# Patient Record
Sex: MALE | Race: WHITE | NOT HISPANIC OR LATINO | Employment: PART TIME | URBAN - METROPOLITAN AREA
[De-identification: names, ages, dates, MRNs, and addresses within clinical notes are randomized per-mention and may not be internally consistent; named-entity substitution may affect disease eponyms.]

---

## 2017-01-16 ENCOUNTER — TRANSCRIBE ORDERS (OUTPATIENT)
Dept: ADMINISTRATIVE | Facility: HOSPITAL | Age: 72
End: 2017-01-16

## 2017-01-16 DIAGNOSIS — Z87.891 PERSONAL HISTORY OF TOBACCO USE, PRESENTING HAZARDS TO HEALTH: Primary | ICD-10-CM

## 2017-01-20 ENCOUNTER — HOSPITAL ENCOUNTER (OUTPATIENT)
Dept: ULTRASOUND IMAGING | Facility: HOSPITAL | Age: 72
Discharge: HOME | End: 2017-01-20
Attending: FAMILY MEDICINE | Admitting: FAMILY MEDICINE

## 2017-01-20 DIAGNOSIS — Z87.891 PERSONAL HISTORY OF TOBACCO USE, PRESENTING HAZARDS TO HEALTH: ICD-10-CM

## 2017-01-20 PROCEDURE — 76706 US ABDL AORTA SCREEN AAA: CPT

## 2017-02-17 ENCOUNTER — OFFICE VISIT (OUTPATIENT)
Dept: CARDIOLOGY | Facility: CLINIC | Age: 72
End: 2017-02-17

## 2017-02-17 VITALS
WEIGHT: 212 LBS | DIASTOLIC BLOOD PRESSURE: 95 MMHG | HEIGHT: 71 IN | BODY MASS INDEX: 29.68 KG/M2 | SYSTOLIC BLOOD PRESSURE: 151 MMHG | HEART RATE: 63 BPM

## 2017-02-17 DIAGNOSIS — I25.10 CORONARY ARTERY DISEASE INVOLVING NATIVE CORONARY ARTERY OF NATIVE HEART WITHOUT ANGINA PECTORIS: Primary | ICD-10-CM

## 2017-02-17 DIAGNOSIS — I10 ESSENTIAL HYPERTENSION: ICD-10-CM

## 2017-02-17 DIAGNOSIS — E78.2 MIXED HYPERLIPIDEMIA: ICD-10-CM

## 2017-02-17 PROCEDURE — 99213 OFFICE O/P EST LOW 20 MIN: CPT | Performed by: INTERNAL MEDICINE

## 2017-02-17 NOTE — PROGRESS NOTES
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     PROBLEM LIST:  1.  CAD:  a. 11/15/1993: PTCA and repeat PTCA 1 week following of proximal  LAD, per Dr. Lovelace with normal circumflex and RCA noted at that time.   b. April 2008: Overlapping 3.0 x 24 mm. And 3.0 x 16.0 mm Taxus to LAD and 2.5 x 8.0 PTCA to proximal first OM, inability to pass stent.  EF greater than 60.  c. Stress echocardiogram,  03/08/2012, within normal limits: EF 60%, 10.1 SHARIFA.    d. 5/16 : Stress echocardiogram, which was within normal limits. EF greater than 60%. Normal hemodynamic response with exercise.   2. Dyslipidemia:  a.  November 2009:  Total cholesterol 163, triglycerides 169, HDL 55, LDL 81.  b. 02/30/2012:  Total cholesterol 242, HDL 51, triglycerides 192, , off statin therapy.  3. Nicotine addiction cessation 15 years prior.  4. HTN, presumed essential.  5. Hypothyroidism on replacement therapy.   6. Exogenous obesity.  7. Arthritis ---- data deficient, on steroid therapy greater than 5 years.   8. GERD.   9. Easy bruising.   10. Right jaw swelling, ongoing evaluation for potential mandibular infection per Dr. Smith.   11. Hip and foot pain, followed per Subhash Sloan and Chao.    12. Prostatism- 2017 3 rounds abx     ALLERGIES/DRUG INTOLERANCES:  1. ZOCOR, arthralgias.  2. LIPITOR,  arthralgias.  3. CRESTOR, arthralgias.    CURRENT MEDICATIONS:  1.  Tamsulosin 0.4 mg.  2. Celebrex 200 mg.  3. Crestor 10 mg.  4. Metoprolol tartrate 25 mg.  5. Levothyroxine 125 mg.  6. Prednisone 5 mg.  7. Nitrostat p.r.n.   8. Aspirin 81 mg.  9. Vitamin D3 at 5000 international units daily.  10. Fish oil 1000 mg.  11.  Osteo Bi-Flex b.i.d.     SUBJECTIVE: Patient presents in followup. He has had no new  substernal chest fullness, pressure, and shortness of breath with activity. He states  he is not sleeping  well and is still having some issues with his prostate.     OBJECTIVE:  VITAL SIGNS: Blood pressure 159/88, heart rate 58, weight 228 pounds, up 11 pounds from last visit.  GENERAL: Overweight white male appears stated age.   NECK: No JVD.   CARDIOVASCULAR: S1 and S2. Soft holosystolic murmur in the left upper sternal border.  CHEST: Clear.   ABDOMEN: Soft. Positive bowel sounds.    EXTREMITIES: No pitting edema.     IMPRESSION AND PLAN:  1. Chest pain, anginal equivalent, with prior revascularization. No stress test since 2012. Risk stratify  with stress echocardiogram at his nearest convenience. Low threshold for invasive ischemic evaluation with any wall motion abnormality.   2. Hypertension, uncontrolled, followup on the day of stress test.   3. Dyslipidemia, on statin therapy with recent  laboratory with Dr. Fletcher. We will obtain this at nearest convenience.   4. Exogenous obesity. Counseled regarding need for weight reduction. I will see him back otherwise in 6-9 months.   5. Disposition following stress testing.       Tal Wagner MD*  KINGSLEY/ashkan    cc: Del Fletcher MD    [facility]

## 2017-02-22 ENCOUNTER — TELEPHONE (OUTPATIENT)
Dept: CARDIOLOGY | Facility: CLINIC | Age: 72
End: 2017-02-22

## 2017-02-22 NOTE — TELEPHONE ENCOUNTER
Patient called and stated that he had discussed a medication OTC he could take as he has been on multiple antibiotics. Asked patient if the dicussed probiotic. Patient stated that he believes that was what it was and wanted to know where to get. Informed patient probiotic are OTC and he can get at any pharmacy. Patient verbalized understanding.

## 2017-04-05 ENCOUNTER — OFFICE VISIT (OUTPATIENT)
Dept: ORTHOPEDIC SURGERY | Facility: CLINIC | Age: 72
End: 2017-04-05

## 2017-04-05 VITALS
HEIGHT: 71 IN | DIASTOLIC BLOOD PRESSURE: 100 MMHG | WEIGHT: 224 LBS | SYSTOLIC BLOOD PRESSURE: 150 MMHG | BODY MASS INDEX: 31.36 KG/M2 | HEART RATE: 63 BPM

## 2017-04-05 DIAGNOSIS — M70.61 TROCHANTERIC BURSITIS OF BOTH HIPS: Primary | ICD-10-CM

## 2017-04-05 DIAGNOSIS — M70.62 TROCHANTERIC BURSITIS OF BOTH HIPS: Primary | ICD-10-CM

## 2017-04-05 PROCEDURE — 20610 DRAIN/INJ JOINT/BURSA W/O US: CPT | Performed by: PHYSICIAN ASSISTANT

## 2017-04-05 PROCEDURE — 99213 OFFICE O/P EST LOW 20 MIN: CPT | Performed by: PHYSICIAN ASSISTANT

## 2017-04-05 RX ORDER — NITROFURANTOIN MACROCRYSTALS 50 MG/1
CAPSULE ORAL
Refills: 0 | COMMUNITY
Start: 2017-01-11 | End: 2018-01-19

## 2017-04-05 RX ORDER — METOPROLOL SUCCINATE 50 MG/1
TABLET, EXTENDED RELEASE ORAL
Refills: 1 | Status: ON HOLD | COMMUNITY
Start: 2017-02-06 | End: 2018-01-29

## 2017-04-05 RX ORDER — METHYLPREDNISOLONE ACETATE 40 MG/ML
40 INJECTION, SUSPENSION INTRA-ARTICULAR; INTRALESIONAL; INTRAMUSCULAR; SOFT TISSUE ONCE
Status: COMPLETED | OUTPATIENT
Start: 2017-04-05 | End: 2017-04-05

## 2017-04-05 RX ORDER — LIDOCAINE HYDROCHLORIDE 10 MG/ML
4 INJECTION, SOLUTION INFILTRATION; PERINEURAL ONCE
Status: COMPLETED | OUTPATIENT
Start: 2017-04-05 | End: 2017-04-05

## 2017-04-05 RX ORDER — BUPIVACAINE HYDROCHLORIDE 5 MG/ML
4 INJECTION, SOLUTION PERINEURAL ONCE
Status: COMPLETED | OUTPATIENT
Start: 2017-04-05 | End: 2017-04-05

## 2017-04-05 RX ORDER — PROMETHAZINE HYDROCHLORIDE AND CODEINE PHOSPHATE 6.25; 1 MG/5ML; MG/5ML
SYRUP ORAL
Refills: 0 | COMMUNITY
Start: 2017-03-30 | End: 2017-08-15

## 2017-04-05 RX ORDER — DOXYCYCLINE HYCLATE 100 MG/1
CAPSULE ORAL
Refills: 0 | COMMUNITY
Start: 2017-03-30 | End: 2017-08-15

## 2017-04-05 RX ORDER — AMOXICILLIN AND CLAVULANATE POTASSIUM 562.5; 437.5; 62.5 MG/1; MG/1; MG/1
TABLET, FILM COATED, EXTENDED RELEASE ORAL
Refills: 0 | COMMUNITY
Start: 2017-02-09 | End: 2017-08-15

## 2017-04-05 RX ORDER — BUPIVACAINE HYDROCHLORIDE 5 MG/ML
4 INJECTION, SOLUTION EPIDURAL; INTRACAUDAL ONCE
Status: DISCONTINUED | OUTPATIENT
Start: 2017-04-05 | End: 2017-04-05

## 2017-04-05 RX ORDER — MELOXICAM 15 MG/1
TABLET ORAL
COMMUNITY
Start: 2015-12-15 | End: 2017-04-05 | Stop reason: SDUPTHER

## 2017-04-05 RX ADMIN — BUPIVACAINE HYDROCHLORIDE 4 ML: 5 INJECTION, SOLUTION PERINEURAL at 16:48

## 2017-04-05 RX ADMIN — METHYLPREDNISOLONE ACETATE 40 MG: 40 INJECTION, SUSPENSION INTRA-ARTICULAR; INTRALESIONAL; INTRAMUSCULAR; SOFT TISSUE at 16:12

## 2017-04-05 RX ADMIN — LIDOCAINE HYDROCHLORIDE 4 ML: 10 INJECTION, SOLUTION INFILTRATION; PERINEURAL at 16:11

## 2017-04-05 RX ADMIN — LIDOCAINE HYDROCHLORIDE 4 ML: 10 INJECTION, SOLUTION INFILTRATION; PERINEURAL at 16:10

## 2017-04-05 RX ADMIN — BUPIVACAINE HYDROCHLORIDE 4 ML: 5 INJECTION, SOLUTION PERINEURAL at 16:47

## 2017-04-05 NOTE — PROGRESS NOTES
Subjective   Rodrigo Baum is a 72 y.o. male with returned bilateral trochanteric bursitis.    History of Present Illness   Mr. Baum returns today with returned bilateral lateral hip pain.  He reports the pain has returned for about 3 weeks.  He has pain with lying on his sides and some pain with walking.  The pain localizes to the greater trochanter bilaterally with no radiating pain, no groin pain, no buttock pain.  He has been treated in the past with multiple steroid injections into the trochanteric bursa every 6 months or more.  He does at home exercises to maintain his bilateral hip and knee strength.  He is here today wanting repeat injections.  No new symptoms.    Allergies   Allergen Reactions   • Lipitor [Atorvastatin] Anxiety and Other (See Comments)     Arthralgias     • Lortab [Hydrocodone-Acetaminophen] Anxiety     Current Outpatient Prescriptions on File Prior to Visit   Medication Sig Dispense Refill   • aspirin 81 MG EC tablet Take 81 mg by mouth Daily.     • cholecalciferol (VITAMIN D3) 1000 UNITS tablet Take 2,000 Units by mouth Daily.     • glucosamine-chondroitin 500-400 MG capsule capsule Take  by mouth 3 (Three) Times a Day With Meals.     • levothyroxine (SYNTHROID, LEVOTHROID) 125 MCG tablet Take 125 mcg by mouth Daily.     • meloxicam (MOBIC) 15 MG tablet Take 15 mg by mouth Daily.     • nitroglycerin (NITROSTAT) 0.4 MG SL tablet Place 0.4 mg under the tongue Every 5 (Five) Minutes As Needed for chest pain. Take no more than 3 doses in 15 minutes.     • pravastatin (PRAVACHOL) 10 MG tablet Take 10 mg by mouth Daily.     • predniSONE (DELTASONE) 5 MG tablet Take 5 mg by mouth Daily.     • tamsulosin (FLOMAX) 0.4 MG capsule 24 hr capsule Take 1 capsule by mouth Every Night.     • famotidine (PEPCID) 20 MG tablet Take 1 tablet by mouth 2 (Two) Times a Day As Needed for heartburn. 60 tablet 0   • metoprolol succinate XL (TOPROL-XL) 25 MG 24 hr tablet Take 25 mg by mouth Daily.       No  current facility-administered medications on file prior to visit.      Social History     Social History   • Marital status:      Spouse name: N/A   • Number of children: N/A   • Years of education: N/A     Occupational History   • Not on file.     Social History Main Topics   • Smoking status: Former Smoker     Types: Cigarettes   • Smokeless tobacco: Never Used   • Alcohol use 0.6 oz/week     1 Cans of beer per week      Comment: occasionally   • Drug use: No   • Sexual activity: Defer     Other Topics Concern   • Not on file     Social History Narrative     Past Surgical History:   Procedure Laterality Date   • CORONARY ANGIOPLASTY WITH STENT PLACEMENT      X2   • CYSTOSCOPY TRANSURETHRAL RESECTION OF PROSTATE N/A 11/1/2016    Procedure: CYSTOSCOPY TRANSURETHRAL RESECTION OF PROSTATE GREENLIGHT;  Surgeon: Alverto Richards MD;  Location: [facility];  Service:    • TONSILLECTOMY       Family History   Problem Relation Age of Onset   • Diabetes Father    • Heart disease Father    • Cancer Father    • Hypertension Father    • Stroke Mother    • Hypertension Mother    • Osteoarthritis Mother    • Stroke Other    • Hypertension Other      Past Medical History:   Diagnosis Date   • Arthritis     Arthritis -- data deficient, on steroid therapy greater than 5 years.    • CAD (coronary artery disease)    • Coronary angioplasty status    • Disease of thyroid gland    • Dyslipidemia    • Easy bruising    • Enlarged prostate    • Exogenous obesity    • Foot pain     Hip and foot pain, followed per Subhash Sloan and Chao.     • GERD (gastroesophageal reflux disease)    • Hip pain     Hip and foot pain, followed per Subhash Sloan and Chao.     • HLD (hyperlipidemia)    • Hypertension     HTN, presumed essential.   • Hypothyroidism     Hypothyroidism on replacement therapy.    • Jaw swelling     Right jaw swelling, ongoing evaluation for potential mandibular infection per Dr. Smith.    • Kidney problem    • Nicotine  "addiction     Nicotine addiction cessation 15 years prior.   • Osteoporosis    • Thin blood          Review of Systems   Constitutional: Negative.  Negative for chills and fatigue.   HENT: Negative.    Eyes: Negative.    Respiratory: Negative.    Cardiovascular: Negative.    Gastrointestinal: Negative.    Endocrine: Negative.    Genitourinary: Negative.    Musculoskeletal: Positive for arthralgias. Negative for gait problem and joint swelling.   Skin: Negative.    Allergic/Immunologic: Negative.    Neurological: Negative.  Negative for weakness.   Hematological: Negative.    Psychiatric/Behavioral: Negative.        Objective   /100  Pulse 63  Ht 71\" (180.3 cm)  Wt 224 lb (102 kg)  BMI 31.24 kg/m2      Physical Exam   Constitutional: He is oriented to person, place, and time. He appears well-developed and well-nourished. No distress.   Neurological: He is alert and oriented to person, place, and time.   Skin: Skin is warm, dry and intact. No rash noted. No erythema.   Psychiatric: He has a normal mood and affect.     Body habitus: obese  Gait: normal    Right Hip Exam     Tenderness   The patient is experiencing tenderness in the greater trochanter.    Range of Motion   The patient has normal right hip ROM.  Flexion: 90   Internal Rotation: 30   External Rotation: 40     Muscle Strength   Abduction: 5/5   Adduction: 5/5   Flexion: 5/5     Other   Sensation: normal    Comments:  Negative Stinchfield's      Left Hip Exam     Tenderness   The patient is experiencing tenderness in the greater trochanter.    Range of Motion   The patient has normal left hip ROM.  Flexion: 90   Internal Rotation: 30   External Rotation: 40     Muscle Strength   Abduction: 5/5   Adduction: 5/5   Flexion: 5/5     Other   Sensation: normal    Comments:  Negative Stinchfield's        Back Exam     Tenderness   The patient is experiencing no tenderness.     Tests   Straight leg raise right: negative  Straight leg raise left: " negative    Other   Gait: normal             Assessment/Plan    Bilateral trochanteric bursitis.  I reviewed clinical findings, past and current treatment with the patient.  On exam, he is tender over bilateral greater trochanter.  He has normal range of motion of bilateral hips with no reproducible groin pain and normal strength.  He has beaded been treated in the past for several years by both myself and Dr. Mani Sloan with intermittent steroid injection and at-home exercises.  This gives him approximately 5-6 months relief before the pain returns.  I discussed the possibility of doing formal physical therapy, is not interested.  Then today's repeat steroid injection into bilateral trochanteric bursa.  He will return in 6 months or sooner if needed.      Using sterile technique, the left hip was sterilely prepped with Hibiclens.  Following time out, using a 25 gauge needle, the left trochanteric bursa was injected with 40mg Depo Medrol, 4 cc lidocaine and 4 cc marcaine.  Patient tolerated the procedure well. No complications.    Using sterile technique, the right hip was sterilely prepped with Hibiclens.  Using a 25 gauge needle, the right trochanteric bursa was injected with 40mg Depo Medrol, 4 cc lidocaine and 4 cc marcaine.  Patient tolerated the procedure well. No complications.        Rodrigo was seen today for follow-up, injections, follow-up and injections.    Diagnoses and all orders for this visit:    Trochanteric bursitis of both hips  -     methylPREDNISolone acetate (DEPO-medrol) injection 40 mg; 1 mL by Intra-tendon route 1 (One) Time.  -     methylPREDNISolone acetate (DEPO-medrol) injection 40 mg; 1 mL by Intra-tendon route 1 (One) Time.  -     lidocaine (XYLOCAINE) 1 % injection 4 mL; Inject 4 mL as directed 1 (One) Time.  -     bupivacaine (PF) (MARCAINE) 0.5 % injection 4 mL; Inject 4 mL as directed 1 (One) Time.  -     lidocaine (XYLOCAINE) 1 % injection 4 mL; Inject 4 mL as directed 1 (One)  Time.  -     bupivacaine (PF) (MARCAINE) 0.5 % injection 4 mL; Inject 4 mL as directed 1 (One) Time.    Other orders  -     Cancel: Large Joint Arthrocentesis

## 2017-05-18 ENCOUNTER — TRANSCRIBE ORDERS (OUTPATIENT)
Dept: ADMINISTRATIVE | Facility: HOSPITAL | Age: 72
End: 2017-05-18

## 2017-05-18 ENCOUNTER — HOSPITAL ENCOUNTER (OUTPATIENT)
Dept: GENERAL RADIOLOGY | Facility: HOSPITAL | Age: 72
Discharge: HOME | End: 2017-05-18
Attending: FAMILY MEDICINE | Admitting: FAMILY MEDICINE

## 2017-05-18 DIAGNOSIS — J45.991 COUGH VARIANT ASTHMA: Primary | ICD-10-CM

## 2017-05-18 PROCEDURE — 71020 HC CHEST PA AND LATERAL: CPT

## 2017-08-01 ENCOUNTER — OFFICE VISIT (OUTPATIENT)
Dept: ORTHOPEDIC SURGERY | Facility: CLINIC | Age: 72
End: 2017-08-01

## 2017-08-01 DIAGNOSIS — M17.0 PRIMARY OSTEOARTHRITIS OF BOTH KNEES: Primary | ICD-10-CM

## 2017-08-01 PROCEDURE — 20610 DRAIN/INJ JOINT/BURSA W/O US: CPT | Performed by: PHYSICIAN ASSISTANT

## 2017-08-01 RX ORDER — HYALURONATE SODIUM 10 MG/ML
20 SYRINGE (ML) INTRAARTICULAR
Status: COMPLETED | OUTPATIENT
Start: 2017-08-01 | End: 2017-08-01

## 2017-08-01 RX ORDER — LISINOPRIL 10 MG/1
TABLET ORAL
Refills: 2 | Status: ON HOLD | COMMUNITY
Start: 2017-07-17 | End: 2018-01-29

## 2017-08-01 RX ADMIN — Medication 20 MG: at 15:40

## 2017-08-01 RX ADMIN — Medication 20 MG: at 15:39

## 2017-08-01 NOTE — PROGRESS NOTES
Procedure   Large Joint Arthrocentesis  Date/Time: 8/1/2017 3:40 PM  Consent given by: patient  Site marked: site marked  Timeout: Immediately prior to procedure a time out was called to verify the correct patient, procedure, equipment, support staff and site/side marked as required   Supporting Documentation  Indications: pain   Procedure Details  Location: knee - L knee  Preparation: Patient was prepped and draped in the usual sterile fashion  Needle size: 22 G  Medications administered: 20 mg Sodium Hyaluronate 20 MG/2ML  Patient tolerance: patient tolerated the procedure well with no immediate complications

## 2017-08-01 NOTE — PROGRESS NOTES
Procedure   Large Joint Arthrocentesis  Date/Time: 8/1/2017 3:39 PM  Consent given by: patient  Site marked: site marked  Timeout: Immediately prior to procedure a time out was called to verify the correct patient, procedure, equipment, support staff and site/side marked as required   Supporting Documentation  Indications: pain   Procedure Details  Location: knee - R knee  Preparation: Patient was prepped and draped in the usual sterile fashion  Needle size: 22 G  Approach: anterolateral  Medications administered: 20 mg Sodium Hyaluronate 20 MG/2ML  Patient tolerance: patient tolerated the procedure well with no immediate complications

## 2017-08-01 NOTE — PROGRESS NOTES
Subjective     Injections (bilateral euflexxa #1)      Rodrigo Baum is a 72 y.o. male.     History of Present Illness   Patient returns to begin a series of Euflexxa in bilateral knees.  He reports no new symptoms.  He complains of mild medial functional pain with no night pain.  He denies any radiating pain or mechanical symptoms.  He denies any erythema, warmth or effusion.  He describes the pain as aching with intermittent sharp pain.  He reports he gets significant relief from Euflexxa injections for up to 6 months.  As long he is he is getting significant relief from these injections he will continue to get them.    Allergies   Allergen Reactions   • Lipitor [Atorvastatin] Anxiety and Other (See Comments)     Arthralgias     • Lortab [Hydrocodone-Acetaminophen] Anxiety     Current Outpatient Prescriptions on File Prior to Visit   Medication Sig Dispense Refill   • amoxicillin-clavulanate XR (AUGMENTIN XR) 1000-62.5 MG per 12 hr tablet TK 2 TS PO Q 12 H  0   • aspirin 81 MG EC tablet Take 81 mg by mouth Daily.     • cholecalciferol (VITAMIN D3) 1000 UNITS tablet Take 2,000 Units by mouth Daily.     • doxycycline (VIBRAMYCIN) 100 MG capsule   0   • famotidine (PEPCID) 20 MG tablet Take 1 tablet by mouth 2 (Two) Times a Day As Needed for heartburn. 60 tablet 0   • glucosamine-chondroitin 500-400 MG capsule capsule Take  by mouth 3 (Three) Times a Day With Meals.     • levothyroxine (SYNTHROID, LEVOTHROID) 125 MCG tablet Take 125 mcg by mouth Daily.     • meloxicam (MOBIC) 15 MG tablet Take 15 mg by mouth Daily.     • metoprolol succinate XL (TOPROL-XL) 25 MG 24 hr tablet Take 25 mg by mouth Daily.     • metoprolol succinate XL (TOPROL-XL) 50 MG 24 hr tablet TK 1 T PO D  1   • nitrofurantoin (MACRODANTIN) 50 MG capsule As needed  0   • nitroglycerin (NITROSTAT) 0.4 MG SL tablet Place 0.4 mg under the tongue Every 5 (Five) Minutes As Needed for chest pain. Take no more than 3 doses in 15 minutes.     •  pravastatin (PRAVACHOL) 10 MG tablet Take 10 mg by mouth Daily.     • predniSONE (DELTASONE) 5 MG tablet Take 5 mg by mouth Daily.     • promethazine-codeine (PHENERGAN with CODEINE) 6.25-10 MG/5ML syrup TK 1 OR 2 TEA PO Q 4 TO 6 H PRN FOR COUGH  0   • tamsulosin (FLOMAX) 0.4 MG capsule 24 hr capsule Take 1 capsule by mouth Every Night.       No current facility-administered medications on file prior to visit.      Social History     Social History   • Marital status:      Spouse name: N/A   • Number of children: N/A   • Years of education: N/A     Occupational History   • Not on file.     Social History Main Topics   • Smoking status: Former Smoker     Types: Cigarettes   • Smokeless tobacco: Never Used   • Alcohol use 0.6 oz/week     1 Cans of beer per week      Comment: occasionally   • Drug use: No   • Sexual activity: Defer     Other Topics Concern   • Not on file     Social History Narrative     Past Surgical History:   Procedure Laterality Date   • CORONARY ANGIOPLASTY WITH STENT PLACEMENT      X2   • CYSTOSCOPY TRANSURETHRAL RESECTION OF PROSTATE N/A 11/1/2016    Procedure: CYSTOSCOPY TRANSURETHRAL RESECTION OF PROSTATE GREENLIGHT;  Surgeon: Alverto Richards MD;  [facility];  Service:    • TONSILLECTOMY       Family History   Problem Relation Age of Onset   • Diabetes Father    • Heart disease Father    • Cancer Father    • Hypertension Father    • Stroke Mother    • Hypertension Mother    • Osteoarthritis Mother    • Stroke Other    • Hypertension Other      Past Medical History:   Diagnosis Date   • Arthritis     Arthritis -- data deficient, on steroid therapy greater than 5 years.    • CAD (coronary artery disease)    • Coronary angioplasty status    • Disease of thyroid gland    • Dyslipidemia    • Easy bruising    • Enlarged prostate    • Exogenous obesity    • Foot pain     Hip and foot pain, followed per Subhash Sloan and Chao.     • GERD (gastroesophageal reflux disease)    • Hip  pain     Hip and foot pain, followed per Subhash Sloan and Chao.     • HLD (hyperlipidemia)    • Hypertension     HTN, presumed essential.   • Hypothyroidism     Hypothyroidism on replacement therapy.    • Jaw swelling     Right jaw swelling, ongoing evaluation for potential mandibular infection per Dr. Smith.    • Kidney problem    • Nicotine addiction     Nicotine addiction cessation 15 years prior.   • Osteoporosis    • Thin blood          Review of Systems    The following portions of the patient's history were reviewed and updated as appropriate: allergies, current medications, past family history, past medical history, past social history, past surgical history and problem list.    Objective   There were no vitals taken for this visit.    Physical Exam:   GENERAL: Body habitus: obese    Gait: normal     Mental Status:  awake and alert; oriented to person, place, and time    Voice:  clear  SKIN:  Normal    Hair Growth:  Right:normal; Left:  normal  HEENT: Head: Normocephalic, no lesions, without obvious abnormality.     Eyes: sclera anicteric  PULM:  Repiratory effort normal    Ortho Exam    Right Knee Exam  ----------  ALIGNMENT: Right: neutral----------  RANGE OF MOTION:  Right: Normal (0-130 degrees) with no extensor lag or flexion contracture  LIGAMENTOUS STABILITY:   Right:stable to varus and valgus stress at 0 and 30 degrees without any evidence of laxity----------  STRENGTH:  KNEE FLEXION Right 5/5  KNEE EXTENSION Right 5/5 ----------  PAIN WITH PALPATION: Right denies tenderness to palpation about the knee  PAIN WITH KNEE ROM: Right no  PATELLAR CREPITUS: Right yes   ----------    Left Knee Exam  ----------  Knee Exam:  ----------  ALIGNMENT:  Left: neutral  ----------  RANGE OF MOTION:  Left: Normal (0-130 degrees) with no extensor lag or flexion contracture  LIGAMENTOUS STABILITY:   Left:stable to varus and valgus stress at 0 and 30 degrees without any evidence of laxity    ----------  STRENGTH:  KNEE FLEXION  Left 5/5  KNEE EXTENSION Left 5/5  ----------  PAIN WITH PALPATION: Left denies tenderness to palpation about the knee  PAIN WITH KNEE ROM:  Left no  PATELLAR CREPITUS:  Left yes  ----------        Medical Decision Making    Data Review:   none    Assessment and Plan/ Diagnosis/Treatment options:   Well with nonoperative treatment of bilateral knee arthritis.  I reviewed clinical findings, past and current treatment with the patient.  On exam, he has none nontender with good range of motion stable ligamentous exam with no evidence of new ligament or meniscal pathology.  He has been treated in the past with multiple series of Euflexxa with significant relief for 6 months.  He reports mild increased pain over the past month. He would like to repeat Euflexxa in both knees at this time.  Plan today is to begin the series in bilateral knees.  He'll return in 1 week for the second injection or sooner if needed.      Using sterile technique, the left knee was sterilely prepped with Hibiclens.  Following time out, using a 22 gauge needle the left knee was aspirated and then injected with 2 ml Euflexxa. Approximately 0.5 mm of straw-colored fluid was obtained.  Patient tolerated the procedure well.  No complications.      Using sterile technique, the right knee was sterilely prepped with Hibiclens.  Following time out using a 22 gauge needle, the right knee was aspirated and then injected with 2 ml Euflexxa.  Approximately 0.5 cc of straw-colored fluid was obtained.  Patient tolerated the procedure well. No complications

## 2017-08-08 ENCOUNTER — CLINICAL SUPPORT (OUTPATIENT)
Dept: ORTHOPEDIC SURGERY | Facility: CLINIC | Age: 72
End: 2017-08-08

## 2017-08-08 DIAGNOSIS — M17.0 OSTEOARTHRITIS OF BOTH KNEES, UNSPECIFIED OSTEOARTHRITIS TYPE: Primary | ICD-10-CM

## 2017-08-08 PROCEDURE — 20610 DRAIN/INJ JOINT/BURSA W/O US: CPT | Performed by: PHYSICIAN ASSISTANT

## 2017-08-08 RX ORDER — HYALURONATE SODIUM 10 MG/ML
20 SYRINGE (ML) INTRAARTICULAR
Status: COMPLETED | OUTPATIENT
Start: 2017-08-08 | End: 2017-08-08

## 2017-08-08 RX ADMIN — Medication 20 MG: at 15:27

## 2017-08-08 RX ADMIN — Medication 20 MG: at 15:28

## 2017-08-08 NOTE — PROGRESS NOTES
Procedure   Large Joint Arthrocentesis  Date/Time: 8/8/2017 3:28 PM  Consent given by: patient  Site marked: site marked  Timeout: Immediately prior to procedure a time out was called to verify the correct patient, procedure, equipment, support staff and site/side marked as required   Supporting Documentation  Indications: pain   Procedure Details  Location: knee - L knee  Needle size: 22 G  Approach: anteromedial  Medications administered: 20 mg Sodium Hyaluronate 20 MG/2ML  Patient tolerance: patient tolerated the procedure well with no immediate complications

## 2017-08-08 NOTE — PROGRESS NOTES
Subjective     Follow-up (Bilat 2nd Euflexxa injection)      Rodrigo Baum is a 72 y.o. male.     History of Present Illness   Patient returns for the second injection of Euflexxa in bilateral knees.  He reports he has tolerated previous injections well.  Allergies   Allergen Reactions   • Lipitor [Atorvastatin] Anxiety and Other (See Comments)     Arthralgias     • Lortab [Hydrocodone-Acetaminophen] Anxiety     Current Outpatient Prescriptions on File Prior to Visit   Medication Sig Dispense Refill   • amoxicillin-clavulanate XR (AUGMENTIN XR) 1000-62.5 MG per 12 hr tablet TK 2 TS PO Q 12 H  0   • aspirin 81 MG EC tablet Take 81 mg by mouth Daily.     • cholecalciferol (VITAMIN D3) 1000 UNITS tablet Take 2,000 Units by mouth Daily.     • doxycycline (VIBRAMYCIN) 100 MG capsule   0   • famotidine (PEPCID) 20 MG tablet Take 1 tablet by mouth 2 (Two) Times a Day As Needed for heartburn. 60 tablet 0   • glucosamine-chondroitin 500-400 MG capsule capsule Take  by mouth 3 (Three) Times a Day With Meals.     • levothyroxine (SYNTHROID, LEVOTHROID) 125 MCG tablet Take 125 mcg by mouth Daily.     • lisinopril (PRINIVIL,ZESTRIL) 10 MG tablet TK 1 T PO D  2   • meloxicam (MOBIC) 15 MG tablet Take 15 mg by mouth Daily.     • metoprolol succinate XL (TOPROL-XL) 25 MG 24 hr tablet Take 25 mg by mouth Daily.     • metoprolol succinate XL (TOPROL-XL) 50 MG 24 hr tablet TK 1 T PO D  1   • nitrofurantoin (MACRODANTIN) 50 MG capsule As needed  0   • nitroglycerin (NITROSTAT) 0.4 MG SL tablet Place 0.4 mg under the tongue Every 5 (Five) Minutes As Needed for chest pain. Take no more than 3 doses in 15 minutes.     • pravastatin (PRAVACHOL) 10 MG tablet Take 10 mg by mouth Daily.     • predniSONE (DELTASONE) 5 MG tablet Take 5 mg by mouth Daily.     • promethazine-codeine (PHENERGAN with CODEINE) 6.25-10 MG/5ML syrup TK 1 OR 2 TEA PO Q 4 TO 6 H PRN FOR COUGH  0   • tamsulosin (FLOMAX) 0.4 MG capsule 24 hr capsule Take 1 capsule by  mouth Every Night.       No current facility-administered medications on file prior to visit.      Social History     Social History   • Marital status:      Spouse name: N/A   • Number of children: N/A   • Years of education: N/A     Occupational History   • Not on file.     Social History Main Topics   • Smoking status: Former Smoker     Types: Cigarettes   • Smokeless tobacco: Never Used   • Alcohol use 0.6 oz/week     1 Cans of beer per week      Comment: occasionally   • Drug use: No   • Sexual activity: Defer     Other Topics Concern   • Not on file     Social History Narrative     Past Surgical History:   Procedure Laterality Date   • CORONARY ANGIOPLASTY WITH STENT PLACEMENT      X2   • CYSTOSCOPY TRANSURETHRAL RESECTION OF PROSTATE N/A 11/1/2016    Procedure: CYSTOSCOPY TRANSURETHRAL RESECTION OF PROSTATE GREENLIGHT;  Surgeon: Alverto Richards MD;  Location: [facility];  Service:    • TONSILLECTOMY       Family History   Problem Relation Age of Onset   • Diabetes Father    • Heart disease Father    • Cancer Father    • Hypertension Father    • Stroke Mother    • Hypertension Mother    • Osteoarthritis Mother    • Stroke Other    • Hypertension Other      Past Medical History:   Diagnosis Date   • Arthritis     Arthritis -- data deficient, on steroid therapy greater than 5 years.    • CAD (coronary artery disease)    • Coronary angioplasty status    • Disease of thyroid gland    • Dyslipidemia    • Easy bruising    • Enlarged prostate    • Exogenous obesity    • Foot pain     Hip and foot pain, followed per Subhash Sloan and Chao.     • GERD (gastroesophageal reflux disease)    • Hip pain     Hip and foot pain, followed per Subhash Sloan and Chao.     • HLD (hyperlipidemia)    • Hypertension     HTN, presumed essential.   • Hypothyroidism     Hypothyroidism on replacement therapy.    • Jaw swelling     Right jaw swelling, ongoing evaluation for potential mandibular infection per Dr. Smith.    •  Kidney problem    • Nicotine addiction     Nicotine addiction cessation 15 years prior.   • Osteoporosis    • Thin blood          Review of Systems    The following portions of the patient's history were reviewed and updated as appropriate: allergies, current medications, past family history, past medical history, past social history, past surgical history and problem list.    Ortho Exam      Medical Decision Making    Assessment and Plan/ Diagnosis/Treatment options:   Bilateral knee arthritis undergoing Euflexxa series.  Patient is tolerated previous injections well.  Plan is to proceed with second injection today both knees he'll return in 1 week or sooner if needed for the final injection.    Using sterile technique, the left knee was sterilely prepped with Hibiclens.  Following time out, using a 22 gauge needle the left knee was aspirated and then injected with 2 ml Euflexxa. Approximately 0.5 mm of straw-colored fluid was obtained.  Patient tolerated the procedure well.  No complications.      Using sterile technique, the right knee was sterilely prepped with Hibiclens.  Following time out using a 22 gauge needle, the right knee was aspirated and then injected with 2 ml Euflexxa.  Approximately 0.5 cc of straw-colored fluid was obtained.  Patient tolerated the procedure well. No complications

## 2017-08-08 NOTE — PROGRESS NOTES
Procedure   Large Joint Arthrocentesis  Date/Time: 8/8/2017 3:27 PM  Consent given by: patient  Site marked: site marked  Timeout: Immediately prior to procedure a time out was called to verify the correct patient, procedure, equipment, support staff and site/side marked as required   Supporting Documentation  Indications: pain   Procedure Details  Location: knee - R knee  Needle size: 22 G  Approach: anterolateral  Medications administered: 20 mg Sodium Hyaluronate 20 MG/2ML  Patient tolerance: patient tolerated the procedure well with no immediate complications

## 2017-08-15 ENCOUNTER — CLINICAL SUPPORT (OUTPATIENT)
Dept: ORTHOPEDIC SURGERY | Facility: CLINIC | Age: 72
End: 2017-08-15

## 2017-08-15 DIAGNOSIS — M17.0 PRIMARY OSTEOARTHRITIS OF BOTH KNEES: Primary | ICD-10-CM

## 2017-08-15 PROCEDURE — 20610 DRAIN/INJ JOINT/BURSA W/O US: CPT | Performed by: PHYSICIAN ASSISTANT

## 2017-08-15 RX ORDER — HYALURONATE SODIUM 10 MG/ML
20 SYRINGE (ML) INTRAARTICULAR
Status: COMPLETED | OUTPATIENT
Start: 2017-08-15 | End: 2017-08-15

## 2017-08-15 RX ADMIN — Medication 20 MG: at 15:04

## 2017-08-15 RX ADMIN — Medication 20 MG: at 15:05

## 2017-08-15 NOTE — PROGRESS NOTES
Procedure   Large Joint Arthrocentesis  Date/Time: 8/15/2017 3:04 PM  Consent given by: patient  Site marked: site marked  Timeout: Immediately prior to procedure a time out was called to verify the correct patient, procedure, equipment, support staff and site/side marked as required   Supporting Documentation  Indications: pain   Procedure Details  Location: knee - R knee  Preparation: Patient was prepped and draped in the usual sterile fashion  Needle size: 22 G  Approach: anterolateral  Medications administered: 20 mg Sodium Hyaluronate 20 MG/2ML  Patient tolerance: patient tolerated the procedure well with no immediate complications

## 2017-08-15 NOTE — PROGRESS NOTES
Subjective     Injections (#3 Euflexxa Bilateral knees)      Rodrigo Baum is a 72 y.o. male.     History of Present Illness   Patient is here for her final injection of Euflexxa in bilateral knees.  He has tolerated previous injections well.  Allergies   Allergen Reactions   • Lipitor [Atorvastatin] Anxiety and Other (See Comments)     Arthralgias     • Lortab [Hydrocodone-Acetaminophen] Anxiety     Current Outpatient Prescriptions on File Prior to Visit   Medication Sig Dispense Refill   • aspirin 81 MG EC tablet Take 81 mg by mouth Daily.     • cholecalciferol (VITAMIN D3) 1000 UNITS tablet Take 2,000 Units by mouth Daily.     • famotidine (PEPCID) 20 MG tablet Take 1 tablet by mouth 2 (Two) Times a Day As Needed for heartburn. 60 tablet 0   • glucosamine-chondroitin 500-400 MG capsule capsule Take  by mouth 3 (Three) Times a Day With Meals.     • levothyroxine (SYNTHROID, LEVOTHROID) 125 MCG tablet Take 125 mcg by mouth Daily.     • lisinopril (PRINIVIL,ZESTRIL) 10 MG tablet TK 1 T PO D  2   • meloxicam (MOBIC) 15 MG tablet Take 15 mg by mouth Daily.     • metoprolol succinate XL (TOPROL-XL) 50 MG 24 hr tablet TK 1 T PO D  1   • nitrofurantoin (MACRODANTIN) 50 MG capsule As needed  0   • nitroglycerin (NITROSTAT) 0.4 MG SL tablet Place 0.4 mg under the tongue Every 5 (Five) Minutes As Needed for chest pain. Take no more than 3 doses in 15 minutes.     • pravastatin (PRAVACHOL) 10 MG tablet Take 10 mg by mouth Daily.     • predniSONE (DELTASONE) 5 MG tablet Take 5 mg by mouth Daily.     • tamsulosin (FLOMAX) 0.4 MG capsule 24 hr capsule Take 1 capsule by mouth Every Night.     • [DISCONTINUED] amoxicillin-clavulanate XR (AUGMENTIN XR) 1000-62.5 MG per 12 hr tablet TK 2 TS PO Q 12 H  0   • [DISCONTINUED] doxycycline (VIBRAMYCIN) 100 MG capsule   0   • [DISCONTINUED] metoprolol succinate XL (TOPROL-XL) 25 MG 24 hr tablet Take 25 mg by mouth Daily.     • [DISCONTINUED] promethazine-codeine (PHENERGAN with CODEINE)  6.25-10 MG/5ML syrup TK 1 OR 2 TEA PO Q 4 TO 6 H PRN FOR COUGH  0     No current facility-administered medications on file prior to visit.      Social History     Social History   • Marital status:      Spouse name: N/A   • Number of children: N/A   • Years of education: N/A     Occupational History   • Not on file.     Social History Main Topics   • Smoking status: Former Smoker     Types: Cigarettes   • Smokeless tobacco: Never Used   • Alcohol use 0.6 oz/week     1 Cans of beer per week      Comment: occasionally   • Drug use: No   • Sexual activity: Defer     Other Topics Concern   • Not on file     Social History Narrative     Past Surgical History:   Procedure Laterality Date   • CORONARY ANGIOPLASTY WITH STENT PLACEMENT      X2   • CYSTOSCOPY TRANSURETHRAL RESECTION OF PROSTATE N/A 11/1/2016    Procedure: CYSTOSCOPY TRANSURETHRAL RESECTION OF PROSTATE GREENLIGHT;  Surgeon: Alverto Richards MD;  Location: [facility];  Service:    • TONSILLECTOMY       Family History   Problem Relation Age of Onset   • Diabetes Father    • Heart disease Father    • Cancer Father    • Hypertension Father    • Stroke Mother    • Hypertension Mother    • Osteoarthritis Mother    • Stroke Other    • Hypertension Other      Past Medical History:   Diagnosis Date   • Arthritis     Arthritis -- data deficient, on steroid therapy greater than 5 years.    • CAD (coronary artery disease)    • Coronary angioplasty status    • Disease of thyroid gland    • Dyslipidemia    • Easy bruising    • Enlarged prostate    • Exogenous obesity    • Foot pain     Hip and foot pain, followed per Subhash Sloan and Chao.     • GERD (gastroesophageal reflux disease)    • Hip pain     Hip and foot pain, followed per Subhash Peña.     • HLD (hyperlipidemia)    • Hypertension     HTN, presumed essential.   • Hypothyroidism     Hypothyroidism on replacement therapy.    • Jaw swelling     Right jaw swelling, ongoing evaluation for  potential mandibular infection per Dr. Smith.    • Kidney problem    • Nicotine addiction     Nicotine addiction cessation 15 years prior.   • Osteoporosis    • Thin blood          Review of Systems    The following portions of the patient's history were reviewed and updated as appropriate: allergies, current medications, past family history, past medical history, past social history, past surgical history and problem list.    Ortho Exam      Medical Decision Making    Assessment and Plan/ Diagnosis/Treatment optbilateral knee arthritis undergoing Euflexxa series.  Patient is tolerated previous injections well.  Plan is to finish the series in both knees today.  He'll return in 6 months for repeat series or sooner if needed.    Using sterile technique, the left knee was sterilely prepped with Hibiclens.  Following time out, using a 22 gauge needle the left knee was aspirated and then injected with 2 ml Euflexxa. Approximately 0.5 mm of straw-colored fluid was obtained.  Patient tolerated the procedure well.  No complications.      Using sterile technique, the right knee was sterilely prepped with Hibiclens.  Following time out using a 22 gauge needle, the right knee was aspirated and then injected with 2 ml Euflexxa.  Approximately 0.5 cc of straw-colored fluid was obtained.  Patient tolerated the procedure well. No complications

## 2017-10-19 ENCOUNTER — OUTSIDE FACILITY SERVICE (OUTPATIENT)
Dept: GASTROENTEROLOGY | Facility: CLINIC | Age: 72
End: 2017-10-19

## 2017-10-19 ENCOUNTER — LAB REQUISITION (OUTPATIENT)
Dept: LAB | Facility: HOSPITAL | Age: 72
End: 2017-10-19

## 2017-10-19 DIAGNOSIS — Z12.11 ENCOUNTER FOR SCREENING FOR MALIGNANT NEOPLASM OF COLON: ICD-10-CM

## 2017-10-19 PROCEDURE — 45378 DIAGNOSTIC COLONOSCOPY: CPT | Performed by: INTERNAL MEDICINE

## 2017-10-19 PROCEDURE — 43239 EGD BIOPSY SINGLE/MULTIPLE: CPT | Performed by: INTERNAL MEDICINE

## 2017-10-19 PROCEDURE — 88305 TISSUE EXAM BY PATHOLOGIST: CPT | Performed by: INTERNAL MEDICINE

## 2017-10-19 PROCEDURE — 88312 SPECIAL STAINS GROUP 1: CPT | Performed by: INTERNAL MEDICINE

## 2017-10-20 LAB
CYTO UR: NORMAL
LAB AP CASE REPORT: NORMAL
LAB AP CLINICAL INFORMATION: NORMAL
Lab: NORMAL
PATH REPORT.FINAL DX SPEC: NORMAL
PATH REPORT.GROSS SPEC: NORMAL

## 2017-10-26 ENCOUNTER — TELEPHONE (OUTPATIENT)
Dept: GASTROENTEROLOGY | Facility: CLINIC | Age: 72
End: 2017-10-26

## 2017-11-09 ENCOUNTER — OFFICE VISIT (OUTPATIENT)
Dept: ORTHOPEDIC SURGERY | Facility: CLINIC | Age: 72
End: 2017-11-09

## 2017-11-09 DIAGNOSIS — M70.62 TROCHANTERIC BURSITIS OF BOTH HIPS: Primary | ICD-10-CM

## 2017-11-09 DIAGNOSIS — M70.61 TROCHANTERIC BURSITIS OF BOTH HIPS: Primary | ICD-10-CM

## 2017-11-09 PROCEDURE — 99214 OFFICE O/P EST MOD 30 MIN: CPT | Performed by: PHYSICIAN ASSISTANT

## 2017-11-09 PROCEDURE — 20610 DRAIN/INJ JOINT/BURSA W/O US: CPT | Performed by: PHYSICIAN ASSISTANT

## 2017-11-09 RX ORDER — POLYETHYLENE GLYCOL-3350 AND ELECTROLYTES 236; 6.74; 5.86; 2.97; 22.74 G/274.31G; G/274.31G; G/274.31G; G/274.31G; G/274.31G
POWDER, FOR SOLUTION ORAL
Refills: 0 | COMMUNITY
Start: 2017-10-16 | End: 2018-01-19

## 2017-11-09 RX ORDER — LIDOCAINE HYDROCHLORIDE 10 MG/ML
4 INJECTION, SOLUTION INFILTRATION; PERINEURAL
Status: COMPLETED | OUTPATIENT
Start: 2017-11-09 | End: 2017-11-09

## 2017-11-09 RX ORDER — OMEPRAZOLE 40 MG/1
CAPSULE, DELAYED RELEASE ORAL
Refills: 3 | Status: ON HOLD | COMMUNITY
Start: 2017-10-19 | End: 2018-01-29

## 2017-11-09 RX ORDER — BUPIVACAINE HYDROCHLORIDE 2.5 MG/ML
4 INJECTION, SOLUTION INFILTRATION; PERINEURAL
Status: COMPLETED | OUTPATIENT
Start: 2017-11-09 | End: 2017-11-09

## 2017-11-09 RX ORDER — SUCRALFATE 1 G/10ML
SUSPENSION ORAL
Refills: 0 | COMMUNITY
Start: 2017-10-19 | End: 2018-01-19

## 2017-11-09 RX ORDER — METHYLPREDNISOLONE ACETATE 40 MG/ML
40 INJECTION, SUSPENSION INTRA-ARTICULAR; INTRALESIONAL; INTRAMUSCULAR; SOFT TISSUE
Status: COMPLETED | OUTPATIENT
Start: 2017-11-09 | End: 2017-11-09

## 2017-11-09 RX ADMIN — LIDOCAINE HYDROCHLORIDE 4 ML: 10 INJECTION, SOLUTION INFILTRATION; PERINEURAL at 14:24

## 2017-11-09 RX ADMIN — METHYLPREDNISOLONE ACETATE 40 MG: 40 INJECTION, SUSPENSION INTRA-ARTICULAR; INTRALESIONAL; INTRAMUSCULAR; SOFT TISSUE at 14:24

## 2017-11-09 RX ADMIN — LIDOCAINE HYDROCHLORIDE 4 ML: 10 INJECTION, SOLUTION INFILTRATION; PERINEURAL at 14:27

## 2017-11-09 RX ADMIN — BUPIVACAINE HYDROCHLORIDE 4 ML: 2.5 INJECTION, SOLUTION INFILTRATION; PERINEURAL at 14:27

## 2017-11-09 RX ADMIN — BUPIVACAINE HYDROCHLORIDE 4 ML: 2.5 INJECTION, SOLUTION INFILTRATION; PERINEURAL at 14:24

## 2017-11-09 NOTE — PROGRESS NOTES
Procedure   Large Joint Arthrocentesis  Date/Time: 11/9/2017 2:24 PM  Consent given by: patient  Site marked: site marked  Timeout: Immediately prior to procedure a time out was called to verify the correct patient, procedure, equipment, support staff and site/side marked as required   Supporting Documentation  Indications: pain   Procedure Details  Location: hip - R greater trochanteric bursa  Preparation: Patient was prepped and draped in the usual sterile fashion  Needle size: 22 G  Approach: anterolateral  Medications administered: 4 mL bupivacaine 0.25 %; 4 mL lidocaine 1 %; 40 mg methylPREDNISolone acetate 40 MG/ML (Marcaine NDC is 93060-772-75)  Patient tolerance: patient tolerated the procedure well with no immediate complications

## 2017-11-09 NOTE — PROGRESS NOTES
Patient: Rodrigo Baum  : 1945    Primary Care Provider: JOSHUA Fletcher MD    Requesting Provider: As above    Follow-up (7 month - Trochanteric bursitis of both hips)      History    Chief Complaint: Patient returns for bilateral lateral hip pain.    History of Present Illness: Patient returns today to discuss his increasing bilateral, lateral hip pain for 2 months.  He reports he has pain and stiffness when first getting up from a seated position or first thing in the morning and then he begins walking it seems to settle down some.  He denies any radiating pain or mechanical symptoms he complains the pain is sharp in nature.  He denies any groin pain or buttock pain.  He has been treated in the past with steroid injection every 6 months or so and physical therapy several years ago with improved pain for up to 6 months.  No new symptoms.  He is here wanting repeat injection today. Last injection         Allergies   Allergen Reactions   • Lipitor [Atorvastatin] Anxiety and Other (See Comments)     Arthralgias     • Lortab [Hydrocodone-Acetaminophen] Anxiety   • Azithromycin Nausea Only     Current Outpatient Prescriptions on File Prior to Visit   Medication Sig Dispense Refill   • aspirin 81 MG EC tablet Take 81 mg by mouth Daily.     • cholecalciferol (VITAMIN D3) 1000 UNITS tablet Take 2,000 Units by mouth Daily.     • famotidine (PEPCID) 20 MG tablet Take 1 tablet by mouth 2 (Two) Times a Day As Needed for heartburn. 60 tablet 0   • glucosamine-chondroitin 500-400 MG capsule capsule Take  by mouth 3 (Three) Times a Day With Meals.     • levothyroxine (SYNTHROID, LEVOTHROID) 125 MCG tablet Take 125 mcg by mouth Daily.     • lisinopril (PRINIVIL,ZESTRIL) 10 MG tablet TK 1 T PO D  2   • meloxicam (MOBIC) 15 MG tablet Take 15 mg by mouth Daily.     • metoprolol succinate XL (TOPROL-XL) 50 MG 24 hr tablet TK 1 T PO D  1   • nitroglycerin (NITROSTAT) 0.4 MG SL tablet Place 0.4 mg under the tongue Every  5 (Five) Minutes As Needed for chest pain. Take no more than 3 doses in 15 minutes.     • pravastatin (PRAVACHOL) 10 MG tablet Take 10 mg by mouth Daily.     • predniSONE (DELTASONE) 5 MG tablet Take 5 mg by mouth Daily.     • tamsulosin (FLOMAX) 0.4 MG capsule 24 hr capsule Take 1 capsule by mouth Every Night.     • gabapentin (NEURONTIN) 300 MG capsule Take 300 mg by mouth Take As Directed.     • nitrofurantoin (MACRODANTIN) 50 MG capsule As needed  0     No current facility-administered medications on file prior to visit.      Social History     Social History   • Marital status:      Spouse name: N/A   • Number of children: N/A   • Years of education: N/A     Occupational History   • Not on file.     Social History Main Topics   • Smoking status: Former Smoker     Packs/day: 2.00     Years: 20.00     Types: Cigarettes     Start date: 1974     Quit date: 1994   • Smokeless tobacco: Never Used   • Alcohol use 0.6 oz/week     1 Cans of beer per week      Comment: occasionally   • Drug use: No   • Sexual activity: Defer     Other Topics Concern   • Not on file     Social History Narrative     Past Surgical History:   Procedure Laterality Date   • CORONARY ANGIOPLASTY WITH STENT PLACEMENT      X2   • CYSTOSCOPY TRANSURETHRAL RESECTION OF PROSTATE N/A 11/1/2016    Procedure: CYSTOSCOPY TRANSURETHRAL RESECTION OF PROSTATE GREENLIGHT;  Surgeon: Alverto Richards MD;  [facility]  Service:    • TONSILLECTOMY       Family History   Problem Relation Age of Onset   • Diabetes Father    • Heart disease Father    • Cancer Father    • Hypertension Father    • Heart attack Father    • Osteoarthritis Father    • Stroke Mother    • Hypertension Mother    • Osteoarthritis Mother    • Stroke Other    • Hypertension Other    • Heart attack Other      Past Medical History:   Diagnosis Date   • Arthritis     Arthritis -- data deficient, on steroid therapy greater than 5 years.    • CAD (coronary artery disease)    •  Chondrocalcinosis    • Coronary angioplasty status    • Disease of thyroid gland    • Dyslipidemia    • Easy bruising    • Enlarged prostate    • Esophagitis    • Exogenous obesity    • Foot pain     Hip and foot pain, followed per Subhash Sloan and Chao.     • GERD (gastroesophageal reflux disease)    • Greater trochanteric bursitis of both hips    • Hammertoe    • Heart disease    • Hip arthritis    • Hip pain     Hip and foot pain, followed per Subhash Sloan and Chao.     • HLD (hyperlipidemia)    • Hypertension     HTN, presumed essential.   • Hypothyroidism     Hypothyroidism on replacement therapy.    • Jaw swelling     Right jaw swelling, ongoing evaluation for potential mandibular infection per Dr. Smith.    • Kidney problem    • Nicotine addiction     Nicotine addiction cessation 15 years prior.   • OA (osteoarthritis) of ankle/foot    • Osteoporosis    • RA (rheumatoid arthritis)    • Right hamstring muscle strain    • Thin blood    • Venous stasis          Review of Systems    The following portions of the patient's history were reviewed and updated as appropriate: allergies, current medications, past family history, past medical history, past social history, past surgical history and problem list.    Objective   There were no vitals taken for this visit.    Physical Exam:   GENERAL: Body habitus: obese    Lower extremity edema: Left: trace; Right: trace    Varicose veins:  Left: mild; Right: mild    Gait: normal     Mental Status:  awake and alert; oriented to person, place, and time    Voice:  clear  SKIN:  Normal    Hair Growth:  Right:normal; Left:  normal  HEENT: Head: Normocephalic, no lesions, without obvious abnormality.     Eyes: sclera anicteric  PULM:  Repiratory effort normal    Ortho Exam  Left hip exam:    RANGE OF MOTION:   FLEXION CONTRACTURE: None   FLEXION: 110 degrees   INTERNAL ROTATION: 20 degrees at 90 degrees of flexion   EXTERNAL ROTATION: 40 degrees at 90 degrees of flexion     PAIN WITH HIP MOTION: no  PAIN WITH LOGROLL: no  STINCHFIELD TEST: negative    STRENGTH:  5/5 hip adduction, abduction, flexion. 5/5 strength knee flexion, extension. 5/5 strength ankle dorsiflexion and plantarflexion.     GREATER TROCHANTER BURSAL PAIN:  Moderately tender    SENSATION TO LIGHT TOUCH:  DEEP PERONEAL/SUPERFICIAL PERONEAL/SURAL/SAPHENOUS/TIBIAL:  intact    STRAIGHT LEG TEST:   negative      Right  hip    RANGE OF MOTION:   FLEXION CONTRACTURE: None   FLEXION: 110 degrees   INTERNAL ROTATION: 20 degrees at 90 degrees of flexion   EXTERNAL ROTATION: 40 degrees at 90 degrees of flexion    PAIN WITH HIP MOTION: no  PAIN WITH LOGROLL: no  STINCHFIELD TEST: negative    STRENGTH:  5/5 hip adduction, abduction, flexion. 5/5 strength knee flexion, extension. 5/5 strength ankle dorsiflexion and plantarflexion.     GREATER TROCHANTER BURSAL PAIN:  Moderately tender    SENSATION TO LIGHT TOUCH:  DEEP PERONEAL/SUPERFICIAL PERONEAL/SURAL/SAPHENOUS/TIBIAL:  intact    STRAIGHT LEG TEST:   negative          Medical Decision Making    Data Review:   ordered and reviewed x-rays today    Assessment and Plan/ Diagnosis/Treatment options:   Bilateral trochanteric bursitis.  I reviewed today's x-rays and clinical findings, past and current treatment the patient.  On exam, he is tender over bilateral greater crit trochanters with normal range of motion bilateral hips and no reproducible pain.  X-rays today show no evidence of significant arthritis or anything more worrisome.  I do think his return pain is secondary to trochanteric bursitis.  We discussed treatment options including repeat steroid injection followed by at-home exercises.  I explained him that we did not want to put too much steroid into the bursa as it can weaken the tendons and eventually cause tear.  I also explained the importance of the exercises to help strengthening the abductor muscles and keep the pain at bay.  Plan today is repeat steroid injection  into bilateral greater trochanter I given him a sheet of at-home exercises.  He'll return to see me as needed.    Using sterile technique, the left hip was sterilely prepped with Hibiclens.  Using a 22 gauge needle, the left trochanteric bursa was injected with 40mg Depo Medrol, 4 cc lidocaine and 4 cc marcaine.  Patient tolerated the procedure well. No complications.    Using sterile technique, the right hip was sterilely prepped with Hibiclens.  Using a 22 gauge needle, the right trochanteric bursa was injected with 40mg Depo Medrol, 4 cc lidocaine and 4 cc marcaine.  Patient tolerated the procedure well. No complications.

## 2017-11-09 NOTE — PROGRESS NOTES
Procedure   Large Joint Arthrocentesis  Date/Time: 11/9/2017 2:27 PM  Consent given by: patient  Site marked: site marked  Timeout: Immediately prior to procedure a time out was called to verify the correct patient, procedure, equipment, support staff and site/side marked as required   Supporting Documentation  Indications: pain   Procedure Details  Location: hip - L greater trochanteric bursa  Preparation: Patient was prepped and draped in the usual sterile fashion  Needle size: 22 G  Approach: anterolateral  Medications administered: 4 mL bupivacaine 0.25 %; 4 mL lidocaine 1 % (Marcaine NDC is 87689-167-54)  Patient tolerance: patient tolerated the procedure well with no immediate complications

## 2018-01-19 ENCOUNTER — OFFICE VISIT (OUTPATIENT)
Dept: CARDIOLOGY | Facility: CLINIC | Age: 73
End: 2018-01-19

## 2018-01-19 VITALS
HEIGHT: 71 IN | HEART RATE: 78 BPM | WEIGHT: 225.6 LBS | SYSTOLIC BLOOD PRESSURE: 140 MMHG | DIASTOLIC BLOOD PRESSURE: 80 MMHG | BODY MASS INDEX: 31.58 KG/M2 | OXYGEN SATURATION: 98 %

## 2018-01-19 DIAGNOSIS — I10 ESSENTIAL HYPERTENSION: ICD-10-CM

## 2018-01-19 DIAGNOSIS — E78.2 MIXED HYPERLIPIDEMIA: ICD-10-CM

## 2018-01-19 DIAGNOSIS — I20.9 ANGINA PECTORIS (HCC): ICD-10-CM

## 2018-01-19 DIAGNOSIS — R07.89 OTHER CHEST PAIN: Primary | ICD-10-CM

## 2018-01-19 PROCEDURE — 99214 OFFICE O/P EST MOD 30 MIN: CPT | Performed by: INTERNAL MEDICINE

## 2018-01-22 ENCOUNTER — PREP FOR SURGERY (OUTPATIENT)
Dept: OTHER | Facility: HOSPITAL | Age: 73
End: 2018-01-22

## 2018-01-22 DIAGNOSIS — I20.9 ANGINA PECTORIS (HCC): Primary | ICD-10-CM

## 2018-01-22 PROBLEM — R07.89 OTHER CHEST PAIN: Status: ACTIVE | Noted: 2018-01-22

## 2018-01-22 RX ORDER — SODIUM CHLORIDE 0.9 % (FLUSH) 0.9 %
1-10 SYRINGE (ML) INJECTION AS NEEDED
Status: CANCELLED | OUTPATIENT
Start: 2018-01-22

## 2018-01-22 RX ORDER — ASPIRIN 325 MG
325 TABLET, DELAYED RELEASE (ENTERIC COATED) ORAL DAILY
Status: CANCELLED | OUTPATIENT
Start: 2018-01-23

## 2018-01-22 RX ORDER — NITROGLYCERIN 0.4 MG/1
0.4 TABLET SUBLINGUAL
Status: CANCELLED | OUTPATIENT
Start: 2018-01-22

## 2018-01-22 RX ORDER — ACETAMINOPHEN 325 MG/1
650 TABLET ORAL EVERY 4 HOURS PRN
Status: CANCELLED | OUTPATIENT
Start: 2018-01-22

## 2018-01-22 RX ORDER — ONDANSETRON 2 MG/ML
4 INJECTION INTRAMUSCULAR; INTRAVENOUS EVERY 6 HOURS PRN
Status: CANCELLED | OUTPATIENT
Start: 2018-01-22

## 2018-01-22 RX ORDER — ASPIRIN 325 MG
325 TABLET ORAL ONCE
Status: CANCELLED | OUTPATIENT
Start: 2018-01-22 | End: 2018-01-22

## 2018-01-23 ENCOUNTER — APPOINTMENT (OUTPATIENT)
Dept: PREADMISSION TESTING | Facility: HOSPITAL | Age: 73
End: 2018-01-23

## 2018-01-23 DIAGNOSIS — I20.9 ANGINA PECTORIS (HCC): ICD-10-CM

## 2018-01-23 LAB
DEPRECATED RDW RBC AUTO: 47.5 FL (ref 37–54)
ERYTHROCYTE [DISTWIDTH] IN BLOOD BY AUTOMATED COUNT: 13.3 % (ref 11.3–14.5)
HBA1C MFR BLD: 6.1 % (ref 4.8–5.6)
HCT VFR BLD AUTO: 48.6 % (ref 38.9–50.9)
HGB BLD-MCNC: 16.8 G/DL (ref 13.1–17.5)
MCH RBC QN AUTO: 33.3 PG (ref 27–31)
MCHC RBC AUTO-ENTMCNC: 34.6 G/DL (ref 32–36)
MCV RBC AUTO: 96.4 FL (ref 80–99)
PLATELET # BLD AUTO: 194 10*3/MM3 (ref 150–450)
PMV BLD AUTO: 10.3 FL (ref 6–12)
RBC # BLD AUTO: 5.04 10*6/MM3 (ref 4.2–5.76)
WBC NRBC COR # BLD: 8.54 10*3/MM3 (ref 3.5–10.8)

## 2018-01-23 PROCEDURE — 93005 ELECTROCARDIOGRAM TRACING: CPT

## 2018-01-23 PROCEDURE — 93010 ELECTROCARDIOGRAM REPORT: CPT | Performed by: INTERNAL MEDICINE

## 2018-01-23 PROCEDURE — 36415 COLL VENOUS BLD VENIPUNCTURE: CPT

## 2018-01-23 PROCEDURE — 85027 COMPLETE CBC AUTOMATED: CPT | Performed by: PHYSICIAN ASSISTANT

## 2018-01-23 PROCEDURE — 83036 HEMOGLOBIN GLYCOSYLATED A1C: CPT | Performed by: PHYSICIAN ASSISTANT

## 2018-01-23 RX ORDER — CLOTRIMAZOLE AND BETAMETHASONE DIPROPIONATE 10; .64 MG/G; MG/G
CREAM TOPICAL 2 TIMES DAILY
COMMUNITY
End: 2018-01-25

## 2018-01-24 ENCOUNTER — APPOINTMENT (OUTPATIENT)
Dept: GENERAL RADIOLOGY | Facility: HOSPITAL | Age: 73
End: 2018-01-24

## 2018-01-24 ENCOUNTER — APPOINTMENT (OUTPATIENT)
Dept: CARDIOLOGY | Facility: HOSPITAL | Age: 73
End: 2018-01-24

## 2018-01-24 ENCOUNTER — HOSPITAL ENCOUNTER (OUTPATIENT)
Facility: HOSPITAL | Age: 73
Discharge: HOME | End: 2018-01-24
Attending: INTERNAL MEDICINE | Admitting: INTERNAL MEDICINE

## 2018-01-24 ENCOUNTER — APPOINTMENT (OUTPATIENT)
Dept: PULMONOLOGY | Facility: HOSPITAL | Age: 73
End: 2018-01-24

## 2018-01-24 ENCOUNTER — PREP FOR SURGERY (OUTPATIENT)
Dept: OTHER | Facility: HOSPITAL | Age: 73
End: 2018-01-24

## 2018-01-24 ENCOUNTER — TELEPHONE (OUTPATIENT)
Dept: CARDIAC SURGERY | Facility: CLINIC | Age: 73
End: 2018-01-24

## 2018-01-24 VITALS
HEART RATE: 67 BPM | DIASTOLIC BLOOD PRESSURE: 88 MMHG | BODY MASS INDEX: 31.27 KG/M2 | HEIGHT: 71 IN | WEIGHT: 223.33 LBS | SYSTOLIC BLOOD PRESSURE: 155 MMHG | OXYGEN SATURATION: 96 % | TEMPERATURE: 98 F | RESPIRATION RATE: 20 BRPM

## 2018-01-24 DIAGNOSIS — I25.118 CORONARY ARTERY DISEASE OF NATIVE ARTERY OF NATIVE HEART WITH STABLE ANGINA PECTORIS (HCC): Primary | ICD-10-CM

## 2018-01-24 DIAGNOSIS — R07.89 OTHER CHEST PAIN: ICD-10-CM

## 2018-01-24 DIAGNOSIS — I25.10 CORONARY ARTERY DISEASE INVOLVING NATIVE CORONARY ARTERY OF NATIVE HEART WITHOUT ANGINA PECTORIS: Primary | ICD-10-CM

## 2018-01-24 LAB
BH CV XLRA MEAS LEFT CCA RATIO VEL: 72.7 CM/SEC
BH CV XLRA MEAS LEFT DIST CCA EDV: 14.7 CM/SEC
BH CV XLRA MEAS LEFT DIST CCA PSV: 64.3 CM/SEC
BH CV XLRA MEAS LEFT DIST ICA EDV: 26 CM/SEC
BH CV XLRA MEAS LEFT DIST ICA PSV: 79.1 CM/SEC
BH CV XLRA MEAS LEFT ICA RATIO VEL: 67.2 CM/SEC
BH CV XLRA MEAS LEFT ICA/CCA RATIO: 0.92
BH CV XLRA MEAS LEFT MID CCA EDV: 14.7 CM/SEC
BH CV XLRA MEAS LEFT MID CCA PSV: 73.2 CM/SEC
BH CV XLRA MEAS LEFT MID ICA EDV: 18.1 CM/SEC
BH CV XLRA MEAS LEFT MID ICA PSV: 67.6 CM/SEC
BH CV XLRA MEAS LEFT PROX CCA EDV: 10.8 CM/SEC
BH CV XLRA MEAS LEFT PROX CCA PSV: 70.7 CM/SEC
BH CV XLRA MEAS LEFT PROX ECA PSV: 72.2 CM/SEC
BH CV XLRA MEAS LEFT PROX ICA EDV: 9.6 CM/SEC
BH CV XLRA MEAS LEFT PROX ICA PSV: 42 CM/SEC
BH CV XLRA MEAS LEFT PROX SCLA PSV: 105.3 CM/SEC
BH CV XLRA MEAS LEFT VERTEBRAL A PSV: 22.8 CM/SEC
BH CV XLRA MEAS RIGHT CCA RATIO VEL: 66.4 CM/SEC
BH CV XLRA MEAS RIGHT DIST CCA EDV: 15.6 CM/SEC
BH CV XLRA MEAS RIGHT DIST CCA PSV: 70.6 CM/SEC
BH CV XLRA MEAS RIGHT DIST ICA EDV: 32.9 CM/SEC
BH CV XLRA MEAS RIGHT DIST ICA PSV: 92.7 CM/SEC
BH CV XLRA MEAS RIGHT ICA RATIO VEL: 78.8 CM/SEC
BH CV XLRA MEAS RIGHT ICA/CCA RATIO: 1.2
BH CV XLRA MEAS RIGHT MID CCA EDV: 12.8 CM/SEC
BH CV XLRA MEAS RIGHT MID CCA PSV: 66.9 CM/SEC
BH CV XLRA MEAS RIGHT MID ICA EDV: 24.3 CM/SEC
BH CV XLRA MEAS RIGHT MID ICA PSV: 79.4 CM/SEC
BH CV XLRA MEAS RIGHT PROX CCA EDV: 15.2 CM/SEC
BH CV XLRA MEAS RIGHT PROX CCA PSV: 72.5 CM/SEC
BH CV XLRA MEAS RIGHT PROX ECA PSV: 65.4 CM/SEC
BH CV XLRA MEAS RIGHT PROX ICA EDV: 19.9 CM/SEC
BH CV XLRA MEAS RIGHT PROX ICA PSV: 52.2 CM/SEC
BH CV XLRA MEAS RIGHT PROX SCLA PSV: 98.6 CM/SEC
BH CV XLRA MEAS RIGHT VERTEBRAL A PSV: 64.1 CM/SEC

## 2018-01-24 PROCEDURE — 93880 EXTRACRANIAL BILAT STUDY: CPT

## 2018-01-24 PROCEDURE — 25010000002 HEPARIN (PORCINE) PER 1000 UNITS: Performed by: INTERNAL MEDICINE

## 2018-01-24 PROCEDURE — 93571 IV DOP VEL&/PRESS C FLO 1ST: CPT | Performed by: INTERNAL MEDICINE

## 2018-01-24 PROCEDURE — 93458 L HRT ARTERY/VENTRICLE ANGIO: CPT | Performed by: INTERNAL MEDICINE

## 2018-01-24 PROCEDURE — 93880 EXTRACRANIAL BILAT STUDY: CPT | Performed by: INTERNAL MEDICINE

## 2018-01-24 PROCEDURE — 0 IOPAMIDOL PER 1 ML: Performed by: INTERNAL MEDICINE

## 2018-01-24 PROCEDURE — A9270 NON-COVERED ITEM OR SERVICE: HCPCS | Performed by: PHYSICIAN ASSISTANT

## 2018-01-24 PROCEDURE — 71045 X-RAY EXAM CHEST 1 VIEW: CPT

## 2018-01-24 PROCEDURE — 25010000002 MIDAZOLAM PER 1 MG: Performed by: INTERNAL MEDICINE

## 2018-01-24 PROCEDURE — 25010000002 SULFUR HEXAFLUORIDE MICROSPH 60.7-25 MG RECONSTITUTED SUSPENSION: Performed by: INTERNAL MEDICINE

## 2018-01-24 PROCEDURE — 94010 BREATHING CAPACITY TEST: CPT

## 2018-01-24 PROCEDURE — C1887 CATHETER, GUIDING: HCPCS | Performed by: INTERNAL MEDICINE

## 2018-01-24 PROCEDURE — 63710000001 ASPIRIN 325 MG TABLET: Performed by: PHYSICIAN ASSISTANT

## 2018-01-24 PROCEDURE — 93306 TTE W/DOPPLER COMPLETE: CPT

## 2018-01-24 PROCEDURE — 94010 BREATHING CAPACITY TEST: CPT | Performed by: INTERNAL MEDICINE

## 2018-01-24 PROCEDURE — C1769 GUIDE WIRE: HCPCS | Performed by: INTERNAL MEDICINE

## 2018-01-24 PROCEDURE — 93306 TTE W/DOPPLER COMPLETE: CPT | Performed by: INTERNAL MEDICINE

## 2018-01-24 PROCEDURE — 25010000002 FENTANYL CITRATE (PF) 100 MCG/2ML SOLUTION: Performed by: INTERNAL MEDICINE

## 2018-01-24 PROCEDURE — C1894 INTRO/SHEATH, NON-LASER: HCPCS | Performed by: INTERNAL MEDICINE

## 2018-01-24 RX ORDER — NITROGLYCERIN 0.4 MG/1
0.4 TABLET SUBLINGUAL
Status: DISCONTINUED | OUTPATIENT
Start: 2018-01-24 | End: 2018-01-24 | Stop reason: HOSPADM

## 2018-01-24 RX ORDER — CEFAZOLIN SODIUM 2 G/100ML
2 INJECTION, SOLUTION INTRAVENOUS ONCE
Status: CANCELLED | OUTPATIENT
Start: 2018-01-24 | End: 2018-01-24

## 2018-01-24 RX ORDER — HEPARIN SODIUM 1000 [USP'U]/ML
INJECTION, SOLUTION INTRAVENOUS; SUBCUTANEOUS AS NEEDED
Status: DISCONTINUED | OUTPATIENT
Start: 2018-01-24 | End: 2018-01-24 | Stop reason: HOSPADM

## 2018-01-24 RX ORDER — ASPIRIN 325 MG
325 TABLET ORAL ONCE
Status: COMPLETED | OUTPATIENT
Start: 2018-01-24 | End: 2018-01-24

## 2018-01-24 RX ORDER — MIDAZOLAM HYDROCHLORIDE 1 MG/ML
INJECTION INTRAMUSCULAR; INTRAVENOUS AS NEEDED
Status: DISCONTINUED | OUTPATIENT
Start: 2018-01-24 | End: 2018-01-24 | Stop reason: HOSPADM

## 2018-01-24 RX ORDER — CHLORHEXIDINE GLUCONATE 500 MG/1
1 CLOTH TOPICAL EVERY 12 HOURS PRN
Status: CANCELLED | OUTPATIENT
Start: 2018-01-26

## 2018-01-24 RX ORDER — ACETAMINOPHEN 325 MG/1
650 TABLET ORAL EVERY 4 HOURS PRN
Status: CANCELLED | OUTPATIENT
Start: 2018-01-26

## 2018-01-24 RX ORDER — ASPIRIN 325 MG
325 TABLET ORAL NIGHTLY
Status: CANCELLED | OUTPATIENT
Start: 2018-01-25 | End: 2018-01-26

## 2018-01-24 RX ORDER — CHLORHEXIDINE GLUCONATE 0.12 MG/ML
15 RINSE ORAL ONCE
Status: CANCELLED | OUTPATIENT
Start: 2018-01-26 | End: 2018-01-26

## 2018-01-24 RX ORDER — NITROGLYCERIN 0.4 MG/1
0.4 TABLET SUBLINGUAL
Status: CANCELLED | OUTPATIENT
Start: 2018-01-26

## 2018-01-24 RX ORDER — SODIUM CHLORIDE 9 MG/ML
150 INJECTION, SOLUTION INTRAVENOUS CONTINUOUS
Status: ACTIVE | OUTPATIENT
Start: 2018-01-24 | End: 2018-01-24

## 2018-01-24 RX ORDER — ONDANSETRON 2 MG/ML
4 INJECTION INTRAMUSCULAR; INTRAVENOUS EVERY 6 HOURS PRN
Status: DISCONTINUED | OUTPATIENT
Start: 2018-01-24 | End: 2018-01-24 | Stop reason: HOSPADM

## 2018-01-24 RX ORDER — SODIUM CHLORIDE 9 MG/ML
1-3 INJECTION, SOLUTION INTRAVENOUS CONTINUOUS
Status: DISCONTINUED | OUTPATIENT
Start: 2018-01-24 | End: 2018-01-24 | Stop reason: HOSPADM

## 2018-01-24 RX ORDER — ASPIRIN 325 MG
325 TABLET, DELAYED RELEASE (ENTERIC COATED) ORAL DAILY
Status: DISCONTINUED | OUTPATIENT
Start: 2018-01-25 | End: 2018-01-24 | Stop reason: HOSPADM

## 2018-01-24 RX ORDER — ACETAMINOPHEN 325 MG/1
650 TABLET ORAL EVERY 4 HOURS PRN
Status: DISCONTINUED | OUTPATIENT
Start: 2018-01-24 | End: 2018-01-24 | Stop reason: HOSPADM

## 2018-01-24 RX ORDER — FENTANYL CITRATE 50 UG/ML
INJECTION, SOLUTION INTRAMUSCULAR; INTRAVENOUS AS NEEDED
Status: DISCONTINUED | OUTPATIENT
Start: 2018-01-24 | End: 2018-01-24 | Stop reason: HOSPADM

## 2018-01-24 RX ORDER — CHLORHEXIDINE GLUCONATE 500 MG/1
1 CLOTH TOPICAL EVERY 12 HOURS PRN
Status: CANCELLED | OUTPATIENT
Start: 2018-01-25

## 2018-01-24 RX ORDER — LIDOCAINE HYDROCHLORIDE 10 MG/ML
INJECTION, SOLUTION EPIDURAL; INFILTRATION; INTRACAUDAL; PERINEURAL AS NEEDED
Status: DISCONTINUED | OUTPATIENT
Start: 2018-01-24 | End: 2018-01-24 | Stop reason: HOSPADM

## 2018-01-24 RX ORDER — SODIUM CHLORIDE 0.9 % (FLUSH) 0.9 %
1-10 SYRINGE (ML) INJECTION AS NEEDED
Status: DISCONTINUED | OUTPATIENT
Start: 2018-01-24 | End: 2018-01-24 | Stop reason: HOSPADM

## 2018-01-24 RX ADMIN — SODIUM CHLORIDE 3 ML/KG/HR: 9 INJECTION, SOLUTION INTRAVENOUS at 08:51

## 2018-01-24 RX ADMIN — ASPIRIN 325 MG ORAL TABLET 325 MG: 325 PILL ORAL at 08:45

## 2018-01-24 RX ADMIN — SULFUR HEXAFLUORIDE 2 ML: KIT at 15:15

## 2018-01-24 NOTE — TELEPHONE ENCOUNTER
Per Ciara, patient needs to go through PAT on 1/25. PAT appt on 1/25 @ 9:30am. S/w patient, he is agreeable.

## 2018-01-25 ENCOUNTER — APPOINTMENT (OUTPATIENT)
Dept: PREADMISSION TESTING | Facility: HOSPITAL | Age: 73
End: 2018-01-25

## 2018-01-25 ENCOUNTER — HOSPITAL ENCOUNTER (OUTPATIENT)
Dept: GENERAL RADIOLOGY | Facility: HOSPITAL | Age: 73
Discharge: HOME | End: 2018-01-25
Admitting: PHYSICIAN ASSISTANT

## 2018-01-25 VITALS — WEIGHT: 223.77 LBS | HEIGHT: 71 IN | OXYGEN SATURATION: 97 % | BODY MASS INDEX: 31.33 KG/M2

## 2018-01-25 DIAGNOSIS — I25.118 CORONARY ARTERY DISEASE OF NATIVE ARTERY OF NATIVE HEART WITH STABLE ANGINA PECTORIS (HCC): ICD-10-CM

## 2018-01-25 LAB
ABO GROUP BLD: NORMAL
ALBUMIN SERPL-MCNC: 4.2 G/DL (ref 3.2–4.8)
ALBUMIN/GLOB SERPL: 1.8 G/DL (ref 1.5–2.5)
ALP SERPL-CCNC: 72 U/L (ref 25–100)
ALT SERPL W P-5'-P-CCNC: 82 U/L (ref 7–40)
AMPHET+METHAMPHET UR QL: NEGATIVE
AMPHETAMINES UR QL: NEGATIVE
ANION GAP SERPL CALCULATED.3IONS-SCNC: 6 MMOL/L (ref 3–11)
APTT PPP: 26.1 SECONDS (ref 24–31)
AST SERPL-CCNC: 45 U/L (ref 0–33)
BARBITURATES UR QL SCN: NEGATIVE
BASOPHILS # BLD AUTO: 0.06 10*3/MM3 (ref 0–0.2)
BASOPHILS NFR BLD AUTO: 0.9 % (ref 0–1)
BENZODIAZ UR QL SCN: NEGATIVE
BH CV ECHO MEAS - AO MAX PG (FULL): 5 MMHG
BH CV ECHO MEAS - AO MAX PG: 7.4 MMHG
BH CV ECHO MEAS - AO MEAN PG (FULL): 2.7 MMHG
BH CV ECHO MEAS - AO MEAN PG: 4 MMHG
BH CV ECHO MEAS - AO ROOT AREA (BSA CORRECTED): 1.4
BH CV ECHO MEAS - AO ROOT AREA: 7.6 CM^2
BH CV ECHO MEAS - AO ROOT DIAM: 3.1 CM
BH CV ECHO MEAS - AO V2 MAX: 135.7 CM/SEC
BH CV ECHO MEAS - AO V2 MEAN: 92.6 CM/SEC
BH CV ECHO MEAS - AO V2 VTI: 29 CM
BH CV ECHO MEAS - ASC AORTA: 3 CM
BH CV ECHO MEAS - AVA(I,A): 2.2 CM^2
BH CV ECHO MEAS - AVA(I,D): 2.2 CM^2
BH CV ECHO MEAS - AVA(V,A): 1.8 CM^2
BH CV ECHO MEAS - AVA(V,D): 1.8 CM^2
BH CV ECHO MEAS - BSA(HAYCOCK): 2.3 M^2
BH CV ECHO MEAS - BSA: 2.2 M^2
BH CV ECHO MEAS - BZI_BMI: 31.1 KILOGRAMS/M^2
BH CV ECHO MEAS - BZI_METRIC_HEIGHT: 180.3 CM
BH CV ECHO MEAS - BZI_METRIC_WEIGHT: 101.2 KG
BH CV ECHO MEAS - EDV(CUBED): 106.8 ML
BH CV ECHO MEAS - EDV(TEICH): 104.6 ML
BH CV ECHO MEAS - EF(CUBED): 81.2 %
BH CV ECHO MEAS - EF(TEICH): 73.7 %
BH CV ECHO MEAS - ESV(CUBED): 20.1 ML
BH CV ECHO MEAS - ESV(TEICH): 27.5 ML
BH CV ECHO MEAS - FS: 42.7 %
BH CV ECHO MEAS - IVS/LVPW: 1.1
BH CV ECHO MEAS - IVSD: 1.1 CM
BH CV ECHO MEAS - LA DIMENSION: 4.6 CM
BH CV ECHO MEAS - LA/AO: 1.5
BH CV ECHO MEAS - LV MASS(C)D: 175.5 GRAMS
BH CV ECHO MEAS - LV MASS(C)DI: 79.4 GRAMS/M^2
BH CV ECHO MEAS - LV MAX PG: 2.4 MMHG
BH CV ECHO MEAS - LV MEAN PG: 1.3 MMHG
BH CV ECHO MEAS - LV V1 MAX: 77.1 CM/SEC
BH CV ECHO MEAS - LV V1 MEAN: 52.2 CM/SEC
BH CV ECHO MEAS - LV V1 VTI: 20.2 CM
BH CV ECHO MEAS - LVIDD: 4.7 CM
BH CV ECHO MEAS - LVIDS: 2.7 CM
BH CV ECHO MEAS - LVOT AREA (M): 3.1 CM^2
BH CV ECHO MEAS - LVOT AREA: 3.1 CM^2
BH CV ECHO MEAS - LVOT DIAM: 2 CM
BH CV ECHO MEAS - LVPWD: 0.98 CM
BH CV ECHO MEAS - MV A MAX VEL: 85 CM/SEC
BH CV ECHO MEAS - MV DEC TIME: 0.18 SEC
BH CV ECHO MEAS - MV E MAX VEL: 72 CM/SEC
BH CV ECHO MEAS - MV E/A: 0.85
BH CV ECHO MEAS - PA ACC SLOPE: 341.1 CM/SEC^2
BH CV ECHO MEAS - PA ACC TIME: 0.21 SEC
BH CV ECHO MEAS - PA MAX PG: 2.9 MMHG
BH CV ECHO MEAS - PA PR(ACCEL): -17.7 MMHG
BH CV ECHO MEAS - PA V2 MAX: 85.8 CM/SEC
BH CV ECHO MEAS - PI END-D VEL: 98.8 CM/SEC
BH CV ECHO MEAS - RAP SYSTOLE: 8 MMHG
BH CV ECHO MEAS - RVDD: 2.4 CM
BH CV ECHO MEAS - RVSP: 28 MMHG
BH CV ECHO MEAS - SI(AO): 99.8 ML/M^2
BH CV ECHO MEAS - SI(CUBED): 39.2 ML/M^2
BH CV ECHO MEAS - SI(LVOT): 28.3 ML/M^2
BH CV ECHO MEAS - SI(TEICH): 34.9 ML/M^2
BH CV ECHO MEAS - SV(AO): 220.5 ML
BH CV ECHO MEAS - SV(CUBED): 86.7 ML
BH CV ECHO MEAS - SV(LVOT): 62.5 ML
BH CV ECHO MEAS - SV(TEICH): 77.1 ML
BH CV ECHO MEAS - TAPSE (>1.6): 2 CM2
BH CV ECHO MEAS - TR MAX VEL: 213.4 CM/SEC
BH CV ECHO MEAS - TV MAX PG: 0.95 MMHG
BH CV ECHO MEAS - TV V2 MAX: 48.7 CM/SEC
BH CV XLRA - RV BASE: 3.9 CM
BH CV XLRA - RV LENGTH: 6.2 CM
BH CV XLRA - RV MID: 3.5 CM
BH CV XLRA - TDI S': 14.6 CM/SEC
BILIRUB SERPL-MCNC: 1.9 MG/DL (ref 0.3–1.2)
BLD GP AB SCN SERPL QL: NEGATIVE
BUN BLD-MCNC: 20 MG/DL (ref 9–23)
BUN/CREAT SERPL: 18.2 (ref 7–25)
BUPRENORPHINE SERPL-MCNC: NEGATIVE NG/ML
CALCIUM SPEC-SCNC: 9.4 MG/DL (ref 8.7–10.4)
CANNABINOIDS SERPL QL: NEGATIVE
CHLORIDE SERPL-SCNC: 104 MMOL/L (ref 99–109)
CO2 SERPL-SCNC: 27 MMOL/L (ref 20–31)
COCAINE UR QL: NEGATIVE
CREAT BLD-MCNC: 1.1 MG/DL (ref 0.6–1.3)
DEPRECATED RDW RBC AUTO: 47.3 FL (ref 37–54)
EOSINOPHIL # BLD AUTO: 0.43 10*3/MM3 (ref 0–0.3)
EOSINOPHIL NFR BLD AUTO: 6.6 % (ref 0–3)
ERYTHROCYTE [DISTWIDTH] IN BLOOD BY AUTOMATED COUNT: 13.3 % (ref 11.3–14.5)
GFR SERPL CREATININE-BSD FRML MDRD: 66 ML/MIN/1.73
GLOBULIN UR ELPH-MCNC: 2.3 GM/DL
GLUCOSE BLD-MCNC: 102 MG/DL (ref 70–100)
HCT VFR BLD AUTO: 46.6 % (ref 38.9–50.9)
HGB BLD-MCNC: 16.3 G/DL (ref 13.1–17.5)
IMM GRANULOCYTES # BLD: 0.03 10*3/MM3 (ref 0–0.03)
IMM GRANULOCYTES NFR BLD: 0.5 % (ref 0–0.6)
INR PPP: 0.96
LV EF 2D ECHO EST: 65 %
LYMPHOCYTES # BLD AUTO: 1.27 10*3/MM3 (ref 0.6–4.8)
LYMPHOCYTES NFR BLD AUTO: 19.6 % (ref 24–44)
MAGNESIUM SERPL-MCNC: 1.9 MG/DL (ref 1.3–2.7)
MCH RBC QN AUTO: 33.6 PG (ref 27–31)
MCHC RBC AUTO-ENTMCNC: 35 G/DL (ref 32–36)
MCV RBC AUTO: 96.1 FL (ref 80–99)
METHADONE UR QL SCN: NEGATIVE
MONOCYTES # BLD AUTO: 0.61 10*3/MM3 (ref 0–1)
MONOCYTES NFR BLD AUTO: 9.4 % (ref 0–12)
NEUTROPHILS # BLD AUTO: 4.08 10*3/MM3 (ref 1.5–8.3)
NEUTROPHILS NFR BLD AUTO: 63 % (ref 41–71)
OPIATES UR QL: NEGATIVE
OXYCODONE UR QL SCN: NEGATIVE
PA ADP PRP-ACNC: 173 PRU
PCP UR QL SCN: NEGATIVE
PLATELET # BLD AUTO: 189 10*3/MM3 (ref 150–450)
PMV BLD AUTO: 10.2 FL (ref 6–12)
POTASSIUM BLD-SCNC: 3.8 MMOL/L (ref 3.5–5.5)
PROPOXYPH UR QL: NEGATIVE
PROT SERPL-MCNC: 6.5 G/DL (ref 5.7–8.2)
PROTHROMBIN TIME: 10.5 SECONDS (ref 9.6–11.5)
RBC # BLD AUTO: 4.85 10*6/MM3 (ref 4.2–5.76)
RH BLD: POSITIVE
SODIUM BLD-SCNC: 137 MMOL/L (ref 132–146)
TRICYCLICS UR QL SCN: NEGATIVE
WBC NRBC COR # BLD: 6.48 10*3/MM3 (ref 3.5–10.8)

## 2018-01-25 PROCEDURE — 85610 PROTHROMBIN TIME: CPT | Performed by: PHYSICIAN ASSISTANT

## 2018-01-25 PROCEDURE — 86850 RBC ANTIBODY SCREEN: CPT | Performed by: PHYSICIAN ASSISTANT

## 2018-01-25 PROCEDURE — 86900 BLOOD TYPING SEROLOGIC ABO: CPT | Performed by: PHYSICIAN ASSISTANT

## 2018-01-25 PROCEDURE — 80053 COMPREHEN METABOLIC PANEL: CPT | Performed by: PHYSICIAN ASSISTANT

## 2018-01-25 PROCEDURE — 71046 X-RAY EXAM CHEST 2 VIEWS: CPT

## 2018-01-25 PROCEDURE — 85025 COMPLETE CBC W/AUTO DIFF WBC: CPT | Performed by: PHYSICIAN ASSISTANT

## 2018-01-25 PROCEDURE — 86901 BLOOD TYPING SEROLOGIC RH(D): CPT | Performed by: PHYSICIAN ASSISTANT

## 2018-01-25 PROCEDURE — 85576 BLOOD PLATELET AGGREGATION: CPT | Performed by: PHYSICIAN ASSISTANT

## 2018-01-25 PROCEDURE — 85730 THROMBOPLASTIN TIME PARTIAL: CPT | Performed by: PHYSICIAN ASSISTANT

## 2018-01-25 PROCEDURE — 86923 COMPATIBILITY TEST ELECTRIC: CPT

## 2018-01-25 PROCEDURE — 83735 ASSAY OF MAGNESIUM: CPT | Performed by: PHYSICIAN ASSISTANT

## 2018-01-25 PROCEDURE — 36415 COLL VENOUS BLD VENIPUNCTURE: CPT

## 2018-01-25 PROCEDURE — 80306 DRUG TEST PRSMV INSTRMNT: CPT | Performed by: PHYSICIAN ASSISTANT

## 2018-01-25 RX ORDER — CHLORHEXIDINE GLUCONATE 500 MG/1
1 CLOTH TOPICAL EVERY 12 HOURS PRN
Status: ACTIVE | OUTPATIENT
Start: 2018-01-25

## 2018-01-25 RX ORDER — ASPIRIN 325 MG
325 TABLET ORAL NIGHTLY
Status: SHIPPED | OUTPATIENT
Start: 2018-01-25 | End: 2018-01-26

## 2018-01-26 ENCOUNTER — APPOINTMENT (OUTPATIENT)
Dept: GENERAL RADIOLOGY | Facility: HOSPITAL | Age: 73
End: 2018-01-26

## 2018-01-26 ENCOUNTER — ANESTHESIA (OUTPATIENT)
Dept: PERIOP | Facility: HOSPITAL | Age: 73
End: 2018-01-26

## 2018-01-26 ENCOUNTER — HOSPITAL ENCOUNTER (INPATIENT)
Facility: HOSPITAL | Age: 73
LOS: 10 days | Discharge: NURSING, REHAB OR LONG-TERM CARE | End: 2018-02-05
Attending: THORACIC SURGERY (CARDIOTHORACIC VASCULAR SURGERY) | Admitting: THORACIC SURGERY (CARDIOTHORACIC VASCULAR SURGERY)

## 2018-01-26 ENCOUNTER — ANESTHESIA EVENT (OUTPATIENT)
Dept: PERIOP | Facility: HOSPITAL | Age: 73
End: 2018-01-26

## 2018-01-26 DIAGNOSIS — Z74.09 IMPAIRED FUNCTIONAL MOBILITY, BALANCE, GAIT, AND ENDURANCE: Primary | ICD-10-CM

## 2018-01-26 DIAGNOSIS — I25.118 CORONARY ARTERY DISEASE OF NATIVE ARTERY OF NATIVE HEART WITH STABLE ANGINA PECTORIS (HCC): ICD-10-CM

## 2018-01-26 PROBLEM — J44.9 COPD (CHRONIC OBSTRUCTIVE PULMONARY DISEASE) (HCC): Status: RESOLVED | Noted: 2018-01-26 | Resolved: 2018-01-26

## 2018-01-26 PROBLEM — F10.10 ETOH ABUSE: Status: ACTIVE | Noted: 2018-01-26

## 2018-01-26 PROBLEM — K74.60 CIRRHOSIS OF LIVER: Status: ACTIVE | Noted: 2018-01-26

## 2018-01-26 PROBLEM — J44.9 COPD (CHRONIC OBSTRUCTIVE PULMONARY DISEASE): Status: ACTIVE | Noted: 2018-01-26

## 2018-01-26 LAB
ABO GROUP BLD: NORMAL
ACT BLD: 103 SECONDS (ref 82–152)
ACT BLD: 120 SECONDS (ref 82–152)
ACT BLD: 417 SECONDS (ref 82–152)
ACT BLD: 434 SECONDS (ref 82–152)
ALBUMIN SERPL-MCNC: 3.6 G/DL (ref 3.2–4.8)
ALBUMIN SERPL-MCNC: 3.8 G/DL (ref 3.2–4.8)
ANION GAP SERPL CALCULATED.3IONS-SCNC: 10 MMOL/L (ref 3–11)
ANION GAP SERPL CALCULATED.3IONS-SCNC: 10 MMOL/L (ref 3–11)
ANION GAP SERPL CALCULATED.3IONS-SCNC: 8 MMOL/L (ref 3–11)
APTT PPP: 26.8 SECONDS (ref 24–31)
ARTERIAL PATENCY WRIST A: ABNORMAL
ARTERIAL PATENCY WRIST A: ABNORMAL
ATMOSPHERIC PRESS: ABNORMAL MMHG
ATMOSPHERIC PRESS: ABNORMAL MMHG
BACTERIA UR QL AUTO: ABNORMAL /HPF
BASE EXCESS BLDA CALC-SCNC: -0.1 MMOL/L (ref 0–2)
BASE EXCESS BLDA CALC-SCNC: -2 MMOL/L (ref -5–5)
BASE EXCESS BLDA CALC-SCNC: -2 MMOL/L (ref -5–5)
BASE EXCESS BLDA CALC-SCNC: 0 MMOL/L (ref -5–5)
BASE EXCESS BLDA CALC-SCNC: 0 MMOL/L (ref -5–5)
BASE EXCESS BLDA CALC-SCNC: 0.6 MMOL/L (ref 0–2)
BASE EXCESS BLDA CALC-SCNC: 2 MMOL/L (ref -5–5)
BDY SITE: ABNORMAL
BDY SITE: ABNORMAL
BILIRUB UR QL STRIP: NEGATIVE
BUN BLD-MCNC: 18 MG/DL (ref 9–23)
BUN BLD-MCNC: 19 MG/DL (ref 9–23)
BUN BLD-MCNC: 19 MG/DL (ref 9–23)
BUN/CREAT SERPL: 14.6 (ref 7–25)
BUN/CREAT SERPL: 15.8 (ref 7–25)
BUN/CREAT SERPL: 16.4 (ref 7–25)
CA-I BLDA-SCNC: 1.14 MMOL/L (ref 1.2–1.32)
CA-I BLDA-SCNC: 1.18 MMOL/L (ref 1.2–1.32)
CA-I BLDA-SCNC: 1.22 MMOL/L (ref 1.2–1.32)
CA-I BLDA-SCNC: 1.23 MMOL/L (ref 1.2–1.32)
CA-I BLDA-SCNC: 1.25 MMOL/L (ref 1.2–1.32)
CA-I SERPL ISE-MCNC: 1.32 MMOL/L (ref 1.12–1.32)
CALCIUM SPEC-SCNC: 8.9 MG/DL (ref 8.7–10.4)
CALCIUM SPEC-SCNC: 9 MG/DL (ref 8.7–10.4)
CALCIUM SPEC-SCNC: 9.3 MG/DL (ref 8.7–10.4)
CHLORIDE SERPL-SCNC: 105 MMOL/L (ref 99–109)
CHLORIDE SERPL-SCNC: 105 MMOL/L (ref 99–109)
CHLORIDE SERPL-SCNC: 107 MMOL/L (ref 99–109)
CLARITY UR: CLEAR
CO2 BLDA-SCNC: 24 MMOL/L (ref 24–29)
CO2 BLDA-SCNC: 24 MMOL/L (ref 24–29)
CO2 BLDA-SCNC: 24.5 MMOL/L (ref 22–33)
CO2 BLDA-SCNC: 26 MMOL/L (ref 24–29)
CO2 BLDA-SCNC: 26.5 MMOL/L (ref 22–33)
CO2 BLDA-SCNC: 27 MMOL/L (ref 24–29)
CO2 BLDA-SCNC: 27 MMOL/L (ref 24–29)
CO2 SERPL-SCNC: 24 MMOL/L (ref 20–31)
CO2 SERPL-SCNC: 26 MMOL/L (ref 20–31)
CO2 SERPL-SCNC: 26 MMOL/L (ref 20–31)
COHGB MFR BLD: 1.1 % (ref 0–2)
COHGB MFR BLD: 1.3 % (ref 0–2)
COLOR UR: YELLOW
CREAT BLD-MCNC: 1.1 MG/DL (ref 0.6–1.3)
CREAT BLD-MCNC: 1.2 MG/DL (ref 0.6–1.3)
CREAT BLD-MCNC: 1.3 MG/DL (ref 0.6–1.3)
DEPRECATED RDW RBC AUTO: 45.5 FL (ref 37–54)
DEPRECATED RDW RBC AUTO: 46.5 FL (ref 37–54)
DEPRECATED RDW RBC AUTO: 46.8 FL (ref 37–54)
ERYTHROCYTE [DISTWIDTH] IN BLOOD BY AUTOMATED COUNT: 13.1 % (ref 11.3–14.5)
ERYTHROCYTE [DISTWIDTH] IN BLOOD BY AUTOMATED COUNT: 13.3 % (ref 11.3–14.5)
ERYTHROCYTE [DISTWIDTH] IN BLOOD BY AUTOMATED COUNT: 13.4 % (ref 11.3–14.5)
GFR SERPL CREATININE-BSD FRML MDRD: 55 ML/MIN/1.73
GFR SERPL CREATININE-BSD FRML MDRD: 60 ML/MIN/1.73
GFR SERPL CREATININE-BSD FRML MDRD: 66 ML/MIN/1.73
GLUCOSE BLD-MCNC: 178 MG/DL (ref 70–100)
GLUCOSE BLD-MCNC: 229 MG/DL (ref 70–100)
GLUCOSE BLD-MCNC: 249 MG/DL (ref 70–100)
GLUCOSE BLDC GLUCOMTR-MCNC: 140 MG/DL (ref 70–130)
GLUCOSE BLDC GLUCOMTR-MCNC: 144 MG/DL (ref 70–130)
GLUCOSE BLDC GLUCOMTR-MCNC: 147 MG/DL (ref 70–130)
GLUCOSE BLDC GLUCOMTR-MCNC: 179 MG/DL (ref 70–130)
GLUCOSE BLDC GLUCOMTR-MCNC: 180 MG/DL (ref 70–130)
GLUCOSE BLDC GLUCOMTR-MCNC: 197 MG/DL (ref 70–130)
GLUCOSE BLDC GLUCOMTR-MCNC: 222 MG/DL (ref 70–130)
GLUCOSE BLDC GLUCOMTR-MCNC: 224 MG/DL (ref 70–130)
GLUCOSE BLDC GLUCOMTR-MCNC: 244 MG/DL (ref 70–130)
GLUCOSE BLDC GLUCOMTR-MCNC: 248 MG/DL (ref 70–130)
GLUCOSE BLDC GLUCOMTR-MCNC: 256 MG/DL (ref 70–130)
GLUCOSE BLDC GLUCOMTR-MCNC: 273 MG/DL (ref 70–130)
GLUCOSE BLDC GLUCOMTR-MCNC: 93 MG/DL (ref 70–130)
GLUCOSE UR STRIP-MCNC: NEGATIVE MG/DL
HCO3 BLDA-SCNC: 23.1 MMOL/L (ref 22–26)
HCO3 BLDA-SCNC: 23.3 MMOL/L (ref 22–26)
HCO3 BLDA-SCNC: 23.5 MMOL/L (ref 20–26)
HCO3 BLDA-SCNC: 24.4 MMOL/L (ref 22–26)
HCO3 BLDA-SCNC: 25.2 MMOL/L (ref 20–26)
HCO3 BLDA-SCNC: 25.2 MMOL/L (ref 22–26)
HCO3 BLDA-SCNC: 26.1 MMOL/L (ref 22–26)
HCT VFR BLD AUTO: 28.9 % (ref 38.9–50.9)
HCT VFR BLD AUTO: 31.2 % (ref 38.9–50.9)
HCT VFR BLD AUTO: 31.6 % (ref 38.9–50.9)
HCT VFR BLD CALC: 30.5 %
HCT VFR BLD CALC: 35.3 %
HCT VFR BLDA CALC: 25 % (ref 38–51)
HCT VFR BLDA CALC: 25 % (ref 38–51)
HCT VFR BLDA CALC: 26 % (ref 38–51)
HCT VFR BLDA CALC: 34 % (ref 38–51)
HCT VFR BLDA CALC: 41 % (ref 38–51)
HGB BLD-MCNC: 10 G/DL (ref 13.1–17.5)
HGB BLD-MCNC: 10.7 G/DL (ref 13.1–17.5)
HGB BLD-MCNC: 11 G/DL (ref 13.1–17.5)
HGB BLDA-MCNC: 11.5 G/DL (ref 13.5–17.5)
HGB BLDA-MCNC: 11.6 G/DL (ref 12–17)
HGB BLDA-MCNC: 13.9 G/DL (ref 12–17)
HGB BLDA-MCNC: 8.5 G/DL (ref 12–17)
HGB BLDA-MCNC: 8.5 G/DL (ref 12–17)
HGB BLDA-MCNC: 8.8 G/DL (ref 12–17)
HGB BLDA-MCNC: 9.9 G/DL (ref 13.5–17.5)
HGB UR QL STRIP.AUTO: ABNORMAL
HOROWITZ INDEX BLD+IHG-RTO: 100 %
HOROWITZ INDEX BLD+IHG-RTO: 40 %
HYALINE CASTS UR QL AUTO: ABNORMAL /LPF
INR PPP: 1.11
KETONES UR QL STRIP: NEGATIVE
LEUKOCYTE ESTERASE UR QL STRIP.AUTO: NEGATIVE
MAGNESIUM SERPL-MCNC: 2.8 MG/DL (ref 1.3–2.7)
MAGNESIUM SERPL-MCNC: 3.4 MG/DL (ref 1.3–2.7)
MCH RBC QN AUTO: 32.9 PG (ref 27–31)
MCH RBC QN AUTO: 32.9 PG (ref 27–31)
MCH RBC QN AUTO: 33.3 PG (ref 27–31)
MCHC RBC AUTO-ENTMCNC: 34.3 G/DL (ref 32–36)
MCHC RBC AUTO-ENTMCNC: 34.6 G/DL (ref 32–36)
MCHC RBC AUTO-ENTMCNC: 34.8 G/DL (ref 32–36)
MCV RBC AUTO: 95.1 FL (ref 80–99)
MCV RBC AUTO: 95.8 FL (ref 80–99)
MCV RBC AUTO: 96 FL (ref 80–99)
METHGB BLD QL: 1 % (ref 0–1.5)
METHGB BLD QL: 1.2 % (ref 0–1.5)
MODALITY: ABNORMAL
MODALITY: ABNORMAL
NITRITE UR QL STRIP: NEGATIVE
OXYHGB MFR BLDV: 93.7 % (ref 94–99)
OXYHGB MFR BLDV: 97.6 % (ref 94–99)
PA ADP PRP-ACNC: 182 PRU
PCO2 BLDA: 33.4 MM HG (ref 35–48)
PCO2 BLDA: 38 MM HG (ref 35–45)
PCO2 BLDA: 38 MM HG (ref 35–45)
PCO2 BLDA: 38.8 MM HG (ref 35–45)
PCO2 BLDA: 39.5 MM HG (ref 35–48)
PCO2 BLDA: 40.8 MM HG (ref 35–45)
PCO2 BLDA: 43.4 MM HG (ref 35–45)
PH BLDA: 7.36 PH UNITS (ref 7.35–7.6)
PH BLDA: 7.37 PH UNITS (ref 7.35–7.6)
PH BLDA: 7.39 PH UNITS (ref 7.35–7.6)
PH BLDA: 7.41 PH UNITS (ref 7.35–7.45)
PH BLDA: 7.42 PH UNITS (ref 7.35–7.6)
PH BLDA: 7.44 PH UNITS (ref 7.35–7.6)
PH BLDA: 7.46 PH UNITS (ref 7.35–7.45)
PH UR STRIP.AUTO: 5.5 [PH] (ref 5–8)
PHOSPHATE SERPL-MCNC: 1.4 MG/DL (ref 2.4–5.1)
PHOSPHATE SERPL-MCNC: 2.2 MG/DL (ref 2.4–5.1)
PLATELET # BLD AUTO: 118 10*3/MM3 (ref 150–450)
PLATELET # BLD AUTO: 126 10*3/MM3 (ref 150–450)
PLATELET # BLD AUTO: 133 10*3/MM3 (ref 150–450)
PMV BLD AUTO: 10.1 FL (ref 6–12)
PMV BLD AUTO: 9.7 FL (ref 6–12)
PMV BLD AUTO: 9.8 FL (ref 6–12)
PO2 BLDA: 197 MM HG (ref 83–108)
PO2 BLDA: 249 MMHG (ref 80–105)
PO2 BLDA: 381 MMHG (ref 80–105)
PO2 BLDA: 397 MMHG (ref 80–105)
PO2 BLDA: 49 MMHG (ref 80–105)
PO2 BLDA: 69 MM HG (ref 83–108)
PO2 BLDA: 76 MMHG (ref 80–105)
POTASSIUM BLD-SCNC: 3.8 MMOL/L (ref 3.5–5.5)
POTASSIUM BLD-SCNC: 3.8 MMOL/L (ref 3.5–5.5)
POTASSIUM BLD-SCNC: 3.9 MMOL/L (ref 3.5–5.5)
POTASSIUM BLDA-SCNC: 3.8 MMOL/L (ref 3.5–4.9)
POTASSIUM BLDA-SCNC: 3.9 MMOL/L (ref 3.5–4.9)
POTASSIUM BLDA-SCNC: 4.5 MMOL/L (ref 3.5–4.9)
POTASSIUM BLDA-SCNC: 4.6 MMOL/L (ref 3.5–4.9)
POTASSIUM BLDA-SCNC: 5.4 MMOL/L (ref 3.5–4.9)
PROT UR QL STRIP: NEGATIVE
PROTHROMBIN TIME: 12.1 SECONDS (ref 9.6–11.5)
PSV: 10 CMH2O
RBC # BLD AUTO: 3.04 10*6/MM3 (ref 4.2–5.76)
RBC # BLD AUTO: 3.25 10*6/MM3 (ref 4.2–5.76)
RBC # BLD AUTO: 3.3 10*6/MM3 (ref 4.2–5.76)
RBC # UR: ABNORMAL /HPF
REF LAB TEST METHOD: ABNORMAL
RH BLD: POSITIVE
SAO2 % BLDA: 100 % (ref 95–98)
SAO2 % BLDA: 82 % (ref 95–98)
SAO2 % BLDA: 95 % (ref 95–98)
SODIUM BLD-SCNC: 139 MMOL/L (ref 132–146)
SODIUM BLD-SCNC: 141 MMOL/L (ref 132–146)
SODIUM BLD-SCNC: 141 MMOL/L (ref 132–146)
SODIUM BLDA-SCNC: 139 MMOL/L (ref 138–146)
SODIUM BLDA-SCNC: 142 MMOL/L (ref 138–146)
SP GR UR STRIP: 1.01 (ref 1–1.03)
SQUAMOUS #/AREA URNS HPF: ABNORMAL /HPF
UROBILINOGEN UR QL STRIP: ABNORMAL
VENTILATOR MODE: ABNORMAL
WBC NRBC COR # BLD: 7.13 10*3/MM3 (ref 3.5–10.8)
WBC NRBC COR # BLD: 7.75 10*3/MM3 (ref 3.5–10.8)
WBC NRBC COR # BLD: 7.95 10*3/MM3 (ref 3.5–10.8)
WBC UR QL AUTO: ABNORMAL /HPF

## 2018-01-26 PROCEDURE — 25010000002 POTASSIUM CHLORIDE PER 2 MEQ OF POTASSIUM

## 2018-01-26 PROCEDURE — 25010000002 INSULIN REGULAR HUMAN PER 5 UNITS: Performed by: PHYSICIAN ASSISTANT

## 2018-01-26 PROCEDURE — 25010000002 MIDAZOLAM PER 1 MG: Performed by: ANESTHESIOLOGY

## 2018-01-26 PROCEDURE — 85576 BLOOD PLATELET AGGREGATION: CPT | Performed by: ANESTHESIOLOGY

## 2018-01-26 PROCEDURE — 85027 COMPLETE CBC AUTOMATED: CPT | Performed by: PHYSICIAN ASSISTANT

## 2018-01-26 PROCEDURE — 94799 UNLISTED PULMONARY SVC/PX: CPT

## 2018-01-26 PROCEDURE — 83735 ASSAY OF MAGNESIUM: CPT | Performed by: PHYSICIAN ASSISTANT

## 2018-01-26 PROCEDURE — C1894 INTRO/SHEATH, NON-LASER: HCPCS | Performed by: THORACIC SURGERY (CARDIOTHORACIC VASCULAR SURGERY)

## 2018-01-26 PROCEDURE — 85027 COMPLETE CBC AUTOMATED: CPT | Performed by: THORACIC SURGERY (CARDIOTHORACIC VASCULAR SURGERY)

## 2018-01-26 PROCEDURE — P9041 ALBUMIN (HUMAN),5%, 50ML: HCPCS

## 2018-01-26 PROCEDURE — 25010000002 ALBUMIN HUMAN 5% PER 50 ML

## 2018-01-26 PROCEDURE — 25010000002 MORPHINE SULFATE (PF) 2 MG/ML SOLUTION: Performed by: THORACIC SURGERY (CARDIOTHORACIC VASCULAR SURGERY)

## 2018-01-26 PROCEDURE — 82330 ASSAY OF CALCIUM: CPT | Performed by: PHYSICIAN ASSISTANT

## 2018-01-26 PROCEDURE — 86900 BLOOD TYPING SEROLOGIC ABO: CPT

## 2018-01-26 PROCEDURE — 25010000002 METHYLPREDNISOLONE PER 125 MG: Performed by: ANESTHESIOLOGY

## 2018-01-26 PROCEDURE — P9017 PLASMA 1 DONOR FRZ W/IN 8 HR: HCPCS

## 2018-01-26 PROCEDURE — 84132 ASSAY OF SERUM POTASSIUM: CPT

## 2018-01-26 PROCEDURE — 33517 CABG ARTERY-VEIN SINGLE: CPT | Performed by: THORACIC SURGERY (CARDIOTHORACIC VASCULAR SURGERY)

## 2018-01-26 PROCEDURE — 86901 BLOOD TYPING SEROLOGIC RH(D): CPT

## 2018-01-26 PROCEDURE — 25010000002 LEVOFLOXACIN PER 250 MG: Performed by: ANESTHESIOLOGY

## 2018-01-26 PROCEDURE — 33508 ENDOSCOPIC VEIN HARVEST: CPT | Performed by: THORACIC SURGERY (CARDIOTHORACIC VASCULAR SURGERY)

## 2018-01-26 PROCEDURE — 25010000002 HEPARIN (PORCINE) PER 1000 UNITS: Performed by: THORACIC SURGERY (CARDIOTHORACIC VASCULAR SURGERY)

## 2018-01-26 PROCEDURE — C1729 CATH, DRAINAGE: HCPCS | Performed by: THORACIC SURGERY (CARDIOTHORACIC VASCULAR SURGERY)

## 2018-01-26 PROCEDURE — 82330 ASSAY OF CALCIUM: CPT

## 2018-01-26 PROCEDURE — 25010000002 PROPOFOL 10 MG/ML EMULSION: Performed by: ANESTHESIOLOGY

## 2018-01-26 PROCEDURE — C1758 CATHETER, URETERAL: HCPCS | Performed by: THORACIC SURGERY (CARDIOTHORACIC VASCULAR SURGERY)

## 2018-01-26 PROCEDURE — 02100Z9 BYPASS CORONARY ARTERY, ONE ARTERY FROM LEFT INTERNAL MAMMARY, OPEN APPROACH: ICD-10-PCS | Performed by: THORACIC SURGERY (CARDIOTHORACIC VASCULAR SURGERY)

## 2018-01-26 PROCEDURE — 99233 SBSQ HOSP IP/OBS HIGH 50: CPT | Performed by: INTERNAL MEDICINE

## 2018-01-26 PROCEDURE — 25010000002 HEPARIN (PORCINE) PER 1000 UNITS: Performed by: ANESTHESIOLOGY

## 2018-01-26 PROCEDURE — C1769 GUIDE WIRE: HCPCS | Performed by: THORACIC SURGERY (CARDIOTHORACIC VASCULAR SURGERY)

## 2018-01-26 PROCEDURE — 06BQ4ZZ EXCISION OF LEFT SAPHENOUS VEIN, PERCUTANEOUS ENDOSCOPIC APPROACH: ICD-10-PCS | Performed by: THORACIC SURGERY (CARDIOTHORACIC VASCULAR SURGERY)

## 2018-01-26 PROCEDURE — 25010000002 PHENYLEPHRINE PER 1 ML

## 2018-01-26 PROCEDURE — 25010000002 PROPOFOL 1000 MG/ML EMULSION: Performed by: THORACIC SURGERY (CARDIOTHORACIC VASCULAR SURGERY)

## 2018-01-26 PROCEDURE — 82803 BLOOD GASES ANY COMBINATION: CPT

## 2018-01-26 PROCEDURE — S0260 H&P FOR SURGERY: HCPCS | Performed by: THORACIC SURGERY (CARDIOTHORACIC VASCULAR SURGERY)

## 2018-01-26 PROCEDURE — 85014 HEMATOCRIT: CPT

## 2018-01-26 PROCEDURE — 25010000002 PROTAMINE SULFATE PER 10 MG

## 2018-01-26 PROCEDURE — 25010000002 MAGNESIUM SULFATE PER 500 MG OF MAGNESIUM

## 2018-01-26 PROCEDURE — 25010000002 AMIODARONE PER 30 MG

## 2018-01-26 PROCEDURE — 85347 COAGULATION TIME ACTIVATED: CPT

## 2018-01-26 PROCEDURE — 25010000002 ONDANSETRON PER 1 MG: Performed by: PHYSICIAN ASSISTANT

## 2018-01-26 PROCEDURE — 80048 BASIC METABOLIC PNL TOTAL CA: CPT | Performed by: THORACIC SURGERY (CARDIOTHORACIC VASCULAR SURGERY)

## 2018-01-26 PROCEDURE — 85730 THROMBOPLASTIN TIME PARTIAL: CPT | Performed by: PHYSICIAN ASSISTANT

## 2018-01-26 PROCEDURE — 25010000002 PAPAVERINE PER 60 MG: Performed by: THORACIC SURGERY (CARDIOTHORACIC VASCULAR SURGERY)

## 2018-01-26 PROCEDURE — 85610 PROTHROMBIN TIME: CPT | Performed by: PHYSICIAN ASSISTANT

## 2018-01-26 PROCEDURE — 25010000002 FUROSEMIDE PER 20 MG

## 2018-01-26 PROCEDURE — 87086 URINE CULTURE/COLONY COUNT: CPT | Performed by: PHYSICIAN ASSISTANT

## 2018-01-26 PROCEDURE — 71045 X-RAY EXAM CHEST 1 VIEW: CPT

## 2018-01-26 PROCEDURE — 5A1221Z PERFORMANCE OF CARDIAC OUTPUT, CONTINUOUS: ICD-10-PCS | Performed by: THORACIC SURGERY (CARDIOTHORACIC VASCULAR SURGERY)

## 2018-01-26 PROCEDURE — 25010000002 HEPARIN (PORCINE) PER 1000 UNITS

## 2018-01-26 PROCEDURE — 82805 BLOOD GASES W/O2 SATURATION: CPT | Performed by: PHYSICIAN ASSISTANT

## 2018-01-26 PROCEDURE — 33533 CABG ARTERIAL SINGLE: CPT | Performed by: PHYSICIAN ASSISTANT

## 2018-01-26 PROCEDURE — 33517 CABG ARTERY-VEIN SINGLE: CPT | Performed by: PHYSICIAN ASSISTANT

## 2018-01-26 PROCEDURE — 25010000002 PROTAMINE SULFATE PER 10 MG: Performed by: ANESTHESIOLOGY

## 2018-01-26 PROCEDURE — 25010000003 CEFAZOLIN IN DEXTROSE 2-4 GM/100ML-% SOLUTION: Performed by: ANESTHESIOLOGY

## 2018-01-26 PROCEDURE — C1751 CATH, INF, PER/CENT/MIDLINE: HCPCS | Performed by: ANESTHESIOLOGY

## 2018-01-26 PROCEDURE — 25010000003 CEFAZOLIN IN DEXTROSE 2-4 GM/100ML-% SOLUTION: Performed by: THORACIC SURGERY (CARDIOTHORACIC VASCULAR SURGERY)

## 2018-01-26 PROCEDURE — 021009W BYPASS CORONARY ARTERY, ONE ARTERY FROM AORTA WITH AUTOLOGOUS VENOUS TISSUE, OPEN APPROACH: ICD-10-PCS | Performed by: THORACIC SURGERY (CARDIOTHORACIC VASCULAR SURGERY)

## 2018-01-26 PROCEDURE — 25810000003 DEXTROSE 5 % WITH KCL 20 MEQ 20-5 MEQ/L-% SOLUTION: Performed by: PHYSICIAN ASSISTANT

## 2018-01-26 PROCEDURE — 84295 ASSAY OF SERUM SODIUM: CPT

## 2018-01-26 PROCEDURE — 0T9B80Z DRAINAGE OF BLADDER WITH DRAINAGE DEVICE, VIA NATURAL OR ARTIFICIAL OPENING ENDOSCOPIC: ICD-10-PCS | Performed by: UROLOGY

## 2018-01-26 PROCEDURE — 86927 PLASMA FRESH FROZEN: CPT

## 2018-01-26 PROCEDURE — 36430 TRANSFUSION BLD/BLD COMPNT: CPT

## 2018-01-26 PROCEDURE — 93010 ELECTROCARDIOGRAM REPORT: CPT | Performed by: INTERNAL MEDICINE

## 2018-01-26 PROCEDURE — 82805 BLOOD GASES W/O2 SATURATION: CPT | Performed by: THORACIC SURGERY (CARDIOTHORACIC VASCULAR SURGERY)

## 2018-01-26 PROCEDURE — 80069 RENAL FUNCTION PANEL: CPT | Performed by: PHYSICIAN ASSISTANT

## 2018-01-26 PROCEDURE — 81001 URINALYSIS AUTO W/SCOPE: CPT | Performed by: PHYSICIAN ASSISTANT

## 2018-01-26 PROCEDURE — 93005 ELECTROCARDIOGRAM TRACING: CPT | Performed by: PHYSICIAN ASSISTANT

## 2018-01-26 PROCEDURE — 82947 ASSAY GLUCOSE BLOOD QUANT: CPT

## 2018-01-26 PROCEDURE — 25010000002 MANNITOL PER 50 ML

## 2018-01-26 PROCEDURE — 33533 CABG ARTERIAL SINGLE: CPT | Performed by: THORACIC SURGERY (CARDIOTHORACIC VASCULAR SURGERY)

## 2018-01-26 PROCEDURE — 94002 VENT MGMT INPAT INIT DAY: CPT

## 2018-01-26 PROCEDURE — P9012 CRYOPRECIPITATE EACH UNIT: HCPCS

## 2018-01-26 RX ORDER — NOREPINEPHRINE BITARTRATE 1 MG/ML
INJECTION, SOLUTION INTRAVENOUS CONTINUOUS PRN
Status: DISCONTINUED | OUTPATIENT
Start: 2018-01-26 | End: 2018-01-26 | Stop reason: SURG

## 2018-01-26 RX ORDER — MORPHINE SULFATE 2 MG/ML
2 INJECTION, SOLUTION INTRAMUSCULAR; INTRAVENOUS
Status: DISPENSED | OUTPATIENT
Start: 2018-01-26 | End: 2018-02-05

## 2018-01-26 RX ORDER — METHYLPREDNISOLONE SODIUM SUCCINATE 125 MG/2ML
INJECTION, POWDER, LYOPHILIZED, FOR SOLUTION INTRAMUSCULAR; INTRAVENOUS AS NEEDED
Status: DISCONTINUED | OUTPATIENT
Start: 2018-01-26 | End: 2018-01-26 | Stop reason: SURG

## 2018-01-26 RX ORDER — LEVOFLOXACIN 5 MG/ML
750 INJECTION, SOLUTION INTRAVENOUS ONCE
Status: DISCONTINUED | OUTPATIENT
Start: 2018-01-26 | End: 2018-01-26 | Stop reason: SDUPTHER

## 2018-01-26 RX ORDER — SODIUM CHLORIDE 0.9 % (FLUSH) 0.9 %
30 SYRINGE (ML) INJECTION ONCE AS NEEDED
Status: DISCONTINUED | OUTPATIENT
Start: 2018-01-26 | End: 2018-01-27

## 2018-01-26 RX ORDER — DOCUSATE SODIUM 100 MG/1
100 CAPSULE, LIQUID FILLED ORAL 2 TIMES DAILY PRN
Status: DISCONTINUED | OUTPATIENT
Start: 2018-01-26 | End: 2018-02-02

## 2018-01-26 RX ORDER — ASPIRIN 325 MG
325 TABLET, DELAYED RELEASE (ENTERIC COATED) ORAL DAILY
Status: DISCONTINUED | OUTPATIENT
Start: 2018-01-27 | End: 2018-02-05 | Stop reason: HOSPADM

## 2018-01-26 RX ORDER — ASPIRIN 325 MG
325 TABLET ORAL ONCE
Status: DISCONTINUED | OUTPATIENT
Start: 2018-01-26 | End: 2018-01-27

## 2018-01-26 RX ORDER — BISACODYL 10 MG
10 SUPPOSITORY, RECTAL RECTAL DAILY PRN
Status: DISCONTINUED | OUTPATIENT
Start: 2018-01-27 | End: 2018-01-30

## 2018-01-26 RX ORDER — POTASSIUM CHLORIDE 1.5 G/1.77G
40 POWDER, FOR SOLUTION ORAL AS NEEDED
Status: DISCONTINUED | OUTPATIENT
Start: 2018-01-26 | End: 2018-01-30

## 2018-01-26 RX ORDER — ALBUMIN, HUMAN INJ 5% 5 %
500 SOLUTION INTRAVENOUS AS NEEDED
Status: DISCONTINUED | OUTPATIENT
Start: 2018-01-26 | End: 2018-01-27

## 2018-01-26 RX ORDER — ONDANSETRON 2 MG/ML
4 INJECTION INTRAMUSCULAR; INTRAVENOUS EVERY 6 HOURS PRN
Status: DISCONTINUED | OUTPATIENT
Start: 2018-01-26 | End: 2018-02-05 | Stop reason: HOSPADM

## 2018-01-26 RX ORDER — NITROGLYCERIN 20 MG/100ML
5-200 INJECTION INTRAVENOUS CONTINUOUS PRN
Status: DISCONTINUED | OUTPATIENT
Start: 2018-01-26 | End: 2018-01-29

## 2018-01-26 RX ORDER — LEVOFLOXACIN 5 MG/ML
750 INJECTION, SOLUTION INTRAVENOUS ONCE
Status: COMPLETED | OUTPATIENT
Start: 2018-01-26 | End: 2018-01-26

## 2018-01-26 RX ORDER — POTASSIUM CHLORIDE, DEXTROSE MONOHYDRATE 150; 5 MG/100ML; G/100ML
30 INJECTION, SOLUTION INTRAVENOUS CONTINUOUS
Status: DISCONTINUED | OUTPATIENT
Start: 2018-01-26 | End: 2018-01-28

## 2018-01-26 RX ORDER — PHENYLEPHRINE HCL IN 0.9% NACL 0.5 MG/5ML
.5-3 SYRINGE (ML) INTRAVENOUS CONTINUOUS PRN
Status: DISCONTINUED | OUTPATIENT
Start: 2018-01-26 | End: 2018-01-28

## 2018-01-26 RX ORDER — ROCURONIUM BROMIDE 10 MG/ML
INJECTION, SOLUTION INTRAVENOUS AS NEEDED
Status: DISCONTINUED | OUTPATIENT
Start: 2018-01-26 | End: 2018-01-26 | Stop reason: SURG

## 2018-01-26 RX ORDER — FAMOTIDINE 10 MG/ML
20 INJECTION, SOLUTION INTRAVENOUS 2 TIMES DAILY
Status: DISCONTINUED | OUTPATIENT
Start: 2018-01-26 | End: 2018-01-27

## 2018-01-26 RX ORDER — SODIUM CHLORIDE 9 MG/ML
30 INJECTION, SOLUTION INTRAVENOUS CONTINUOUS PRN
Status: DISCONTINUED | OUTPATIENT
Start: 2018-01-26 | End: 2018-01-27

## 2018-01-26 RX ORDER — PREDNISONE 10 MG/1
5 TABLET ORAL DAILY
Status: DISCONTINUED | OUTPATIENT
Start: 2018-01-26 | End: 2018-02-05 | Stop reason: HOSPADM

## 2018-01-26 RX ORDER — OXYCODONE HYDROCHLORIDE AND ACETAMINOPHEN 5; 325 MG/1; MG/1
2 TABLET ORAL EVERY 4 HOURS PRN
Status: DISPENSED | OUTPATIENT
Start: 2018-01-26 | End: 2018-02-05

## 2018-01-26 RX ORDER — SODIUM CHLORIDE, SODIUM LACTATE, POTASSIUM CHLORIDE, CALCIUM CHLORIDE 600; 310; 30; 20 MG/100ML; MG/100ML; MG/100ML; MG/100ML
9 INJECTION, SOLUTION INTRAVENOUS CONTINUOUS
Status: DISCONTINUED | OUTPATIENT
Start: 2018-01-26 | End: 2018-01-26 | Stop reason: HOSPADM

## 2018-01-26 RX ORDER — SENNA AND DOCUSATE SODIUM 50; 8.6 MG/1; MG/1
2 TABLET, FILM COATED ORAL 2 TIMES DAILY PRN
Status: DISCONTINUED | OUTPATIENT
Start: 2018-01-26 | End: 2018-02-05 | Stop reason: HOSPADM

## 2018-01-26 RX ORDER — BISACODYL 5 MG/1
10 TABLET, DELAYED RELEASE ORAL DAILY PRN
Status: DISCONTINUED | OUTPATIENT
Start: 2018-01-26 | End: 2018-02-05 | Stop reason: HOSPADM

## 2018-01-26 RX ORDER — PAPAVERINE HYDROCHLORIDE 30 MG/ML
INJECTION INTRAMUSCULAR; INTRAVENOUS AS NEEDED
Status: DISCONTINUED | OUTPATIENT
Start: 2018-01-26 | End: 2018-01-26 | Stop reason: HOSPADM

## 2018-01-26 RX ORDER — ACETAMINOPHEN 325 MG/1
650 TABLET ORAL EVERY 4 HOURS PRN
Status: DISCONTINUED | OUTPATIENT
Start: 2018-01-26 | End: 2018-02-05 | Stop reason: HOSPADM

## 2018-01-26 RX ORDER — CEFAZOLIN SODIUM 2 G/100ML
2 INJECTION, SOLUTION INTRAVENOUS ONCE
Status: DISCONTINUED | OUTPATIENT
Start: 2018-01-26 | End: 2018-01-26 | Stop reason: HOSPADM

## 2018-01-26 RX ORDER — ACETAMINOPHEN 325 MG/1
650 TABLET ORAL EVERY 4 HOURS PRN
Status: DISCONTINUED | OUTPATIENT
Start: 2018-01-26 | End: 2018-01-26 | Stop reason: HOSPADM

## 2018-01-26 RX ORDER — PROPOFOL 10 MG/ML
VIAL (ML) INTRAVENOUS AS NEEDED
Status: DISCONTINUED | OUTPATIENT
Start: 2018-01-26 | End: 2018-01-26 | Stop reason: SURG

## 2018-01-26 RX ORDER — POTASSIUM CHLORIDE 750 MG/1
40 CAPSULE, EXTENDED RELEASE ORAL AS NEEDED
Status: DISCONTINUED | OUTPATIENT
Start: 2018-01-26 | End: 2018-01-30

## 2018-01-26 RX ORDER — DEXMEDETOMIDINE HYDROCHLORIDE 4 UG/ML
.2-1.5 INJECTION, SOLUTION INTRAVENOUS CONTINUOUS PRN
Status: DISCONTINUED | OUTPATIENT
Start: 2018-01-26 | End: 2018-01-27

## 2018-01-26 RX ORDER — FENTANYL CITRATE 50 UG/ML
25 INJECTION, SOLUTION INTRAMUSCULAR; INTRAVENOUS
Status: DISCONTINUED | OUTPATIENT
Start: 2018-01-26 | End: 2018-01-27

## 2018-01-26 RX ORDER — SODIUM CHLORIDE 9 MG/ML
INJECTION, SOLUTION INTRAVENOUS AS NEEDED
Status: DISCONTINUED | OUTPATIENT
Start: 2018-01-26 | End: 2018-01-26 | Stop reason: HOSPADM

## 2018-01-26 RX ORDER — CHLORHEXIDINE GLUCONATE 0.12 MG/ML
15 RINSE ORAL ONCE
Status: COMPLETED | OUTPATIENT
Start: 2018-01-26 | End: 2018-01-26

## 2018-01-26 RX ORDER — LIDOCAINE HYDROCHLORIDE 10 MG/ML
0.5 INJECTION, SOLUTION EPIDURAL; INFILTRATION; INTRACAUDAL; PERINEURAL ONCE AS NEEDED
Status: DISCONTINUED | OUTPATIENT
Start: 2018-01-26 | End: 2018-01-26 | Stop reason: HOSPADM

## 2018-01-26 RX ORDER — CHLORHEXIDINE GLUCONATE 500 MG/1
1 CLOTH TOPICAL EVERY 12 HOURS PRN
Status: DISCONTINUED | OUTPATIENT
Start: 2018-01-26 | End: 2018-01-26 | Stop reason: HOSPADM

## 2018-01-26 RX ORDER — CHLORHEXIDINE GLUCONATE 0.12 MG/ML
15 RINSE ORAL EVERY 12 HOURS SCHEDULED
Status: DISCONTINUED | OUTPATIENT
Start: 2018-01-26 | End: 2018-01-27

## 2018-01-26 RX ORDER — LIDOCAINE HYDROCHLORIDE 10 MG/ML
INJECTION, SOLUTION INFILTRATION; PERINEURAL AS NEEDED
Status: DISCONTINUED | OUTPATIENT
Start: 2018-01-26 | End: 2018-01-26 | Stop reason: SURG

## 2018-01-26 RX ORDER — PROTAMINE SULFATE 10 MG/ML
INJECTION, SOLUTION INTRAVENOUS AS NEEDED
Status: DISCONTINUED | OUTPATIENT
Start: 2018-01-26 | End: 2018-01-26 | Stop reason: SURG

## 2018-01-26 RX ORDER — PROTAMINE SULFATE 10 MG/ML
50 INJECTION, SOLUTION INTRAVENOUS ONCE
Status: COMPLETED | OUTPATIENT
Start: 2018-01-26 | End: 2018-01-26

## 2018-01-26 RX ORDER — FAMOTIDINE 20 MG/1
20 TABLET, FILM COATED ORAL ONCE
Status: COMPLETED | OUTPATIENT
Start: 2018-01-26 | End: 2018-01-26

## 2018-01-26 RX ORDER — MIDAZOLAM HYDROCHLORIDE 1 MG/ML
INJECTION INTRAMUSCULAR; INTRAVENOUS AS NEEDED
Status: DISCONTINUED | OUTPATIENT
Start: 2018-01-26 | End: 2018-01-26 | Stop reason: SURG

## 2018-01-26 RX ORDER — DOBUTAMINE HYDROCHLORIDE 100 MG/100ML
2-20 INJECTION INTRAVENOUS CONTINUOUS PRN
Status: DISCONTINUED | OUTPATIENT
Start: 2018-01-26 | End: 2018-01-27

## 2018-01-26 RX ORDER — SODIUM CHLORIDE 0.9 % (FLUSH) 0.9 %
1-10 SYRINGE (ML) INJECTION AS NEEDED
Status: DISCONTINUED | OUTPATIENT
Start: 2018-01-26 | End: 2018-01-26 | Stop reason: HOSPADM

## 2018-01-26 RX ORDER — HEPARIN SODIUM 1000 [USP'U]/ML
INJECTION, SOLUTION INTRAVENOUS; SUBCUTANEOUS AS NEEDED
Status: DISCONTINUED | OUTPATIENT
Start: 2018-01-26 | End: 2018-01-26 | Stop reason: SURG

## 2018-01-26 RX ORDER — DOPAMINE HYDROCHLORIDE 160 MG/100ML
2-20 INJECTION, SOLUTION INTRAVENOUS CONTINUOUS PRN
Status: DISCONTINUED | OUTPATIENT
Start: 2018-01-26 | End: 2018-01-27

## 2018-01-26 RX ORDER — MAGNESIUM SULFATE HEPTAHYDRATE 40 MG/ML
4 INJECTION, SOLUTION INTRAVENOUS AS NEEDED
Status: DISCONTINUED | OUTPATIENT
Start: 2018-01-26 | End: 2018-01-30

## 2018-01-26 RX ORDER — METOPROLOL TARTRATE 5 MG/5ML
2.5 INJECTION INTRAVENOUS EVERY 6 HOURS SCHEDULED
Status: DISCONTINUED | OUTPATIENT
Start: 2018-01-26 | End: 2018-01-27

## 2018-01-26 RX ORDER — ALBUMIN, HUMAN INJ 5% 5 %
SOLUTION INTRAVENOUS
Status: COMPLETED
Start: 2018-01-26 | End: 2018-01-26

## 2018-01-26 RX ORDER — NALOXONE HCL 0.4 MG/ML
0.4 VIAL (ML) INJECTION
Status: DISCONTINUED | OUTPATIENT
Start: 2018-01-26 | End: 2018-01-27

## 2018-01-26 RX ORDER — ATORVASTATIN CALCIUM 40 MG/1
40 TABLET, FILM COATED ORAL NIGHTLY
Status: DISCONTINUED | OUTPATIENT
Start: 2018-01-26 | End: 2018-01-26

## 2018-01-26 RX ORDER — MEPERIDINE HYDROCHLORIDE 25 MG/ML
25 INJECTION INTRAMUSCULAR; INTRAVENOUS; SUBCUTANEOUS EVERY 4 HOURS PRN
Status: ACTIVE | OUTPATIENT
Start: 2018-01-26 | End: 2018-01-26

## 2018-01-26 RX ORDER — CEFAZOLIN SODIUM 2 G/100ML
1 INJECTION, SOLUTION INTRAVENOUS EVERY 8 HOURS
Status: DISCONTINUED | OUTPATIENT
Start: 2018-01-26 | End: 2018-01-26

## 2018-01-26 RX ORDER — NITROGLYCERIN 0.4 MG/1
0.4 TABLET SUBLINGUAL
Status: DISCONTINUED | OUTPATIENT
Start: 2018-01-26 | End: 2018-01-26 | Stop reason: HOSPADM

## 2018-01-26 RX ORDER — MAGNESIUM SULFATE HEPTAHYDRATE 40 MG/ML
2 INJECTION, SOLUTION INTRAVENOUS AS NEEDED
Status: DISCONTINUED | OUTPATIENT
Start: 2018-01-26 | End: 2018-01-30

## 2018-01-26 RX ORDER — PROTAMINE SULFATE 10 MG/ML
INJECTION, SOLUTION INTRAVENOUS
Status: COMPLETED
Start: 2018-01-26 | End: 2018-01-26

## 2018-01-26 RX ORDER — SUFENTANIL CITRATE 50 UG/ML
INJECTION EPIDURAL; INTRAVENOUS AS NEEDED
Status: DISCONTINUED | OUTPATIENT
Start: 2018-01-26 | End: 2018-01-26 | Stop reason: SURG

## 2018-01-26 RX ORDER — CEFAZOLIN SODIUM 2 G/100ML
INJECTION, SOLUTION INTRAVENOUS AS NEEDED
Status: DISCONTINUED | OUTPATIENT
Start: 2018-01-26 | End: 2018-01-26 | Stop reason: SURG

## 2018-01-26 RX ORDER — LEVOFLOXACIN 5 MG/ML
500 INJECTION, SOLUTION INTRAVENOUS EVERY 24 HOURS
Status: COMPLETED | OUTPATIENT
Start: 2018-01-27 | End: 2018-01-28

## 2018-01-26 RX ORDER — FAMOTIDINE 20 MG/1
20 TABLET, FILM COATED ORAL 2 TIMES DAILY
Status: DISCONTINUED | OUTPATIENT
Start: 2018-01-26 | End: 2018-02-05 | Stop reason: HOSPADM

## 2018-01-26 RX ORDER — CEFAZOLIN SODIUM 2 G/100ML
2 INJECTION, SOLUTION INTRAVENOUS EVERY 8 HOURS
Status: COMPLETED | OUTPATIENT
Start: 2018-01-26 | End: 2018-01-26

## 2018-01-26 RX ORDER — AMINOCAPROIC ACID 250 MG/ML
INJECTION, SOLUTION INTRAVENOUS AS NEEDED
Status: DISCONTINUED | OUTPATIENT
Start: 2018-01-26 | End: 2018-01-26 | Stop reason: SURG

## 2018-01-26 RX ORDER — HYDROCODONE BITARTRATE AND ACETAMINOPHEN 7.5; 325 MG/1; MG/1
1 TABLET ORAL EVERY 4 HOURS PRN
Status: DISCONTINUED | OUTPATIENT
Start: 2018-01-26 | End: 2018-01-29

## 2018-01-26 RX ORDER — POTASSIUM CHLORIDE 29.8 MG/ML
20 INJECTION INTRAVENOUS
Status: DISCONTINUED | OUTPATIENT
Start: 2018-01-26 | End: 2018-01-29

## 2018-01-26 RX ORDER — VECURONIUM BROMIDE 1 MG/ML
INJECTION, POWDER, LYOPHILIZED, FOR SOLUTION INTRAVENOUS AS NEEDED
Status: DISCONTINUED | OUTPATIENT
Start: 2018-01-26 | End: 2018-01-26 | Stop reason: SURG

## 2018-01-26 RX ORDER — SODIUM CHLORIDE 9 MG/ML
INJECTION, SOLUTION INTRAVENOUS CONTINUOUS PRN
Status: DISCONTINUED | OUTPATIENT
Start: 2018-01-26 | End: 2018-01-26 | Stop reason: SURG

## 2018-01-26 RX ORDER — ALBUMIN, HUMAN INJ 5% 5 %
SOLUTION INTRAVENOUS
Status: DISPENSED
Start: 2018-01-26 | End: 2018-01-26

## 2018-01-26 RX ORDER — TAMSULOSIN HYDROCHLORIDE 0.4 MG/1
0.4 CAPSULE ORAL NIGHTLY
Status: DISCONTINUED | OUTPATIENT
Start: 2018-01-26 | End: 2018-01-31

## 2018-01-26 RX ORDER — LEVOTHYROXINE SODIUM 0.12 MG/1
125 TABLET ORAL DAILY
Status: DISCONTINUED | OUTPATIENT
Start: 2018-01-26 | End: 2018-02-05 | Stop reason: HOSPADM

## 2018-01-26 RX ORDER — FAMOTIDINE 10 MG/ML
20 INJECTION, SOLUTION INTRAVENOUS ONCE
Status: DISCONTINUED | OUTPATIENT
Start: 2018-01-26 | End: 2018-01-26 | Stop reason: HOSPADM

## 2018-01-26 RX ADMIN — LIDOCAINE HYDROCHLORIDE 50 MG: 10 INJECTION, SOLUTION INFILTRATION; PERINEURAL at 07:37

## 2018-01-26 RX ADMIN — SODIUM CHLORIDE: 9 INJECTION, SOLUTION INTRAVENOUS at 07:29

## 2018-01-26 RX ADMIN — CHLORHEXIDINE GLUCONATE 15 ML: 1.2 RINSE ORAL at 06:13

## 2018-01-26 RX ADMIN — PROTAMINE SULFATE 500 MG: 10 INJECTION, SOLUTION INTRAVENOUS at 10:30

## 2018-01-26 RX ADMIN — FAMOTIDINE 20 MG: 20 TABLET, FILM COATED ORAL at 21:30

## 2018-01-26 RX ADMIN — MUPIROCIN 1 APPLICATION: 20 OINTMENT TOPICAL at 06:13

## 2018-01-26 RX ADMIN — MORPHINE SULFATE 2 MG: 2 INJECTION, SOLUTION INTRAMUSCULAR; INTRAVENOUS at 18:49

## 2018-01-26 RX ADMIN — PROPOFOL 150 MG: 10 INJECTION, EMULSION INTRAVENOUS at 07:37

## 2018-01-26 RX ADMIN — HEPARIN SODIUM 35000 UNITS: 1000 INJECTION, SOLUTION INTRAVENOUS; SUBCUTANEOUS at 09:13

## 2018-01-26 RX ADMIN — PROPOFOL 50 MCG/KG/MIN: 10 INJECTION, EMULSION INTRAVENOUS at 13:24

## 2018-01-26 RX ADMIN — VECURONIUM BROMIDE 20 MG: 1 INJECTION, POWDER, LYOPHILIZED, FOR SOLUTION INTRAVENOUS at 07:45

## 2018-01-26 RX ADMIN — SODIUM CHLORIDE 6.3 UNITS/HR: 9 INJECTION, SOLUTION INTRAVENOUS at 23:18

## 2018-01-26 RX ADMIN — METHYLPREDNISOLONE SODIUM SUCCINATE 60 MG: 125 INJECTION, POWDER, FOR SOLUTION INTRAMUSCULAR; INTRAVENOUS at 10:11

## 2018-01-26 RX ADMIN — ROCURONIUM BROMIDE 100 MG: 10 SOLUTION INTRAVENOUS at 07:37

## 2018-01-26 RX ADMIN — SUFENTANIL CITRATE 250 MCG: 50 INJECTION EPIDURAL; INTRAVENOUS at 07:37

## 2018-01-26 RX ADMIN — POTASSIUM CHLORIDE AND DEXTROSE MONOHYDRATE 30 ML/HR: 150; 5 INJECTION, SOLUTION INTRAVENOUS at 11:30

## 2018-01-26 RX ADMIN — CEFAZOLIN SODIUM 2 G: 2 INJECTION, SOLUTION INTRAVENOUS at 23:18

## 2018-01-26 RX ADMIN — SODIUM CHLORIDE 3.4 UNITS/HR: 9 INJECTION, SOLUTION INTRAVENOUS at 14:00

## 2018-01-26 RX ADMIN — MORPHINE SULFATE 2 MG: 2 INJECTION, SOLUTION INTRAMUSCULAR; INTRAVENOUS at 23:26

## 2018-01-26 RX ADMIN — CEFAZOLIN SODIUM 2 G: 2 INJECTION, SOLUTION INTRAVENOUS at 19:28

## 2018-01-26 RX ADMIN — MORPHINE SULFATE 2 MG: 2 INJECTION, SOLUTION INTRAMUSCULAR; INTRAVENOUS at 19:24

## 2018-01-26 RX ADMIN — ALBUMIN, HUMAN INJ 5% 250 ML: 5 SOLUTION at 12:00

## 2018-01-26 RX ADMIN — PROTAMINE SULFATE 50 MG: 10 INJECTION, SOLUTION INTRAVENOUS at 10:40

## 2018-01-26 RX ADMIN — SODIUM CHLORIDE, POTASSIUM CHLORIDE, SODIUM LACTATE AND CALCIUM CHLORIDE 9 ML/HR: 600; 310; 30; 20 INJECTION, SOLUTION INTRAVENOUS at 06:14

## 2018-01-26 RX ADMIN — MIDAZOLAM HYDROCHLORIDE 10 MG: 1 INJECTION, SOLUTION INTRAMUSCULAR; INTRAVENOUS at 07:37

## 2018-01-26 RX ADMIN — ALBUMIN HUMAN 250 ML: 0.05 INJECTION, SOLUTION INTRAVENOUS at 12:00

## 2018-01-26 RX ADMIN — NITROGLYCERIN 20 MCG/MIN: 20 INJECTION INTRAVENOUS at 11:46

## 2018-01-26 RX ADMIN — CHLORHEXIDINE GLUCONATE 15 ML: 1.2 RINSE ORAL at 21:29

## 2018-01-26 RX ADMIN — PROTAMINE SULFATE 50 MG: 10 INJECTION, SOLUTION INTRAVENOUS at 11:30

## 2018-01-26 RX ADMIN — FAMOTIDINE 20 MG: 20 TABLET, FILM COATED ORAL at 06:13

## 2018-01-26 RX ADMIN — CEFAZOLIN SODIUM 2 G: 2 INJECTION, SOLUTION INTRAVENOUS at 07:57

## 2018-01-26 RX ADMIN — NOREPINEPHRINE BITARTRATE 0.05 MCG/KG/MIN: 1 INJECTION, SOLUTION, CONCENTRATE INTRAVENOUS at 10:05

## 2018-01-26 RX ADMIN — OXYCODONE AND ACETAMINOPHEN 1 TABLET: 5; 325 TABLET ORAL at 21:29

## 2018-01-26 RX ADMIN — LEVOFLOXACIN 750 MG: 5 INJECTION, SOLUTION INTRAVENOUS at 08:14

## 2018-01-26 RX ADMIN — ONDANSETRON HYDROCHLORIDE 4 MG: 2 INJECTION, SOLUTION INTRAMUSCULAR; INTRAVENOUS at 20:36

## 2018-01-26 RX ADMIN — TAMSULOSIN HYDROCHLORIDE 0.4 MG: 0.4 CAPSULE ORAL at 21:30

## 2018-01-26 RX ADMIN — AMINOCAPROIC ACID 10 G: 250 INJECTION, SOLUTION INTRAVENOUS at 07:57

## 2018-01-26 NOTE — ANESTHESIA PROCEDURE NOTES
Central Line    Patient location during procedure: OR  Staff  Anesthesiologist: ALANNA STEEL  Preanesthetic Checklist  Completed: patient identified, site marked, surgical consent, pre-op evaluation, timeout performed, IV checked, risks and benefits discussed and monitors and equipment checked  Central Line Prep  Sterile Tech:gloves, cap, gown, mask and sterile barriers  Patient monitoring: blood pressure monitoring, continuous pulse oximetry and EKG  Central Line Procedure  Laterality:right  Location:internal jugular  Catheter Type:double lumen  Catheter Size:9 Fr  Guidance:ultrasound guided, landmark technique and palpation technique  Assessment  Post procedure:biopatch applied, line sutured and occlusive dressing applied  Assessement:blood return through all ports, free fluid flow, chest x-ray ordered and Kareem Test  Complications:no  Patient Tolerance:patient tolerated the procedure well with no apparent complications

## 2018-01-26 NOTE — ANESTHESIA PROCEDURE NOTES
Airway  Urgency: elective    Airway not difficult    General Information and Staff    Patient location during procedure: OR  Anesthesiologist: ALANNA STEEL    Indications and Patient Condition  Indications for airway management: airway protection    Preoxygenated: yes  Mask difficulty assessment: 1 - vent by mask    Final Airway Details  Final airway type: endotracheal airway      Successful airway: ETT  Cuffed: yes   Successful intubation technique: direct laryngoscopy  Endotracheal tube insertion site: oral  Blade: Russell  Blade size: #3  ETT size: 8.0 mm  Cormack-Lehane Classification: grade I - full view of glottis  Placement verified by: chest auscultation and capnometry   Measured from: gums  Number of attempts at approach: 1

## 2018-01-26 NOTE — ANESTHESIA PREPROCEDURE EVALUATION
Anesthesia Evaluation     NPO Solid Status: > 8 hours  NPO Liquid Status: > 8 hours     Airway   Mallampati: II  TM distance: >3 FB  Neck ROM: full  no difficulty expected  Dental    (+) poor dentition    Pulmonary    (+) a smoker (quit 1993) Former, decreased breath sounds,   (-) asthma  Cardiovascular     ECG reviewed  Rhythm: regular  Rate: normal    (+) hypertension, CAD, cardiac stents   (-) pacemaker, murmur      Neuro/Psych  (-) seizures, TIA, CVA  GI/Hepatic/Renal/Endo    (+) obesity,    (-) no renal disease, diabetes    Musculoskeletal     Abdominal    Substance History   (+) alcohol use (Patient stated that he used to drink more, drinks less now  but still drinks beer occasionally),      OB/GYN          Other        ROS/Med Hx Other: Lab noted increased LFT's  Discussed with Dr. Morales      Phys Exam Other: Evidence of venous congestion on the abdomen                                    Anesthesia Plan    ASA 4     general   (GA, a line, cortis, swan)  intravenous induction

## 2018-01-26 NOTE — ANESTHESIA PROCEDURE NOTES
Arterial Line    Patient location during procedure: pre-op   Line placed for hemodynamic monitoring.  Performed By   Anesthesiologist: ALANNA STEEL  Preanesthetic Checklist  Completed: patient identified, site marked, surgical consent, pre-op evaluation, timeout performed, IV checked, risks and benefits discussed and monitors and equipment checked  Arterial Line Prep   Sterile Tech: cap, gloves and sterile barriers  Prep: ChloraPrep  Patient monitoring: blood pressure monitoring, continuous pulse oximetry and EKG  Arterial Line Procedure   Laterality:right  Location:  radial artery  Catheter size: 20 G   Guidance: ultrasound guided, landmark technique and palpation technique  Number of attempts: 1  Successful placement: yes          Post Assessment   Dressing Type: line sutured, occlusive dressing applied, secured with tape and wrist guard applied.   Complications no  Circ/Move/Sens Assessment: normal and unchanged.   Patient Tolerance: patient tolerated the procedure well with no apparent complications

## 2018-01-26 NOTE — ANESTHESIA POSTPROCEDURE EVALUATION
Patient: Rodrigo Baum    Procedure Summary     Date Anesthesia Start Anesthesia Stop Room / Location    01/26/18 0729   [facility]       Procedure Diagnosis Surgeon Provider    CORONARY ARTERY BYPASS X 2 WITH INTERNAL MAMMARY ARTERY GRAFT, ENDOSCOPIC VEIN HARVESTING UTILIZING THE LEFT GREATER SAPPHENOUS VEIN  CYSTO BY DR WATERS (N/A Chest) Coronary artery disease involving native coronary artery of native heart without angina pectoris  (Coronary artery disease involving native coronary artery of native heart without angina pectoris [I25.10]) MD Eran Wynn MD          Anesthesia Type: general  Last vitals  BP   (!) 191/106 (01/26/18 0608)   Temp   97.3 °F (36.3 °C) (01/26/18 0607)   Pulse   66 (01/26/18 0607)   Resp   18 (01/26/18 0607)     SpO2   96 % (01/26/18 0607)     Post Anesthesia Care and Evaluation    Patient location during evaluation: ICU  Patient participation: complete - patient cannot participate  Pain score: 0  Pain management: adequate  Anesthetic complications: No anesthetic complications  PONV Status: none  Cardiovascular status: acceptable and hemodynamically stable  Respiratory status: acceptable, ETT and ventilator  Hydration status: acceptable    Comments: Transport to the ICU with fulll monitors, stable, report to RN, BBS=

## 2018-01-27 ENCOUNTER — APPOINTMENT (OUTPATIENT)
Dept: GENERAL RADIOLOGY | Facility: HOSPITAL | Age: 73
End: 2018-01-27

## 2018-01-27 LAB
ABO + RH BLD: NORMAL
ALBUMIN SERPL-MCNC: 4.1 G/DL (ref 3.2–4.8)
ANION GAP SERPL CALCULATED.3IONS-SCNC: 6 MMOL/L (ref 3–11)
BASOPHILS # BLD AUTO: 0.01 10*3/MM3 (ref 0–0.2)
BASOPHILS NFR BLD AUTO: 0.1 % (ref 0–1)
BH BB BLOOD EXPIRATION DATE: NORMAL
BH BB BLOOD TYPE BARCODE: 1700
BH BB BLOOD TYPE BARCODE: 5100
BH BB BLOOD TYPE BARCODE: 7300
BH BB DISPENSE STATUS: NORMAL
BH BB PRODUCT CODE: NORMAL
BH BB UNIT NUMBER: NORMAL
BUN BLD-MCNC: 19 MG/DL (ref 9–23)
BUN/CREAT SERPL: 15.8 (ref 7–25)
CALCIUM SPEC-SCNC: 9 MG/DL (ref 8.7–10.4)
CHLORIDE SERPL-SCNC: 105 MMOL/L (ref 99–109)
CO2 SERPL-SCNC: 25 MMOL/L (ref 20–31)
CREAT BLD-MCNC: 1.2 MG/DL (ref 0.6–1.3)
DEPRECATED RDW RBC AUTO: 46.9 FL (ref 37–54)
EOSINOPHIL # BLD AUTO: 0 10*3/MM3 (ref 0–0.3)
EOSINOPHIL NFR BLD AUTO: 0 % (ref 0–3)
ERYTHROCYTE [DISTWIDTH] IN BLOOD BY AUTOMATED COUNT: 13.5 % (ref 11.3–14.5)
GFR SERPL CREATININE-BSD FRML MDRD: 60 ML/MIN/1.73
GLUCOSE BLD-MCNC: 107 MG/DL (ref 70–100)
GLUCOSE BLDC GLUCOMTR-MCNC: 101 MG/DL (ref 70–130)
GLUCOSE BLDC GLUCOMTR-MCNC: 113 MG/DL (ref 70–130)
GLUCOSE BLDC GLUCOMTR-MCNC: 119 MG/DL (ref 70–130)
GLUCOSE BLDC GLUCOMTR-MCNC: 120 MG/DL (ref 70–130)
GLUCOSE BLDC GLUCOMTR-MCNC: 122 MG/DL (ref 70–130)
GLUCOSE BLDC GLUCOMTR-MCNC: 123 MG/DL (ref 70–130)
GLUCOSE BLDC GLUCOMTR-MCNC: 128 MG/DL (ref 70–130)
GLUCOSE BLDC GLUCOMTR-MCNC: 130 MG/DL (ref 70–130)
GLUCOSE BLDC GLUCOMTR-MCNC: 146 MG/DL (ref 70–130)
GLUCOSE BLDC GLUCOMTR-MCNC: 147 MG/DL (ref 70–130)
GLUCOSE BLDC GLUCOMTR-MCNC: 149 MG/DL (ref 70–130)
GLUCOSE BLDC GLUCOMTR-MCNC: 95 MG/DL (ref 70–130)
GLUCOSE BLDC GLUCOMTR-MCNC: 95 MG/DL (ref 70–130)
GLUCOSE BLDC GLUCOMTR-MCNC: 99 MG/DL (ref 70–130)
HCT VFR BLD AUTO: 30.2 % (ref 38.9–50.9)
HGB BLD-MCNC: 10.2 G/DL (ref 13.1–17.5)
IMM GRANULOCYTES # BLD: 0.02 10*3/MM3 (ref 0–0.03)
IMM GRANULOCYTES NFR BLD: 0.2 % (ref 0–0.6)
INR PPP: 1.02
LYMPHOCYTES # BLD AUTO: 0.34 10*3/MM3 (ref 0.6–4.8)
LYMPHOCYTES NFR BLD AUTO: 3.1 % (ref 24–44)
MAGNESIUM SERPL-MCNC: 2.3 MG/DL (ref 1.3–2.7)
MCH RBC QN AUTO: 32.4 PG (ref 27–31)
MCHC RBC AUTO-ENTMCNC: 33.8 G/DL (ref 32–36)
MCV RBC AUTO: 95.9 FL (ref 80–99)
MONOCYTES # BLD AUTO: 1.09 10*3/MM3 (ref 0–1)
MONOCYTES NFR BLD AUTO: 10 % (ref 0–12)
NEUTROPHILS # BLD AUTO: 9.49 10*3/MM3 (ref 1.5–8.3)
NEUTROPHILS NFR BLD AUTO: 86.6 % (ref 41–71)
PHOSPHATE SERPL-MCNC: 2.9 MG/DL (ref 2.4–5.1)
PLATELET # BLD AUTO: 163 10*3/MM3 (ref 150–450)
PMV BLD AUTO: 10.6 FL (ref 6–12)
POTASSIUM BLD-SCNC: 4 MMOL/L (ref 3.5–5.5)
PROTHROMBIN TIME: 11.1 SECONDS (ref 9.6–11.5)
RBC # BLD AUTO: 3.15 10*6/MM3 (ref 4.2–5.76)
SODIUM BLD-SCNC: 136 MMOL/L (ref 132–146)
UNIT  ABO: NORMAL
UNIT  RH: NORMAL
WBC NRBC COR # BLD: 10.95 10*3/MM3 (ref 3.5–10.8)

## 2018-01-27 PROCEDURE — 85610 PROTHROMBIN TIME: CPT | Performed by: PHYSICIAN ASSISTANT

## 2018-01-27 PROCEDURE — 93010 ELECTROCARDIOGRAM REPORT: CPT | Performed by: INTERNAL MEDICINE

## 2018-01-27 PROCEDURE — 83735 ASSAY OF MAGNESIUM: CPT | Performed by: PHYSICIAN ASSISTANT

## 2018-01-27 PROCEDURE — 85025 COMPLETE CBC W/AUTO DIFF WBC: CPT | Performed by: PHYSICIAN ASSISTANT

## 2018-01-27 PROCEDURE — 93005 ELECTROCARDIOGRAM TRACING: CPT | Performed by: PHYSICIAN ASSISTANT

## 2018-01-27 PROCEDURE — 71045 X-RAY EXAM CHEST 1 VIEW: CPT

## 2018-01-27 PROCEDURE — 25010000002 ONDANSETRON PER 1 MG: Performed by: PHYSICIAN ASSISTANT

## 2018-01-27 PROCEDURE — 25010000002 METOCLOPRAMIDE PER 10 MG: Performed by: INTERNAL MEDICINE

## 2018-01-27 PROCEDURE — 63710000001 PREDNISONE PER 5 MG: Performed by: PHYSICIAN ASSISTANT

## 2018-01-27 PROCEDURE — 97162 PT EVAL MOD COMPLEX 30 MIN: CPT

## 2018-01-27 PROCEDURE — 99024 POSTOP FOLLOW-UP VISIT: CPT | Performed by: THORACIC SURGERY (CARDIOTHORACIC VASCULAR SURGERY)

## 2018-01-27 PROCEDURE — 25010000002 LEVOFLOXACIN PER 250 MG: Performed by: PHYSICIAN ASSISTANT

## 2018-01-27 PROCEDURE — 74018 RADEX ABDOMEN 1 VIEW: CPT

## 2018-01-27 PROCEDURE — 99232 SBSQ HOSP IP/OBS MODERATE 35: CPT | Performed by: INTERNAL MEDICINE

## 2018-01-27 PROCEDURE — 94799 UNLISTED PULMONARY SVC/PX: CPT

## 2018-01-27 PROCEDURE — 99233 SBSQ HOSP IP/OBS HIGH 50: CPT | Performed by: INTERNAL MEDICINE

## 2018-01-27 PROCEDURE — 25010000002 MORPHINE SULFATE (PF) 2 MG/ML SOLUTION: Performed by: THORACIC SURGERY (CARDIOTHORACIC VASCULAR SURGERY)

## 2018-01-27 PROCEDURE — 80069 RENAL FUNCTION PANEL: CPT | Performed by: PHYSICIAN ASSISTANT

## 2018-01-27 PROCEDURE — 25010000002 FENTANYL CITRATE (PF) 100 MCG/2ML SOLUTION: Performed by: THORACIC SURGERY (CARDIOTHORACIC VASCULAR SURGERY)

## 2018-01-27 PROCEDURE — 82962 GLUCOSE BLOOD TEST: CPT

## 2018-01-27 RX ORDER — METOCLOPRAMIDE HYDROCHLORIDE 5 MG/ML
10 INJECTION INTRAMUSCULAR; INTRAVENOUS ONCE
Status: COMPLETED | OUTPATIENT
Start: 2018-01-27 | End: 2018-01-27

## 2018-01-27 RX ORDER — DEXTROSE MONOHYDRATE 25 G/50ML
25 INJECTION, SOLUTION INTRAVENOUS
Status: DISCONTINUED | OUTPATIENT
Start: 2018-01-27 | End: 2018-02-02

## 2018-01-27 RX ORDER — NICOTINE POLACRILEX 4 MG
15 LOZENGE BUCCAL
Status: DISCONTINUED | OUTPATIENT
Start: 2018-01-27 | End: 2018-02-02

## 2018-01-27 RX ADMIN — MORPHINE SULFATE 2 MG: 2 INJECTION, SOLUTION INTRAMUSCULAR; INTRAVENOUS at 22:38

## 2018-01-27 RX ADMIN — ASPIRIN 325 MG: 325 TABLET, DELAYED RELEASE ORAL at 08:40

## 2018-01-27 RX ADMIN — TAMSULOSIN HYDROCHLORIDE 0.4 MG: 0.4 CAPSULE ORAL at 20:35

## 2018-01-27 RX ADMIN — OXYCODONE AND ACETAMINOPHEN 2 TABLET: 5; 325 TABLET ORAL at 15:56

## 2018-01-27 RX ADMIN — LEVOTHYROXINE SODIUM 125 MCG: 125 TABLET ORAL at 08:39

## 2018-01-27 RX ADMIN — LEVOFLOXACIN 500 MG: 5 INJECTION, SOLUTION INTRAVENOUS at 08:40

## 2018-01-27 RX ADMIN — ONDANSETRON HYDROCHLORIDE 4 MG: 2 INJECTION, SOLUTION INTRAMUSCULAR; INTRAVENOUS at 12:44

## 2018-01-27 RX ADMIN — OXYCODONE AND ACETAMINOPHEN 2 TABLET: 5; 325 TABLET ORAL at 20:35

## 2018-01-27 RX ADMIN — OXYCODONE AND ACETAMINOPHEN 2 TABLET: 5; 325 TABLET ORAL at 04:04

## 2018-01-27 RX ADMIN — MORPHINE SULFATE 2 MG: 2 INJECTION, SOLUTION INTRAMUSCULAR; INTRAVENOUS at 18:11

## 2018-01-27 RX ADMIN — METOCLOPRAMIDE 10 MG: 5 INJECTION, SOLUTION INTRAMUSCULAR; INTRAVENOUS at 14:09

## 2018-01-27 RX ADMIN — PREDNISONE 5 MG: 5 TABLET ORAL at 08:40

## 2018-01-27 RX ADMIN — FENTANYL CITRATE 25 MCG: 50 INJECTION, SOLUTION INTRAMUSCULAR; INTRAVENOUS at 01:59

## 2018-01-27 RX ADMIN — METOPROLOL TARTRATE 12.5 MG: 25 TABLET, FILM COATED ORAL at 20:34

## 2018-01-27 RX ADMIN — FAMOTIDINE 20 MG: 20 TABLET, FILM COATED ORAL at 20:35

## 2018-01-27 RX ADMIN — SODIUM CHLORIDE 500 ML: 9 INJECTION, SOLUTION INTRAVENOUS at 14:41

## 2018-01-27 RX ADMIN — ONDANSETRON HYDROCHLORIDE 4 MG: 2 INJECTION, SOLUTION INTRAMUSCULAR; INTRAVENOUS at 04:09

## 2018-01-27 RX ADMIN — FAMOTIDINE 20 MG: 20 TABLET, FILM COATED ORAL at 08:40

## 2018-01-28 ENCOUNTER — APPOINTMENT (OUTPATIENT)
Dept: GENERAL RADIOLOGY | Facility: HOSPITAL | Age: 73
End: 2018-01-28

## 2018-01-28 LAB
ANION GAP SERPL CALCULATED.3IONS-SCNC: 7 MMOL/L (ref 3–11)
BACTERIA SPEC AEROBE CULT: NORMAL
BUN BLD-MCNC: 20 MG/DL (ref 9–23)
BUN/CREAT SERPL: 16.7 (ref 7–25)
CALCIUM SPEC-SCNC: 8.9 MG/DL (ref 8.7–10.4)
CHLORIDE SERPL-SCNC: 101 MMOL/L (ref 99–109)
CO2 SERPL-SCNC: 28 MMOL/L (ref 20–31)
CREAT BLD-MCNC: 1.2 MG/DL (ref 0.6–1.3)
DEPRECATED RDW RBC AUTO: 49.2 FL (ref 37–54)
ERYTHROCYTE [DISTWIDTH] IN BLOOD BY AUTOMATED COUNT: 13.7 % (ref 11.3–14.5)
GFR SERPL CREATININE-BSD FRML MDRD: 60 ML/MIN/1.73
GLUCOSE BLD-MCNC: 141 MG/DL (ref 70–100)
GLUCOSE BLDC GLUCOMTR-MCNC: 127 MG/DL (ref 70–130)
GLUCOSE BLDC GLUCOMTR-MCNC: 130 MG/DL (ref 70–130)
GLUCOSE BLDC GLUCOMTR-MCNC: 133 MG/DL (ref 70–130)
GLUCOSE BLDC GLUCOMTR-MCNC: 139 MG/DL (ref 70–130)
HCT VFR BLD AUTO: 32.3 % (ref 38.9–50.9)
HGB BLD-MCNC: 10.7 G/DL (ref 13.1–17.5)
MCH RBC QN AUTO: 32.8 PG (ref 27–31)
MCHC RBC AUTO-ENTMCNC: 33.1 G/DL (ref 32–36)
MCV RBC AUTO: 99.1 FL (ref 80–99)
PLATELET # BLD AUTO: 133 10*3/MM3 (ref 150–450)
PMV BLD AUTO: 10.6 FL (ref 6–12)
POTASSIUM BLD-SCNC: 4.7 MMOL/L (ref 3.5–5.5)
RBC # BLD AUTO: 3.26 10*6/MM3 (ref 4.2–5.76)
SODIUM BLD-SCNC: 136 MMOL/L (ref 132–146)
WBC NRBC COR # BLD: 7.67 10*3/MM3 (ref 3.5–10.8)

## 2018-01-28 PROCEDURE — 97110 THERAPEUTIC EXERCISES: CPT

## 2018-01-28 PROCEDURE — 85027 COMPLETE CBC AUTOMATED: CPT | Performed by: PHYSICIAN ASSISTANT

## 2018-01-28 PROCEDURE — 93005 ELECTROCARDIOGRAM TRACING: CPT | Performed by: PHYSICIAN ASSISTANT

## 2018-01-28 PROCEDURE — 94799 UNLISTED PULMONARY SVC/PX: CPT

## 2018-01-28 PROCEDURE — 93010 ELECTROCARDIOGRAM REPORT: CPT | Performed by: INTERNAL MEDICINE

## 2018-01-28 PROCEDURE — 80048 BASIC METABOLIC PNL TOTAL CA: CPT | Performed by: PHYSICIAN ASSISTANT

## 2018-01-28 PROCEDURE — 99232 SBSQ HOSP IP/OBS MODERATE 35: CPT | Performed by: INTERNAL MEDICINE

## 2018-01-28 PROCEDURE — 63710000001 PREDNISONE PER 5 MG: Performed by: PHYSICIAN ASSISTANT

## 2018-01-28 PROCEDURE — 25010000002 LEVOFLOXACIN PER 250 MG: Performed by: PHYSICIAN ASSISTANT

## 2018-01-28 PROCEDURE — 25010000002 METOCLOPRAMIDE PER 10 MG: Performed by: THORACIC SURGERY (CARDIOTHORACIC VASCULAR SURGERY)

## 2018-01-28 PROCEDURE — 99024 POSTOP FOLLOW-UP VISIT: CPT | Performed by: THORACIC SURGERY (CARDIOTHORACIC VASCULAR SURGERY)

## 2018-01-28 PROCEDURE — 71045 X-RAY EXAM CHEST 1 VIEW: CPT

## 2018-01-28 PROCEDURE — 97116 GAIT TRAINING THERAPY: CPT

## 2018-01-28 PROCEDURE — 82962 GLUCOSE BLOOD TEST: CPT

## 2018-01-28 RX ORDER — METOCLOPRAMIDE HYDROCHLORIDE 5 MG/ML
10 INJECTION INTRAMUSCULAR; INTRAVENOUS ONCE
Status: COMPLETED | OUTPATIENT
Start: 2018-01-28 | End: 2018-01-28

## 2018-01-28 RX ORDER — CLOPIDOGREL BISULFATE 75 MG/1
75 TABLET ORAL DAILY
Status: DISCONTINUED | OUTPATIENT
Start: 2018-01-28 | End: 2018-02-03

## 2018-01-28 RX ADMIN — OXYCODONE AND ACETAMINOPHEN 1 TABLET: 5; 325 TABLET ORAL at 21:14

## 2018-01-28 RX ADMIN — ASPIRIN 325 MG: 325 TABLET, DELAYED RELEASE ORAL at 08:26

## 2018-01-28 RX ADMIN — OXYCODONE AND ACETAMINOPHEN 1 TABLET: 5; 325 TABLET ORAL at 16:50

## 2018-01-28 RX ADMIN — LEVOTHYROXINE SODIUM 125 MCG: 125 TABLET ORAL at 08:25

## 2018-01-28 RX ADMIN — FAMOTIDINE 20 MG: 20 TABLET, FILM COATED ORAL at 21:14

## 2018-01-28 RX ADMIN — TAMSULOSIN HYDROCHLORIDE 0.4 MG: 0.4 CAPSULE ORAL at 21:14

## 2018-01-28 RX ADMIN — BISACODYL 10 MG: 5 TABLET, COATED ORAL at 21:15

## 2018-01-28 RX ADMIN — LEVOFLOXACIN 500 MG: 5 INJECTION, SOLUTION INTRAVENOUS at 08:26

## 2018-01-28 RX ADMIN — METOCLOPRAMIDE 10 MG: 5 INJECTION, SOLUTION INTRAMUSCULAR; INTRAVENOUS at 08:26

## 2018-01-28 RX ADMIN — OXYCODONE AND ACETAMINOPHEN 1 TABLET: 5; 325 TABLET ORAL at 10:42

## 2018-01-28 RX ADMIN — PREDNISONE 5 MG: 5 TABLET ORAL at 08:26

## 2018-01-28 RX ADMIN — CLOPIDOGREL BISULFATE 75 MG: 75 TABLET ORAL at 08:25

## 2018-01-28 RX ADMIN — METOPROLOL TARTRATE 12.5 MG: 25 TABLET, FILM COATED ORAL at 21:14

## 2018-01-28 RX ADMIN — FAMOTIDINE 20 MG: 20 TABLET, FILM COATED ORAL at 08:26

## 2018-01-28 RX ADMIN — OXYCODONE AND ACETAMINOPHEN 2 TABLET: 5; 325 TABLET ORAL at 04:17

## 2018-01-28 RX ADMIN — METOPROLOL TARTRATE 12.5 MG: 25 TABLET, FILM COATED ORAL at 08:26

## 2018-01-29 ENCOUNTER — APPOINTMENT (OUTPATIENT)
Dept: GENERAL RADIOLOGY | Facility: HOSPITAL | Age: 73
End: 2018-01-29

## 2018-01-29 LAB
ABO + RH BLD: NORMAL
ABO + RH BLD: NORMAL
ANION GAP SERPL CALCULATED.3IONS-SCNC: 5 MMOL/L (ref 3–11)
BASOPHILS # BLD AUTO: 0.03 10*3/MM3 (ref 0–0.2)
BASOPHILS NFR BLD AUTO: 0.4 % (ref 0–1)
BH BB BLOOD EXPIRATION DATE: NORMAL
BH BB BLOOD EXPIRATION DATE: NORMAL
BH BB BLOOD TYPE BARCODE: 7300
BH BB BLOOD TYPE BARCODE: 7300
BH BB DISPENSE STATUS: NORMAL
BH BB DISPENSE STATUS: NORMAL
BH BB PRODUCT CODE: NORMAL
BH BB PRODUCT CODE: NORMAL
BH BB UNIT NUMBER: NORMAL
BH BB UNIT NUMBER: NORMAL
BUN BLD-MCNC: 20 MG/DL (ref 9–23)
BUN/CREAT SERPL: 20 (ref 7–25)
CALCIUM SPEC-SCNC: 8.9 MG/DL (ref 8.7–10.4)
CHLORIDE SERPL-SCNC: 103 MMOL/L (ref 99–109)
CO2 SERPL-SCNC: 29 MMOL/L (ref 20–31)
CREAT BLD-MCNC: 1 MG/DL (ref 0.6–1.3)
DEPRECATED RDW RBC AUTO: 47.5 FL (ref 37–54)
EOSINOPHIL # BLD AUTO: 0.19 10*3/MM3 (ref 0–0.3)
EOSINOPHIL NFR BLD AUTO: 2.5 % (ref 0–3)
ERYTHROCYTE [DISTWIDTH] IN BLOOD BY AUTOMATED COUNT: 13.4 % (ref 11.3–14.5)
GFR SERPL CREATININE-BSD FRML MDRD: 74 ML/MIN/1.73
GLUCOSE BLD-MCNC: 142 MG/DL (ref 70–100)
GLUCOSE BLDC GLUCOMTR-MCNC: 112 MG/DL (ref 70–130)
GLUCOSE BLDC GLUCOMTR-MCNC: 121 MG/DL (ref 70–130)
GLUCOSE BLDC GLUCOMTR-MCNC: 137 MG/DL (ref 70–130)
GLUCOSE BLDC GLUCOMTR-MCNC: 160 MG/DL (ref 70–130)
HCT VFR BLD AUTO: 32.2 % (ref 38.9–50.9)
HGB BLD-MCNC: 10.6 G/DL (ref 13.1–17.5)
IMM GRANULOCYTES # BLD: 0.06 10*3/MM3 (ref 0–0.03)
IMM GRANULOCYTES NFR BLD: 0.8 % (ref 0–0.6)
LYMPHOCYTES # BLD AUTO: 0.74 10*3/MM3 (ref 0.6–4.8)
LYMPHOCYTES NFR BLD AUTO: 9.8 % (ref 24–44)
MCH RBC QN AUTO: 32.2 PG (ref 27–31)
MCHC RBC AUTO-ENTMCNC: 32.9 G/DL (ref 32–36)
MCV RBC AUTO: 97.9 FL (ref 80–99)
MONOCYTES # BLD AUTO: 0.95 10*3/MM3 (ref 0–1)
MONOCYTES NFR BLD AUTO: 12.5 % (ref 0–12)
NEUTROPHILS # BLD AUTO: 5.61 10*3/MM3 (ref 1.5–8.3)
NEUTROPHILS NFR BLD AUTO: 74 % (ref 41–71)
PLATELET # BLD AUTO: 144 10*3/MM3 (ref 150–450)
PMV BLD AUTO: 10.6 FL (ref 6–12)
POTASSIUM BLD-SCNC: 4.4 MMOL/L (ref 3.5–5.5)
RBC # BLD AUTO: 3.29 10*6/MM3 (ref 4.2–5.76)
SODIUM BLD-SCNC: 137 MMOL/L (ref 132–146)
UNIT  ABO: NORMAL
UNIT  ABO: NORMAL
UNIT  RH: NORMAL
UNIT  RH: NORMAL
WBC NRBC COR # BLD: 7.58 10*3/MM3 (ref 3.5–10.8)

## 2018-01-29 PROCEDURE — 97116 GAIT TRAINING THERAPY: CPT

## 2018-01-29 PROCEDURE — 99232 SBSQ HOSP IP/OBS MODERATE 35: CPT | Performed by: INTERNAL MEDICINE

## 2018-01-29 PROCEDURE — 99024 POSTOP FOLLOW-UP VISIT: CPT | Performed by: THORACIC SURGERY (CARDIOTHORACIC VASCULAR SURGERY)

## 2018-01-29 PROCEDURE — 63710000001 PREDNISONE PER 5 MG: Performed by: PHYSICIAN ASSISTANT

## 2018-01-29 PROCEDURE — 63710000001 INSULIN REGULAR HUMAN PER 5 UNITS: Performed by: INTERNAL MEDICINE

## 2018-01-29 PROCEDURE — 85025 COMPLETE CBC W/AUTO DIFF WBC: CPT | Performed by: INTERNAL MEDICINE

## 2018-01-29 PROCEDURE — 82962 GLUCOSE BLOOD TEST: CPT

## 2018-01-29 PROCEDURE — 25010000002 ONDANSETRON PER 1 MG: Performed by: PHYSICIAN ASSISTANT

## 2018-01-29 PROCEDURE — 80048 BASIC METABOLIC PNL TOTAL CA: CPT | Performed by: PHYSICIAN ASSISTANT

## 2018-01-29 PROCEDURE — 71045 X-RAY EXAM CHEST 1 VIEW: CPT

## 2018-01-29 RX ORDER — HYDROCODONE BITARTRATE AND ACETAMINOPHEN 5; 325 MG/1; MG/1
1 TABLET ORAL EVERY 6 HOURS PRN
Qty: 15 TABLET | Refills: 0 | Status: SHIPPED | OUTPATIENT
Start: 2018-01-29 | End: 2018-02-08

## 2018-01-29 RX ORDER — LISINOPRIL 10 MG/1
10 TABLET ORAL DAILY
COMMUNITY
End: 2018-02-05 | Stop reason: HOSPADM

## 2018-01-29 RX ORDER — OMEPRAZOLE 40 MG/1
40 CAPSULE, DELAYED RELEASE ORAL 2 TIMES DAILY
COMMUNITY
End: 2018-02-08 | Stop reason: ALTCHOICE

## 2018-01-29 RX ORDER — HYDROCODONE BITARTRATE AND ACETAMINOPHEN 5; 325 MG/1; MG/1
1 TABLET ORAL EVERY 6 HOURS PRN
Status: DISCONTINUED | OUTPATIENT
Start: 2018-01-29 | End: 2018-02-05 | Stop reason: HOSPADM

## 2018-01-29 RX ORDER — ROSUVASTATIN CALCIUM 20 MG/1
20 TABLET, COATED ORAL NIGHTLY
Status: DISCONTINUED | OUTPATIENT
Start: 2018-01-29 | End: 2018-02-05 | Stop reason: HOSPADM

## 2018-01-29 RX ORDER — METOPROLOL SUCCINATE 50 MG/1
50 TABLET, EXTENDED RELEASE ORAL DAILY
COMMUNITY
End: 2019-08-20 | Stop reason: HOSPADM

## 2018-01-29 RX ORDER — OXYCODONE HYDROCHLORIDE AND ACETAMINOPHEN 5; 325 MG/1; MG/1
1 TABLET ORAL EVERY 4 HOURS PRN
COMMUNITY
End: 2018-02-05 | Stop reason: HOSPADM

## 2018-01-29 RX ADMIN — ONDANSETRON HYDROCHLORIDE 4 MG: 2 INJECTION, SOLUTION INTRAMUSCULAR; INTRAVENOUS at 13:28

## 2018-01-29 RX ADMIN — METOPROLOL TARTRATE 12.5 MG: 25 TABLET, FILM COATED ORAL at 09:14

## 2018-01-29 RX ADMIN — OXYCODONE AND ACETAMINOPHEN 2 TABLET: 5; 325 TABLET ORAL at 13:38

## 2018-01-29 RX ADMIN — OXYCODONE AND ACETAMINOPHEN 2 TABLET: 5; 325 TABLET ORAL at 09:14

## 2018-01-29 RX ADMIN — FAMOTIDINE 20 MG: 20 TABLET, FILM COATED ORAL at 20:06

## 2018-01-29 RX ADMIN — FAMOTIDINE 20 MG: 20 TABLET, FILM COATED ORAL at 09:14

## 2018-01-29 RX ADMIN — METOPROLOL TARTRATE 12.5 MG: 25 TABLET, FILM COATED ORAL at 20:06

## 2018-01-29 RX ADMIN — ASPIRIN 325 MG: 325 TABLET, DELAYED RELEASE ORAL at 09:14

## 2018-01-29 RX ADMIN — INSULIN HUMAN 2 UNITS: 100 INJECTION, SOLUTION PARENTERAL at 20:11

## 2018-01-29 RX ADMIN — ONDANSETRON HYDROCHLORIDE 4 MG: 2 INJECTION, SOLUTION INTRAMUSCULAR; INTRAVENOUS at 00:28

## 2018-01-29 RX ADMIN — TAMSULOSIN HYDROCHLORIDE 0.4 MG: 0.4 CAPSULE ORAL at 20:06

## 2018-01-29 RX ADMIN — ROSUVASTATIN CALCIUM 20 MG: 20 TABLET, FILM COATED ORAL at 20:06

## 2018-01-29 RX ADMIN — PREDNISONE 5 MG: 5 TABLET ORAL at 09:15

## 2018-01-29 RX ADMIN — LEVOTHYROXINE SODIUM 125 MCG: 125 TABLET ORAL at 09:14

## 2018-01-29 RX ADMIN — CLOPIDOGREL BISULFATE 75 MG: 75 TABLET ORAL at 09:14

## 2018-01-30 LAB
ACT BLD: 120 SECONDS (ref 82–152)
ANION GAP SERPL CALCULATED.3IONS-SCNC: 6 MMOL/L (ref 3–11)
BASOPHILS # BLD AUTO: 0.06 10*3/MM3 (ref 0–0.2)
BASOPHILS NFR BLD AUTO: 0.9 % (ref 0–1)
BUN BLD-MCNC: 19 MG/DL (ref 9–23)
BUN/CREAT SERPL: 23.8 (ref 7–25)
CALCIUM SPEC-SCNC: 9.1 MG/DL (ref 8.7–10.4)
CHLORIDE SERPL-SCNC: 101 MMOL/L (ref 99–109)
CO2 SERPL-SCNC: 28 MMOL/L (ref 20–31)
CREAT BLD-MCNC: 0.8 MG/DL (ref 0.6–1.3)
DEPRECATED RDW RBC AUTO: 47.6 FL (ref 37–54)
EOSINOPHIL # BLD AUTO: 0.35 10*3/MM3 (ref 0–0.3)
EOSINOPHIL NFR BLD AUTO: 5.3 % (ref 0–3)
ERYTHROCYTE [DISTWIDTH] IN BLOOD BY AUTOMATED COUNT: 13.3 % (ref 11.3–14.5)
GFR SERPL CREATININE-BSD FRML MDRD: 96 ML/MIN/1.73
GLUCOSE BLD-MCNC: 114 MG/DL (ref 70–100)
GLUCOSE BLDC GLUCOMTR-MCNC: 109 MG/DL (ref 70–130)
GLUCOSE BLDC GLUCOMTR-MCNC: 118 MG/DL (ref 70–130)
GLUCOSE BLDC GLUCOMTR-MCNC: 125 MG/DL (ref 70–130)
GLUCOSE BLDC GLUCOMTR-MCNC: 241 MG/DL (ref 70–130)
HCT VFR BLD AUTO: 31.4 % (ref 38.9–50.9)
HGB BLD-MCNC: 10.6 G/DL (ref 13.1–17.5)
IMM GRANULOCYTES # BLD: 0.06 10*3/MM3 (ref 0–0.03)
IMM GRANULOCYTES NFR BLD: 0.9 % (ref 0–0.6)
LYMPHOCYTES # BLD AUTO: 0.98 10*3/MM3 (ref 0.6–4.8)
LYMPHOCYTES NFR BLD AUTO: 14.9 % (ref 24–44)
MCH RBC QN AUTO: 32.9 PG (ref 27–31)
MCHC RBC AUTO-ENTMCNC: 33.8 G/DL (ref 32–36)
MCV RBC AUTO: 97.5 FL (ref 80–99)
MONOCYTES # BLD AUTO: 0.71 10*3/MM3 (ref 0–1)
MONOCYTES NFR BLD AUTO: 10.8 % (ref 0–12)
NEUTROPHILS # BLD AUTO: 4.42 10*3/MM3 (ref 1.5–8.3)
NEUTROPHILS NFR BLD AUTO: 67.2 % (ref 41–71)
PLATELET # BLD AUTO: 161 10*3/MM3 (ref 150–450)
PMV BLD AUTO: 10.1 FL (ref 6–12)
POTASSIUM BLD-SCNC: 3.6 MMOL/L (ref 3.5–5.5)
RBC # BLD AUTO: 3.22 10*6/MM3 (ref 4.2–5.76)
SODIUM BLD-SCNC: 135 MMOL/L (ref 132–146)
WBC NRBC COR # BLD: 6.58 10*3/MM3 (ref 3.5–10.8)

## 2018-01-30 PROCEDURE — 85025 COMPLETE CBC W/AUTO DIFF WBC: CPT | Performed by: INTERNAL MEDICINE

## 2018-01-30 PROCEDURE — 97110 THERAPEUTIC EXERCISES: CPT

## 2018-01-30 PROCEDURE — 25010000002 FUROSEMIDE PER 20 MG: Performed by: THORACIC SURGERY (CARDIOTHORACIC VASCULAR SURGERY)

## 2018-01-30 PROCEDURE — 99024 POSTOP FOLLOW-UP VISIT: CPT | Performed by: THORACIC SURGERY (CARDIOTHORACIC VASCULAR SURGERY)

## 2018-01-30 PROCEDURE — 25010000002 ONDANSETRON PER 1 MG: Performed by: PHYSICIAN ASSISTANT

## 2018-01-30 PROCEDURE — 80048 BASIC METABOLIC PNL TOTAL CA: CPT | Performed by: PHYSICIAN ASSISTANT

## 2018-01-30 PROCEDURE — 99232 SBSQ HOSP IP/OBS MODERATE 35: CPT | Performed by: INTERNAL MEDICINE

## 2018-01-30 PROCEDURE — 82962 GLUCOSE BLOOD TEST: CPT

## 2018-01-30 PROCEDURE — 94799 UNLISTED PULMONARY SVC/PX: CPT

## 2018-01-30 PROCEDURE — 63710000001 PREDNISONE PER 5 MG: Performed by: PHYSICIAN ASSISTANT

## 2018-01-30 RX ORDER — FUROSEMIDE 10 MG/ML
40 INJECTION INTRAMUSCULAR; INTRAVENOUS ONCE
Status: COMPLETED | OUTPATIENT
Start: 2018-01-30 | End: 2018-01-30

## 2018-01-30 RX ORDER — SODIUM CHLORIDE 0.9 % (FLUSH) 0.9 %
1-10 SYRINGE (ML) INJECTION EVERY 8 HOURS
Status: DISCONTINUED | OUTPATIENT
Start: 2018-01-30 | End: 2018-02-05 | Stop reason: HOSPADM

## 2018-01-30 RX ADMIN — FAMOTIDINE 20 MG: 20 TABLET, FILM COATED ORAL at 21:43

## 2018-01-30 RX ADMIN — ROSUVASTATIN CALCIUM 20 MG: 20 TABLET, FILM COATED ORAL at 21:43

## 2018-01-30 RX ADMIN — ONDANSETRON HYDROCHLORIDE 4 MG: 2 INJECTION, SOLUTION INTRAMUSCULAR; INTRAVENOUS at 19:53

## 2018-01-30 RX ADMIN — POTASSIUM CHLORIDE 40 MEQ: 1.5 POWDER, FOR SOLUTION ORAL at 12:41

## 2018-01-30 RX ADMIN — CLOPIDOGREL BISULFATE 75 MG: 75 TABLET ORAL at 08:45

## 2018-01-30 RX ADMIN — INSULIN HUMAN 3 UNITS: 100 INJECTION, SOLUTION PARENTERAL at 21:46

## 2018-01-30 RX ADMIN — BISACODYL 10 MG: 5 TABLET, COATED ORAL at 21:43

## 2018-01-30 RX ADMIN — ASPIRIN 325 MG: 325 TABLET, DELAYED RELEASE ORAL at 08:45

## 2018-01-30 RX ADMIN — FUROSEMIDE 40 MG: 10 INJECTION, SOLUTION INTRAMUSCULAR; INTRAVENOUS at 06:49

## 2018-01-30 RX ADMIN — PREDNISONE 5 MG: 5 TABLET ORAL at 08:45

## 2018-01-30 RX ADMIN — TAMSULOSIN HYDROCHLORIDE 0.4 MG: 0.4 CAPSULE ORAL at 21:43

## 2018-01-30 RX ADMIN — METOPROLOL TARTRATE 12.5 MG: 25 TABLET, FILM COATED ORAL at 08:45

## 2018-01-30 RX ADMIN — FAMOTIDINE 20 MG: 20 TABLET, FILM COATED ORAL at 08:45

## 2018-01-30 RX ADMIN — POTASSIUM CHLORIDE 40 MEQ: 750 CAPSULE, EXTENDED RELEASE ORAL at 06:49

## 2018-01-30 RX ADMIN — METOPROLOL TARTRATE 12.5 MG: 25 TABLET, FILM COATED ORAL at 21:43

## 2018-01-30 RX ADMIN — LEVOTHYROXINE SODIUM 125 MCG: 125 TABLET ORAL at 08:45

## 2018-01-31 LAB
ANION GAP SERPL CALCULATED.3IONS-SCNC: 10 MMOL/L (ref 3–11)
BACTERIA UR QL AUTO: ABNORMAL /HPF
BILIRUB UR QL STRIP: NEGATIVE
BUN BLD-MCNC: 20 MG/DL (ref 9–23)
BUN/CREAT SERPL: 22.2 (ref 7–25)
CALCIUM SPEC-SCNC: 8.8 MG/DL (ref 8.7–10.4)
CHLORIDE SERPL-SCNC: 99 MMOL/L (ref 99–109)
CLARITY UR: ABNORMAL
CO2 SERPL-SCNC: 27 MMOL/L (ref 20–31)
COLOR UR: YELLOW
CREAT BLD-MCNC: 0.9 MG/DL (ref 0.6–1.3)
GFR SERPL CREATININE-BSD FRML MDRD: 83 ML/MIN/1.73
GLUCOSE BLD-MCNC: 109 MG/DL (ref 70–100)
GLUCOSE BLDC GLUCOMTR-MCNC: 117 MG/DL (ref 70–130)
GLUCOSE BLDC GLUCOMTR-MCNC: 126 MG/DL (ref 70–130)
GLUCOSE BLDC GLUCOMTR-MCNC: 144 MG/DL (ref 70–130)
GLUCOSE BLDC GLUCOMTR-MCNC: 151 MG/DL (ref 70–130)
GLUCOSE UR STRIP-MCNC: NEGATIVE MG/DL
HCT VFR BLD AUTO: 31.8 % (ref 38.9–50.9)
HGB BLD-MCNC: 10.8 G/DL (ref 13.1–17.5)
HGB UR QL STRIP.AUTO: ABNORMAL
HYALINE CASTS UR QL AUTO: ABNORMAL /LPF
KETONES UR QL STRIP: NEGATIVE
LEUKOCYTE ESTERASE UR QL STRIP.AUTO: NEGATIVE
NITRITE UR QL STRIP: NEGATIVE
PH UR STRIP.AUTO: 6.5 [PH] (ref 5–8)
POTASSIUM BLD-SCNC: 3.6 MMOL/L (ref 3.5–5.5)
PROT UR QL STRIP: NEGATIVE
RBC # UR: ABNORMAL /HPF
REF LAB TEST METHOD: ABNORMAL
SODIUM BLD-SCNC: 136 MMOL/L (ref 132–146)
SP GR UR STRIP: <=1.005 (ref 1–1.03)
SQUAMOUS #/AREA URNS HPF: ABNORMAL /HPF
UROBILINOGEN UR QL STRIP: ABNORMAL
WBC UR QL AUTO: ABNORMAL /HPF

## 2018-01-31 PROCEDURE — 97116 GAIT TRAINING THERAPY: CPT

## 2018-01-31 PROCEDURE — 80048 BASIC METABOLIC PNL TOTAL CA: CPT | Performed by: PHYSICIAN ASSISTANT

## 2018-01-31 PROCEDURE — 99231 SBSQ HOSP IP/OBS SF/LOW 25: CPT | Performed by: PHYSICIAN ASSISTANT

## 2018-01-31 PROCEDURE — 99233 SBSQ HOSP IP/OBS HIGH 50: CPT | Performed by: NURSE PRACTITIONER

## 2018-01-31 PROCEDURE — 85014 HEMATOCRIT: CPT | Performed by: PHYSICIAN ASSISTANT

## 2018-01-31 PROCEDURE — 63710000001 PREDNISONE PER 5 MG: Performed by: PHYSICIAN ASSISTANT

## 2018-01-31 PROCEDURE — 85018 HEMOGLOBIN: CPT | Performed by: PHYSICIAN ASSISTANT

## 2018-01-31 PROCEDURE — 99024 POSTOP FOLLOW-UP VISIT: CPT | Performed by: THORACIC SURGERY (CARDIOTHORACIC VASCULAR SURGERY)

## 2018-01-31 PROCEDURE — 81001 URINALYSIS AUTO W/SCOPE: CPT | Performed by: NURSE PRACTITIONER

## 2018-01-31 PROCEDURE — 99024 POSTOP FOLLOW-UP VISIT: CPT | Performed by: PHYSICIAN ASSISTANT

## 2018-01-31 PROCEDURE — 82962 GLUCOSE BLOOD TEST: CPT

## 2018-01-31 RX ORDER — TAMSULOSIN HYDROCHLORIDE 0.4 MG/1
0.8 CAPSULE ORAL NIGHTLY
Status: DISCONTINUED | OUTPATIENT
Start: 2018-01-31 | End: 2018-02-05 | Stop reason: HOSPADM

## 2018-01-31 RX ADMIN — METOPROLOL TARTRATE 12.5 MG: 25 TABLET, FILM COATED ORAL at 20:51

## 2018-01-31 RX ADMIN — FAMOTIDINE 20 MG: 20 TABLET, FILM COATED ORAL at 20:51

## 2018-01-31 RX ADMIN — FAMOTIDINE 20 MG: 20 TABLET, FILM COATED ORAL at 08:50

## 2018-01-31 RX ADMIN — ROSUVASTATIN CALCIUM 20 MG: 20 TABLET, FILM COATED ORAL at 20:51

## 2018-01-31 RX ADMIN — LEVOTHYROXINE SODIUM 125 MCG: 125 TABLET ORAL at 08:50

## 2018-01-31 RX ADMIN — Medication 2 TABLET: at 09:09

## 2018-01-31 RX ADMIN — TAMSULOSIN HYDROCHLORIDE 0.8 MG: 0.4 CAPSULE ORAL at 20:51

## 2018-01-31 RX ADMIN — ASPIRIN 325 MG: 325 TABLET, DELAYED RELEASE ORAL at 08:50

## 2018-01-31 RX ADMIN — PREDNISONE 5 MG: 5 TABLET ORAL at 08:49

## 2018-01-31 RX ADMIN — CLOPIDOGREL BISULFATE 75 MG: 75 TABLET ORAL at 08:49

## 2018-01-31 RX ADMIN — DOCUSATE SODIUM 100 MG: 100 CAPSULE, LIQUID FILLED ORAL at 17:40

## 2018-01-31 RX ADMIN — METOPROLOL TARTRATE 12.5 MG: 25 TABLET, FILM COATED ORAL at 08:49

## 2018-01-31 RX ADMIN — Medication 10 ML: at 20:53

## 2018-01-31 RX ADMIN — DOCUSATE SODIUM 100 MG: 100 CAPSULE, LIQUID FILLED ORAL at 09:09

## 2018-01-31 RX ADMIN — BISACODYL 10 MG: 5 TABLET, COATED ORAL at 21:08

## 2018-02-01 LAB
ANION GAP SERPL CALCULATED.3IONS-SCNC: 5 MMOL/L (ref 3–11)
BUN BLD-MCNC: 19 MG/DL (ref 9–23)
BUN/CREAT SERPL: 17.3 (ref 7–25)
CALCIUM SPEC-SCNC: 8.8 MG/DL (ref 8.7–10.4)
CHLORIDE SERPL-SCNC: 100 MMOL/L (ref 99–109)
CO2 SERPL-SCNC: 31 MMOL/L (ref 20–31)
CREAT BLD-MCNC: 1.1 MG/DL (ref 0.6–1.3)
GFR SERPL CREATININE-BSD FRML MDRD: 66 ML/MIN/1.73
GLUCOSE BLD-MCNC: 114 MG/DL (ref 70–100)
GLUCOSE BLDC GLUCOMTR-MCNC: 109 MG/DL (ref 70–130)
GLUCOSE BLDC GLUCOMTR-MCNC: 111 MG/DL (ref 70–130)
GLUCOSE BLDC GLUCOMTR-MCNC: 124 MG/DL (ref 70–130)
GLUCOSE BLDC GLUCOMTR-MCNC: 137 MG/DL (ref 70–130)
HCT VFR BLD AUTO: 31.1 % (ref 38.9–50.9)
HCT VFR BLD AUTO: 33 % (ref 38.9–50.9)
HGB BLD-MCNC: 10.3 G/DL (ref 13.1–17.5)
HGB BLD-MCNC: 11.1 G/DL (ref 13.1–17.5)
POTASSIUM BLD-SCNC: 3.5 MMOL/L (ref 3.5–5.5)
SODIUM BLD-SCNC: 136 MMOL/L (ref 132–146)

## 2018-02-01 PROCEDURE — 63710000001 PREDNISONE PER 5 MG: Performed by: PHYSICIAN ASSISTANT

## 2018-02-01 PROCEDURE — 82962 GLUCOSE BLOOD TEST: CPT

## 2018-02-01 PROCEDURE — 25010000002 ONDANSETRON PER 1 MG: Performed by: PHYSICIAN ASSISTANT

## 2018-02-01 PROCEDURE — 80048 BASIC METABOLIC PNL TOTAL CA: CPT | Performed by: PHYSICIAN ASSISTANT

## 2018-02-01 PROCEDURE — 99233 SBSQ HOSP IP/OBS HIGH 50: CPT | Performed by: NURSE PRACTITIONER

## 2018-02-01 PROCEDURE — 99024 POSTOP FOLLOW-UP VISIT: CPT | Performed by: THORACIC SURGERY (CARDIOTHORACIC VASCULAR SURGERY)

## 2018-02-01 PROCEDURE — 85014 HEMATOCRIT: CPT | Performed by: PHYSICIAN ASSISTANT

## 2018-02-01 PROCEDURE — 85014 HEMATOCRIT: CPT | Performed by: NURSE PRACTITIONER

## 2018-02-01 PROCEDURE — 85018 HEMOGLOBIN: CPT | Performed by: PHYSICIAN ASSISTANT

## 2018-02-01 PROCEDURE — 85018 HEMOGLOBIN: CPT | Performed by: NURSE PRACTITIONER

## 2018-02-01 RX ADMIN — ONDANSETRON HYDROCHLORIDE 4 MG: 2 INJECTION, SOLUTION INTRAMUSCULAR; INTRAVENOUS at 17:56

## 2018-02-01 RX ADMIN — PREDNISONE 5 MG: 5 TABLET ORAL at 09:07

## 2018-02-01 RX ADMIN — OXYCODONE AND ACETAMINOPHEN 2 TABLET: 5; 325 TABLET ORAL at 22:10

## 2018-02-01 RX ADMIN — DOCUSATE SODIUM 100 MG: 100 CAPSULE, LIQUID FILLED ORAL at 09:11

## 2018-02-01 RX ADMIN — TAMSULOSIN HYDROCHLORIDE 0.8 MG: 0.4 CAPSULE ORAL at 22:11

## 2018-02-01 RX ADMIN — Medication 10 ML: at 09:17

## 2018-02-01 RX ADMIN — FAMOTIDINE 20 MG: 20 TABLET, FILM COATED ORAL at 09:07

## 2018-02-01 RX ADMIN — OXYCODONE AND ACETAMINOPHEN 2 TABLET: 5; 325 TABLET ORAL at 17:56

## 2018-02-01 RX ADMIN — ROSUVASTATIN CALCIUM 20 MG: 20 TABLET, FILM COATED ORAL at 22:11

## 2018-02-01 RX ADMIN — POLYETHYLENE GLYCOL 3350 17 G: 17 POWDER, FOR SOLUTION ORAL at 15:53

## 2018-02-01 RX ADMIN — METOPROLOL TARTRATE 12.5 MG: 25 TABLET, FILM COATED ORAL at 22:11

## 2018-02-01 RX ADMIN — Medication 2 TABLET: at 09:10

## 2018-02-01 RX ADMIN — METOPROLOL TARTRATE 12.5 MG: 25 TABLET, FILM COATED ORAL at 09:07

## 2018-02-01 RX ADMIN — ONDANSETRON HYDROCHLORIDE 4 MG: 2 INJECTION, SOLUTION INTRAMUSCULAR; INTRAVENOUS at 23:31

## 2018-02-01 RX ADMIN — LEVOTHYROXINE SODIUM 125 MCG: 125 TABLET ORAL at 09:05

## 2018-02-01 RX ADMIN — ASPIRIN 325 MG: 325 TABLET, DELAYED RELEASE ORAL at 09:07

## 2018-02-01 RX ADMIN — FAMOTIDINE 20 MG: 20 TABLET, FILM COATED ORAL at 22:10

## 2018-02-01 RX ADMIN — CLOPIDOGREL BISULFATE 75 MG: 75 TABLET ORAL at 09:05

## 2018-02-02 ENCOUNTER — APPOINTMENT (OUTPATIENT)
Dept: GENERAL RADIOLOGY | Facility: HOSPITAL | Age: 73
End: 2018-02-02

## 2018-02-02 LAB
GLUCOSE BLDC GLUCOMTR-MCNC: 101 MG/DL (ref 70–130)
GLUCOSE BLDC GLUCOMTR-MCNC: 117 MG/DL (ref 70–130)
GLUCOSE BLDC GLUCOMTR-MCNC: 127 MG/DL (ref 70–130)
GLUCOSE BLDC GLUCOMTR-MCNC: 133 MG/DL (ref 70–130)

## 2018-02-02 PROCEDURE — 74018 RADEX ABDOMEN 1 VIEW: CPT

## 2018-02-02 PROCEDURE — 25010000002 ONDANSETRON PER 1 MG: Performed by: PHYSICIAN ASSISTANT

## 2018-02-02 PROCEDURE — 63710000001 PREDNISONE PER 5 MG: Performed by: PHYSICIAN ASSISTANT

## 2018-02-02 PROCEDURE — 97110 THERAPEUTIC EXERCISES: CPT

## 2018-02-02 PROCEDURE — 99233 SBSQ HOSP IP/OBS HIGH 50: CPT | Performed by: NURSE PRACTITIONER

## 2018-02-02 PROCEDURE — 99024 POSTOP FOLLOW-UP VISIT: CPT | Performed by: THORACIC SURGERY (CARDIOTHORACIC VASCULAR SURGERY)

## 2018-02-02 PROCEDURE — 82962 GLUCOSE BLOOD TEST: CPT

## 2018-02-02 PROCEDURE — 97116 GAIT TRAINING THERAPY: CPT

## 2018-02-02 RX ORDER — BISACODYL 10 MG
10 SUPPOSITORY, RECTAL RECTAL ONCE
Status: COMPLETED | OUTPATIENT
Start: 2018-02-02 | End: 2018-02-02

## 2018-02-02 RX ORDER — DOCUSATE SODIUM 100 MG/1
100 CAPSULE, LIQUID FILLED ORAL 2 TIMES DAILY
Status: DISCONTINUED | OUTPATIENT
Start: 2018-02-02 | End: 2018-02-03

## 2018-02-02 RX ADMIN — CLOPIDOGREL BISULFATE 75 MG: 75 TABLET ORAL at 09:41

## 2018-02-02 RX ADMIN — BISACODYL 10 MG: 10 SUPPOSITORY RECTAL at 17:36

## 2018-02-02 RX ADMIN — METOPROLOL TARTRATE 12.5 MG: 25 TABLET, FILM COATED ORAL at 09:41

## 2018-02-02 RX ADMIN — OXYCODONE AND ACETAMINOPHEN 2 TABLET: 5; 325 TABLET ORAL at 07:55

## 2018-02-02 RX ADMIN — DOCUSATE SODIUM 100 MG: 100 CAPSULE, LIQUID FILLED ORAL at 23:13

## 2018-02-02 RX ADMIN — Medication 10 ML: at 23:15

## 2018-02-02 RX ADMIN — TAMSULOSIN HYDROCHLORIDE 0.8 MG: 0.4 CAPSULE ORAL at 23:13

## 2018-02-02 RX ADMIN — ROSUVASTATIN CALCIUM 20 MG: 20 TABLET, FILM COATED ORAL at 23:14

## 2018-02-02 RX ADMIN — PREDNISONE 5 MG: 5 TABLET ORAL at 09:41

## 2018-02-02 RX ADMIN — Medication 2 TABLET: at 17:37

## 2018-02-02 RX ADMIN — ASPIRIN 325 MG: 325 TABLET, DELAYED RELEASE ORAL at 09:41

## 2018-02-02 RX ADMIN — Medication 2 TABLET: at 07:55

## 2018-02-02 RX ADMIN — POLYETHYLENE GLYCOL 3350 17 G: 17 POWDER, FOR SOLUTION ORAL at 07:56

## 2018-02-02 RX ADMIN — FAMOTIDINE 20 MG: 20 TABLET, FILM COATED ORAL at 09:41

## 2018-02-02 RX ADMIN — ONDANSETRON HYDROCHLORIDE 4 MG: 2 INJECTION, SOLUTION INTRAMUSCULAR; INTRAVENOUS at 07:54

## 2018-02-02 RX ADMIN — POLYETHYLENE GLYCOL 3350 17 G: 17 POWDER, FOR SOLUTION ORAL at 17:36

## 2018-02-02 RX ADMIN — METOPROLOL TARTRATE 12.5 MG: 25 TABLET, FILM COATED ORAL at 23:13

## 2018-02-02 RX ADMIN — FAMOTIDINE 20 MG: 20 TABLET, FILM COATED ORAL at 23:13

## 2018-02-02 RX ADMIN — LEVOTHYROXINE SODIUM 125 MCG: 125 TABLET ORAL at 09:41

## 2018-02-03 LAB
AMYLASE SERPL-CCNC: 54 U/L (ref 30–118)
DEPRECATED RDW RBC AUTO: 47.4 FL (ref 37–54)
ERYTHROCYTE [DISTWIDTH] IN BLOOD BY AUTOMATED COUNT: 13.6 % (ref 11.3–14.5)
GLUCOSE BLDC GLUCOMTR-MCNC: 114 MG/DL (ref 70–130)
GLUCOSE BLDC GLUCOMTR-MCNC: 128 MG/DL (ref 70–130)
GLUCOSE BLDC GLUCOMTR-MCNC: 131 MG/DL (ref 70–130)
GLUCOSE BLDC GLUCOMTR-MCNC: 150 MG/DL (ref 70–130)
HCT VFR BLD AUTO: 31.9 % (ref 38.9–50.9)
HGB BLD-MCNC: 10.5 G/DL (ref 13.1–17.5)
LIPASE SERPL-CCNC: 37 U/L (ref 6–51)
MCH RBC QN AUTO: 32.2 PG (ref 27–31)
MCHC RBC AUTO-ENTMCNC: 32.9 G/DL (ref 32–36)
MCV RBC AUTO: 97.9 FL (ref 80–99)
PLATELET # BLD AUTO: 238 10*3/MM3 (ref 150–450)
PMV BLD AUTO: 10.4 FL (ref 6–12)
RBC # BLD AUTO: 3.26 10*6/MM3 (ref 4.2–5.76)
WBC NRBC COR # BLD: 9.15 10*3/MM3 (ref 3.5–10.8)

## 2018-02-03 PROCEDURE — 82962 GLUCOSE BLOOD TEST: CPT

## 2018-02-03 PROCEDURE — 99024 POSTOP FOLLOW-UP VISIT: CPT | Performed by: THORACIC SURGERY (CARDIOTHORACIC VASCULAR SURGERY)

## 2018-02-03 PROCEDURE — 85027 COMPLETE CBC AUTOMATED: CPT | Performed by: PHYSICIAN ASSISTANT

## 2018-02-03 PROCEDURE — 82150 ASSAY OF AMYLASE: CPT | Performed by: PHYSICIAN ASSISTANT

## 2018-02-03 PROCEDURE — 83690 ASSAY OF LIPASE: CPT | Performed by: PHYSICIAN ASSISTANT

## 2018-02-03 PROCEDURE — 99232 SBSQ HOSP IP/OBS MODERATE 35: CPT | Performed by: NURSE PRACTITIONER

## 2018-02-03 PROCEDURE — 63710000001 PREDNISONE PER 5 MG: Performed by: PHYSICIAN ASSISTANT

## 2018-02-03 PROCEDURE — 99231 SBSQ HOSP IP/OBS SF/LOW 25: CPT | Performed by: INTERNAL MEDICINE

## 2018-02-03 RX ORDER — DEXTROSE, SODIUM CHLORIDE, AND POTASSIUM CHLORIDE 5; .45; .15 G/100ML; G/100ML; G/100ML
75 INJECTION INTRAVENOUS CONTINUOUS
Status: DISCONTINUED | OUTPATIENT
Start: 2018-02-03 | End: 2018-02-05 | Stop reason: HOSPADM

## 2018-02-03 RX ADMIN — TAMSULOSIN HYDROCHLORIDE 0.8 MG: 0.4 CAPSULE ORAL at 21:26

## 2018-02-03 RX ADMIN — POTASSIUM CHLORIDE, DEXTROSE MONOHYDRATE AND SODIUM CHLORIDE 75 ML/HR: 150; 5; 450 INJECTION, SOLUTION INTRAVENOUS at 09:12

## 2018-02-03 RX ADMIN — LEVOTHYROXINE SODIUM 125 MCG: 125 TABLET ORAL at 09:15

## 2018-02-03 RX ADMIN — FAMOTIDINE 20 MG: 20 TABLET, FILM COATED ORAL at 09:15

## 2018-02-03 RX ADMIN — METOPROLOL TARTRATE 12.5 MG: 25 TABLET, FILM COATED ORAL at 21:26

## 2018-02-03 RX ADMIN — METOPROLOL TARTRATE 12.5 MG: 25 TABLET, FILM COATED ORAL at 09:15

## 2018-02-03 RX ADMIN — FAMOTIDINE 20 MG: 20 TABLET, FILM COATED ORAL at 21:26

## 2018-02-03 RX ADMIN — ROSUVASTATIN CALCIUM 20 MG: 20 TABLET, FILM COATED ORAL at 21:26

## 2018-02-03 RX ADMIN — ASPIRIN 325 MG: 325 TABLET, DELAYED RELEASE ORAL at 09:16

## 2018-02-03 RX ADMIN — PREDNISONE 5 MG: 5 TABLET ORAL at 09:16

## 2018-02-04 LAB
ALBUMIN SERPL-MCNC: 3.5 G/DL (ref 3.2–4.8)
ALBUMIN/GLOB SERPL: 1.7 G/DL (ref 1.5–2.5)
ALP SERPL-CCNC: 56 U/L (ref 25–100)
ALT SERPL W P-5'-P-CCNC: 40 U/L (ref 7–40)
ANION GAP SERPL CALCULATED.3IONS-SCNC: 7 MMOL/L (ref 3–11)
AST SERPL-CCNC: 30 U/L (ref 0–33)
BASOPHILS # BLD AUTO: 0.08 10*3/MM3 (ref 0–0.2)
BASOPHILS NFR BLD AUTO: 1.1 % (ref 0–1)
BILIRUB SERPL-MCNC: 1.6 MG/DL (ref 0.3–1.2)
BUN BLD-MCNC: 13 MG/DL (ref 9–23)
BUN/CREAT SERPL: 14.4 (ref 7–25)
CALCIUM SPEC-SCNC: 8.3 MG/DL (ref 8.7–10.4)
CHLORIDE SERPL-SCNC: 104 MMOL/L (ref 99–109)
CO2 SERPL-SCNC: 25 MMOL/L (ref 20–31)
CREAT BLD-MCNC: 0.9 MG/DL (ref 0.6–1.3)
DEPRECATED RDW RBC AUTO: 49.5 FL (ref 37–54)
EOSINOPHIL # BLD AUTO: 0.36 10*3/MM3 (ref 0–0.3)
EOSINOPHIL NFR BLD AUTO: 4.8 % (ref 0–3)
ERYTHROCYTE [DISTWIDTH] IN BLOOD BY AUTOMATED COUNT: 14 % (ref 11.3–14.5)
GFR SERPL CREATININE-BSD FRML MDRD: 83 ML/MIN/1.73
GLOBULIN UR ELPH-MCNC: 2.1 GM/DL
GLUCOSE BLD-MCNC: 160 MG/DL (ref 70–100)
GLUCOSE BLDC GLUCOMTR-MCNC: 132 MG/DL (ref 70–130)
HCT VFR BLD AUTO: 32.4 % (ref 38.9–50.9)
HGB BLD-MCNC: 10.6 G/DL (ref 13.1–17.5)
IMM GRANULOCYTES # BLD: 0.23 10*3/MM3 (ref 0–0.03)
IMM GRANULOCYTES NFR BLD: 3.1 % (ref 0–0.6)
LYMPHOCYTES # BLD AUTO: 1.34 10*3/MM3 (ref 0.6–4.8)
LYMPHOCYTES NFR BLD AUTO: 17.9 % (ref 24–44)
MCH RBC QN AUTO: 32.3 PG (ref 27–31)
MCHC RBC AUTO-ENTMCNC: 32.7 G/DL (ref 32–36)
MCV RBC AUTO: 98.8 FL (ref 80–99)
MONOCYTES # BLD AUTO: 0.76 10*3/MM3 (ref 0–1)
MONOCYTES NFR BLD AUTO: 10.2 % (ref 0–12)
NEUTROPHILS # BLD AUTO: 4.71 10*3/MM3 (ref 1.5–8.3)
NEUTROPHILS NFR BLD AUTO: 62.9 % (ref 41–71)
PLATELET # BLD AUTO: 238 10*3/MM3 (ref 150–450)
PMV BLD AUTO: 10.3 FL (ref 6–12)
POTASSIUM BLD-SCNC: 3.6 MMOL/L (ref 3.5–5.5)
PROT SERPL-MCNC: 5.6 G/DL (ref 5.7–8.2)
RBC # BLD AUTO: 3.28 10*6/MM3 (ref 4.2–5.76)
SODIUM BLD-SCNC: 136 MMOL/L (ref 132–146)
WBC NRBC COR # BLD: 7.48 10*3/MM3 (ref 3.5–10.8)

## 2018-02-04 PROCEDURE — 80053 COMPREHEN METABOLIC PANEL: CPT | Performed by: PHYSICIAN ASSISTANT

## 2018-02-04 PROCEDURE — 99024 POSTOP FOLLOW-UP VISIT: CPT | Performed by: THORACIC SURGERY (CARDIOTHORACIC VASCULAR SURGERY)

## 2018-02-04 PROCEDURE — 82962 GLUCOSE BLOOD TEST: CPT

## 2018-02-04 PROCEDURE — 85025 COMPLETE CBC W/AUTO DIFF WBC: CPT | Performed by: PHYSICIAN ASSISTANT

## 2018-02-04 PROCEDURE — 63710000001 PREDNISONE PER 5 MG: Performed by: PHYSICIAN ASSISTANT

## 2018-02-04 PROCEDURE — 99231 SBSQ HOSP IP/OBS SF/LOW 25: CPT | Performed by: INTERNAL MEDICINE

## 2018-02-04 RX ORDER — SENNA AND DOCUSATE SODIUM 50; 8.6 MG/1; MG/1
2 TABLET, FILM COATED ORAL 2 TIMES DAILY
Status: DISCONTINUED | OUTPATIENT
Start: 2018-02-04 | End: 2018-02-05 | Stop reason: HOSPADM

## 2018-02-04 RX ADMIN — ASPIRIN 325 MG: 325 TABLET, DELAYED RELEASE ORAL at 08:10

## 2018-02-04 RX ADMIN — PREDNISONE 5 MG: 5 TABLET ORAL at 08:10

## 2018-02-04 RX ADMIN — ROSUVASTATIN CALCIUM 20 MG: 20 TABLET, FILM COATED ORAL at 20:50

## 2018-02-04 RX ADMIN — LEVOTHYROXINE SODIUM 125 MCG: 125 TABLET ORAL at 08:10

## 2018-02-04 RX ADMIN — Medication 2 TABLET: at 17:35

## 2018-02-04 RX ADMIN — TAMSULOSIN HYDROCHLORIDE 0.8 MG: 0.4 CAPSULE ORAL at 20:50

## 2018-02-04 RX ADMIN — FAMOTIDINE 20 MG: 20 TABLET, FILM COATED ORAL at 20:50

## 2018-02-04 RX ADMIN — METOPROLOL TARTRATE 12.5 MG: 25 TABLET, FILM COATED ORAL at 20:50

## 2018-02-04 RX ADMIN — METOPROLOL TARTRATE 12.5 MG: 25 TABLET, FILM COATED ORAL at 08:09

## 2018-02-04 RX ADMIN — POTASSIUM CHLORIDE, DEXTROSE MONOHYDRATE AND SODIUM CHLORIDE 75 ML/HR: 150; 5; 450 INJECTION, SOLUTION INTRAVENOUS at 11:01

## 2018-02-04 RX ADMIN — FAMOTIDINE 20 MG: 20 TABLET, FILM COATED ORAL at 08:10

## 2018-02-05 VITALS
HEART RATE: 100 BPM | HEIGHT: 71 IN | SYSTOLIC BLOOD PRESSURE: 141 MMHG | BODY MASS INDEX: 30.18 KG/M2 | RESPIRATION RATE: 16 BRPM | WEIGHT: 215.6 LBS | OXYGEN SATURATION: 95 % | TEMPERATURE: 97.9 F | DIASTOLIC BLOOD PRESSURE: 76 MMHG

## 2018-02-05 PROCEDURE — 99024 POSTOP FOLLOW-UP VISIT: CPT | Performed by: PHYSICIAN ASSISTANT

## 2018-02-05 PROCEDURE — 63710000001 PREDNISONE PER 5 MG: Performed by: PHYSICIAN ASSISTANT

## 2018-02-05 PROCEDURE — 99024 POSTOP FOLLOW-UP VISIT: CPT | Performed by: THORACIC SURGERY (CARDIOTHORACIC VASCULAR SURGERY)

## 2018-02-05 RX ORDER — ROSUVASTATIN CALCIUM 20 MG/1
20 TABLET, COATED ORAL NIGHTLY
Qty: 90 TABLET | Refills: 3 | Status: SHIPPED | OUTPATIENT
Start: 2018-02-05 | End: 2018-09-14

## 2018-02-05 RX ADMIN — METOPROLOL TARTRATE 12.5 MG: 25 TABLET, FILM COATED ORAL at 09:48

## 2018-02-05 RX ADMIN — FAMOTIDINE 20 MG: 20 TABLET, FILM COATED ORAL at 09:51

## 2018-02-05 RX ADMIN — PREDNISONE 5 MG: 5 TABLET ORAL at 09:48

## 2018-02-05 RX ADMIN — LEVOTHYROXINE SODIUM 125 MCG: 125 TABLET ORAL at 09:48

## 2018-02-05 RX ADMIN — ASPIRIN 325 MG: 325 TABLET, DELAYED RELEASE ORAL at 09:51

## 2018-02-06 ENCOUNTER — TELEPHONE (OUTPATIENT)
Dept: CARDIOLOGY | Facility: CLINIC | Age: 73
End: 2018-02-06

## 2018-02-06 NOTE — TELEPHONE ENCOUNTER
Patient called to go over the medications that he was discharged with to take at home.  I went over the list with him and with what he had been taking before hospitalization.  He states that he BP has been running 150/80 and .  He is concerned about this because he was taken of Lisinopril on discharge from the hospital and wants to know if he should take the Lisinopril.  Please advise.

## 2018-02-07 RX ORDER — RANITIDINE 150 MG/1
150 TABLET ORAL 2 TIMES DAILY
Qty: 60 TABLET | Refills: 0 | Status: SHIPPED | OUTPATIENT
Start: 2018-02-07 | End: 2018-02-07 | Stop reason: SDUPTHER

## 2018-02-07 NOTE — TELEPHONE ENCOUNTER
I spoke with pt and instructed him that he could start Zantac 150mg BID.  He did not think this would help and wanted something more for nausea.  I instructed him he would need to call Dr. Morales's office or PCP.  He then stated he had called Dr. Morales's office and they had told him they didn't prescribe that and to call here.  He also said his PCP is out of town until 2/9.  I called and spoke with Sierra in CT surgery who said she would give the patient a call and would need to ask one of Dr. Morales's partners since he was out too.  I called the patient back to let him know that Sierra would be calling.

## 2018-02-07 NOTE — TELEPHONE ENCOUNTER
I spoke with patient and told him to restart his Lisinopril and he verbalized understanding.  Now he is asking for something for nausea and he states its from the surgery and he has not been able to eat anything.

## 2018-02-08 RX ORDER — RANITIDINE 150 MG/1
TABLET ORAL
Qty: 180 TABLET | Refills: 0 | Status: SHIPPED | OUTPATIENT
Start: 2018-02-08 | End: 2018-09-14

## 2018-02-15 ENCOUNTER — OFFICE VISIT (OUTPATIENT)
Dept: ORTHOPEDIC SURGERY | Facility: CLINIC | Age: 73
End: 2018-02-15

## 2018-02-15 VITALS
HEART RATE: 97 BPM | HEIGHT: 71 IN | SYSTOLIC BLOOD PRESSURE: 125 MMHG | DIASTOLIC BLOOD PRESSURE: 81 MMHG | BODY MASS INDEX: 28.92 KG/M2 | WEIGHT: 206.57 LBS

## 2018-02-15 DIAGNOSIS — M17.0 PRIMARY OSTEOARTHRITIS OF BOTH KNEES: Primary | ICD-10-CM

## 2018-02-15 PROCEDURE — 99213 OFFICE O/P EST LOW 20 MIN: CPT | Performed by: PHYSICIAN ASSISTANT

## 2018-02-15 RX ORDER — ONDANSETRON 4 MG/1
TABLET, FILM COATED ORAL
Refills: 0 | COMMUNITY
Start: 2018-02-08 | End: 2018-09-14

## 2018-02-15 NOTE — PROGRESS NOTES
[facility] Note    Subjective     Patient: Rodrigo Baum  : 1945    Primary Care Provider: JOSHUA Fletcher MD    Requesting Provider: As above    Follow-up of the Left Knee (6 month) and Follow-up of the Right Knee (6 month)      History    Chief Complaint: Bilateral knee pain    History of Present Illness: Patient comes in today to discuss his increasing bilateral knee pain.  He reports no new symptoms and knees.  He mainly has medial functional pain with no night pain.  He denies any radiating pain or mechanical symptoms.  He complains of pain and stiffness first getting up in the morning and then some improved pain after walking several minutes.  He has been treated in the past with both steroid injection as well as Euflexxa with improved pain.  He reports the Euflexxa gives him good relief for nearly 6 months.  He would like to try another series of Euflexxa to improve his knee pain.  Since last seen he has had CABG and is going to be doing cardiac rehab and would like some pain relief.    Current Outpatient Prescriptions on File Prior to Visit   Medication Sig Dispense Refill   • aspirin 325 MG EC tablet Take 1 tablet by mouth Daily. 90 tablet 3   • cholecalciferol (VITAMIN D3) 1000 UNITS tablet Take 2,000 Units by mouth Daily.     • Cyanocobalamin (B-12 PO) Take 1 tablet by mouth Daily.     • glucosamine-chondroitin 500-400 MG capsule capsule Take 2 capsules by mouth Daily.     • levothyroxine (SYNTHROID, LEVOTHROID) 125 MCG tablet Take 125 mcg by mouth Daily.     • Wcnlrnh-Hohto-Dget-PassF-LBalm (MELATONIN + L-THEANINE PO) Take 1 tablet by mouth Daily As Needed.     • metoprolol succinate XL (TOPROL-XL) 50 MG 24 hr tablet Take 50 mg by mouth Daily.     • nitroglycerin (NITROSTAT) 0.4 MG SL tablet Place 0.4 mg under the tongue Every 5 (Five) Minutes As Needed for chest pain. Take no more than 3 doses in 15 minutes.     • predniSONE (DELTASONE) 5 MG tablet Take 5 mg by  mouth Daily.     • raNITIdine (ZANTAC) 150 MG tablet TAKE 1 TABLET BY MOUTH TWICE DAILY 180 tablet 0   • rosuvastatin (CRESTOR) 20 MG tablet Take 1 tablet by mouth Every Night. 90 tablet 3   • tamsulosin (FLOMAX) 0.4 MG capsule 24 hr capsule Take 1 capsule by mouth 2 (Two) Times a Day.       Current Facility-Administered Medications on File Prior to Visit   Medication Dose Route Frequency Provider Last Rate Last Dose   • Chlorhexidine Gluconate Cloth 2 % pads 1 application  1 application Topical Q12H PRN ROGER Betancourt          Allergies   Allergen Reactions   • Lipitor [Atorvastatin] Anxiety and Myalgia          • Lortab [Hydrocodone-Acetaminophen] Anxiety   • Azithromycin Nausea Only      Past Medical History:   Diagnosis Date   • Arthritis     Arthritis -- data deficient, on steroid therapy greater than 5 years.    • CAD (coronary artery disease)    • Chondrocalcinosis    • Claustrophobia    • Coronary angioplasty status    • Disease of thyroid gland    • Dyslipidemia    • Easy bruising    • Enlarged prostate    • Esophagitis    • Exogenous obesity    • Foot pain     Hip and foot pain, followed per Subhash Sloan and Chao.     • GERD (gastroesophageal reflux disease)    • Greater trochanteric bursitis of both hips    • Hammertoe    • Heart disease    • Hip arthritis    • Hip pain     Hip and foot pain, followed per Subhash Peña.     • HLD (hyperlipidemia)    • Hypertension     HTN, presumed essential.   • Hypothyroidism     Hypothyroidism on replacement therapy.    • Jaw swelling     Right jaw swelling, ongoing evaluation for potential mandibular infection per Dr. Smith.    • Kidney problem    • Nicotine addiction     Nicotine addiction cessation 15 years prior.   • OA (osteoarthritis) of ankle/foot    • Osteoporosis    • RA (rheumatoid arthritis)    • Right hamstring muscle strain    • Thin blood    • Venous stasis    • Wears glasses      Past Surgical History:   Procedure Laterality Date   •  CARDIAC CATHETERIZATION     • CARDIAC CATHETERIZATION N/A 1/24/2018    Procedure: Left Heart Cath;  Surgeon: Susannah Rasmussen MD;  Location:  [facility];  Service:    • COLONOSCOPY     • CORONARY ANGIOPLASTY WITH STENT PLACEMENT      X2   • CORONARY ARTERY BYPASS GRAFT N/A 1/26/2018    Procedure: CORONARY ARTERY BYPASS X 2 WITH INTERNAL MAMMARY ARTERY GRAFT, ENDOSCOPIC VEIN HARVESTING UTILIZING THE LEFT GREATER SAPPHENOUS VEIN  CYSTO BY DR WATERS;  Surgeon: Casey Morales MD;  Location:  [facility];  Service:    • CYSTOSCOPY TRANSURETHRAL RESECTION OF PROSTATE N/A 11/1/2016    Procedure: CYSTOSCOPY TRANSURETHRAL RESECTION OF PROSTATE GREENLIGHT;  Surgeon: Alverto Richards MD;  Location:  [facility];  Service:    • ENDOSCOPY     • TONSILLECTOMY       Family History   Problem Relation Age of Onset   • Diabetes Father    • Heart disease Father    • Cancer Father    • Hypertension Father    • Heart attack Father    • Osteoarthritis Father    • Stroke Mother    • Hypertension Mother    • Osteoarthritis Mother    • Stroke Other    • Hypertension Other    • Heart attack Other       Social History     Social History   • Marital status:      Spouse name: N/A   • Number of children: N/A   • Years of education: N/A     Occupational History   • Not on file.     Social History Main Topics   • Smoking status: Former Smoker     Packs/day: 2.00     Years: 20.00     Types: Cigarettes     Start date: 1974     Quit date: 1994   • Smokeless tobacco: Never Used   • Alcohol use 3.6 oz/week     6 Cans of beer per week      Comment: occasionally   • Drug use: No   • Sexual activity: Defer     Other Topics Concern   • Not on file     Social History Narrative        Review of Systems    The following portions of the patient's history were reviewed and updated as appropriate: allergies, current medications, past family history, past medical history, past social history, past surgical history and problem  "list.      Objective      Physical Exam  /81  Pulse 97  Ht 180.3 cm (70.98\")  Wt 93.7 kg (206 lb 9.1 oz)  BMI 28.82 kg/m2    Body mass index is 28.82 kg/(m^2).    GENERAL: Body habitus: normal weight for height    Lower extremity edema: Left: trace; Right: trace    Varicose veins:  Left: moderate; Right: moderate    Gait: normal     Mental Status:  awake and alert; oriented to person, place, and time    Voice:  clear  SKIN:  Normal    Hair Growth:  Right:normal; Left:  normal  HEENT: Head: Normocephalic, atraumatic,  without obvious abnormality.  eye: normal external eye, no icterus   PULM:  Repiratory effort normal    Ortho Exam    Right Knee Exam  ----------  ALIGNMENT: Right: neutral----------  RANGE OF MOTION:  Right: Normal (0-130 degrees) with no extensor lag or flexion contracture  LIGAMENTOUS STABILITY:   Right:stable to varus and valgus stress at terminal extension and 30 degrees without any evidence of laxity----------  STRENGTH:  KNEE FLEXION Right 5/5  KNEE EXTENSION Right 5/5 ----------  PAIN WITH PALPATION: Right denies tenderness to palpation about the knee  PAIN WITH KNEE ROM: Right no  PATELLAR CREPITUS: Right yes   ----------    Left Knee Exam  ----------  Knee Exam:  ----------  ALIGNMENT:  Left: neutral  ----------  RANGE OF MOTION:  Left: Normal (0-130 degrees) with no extensor lag or flexion contracture  LIGAMENTOUS STABILITY:   Left:stable to varus and valgus stress at terminal extension and 30 degrees without any evidence of laxity   ----------  STRENGTH:  KNEE FLEXION  Left 5/5  KNEE EXTENSION Left 5/5  ----------  PAIN WITH PALPATION: Left denies tenderness to palpation about the knee  PAIN WITH KNEE ROM:  Left no  PATELLAR CREPITUS:  Left yes  ----------        Medical Decision Making    Data Review:   ordered and reviewed x-rays today    Assessment:  1. Primary osteoarthritis of both knees        Plan:  Bilateral knee arthritis.  I reviewed x-rays and clinical findings, past and " current treatment the patient today.  On exam, he is nontender with good range of motion and stable ligament is exam.  X-rays today show moderate tricompartmental arthritis on the right with severe patellofemoral arthritis and mild to moderate tricompartmental arthritis on the left.  Patient has been treated in the past with steroid injection as well as physical therapy and Euflexxa series.  He reports the Euflexxa gives him good relief for nearly 6 months.  He just underwent open heart surgery and is getting ready to start cardiac rehabilitation.  The near future.  He would like to have another series of Euflexxa.  Last series was an 8/17.  Plan today is to get Euflexxa preauthorized for bilateral knees.  He'll return next week to begin the series or sooner if needed.      Krystina Li PA-C  02/16/18  11:35 AM

## 2018-02-20 ENCOUNTER — OFFICE VISIT (OUTPATIENT)
Dept: GASTROENTEROLOGY | Facility: CLINIC | Age: 73
End: 2018-02-20

## 2018-02-20 VITALS
RESPIRATION RATE: 16 BRPM | HEIGHT: 71 IN | DIASTOLIC BLOOD PRESSURE: 72 MMHG | OXYGEN SATURATION: 96 % | SYSTOLIC BLOOD PRESSURE: 120 MMHG | WEIGHT: 209.8 LBS | BODY MASS INDEX: 29.37 KG/M2 | TEMPERATURE: 97.5 F | HEART RATE: 98 BPM

## 2018-02-20 DIAGNOSIS — R74.8 ABNORMAL LEVELS OF OTHER SERUM ENZYMES: ICD-10-CM

## 2018-02-20 DIAGNOSIS — R11.2 NAUSEA AND VOMITING, INTRACTABILITY OF VOMITING NOT SPECIFIED, UNSPECIFIED VOMITING TYPE: ICD-10-CM

## 2018-02-20 DIAGNOSIS — R79.89 ABNORMAL LIVER FUNCTION TESTS: Primary | ICD-10-CM

## 2018-02-20 PROCEDURE — 99214 OFFICE O/P EST MOD 30 MIN: CPT | Performed by: INTERNAL MEDICINE

## 2018-02-20 NOTE — PROGRESS NOTES
PCP: JOSHUA Fletcher MD    Chief Complaint   Patient presents with   • Follow-up     EGD       History of Present Illness:   Rodrigo Baum is a 72 y.o. male who presents to GI clinic as a follow up from endoscopy for large hiatal hernia, odom's esophagus, la grade c esophagitis. Also underwent a CABG 1/2018 and has experienced post op constipation and nausea. States he has lost weight because  He doesn't have an appetite. 1 hard bm q 3 days. Also states his heartburn is much improved on bid ppi. No melena, hematochezia, hematemesis. Takes colace which is not helping. No vomiting.    Past Medical History:   Diagnosis Date   • Arthritis     Arthritis -- data deficient, on steroid therapy greater than 5 years.    • CAD (coronary artery disease)    • Chondrocalcinosis    • Claustrophobia    • Coronary angioplasty status    • Disease of thyroid gland    • Dyslipidemia    • Easy bruising    • Enlarged prostate    • Esophagitis    • Exogenous obesity    • Foot pain     Hip and foot pain, followed per Subhash Sloan and Chao.     • GERD (gastroesophageal reflux disease)    • Greater trochanteric bursitis of both hips    • Hammertoe    • Heart disease    • Hip arthritis    • Hip pain     Hip and foot pain, followed per Subhash Sloan and Chao.     • HLD (hyperlipidemia)    • Hypertension     HTN, presumed essential.   • Hypothyroidism     Hypothyroidism on replacement therapy.    • Jaw swelling     Right jaw swelling, ongoing evaluation for potential mandibular infection per Dr. Smith.    • Kidney problem    • Nicotine addiction     Nicotine addiction cessation 15 years prior.   • OA (osteoarthritis) of ankle/foot    • Osteoporosis    • RA (rheumatoid arthritis)    • Right hamstring muscle strain    • Thin blood    • Venous stasis    • Wears glasses        Past Surgical History:   Procedure Laterality Date   • CARDIAC CATHETERIZATION     • CARDIAC CATHETERIZATION N/A 1/24/2018    Procedure: Left Heart Cath;   Surgeon: Susannah Rasmussen MD;  Location:  [facility];  Service:    • COLONOSCOPY     • CORONARY ANGIOPLASTY WITH STENT PLACEMENT      X2   • CORONARY ARTERY BYPASS GRAFT N/A 1/26/2018    Procedure: CORONARY ARTERY BYPASS X 2 WITH INTERNAL MAMMARY ARTERY GRAFT, ENDOSCOPIC VEIN HARVESTING UTILIZING THE LEFT GREATER SAPPHENOUS VEIN  CYSTO BY DR WATERS;  Surgeon: Casey Morales MD;  Location:  [facility];  Service:    • CYSTOSCOPY TRANSURETHRAL RESECTION OF PROSTATE N/A 11/1/2016    Procedure: CYSTOSCOPY TRANSURETHRAL RESECTION OF PROSTATE GREENLIGHT;  Surgeon: Alverto Richards MD;  Location:  [facility];  Service:    • ENDOSCOPY     • TONSILLECTOMY           Current Outpatient Prescriptions:   •  aspirin 325 MG EC tablet, Take 1 tablet by mouth Daily., Disp: 90 tablet, Rfl: 3  •  cholecalciferol (VITAMIN D3) 1000 UNITS tablet, Take 2,000 Units by mouth Daily., Disp: , Rfl:   •  Cyanocobalamin (B-12 PO), Take 1 tablet by mouth Daily., Disp: , Rfl:   •  glucosamine-chondroitin 500-400 MG capsule capsule, Take 2 capsules by mouth Daily., Disp: , Rfl:   •  levothyroxine (SYNTHROID, LEVOTHROID) 125 MCG tablet, Take 125 mcg by mouth Daily., Disp: , Rfl:   •  Vfyxxwu-Lmmwa-Sghx-PassF-LBalm (MELATONIN + L-THEANINE PO), Take 1 tablet by mouth Daily As Needed., Disp: , Rfl:   •  metoprolol succinate XL (TOPROL-XL) 50 MG 24 hr tablet, Take 50 mg by mouth Daily., Disp: , Rfl:   •  nitroglycerin (NITROSTAT) 0.4 MG SL tablet, Place 0.4 mg under the tongue Every 5 (Five) Minutes As Needed for chest pain. Take no more than 3 doses in 15 minutes., Disp: , Rfl:   •  ondansetron (ZOFRAN) 4 MG tablet, TK 1 T PO TID PRN, Disp: , Rfl: 0  •  predniSONE (DELTASONE) 5 MG tablet, Take 5 mg by mouth Daily., Disp: , Rfl:   •  raNITIdine (ZANTAC) 150 MG tablet, TAKE 1 TABLET BY MOUTH TWICE DAILY, Disp: 180 tablet, Rfl: 0  •  rosuvastatin (CRESTOR) 20 MG tablet, Take 1 tablet by mouth Every Night., Disp: 90 tablet, Rfl: 3  •   tamsulosin (FLOMAX) 0.4 MG capsule 24 hr capsule, Take 1 capsule by mouth 2 (Two) Times a Day., Disp: , Rfl:   No current facility-administered medications for this visit.     Facility-Administered Medications Ordered in Other Visits:   •  Chlorhexidine Gluconate Cloth 2 % pads 1 application, 1 application, Topical, Q12H PRN, ROGER Betancourt    Allergies   Allergen Reactions   • Lipitor [Atorvastatin] Anxiety and Myalgia          • Lortab [Hydrocodone-Acetaminophen] Anxiety   • Azithromycin Nausea Only       Family History   Problem Relation Age of Onset   • Diabetes Father    • Heart disease Father    • Cancer Father    • Hypertension Father    • Heart attack Father    • Osteoarthritis Father    • Stroke Mother    • Hypertension Mother    • Osteoarthritis Mother    • Stroke Other    • Hypertension Other    • Heart attack Other        Social History     Social History   • Marital status:      Spouse name: N/A   • Number of children: N/A   • Years of education: N/A     Occupational History   • Not on file.     Social History Main Topics   • Smoking status: Former Smoker     Packs/day: 2.00     Years: 20.00     Types: Cigarettes     Start date: 1974     Quit date: 1994   • Smokeless tobacco: Never Used   • Alcohol use 3.6 oz/week     6 Cans of beer per week      Comment: occasionally   • Drug use: No   • Sexual activity: Defer     Other Topics Concern   • Not on file     Social History Narrative       Review of Systems   Constitutional: Positive for appetite change and unexpected weight change (down 15lbs). Negative for fever.   HENT: Negative for nosebleeds.    Eyes: Negative for pain.   Respiratory: Negative for choking.    Gastrointestinal: Positive for constipation and nausea.   Allergic/Immunologic: Negative for food allergies and immunocompromised state.   Psychiatric/Behavioral: Negative for confusion and suicidal ideas.   All other systems reviewed and are negative.        Vitals:    02/20/18 1011    BP: 120/72   Pulse: 98   Resp: 16   Temp: 97.5 °F (36.4 °C)   SpO2: 96%       Physical Exam  General Appearance:  Vitals as above. no acute distress  Head/face:  Normocephalic, atraumatic  Eyes:   EOMI, no conjunctivitis or icterus   Nose/Sinuses:  Nares patent bilaterally without discharge or lesions  Mouth/Throat:  Posterior pharynx is pink without drainage or exudates;  dentition is in good condition and repair  Neck:  trachea is midline, no thyromegaly  Lungs:  Clear to auscultation bilaterally  Heart:  Regular rate and rhythm without murmur, gallop or rub  Abdomen:  Soft, non-tender to palpation, no obvious masses, bowel sounds positive in four quadrants; no abdominal bruits; no guarding or rebound tenderness  Neurologic:  Alert; no focal deficits; age appropriate behavior and speech  Psychiatric: mood and affect are congruent  Vascular: extremities without edema  Skin: no rash or cyanosis.      Assessment/Plan  1.) Saenz's esophagus, c2m3  2.) La grade c esophagitis  3.) Large hiatal hernia  Continue acid reduction therapy. Plan to rescope after perioperative recovery of 3 months, tentative egd in 6/2018.    4.) post op nausea  5.) constipation  Will evaluate emptying study as well as control constipation. He is only using colace so we have room for improvement. Goal to use miralax. If needed, linzess and I provided high/low dose samples.  S/p colonoscopy 2017    6.) elevated bilirubin  Bili 1.6 on blood work. Used to drink 2-4 beers daily but has been abstinent. I am unsure how his discharge summary diagnosed him with cirrhosis and I will look into this. Will repeat blood work, conjugate out bili, ggt and assess complete abdominal ultrasound.      rtc in 3 months      Troy Diaz MD  2/20/2018

## 2018-02-21 ENCOUNTER — OFFICE VISIT (OUTPATIENT)
Dept: CARDIAC SURGERY | Facility: CLINIC | Age: 73
End: 2018-02-21

## 2018-02-21 VITALS
HEIGHT: 71 IN | SYSTOLIC BLOOD PRESSURE: 127 MMHG | HEART RATE: 92 BPM | WEIGHT: 208.6 LBS | TEMPERATURE: 98.2 F | DIASTOLIC BLOOD PRESSURE: 73 MMHG | BODY MASS INDEX: 29.2 KG/M2 | OXYGEN SATURATION: 95 %

## 2018-02-21 DIAGNOSIS — I25.83 CORONARY ARTERY DISEASE DUE TO LIPID RICH PLAQUE: ICD-10-CM

## 2018-02-21 DIAGNOSIS — I25.10 CORONARY ARTERY DISEASE DUE TO LIPID RICH PLAQUE: ICD-10-CM

## 2018-02-21 DIAGNOSIS — Z95.1 S/P CABG X 2: Primary | ICD-10-CM

## 2018-02-21 PROCEDURE — 99024 POSTOP FOLLOW-UP VISIT: CPT | Performed by: PHYSICIAN ASSISTANT

## 2018-02-21 PROCEDURE — 71046 X-RAY EXAM CHEST 2 VIEWS: CPT | Performed by: THORACIC SURGERY (CARDIOTHORACIC VASCULAR SURGERY)

## 2018-02-21 RX ORDER — LISINOPRIL 10 MG/1
10 TABLET ORAL DAILY
COMMUNITY
End: 2018-09-14

## 2018-02-21 RX ORDER — OMEPRAZOLE 40 MG/1
40 CAPSULE, DELAYED RELEASE ORAL DAILY
COMMUNITY
End: 2018-10-21 | Stop reason: SDUPTHER

## 2018-02-21 RX ORDER — PRAVASTATIN SODIUM 10 MG
10 TABLET ORAL DAILY
COMMUNITY
End: 2019-07-01 | Stop reason: HOSPADM

## 2018-02-21 NOTE — PROGRESS NOTES
02/21/2018  Patient Information  Rodrigo Baum                                                                                           [address] [id number]  [facility] [address]   1945  'PCP/Referring Physician'  TORI. Del Fletcher MD  [phone]  No ref. provider found    Chief Complaint   Patient presents with   • Coronary Artery Disease     Hospital follow-up s/p CABG x2 1/26/18.       History of Present Illness:  Patient presents to office today for postoperative follow-up of CABG ×2 from 1/26/18.  Patient denies chest pain but does state he has some shortness of breath with exertion.  He denies any cough or difficulty with ambulation.  Patient is scheduled to follow up with his cardiologist, Dr. Wagner, on 3/9/18.  The patient wishes to defer cardiac rehabilitation at this time.  Patient is otherwise doing well, and looking forward to resuming normal physical activity.    Patient Active Problem List   Diagnosis   • Sepsis   • BPH with urinary obstruction   • Metabolic encephalopathy   • Leukocytosis   • HLD (hyperlipidemia)   • Hypothyroid   • CAD (coronary artery disease), s/p CABG x 2   • Acute kidney injury   • Lactic acidosis   • Hematuria   • Dyslipidemia   • Hypertension   • Hypothyroidism   • Exogenous obesity   • Arthritis   • GERD (gastroesophageal reflux disease)   • Jaw swelling   • Foot pain   • Hip pain   • Trochanteric bursitis of both hips   • Other chest pain   • Coronary artery disease involving native coronary artery of native heart without angina pectoris   • ETOH abuse   • Cirrhosis of liver     Past Medical History:   Diagnosis Date   • Arthritis     Arthritis -- data deficient, on steroid therapy greater than 5 years.    • CAD (coronary artery disease)    • Chondrocalcinosis    • Claustrophobia    • Coronary angioplasty status    • Disease of thyroid gland    • Dyslipidemia    • Easy bruising    • Enlarged prostate    • Esophagitis    • Exogenous obesity    • Foot pain     Hip and foot pain,  followed per Subhash Sloan and Chao.     • GERD (gastroesophageal reflux disease)    • Greater trochanteric bursitis of both hips    • Hammertoe    • Heart disease    • Hip arthritis    • Hip pain     Hip and foot pain, followed per Subhash Sloan and Chao.     • HLD (hyperlipidemia)    • Hypertension     HTN, presumed essential.   • Hypothyroidism     Hypothyroidism on replacement therapy.    • Jaw swelling     Right jaw swelling, ongoing evaluation for potential mandibular infection per Dr. Smith.    • Kidney problem    • Nicotine addiction     Nicotine addiction cessation 15 years prior.   • OA (osteoarthritis) of ankle/foot    • Osteoporosis    • RA (rheumatoid arthritis)    • Right hamstring muscle strain    • Thin blood    • Venous stasis    • Wears glasses      Past Surgical History:   Procedure Laterality Date   • CARDIAC CATHETERIZATION     • CARDIAC CATHETERIZATION N/A 1/24/2018    Procedure: Left Heart Cath;  Surgeon: Susannah Rasmussen MD;  Location:  [facility];  Service:    • COLONOSCOPY     • CORONARY ANGIOPLASTY WITH STENT PLACEMENT      X2   • CORONARY ARTERY BYPASS GRAFT N/A 1/26/2018    Procedure: CORONARY ARTERY BYPASS X 2 WITH INTERNAL MAMMARY ARTERY GRAFT, ENDOSCOPIC VEIN HARVESTING UTILIZING THE LEFT GREATER SAPPHENOUS VEIN  CYSTO BY DR WATERS;  Surgeon: Casey Morales MD;  Location:  [facility];  Service:    • CYSTOSCOPY TRANSURETHRAL RESECTION OF PROSTATE N/A 11/1/2016    Procedure: CYSTOSCOPY TRANSURETHRAL RESECTION OF PROSTATE GREENLIGHT;  Surgeon: Alverto Richards MD;  Location:  [facility];  Service:    • ENDOSCOPY     • TONSILLECTOMY         Current Outpatient Prescriptions:   •  aspirin 325 MG EC tablet, Take 1 tablet by mouth Daily. (Patient taking differently: Take 81 mg by mouth Daily.), Disp: 90 tablet, Rfl: 3  •  cholecalciferol (VITAMIN D3) 1000 UNITS tablet, Take 2,000 Units by mouth Daily., Disp: , Rfl:   •  Cyanocobalamin (B-12 PO), Take 1 tablet by mouth  Daily., Disp: , Rfl:   •  glucosamine-chondroitin 500-400 MG capsule capsule, Take 2 capsules by mouth Daily., Disp: , Rfl:   •  levothyroxine (SYNTHROID, LEVOTHROID) 125 MCG tablet, Take 125 mcg by mouth Daily., Disp: , Rfl:   •  lisinopril (PRINIVIL,ZESTRIL) 10 MG tablet, Take 10 mg by mouth Daily., Disp: , Rfl:   •  Suxuqod-Jfxuq-Izrk-PassF-LBalm (MELATONIN + L-THEANINE PO), Take 1 tablet by mouth Daily As Needed., Disp: , Rfl:   •  metoprolol succinate XL (TOPROL-XL) 50 MG 24 hr tablet, Take 50 mg by mouth Daily., Disp: , Rfl:   •  nitroglycerin (NITROSTAT) 0.4 MG SL tablet, Place 0.4 mg under the tongue Every 5 (Five) Minutes As Needed for chest pain. Take no more than 3 doses in 15 minutes., Disp: , Rfl:   •  omeprazole (priLOSEC) 40 MG capsule, Take 40 mg by mouth Daily., Disp: , Rfl:   •  pravastatin (PRAVACHOL) 10 MG tablet, Take 10 mg by mouth Daily., Disp: , Rfl:   •  predniSONE (DELTASONE) 5 MG tablet, Take 5 mg by mouth Daily., Disp: , Rfl:   •  tamsulosin (FLOMAX) 0.4 MG capsule 24 hr capsule, Take 1 capsule by mouth 2 (Two) Times a Day., Disp: , Rfl:   •  ondansetron (ZOFRAN) 4 MG tablet, TK 1 T PO TID PRN, Disp: , Rfl: 0  •  raNITIdine (ZANTAC) 150 MG tablet, TAKE 1 TABLET BY MOUTH TWICE DAILY, Disp: 180 tablet, Rfl: 0  •  rosuvastatin (CRESTOR) 20 MG tablet, Take 1 tablet by mouth Every Night., Disp: 90 tablet, Rfl: 3  No current facility-administered medications for this visit.     Facility-Administered Medications Ordered in Other Visits:   •  Chlorhexidine Gluconate Cloth 2 % pads 1 application, 1 application, Topical, Q12H PRN, ROGER Betancourt  Allergies   Allergen Reactions   • Lipitor [Atorvastatin] Anxiety and Myalgia          • Lortab [Hydrocodone-Acetaminophen] Anxiety   • Azithromycin Nausea Only     Social History     Social History   • Marital status:      Spouse name: N/A   • Number of children: 1   • Years of education: N/A     Occupational History   •  Retired  "    Social History Main Topics   • Smoking status: Former Smoker     Packs/day: 2.00     Years: 20.00     Types: Cigarettes     Start date:      Quit date:    • Smokeless tobacco: Never Used   • Alcohol use 3.6 oz/week     6 Cans of beer per week      Comment: occasionally   • Drug use: No   • Sexual activity: Defer     Other Topics Concern   • Not on file     Social History Narrative    Lives in [place], [place] alone     Family History   Problem Relation Age of Onset   • Diabetes Father    • Heart disease Father    • Cancer Father    • Hypertension Father    • Heart attack Father    • Osteoarthritis Father    • Stroke Mother    • Hypertension Mother    • Osteoarthritis Mother    • Stroke Other    • Hypertension Other    • Heart attack Other      ROS: As per HPI, otherwise negative.   Vitals:    18 1216   BP: 127/73   BP Location: Right arm   Patient Position: Sitting   Pulse: 92   Temp: 98.2 °F (36.8 °C)   SpO2: 95%   Weight: 94.6 kg (208 lb 9.6 oz)   Height: 180.3 cm (71\")      Physical Exam:  Gen - NAD, pleasant, cooperative  CV - Regular rate and rhythm, no murmur gallop or rub  Pulm - Lungs clear to auscultation without wheeze or rhonchi   GI - Soft, normoactive bowel sounds, non-tender  Ext - Without edema, left EVH site healing well  Incision - Sternum stable, no evidence of incisional dehiscence or cellulitis    Labs/Imagin18 CXR  Good quality chest radiograph. Bony structures are within normal limits. Trachea is midline. Left and right lung fields clear. Sternal wires are manifestation of CABG procedure. No evidence of pneumothorax. Compared to most recent chest xray (18), there is still a mild left pleural effusion.     Assessment/Plan:  Patient is a 72-year-old  male with history of coronary artery disease status post CABG ×2 on 18.  Mr. Baum is having a steady postoperative course.  His sternum is stable and his incisions are healing well.  The patient does have a " noted mild left base pleural effusion, without complaints of cough, but some shortness of breath with extended exertion.  I have written the patient is prescription for Lasix 40 mg daily with potassium ×10 days, and aspirin to follow up in 14 days with a chest x-ray to ensure resolution of the left pleural effusion.  There does not appear to be enough pleural effusion for a therapeutic thoracentesis to be performed.  The patient will follow up with his cardiologist, Dr. Wagner, on 3/9/18 - and he wishes to defer cardiac rehabilitation at this time.  I encouraged the patient ambulate more often and utilize his incentive spirometer while at rest.  Patient will follow-up in 14 days with a chest x-ray, otherwise she should call with any questions or concerns, should any arise during interval.      Patient Active Problem List   Diagnosis   • Sepsis   • BPH with urinary obstruction   • Metabolic encephalopathy   • Leukocytosis   • HLD (hyperlipidemia)   • Hypothyroid   • CAD (coronary artery disease), s/p CABG x 2   • Acute kidney injury   • Lactic acidosis   • Hematuria   • Dyslipidemia   • Hypertension   • Hypothyroidism   • Exogenous obesity   • Arthritis   • GERD (gastroesophageal reflux disease)   • Jaw swelling   • Foot pain   • Hip pain   • Trochanteric bursitis of both hips   • Other chest pain   • Coronary artery disease involving native coronary artery of native heart without angina pectoris   • ETOH abuse   • Cirrhosis of liver     Signed by:

## 2018-02-22 ENCOUNTER — CLINICAL SUPPORT (OUTPATIENT)
Dept: ORTHOPEDIC SURGERY | Facility: CLINIC | Age: 73
End: 2018-02-22

## 2018-02-22 DIAGNOSIS — M17.0 PRIMARY OSTEOARTHRITIS OF BOTH KNEES: Primary | ICD-10-CM

## 2018-02-22 PROCEDURE — 20610 DRAIN/INJ JOINT/BURSA W/O US: CPT | Performed by: PHYSICIAN ASSISTANT

## 2018-02-22 RX ORDER — FUROSEMIDE 40 MG/1
TABLET ORAL
Refills: 0 | COMMUNITY
Start: 2018-02-21 | End: 2018-09-14

## 2018-02-22 RX ORDER — POTASSIUM CHLORIDE 20 MEQ/1
TABLET, EXTENDED RELEASE ORAL
Refills: 0 | COMMUNITY
Start: 2018-02-21 | End: 2018-09-14

## 2018-02-22 RX ORDER — FUROSEMIDE 40 MG/1
TABLET ORAL
Qty: 90 TABLET | Refills: 0 | OUTPATIENT
Start: 2018-02-22

## 2018-02-22 RX ORDER — HYALURONATE SODIUM 10 MG/ML
20 SYRINGE (ML) INTRAARTICULAR
Status: COMPLETED | OUTPATIENT
Start: 2018-02-22 | End: 2018-02-22

## 2018-02-22 RX ORDER — POTASSIUM CHLORIDE 20 MEQ/1
TABLET, EXTENDED RELEASE ORAL
Qty: 90 TABLET | Refills: 0 | OUTPATIENT
Start: 2018-02-22

## 2018-02-22 RX ADMIN — Medication 20 MG: at 14:30

## 2018-02-22 RX ADMIN — Medication 20 MG: at 14:31

## 2018-02-22 NOTE — PROGRESS NOTES
[facility] Note    Subjective     Patient: Rodrigo Baum  : 1945    Primary Care Provider: JOSHUA Fletcher MD    Requesting Provider: As above    Injections (bilateral knees euflexxa #1)      History        History of Present Illness: Patient comes in today for the first injection of Euflexxa in bilateral knees.  Had multiple series in the past with good success.    Current Outpatient Prescriptions on File Prior to Visit   Medication Sig Dispense Refill   • aspirin 325 MG EC tablet Take 1 tablet by mouth Daily. (Patient taking differently: Take 81 mg by mouth Daily.) 90 tablet 3   • cholecalciferol (VITAMIN D3) 1000 UNITS tablet Take 2,000 Units by mouth Daily.     • Cyanocobalamin (B-12 PO) Take 1 tablet by mouth Daily.     • glucosamine-chondroitin 500-400 MG capsule capsule Take 2 capsules by mouth Daily.     • levothyroxine (SYNTHROID, LEVOTHROID) 125 MCG tablet Take 125 mcg by mouth Daily.     • lisinopril (PRINIVIL,ZESTRIL) 10 MG tablet Take 10 mg by mouth Daily.     • Inegknj-Mhjnf-Uvpe-PassF-LBalm (MELATONIN + L-THEANINE PO) Take 1 tablet by mouth Daily As Needed.     • metoprolol succinate XL (TOPROL-XL) 50 MG 24 hr tablet Take 50 mg by mouth Daily.     • nitroglycerin (NITROSTAT) 0.4 MG SL tablet Place 0.4 mg under the tongue Every 5 (Five) Minutes As Needed for chest pain. Take no more than 3 doses in 15 minutes.     • omeprazole (priLOSEC) 40 MG capsule Take 40 mg by mouth Daily.     • ondansetron (ZOFRAN) 4 MG tablet TK 1 T PO TID PRN  0   • pravastatin (PRAVACHOL) 10 MG tablet Take 10 mg by mouth Daily.     • predniSONE (DELTASONE) 5 MG tablet Take 5 mg by mouth Daily.     • raNITIdine (ZANTAC) 150 MG tablet TAKE 1 TABLET BY MOUTH TWICE DAILY 180 tablet 0   • rosuvastatin (CRESTOR) 20 MG tablet Take 1 tablet by mouth Every Night. 90 tablet 3   • tamsulosin (FLOMAX) 0.4 MG capsule 24 hr capsule Take 1 capsule by mouth 2 (Two) Times a Day.       Current  Facility-Administered Medications on File Prior to Visit   Medication Dose Route Frequency Provider Last Rate Last Dose   • Chlorhexidine Gluconate Cloth 2 % pads 1 application  1 application Topical Q12H PRN ROGER Betancourt          Allergies   Allergen Reactions   • Lipitor [Atorvastatin] Anxiety and Myalgia          • Lortab [Hydrocodone-Acetaminophen] Anxiety   • Azithromycin Nausea Only      Past Medical History:   Diagnosis Date   • Arthritis     Arthritis -- data deficient, on steroid therapy greater than 5 years.    • CAD (coronary artery disease)    • Chondrocalcinosis    • Claustrophobia    • Coronary angioplasty status    • Disease of thyroid gland    • Dyslipidemia    • Easy bruising    • Enlarged prostate    • Esophagitis    • Exogenous obesity    • Foot pain     Hip and foot pain, followed per Subhash Sloan and Chao.     • GERD (gastroesophageal reflux disease)    • Greater trochanteric bursitis of both hips    • Hammertoe    • Heart disease    • Hip arthritis    • Hip pain     Hip and foot pain, followed per Subhash Sloan and Chao.     • HLD (hyperlipidemia)    • Hypertension     HTN, presumed essential.   • Hypothyroidism     Hypothyroidism on replacement therapy.    • Jaw swelling     Right jaw swelling, ongoing evaluation for potential mandibular infection per Dr. Smith.    • Kidney problem    • Nicotine addiction     Nicotine addiction cessation 15 years prior.   • OA (osteoarthritis) of ankle/foot    • Osteoporosis    • RA (rheumatoid arthritis)    • Right hamstring muscle strain    • Thin blood    • Venous stasis    • Wears glasses      Past Surgical History:   Procedure Laterality Date   • CARDIAC CATHETERIZATION     • CARDIAC CATHETERIZATION N/A 1/24/2018    Procedure: Left Heart Cath;  Surgeon: Susannah Rasmussen MD;  Location: [facility];  Service:    • COLONOSCOPY     • CORONARY ANGIOPLASTY WITH STENT PLACEMENT      X2   • CORONARY ARTERY BYPASS GRAFT N/A 1/26/2018     Procedure: CORONARY ARTERY BYPASS X 2 WITH INTERNAL MAMMARY ARTERY GRAFT, ENDOSCOPIC VEIN HARVESTING UTILIZING THE LEFT GREATER SAPPHENOUS VEIN  CYSTO BY DR WATERS;  Surgeon: Casey Morales MD;  Location: [facility] OR;  Service:    • CYSTOSCOPY TRANSURETHRAL RESECTION OF PROSTATE N/A 11/1/2016    Procedure: CYSTOSCOPY TRANSURETHRAL RESECTION OF PROSTATE GREENLIGHT;  Surgeon: Alverto Richards MD;  Location:  [facility];  Service:    • ENDOSCOPY     • TONSILLECTOMY       Family History   Problem Relation Age of Onset   • Diabetes Father    • Heart disease Father    • Cancer Father    • Hypertension Father    • Heart attack Father    • Osteoarthritis Father    • Stroke Mother    • Hypertension Mother    • Osteoarthritis Mother    • Stroke Other    • Hypertension Other    • Heart attack Other       Social History     Social History   • Marital status:      Spouse name: N/A   • Number of children: 1   • Years of education: N/A     Occupational History   •  Retired     Social History Main Topics   • Smoking status: Former Smoker     Packs/day: 2.00     Years: 20.00     Types: Cigarettes     Start date: 1974     Quit date: 1994   • Smokeless tobacco: Never Used   • Alcohol use 3.6 oz/week     6 Cans of beer per week      Comment: occasionally   • Drug use: No   • Sexual activity: Defer     Other Topics Concern   • Not on file     Social History Narrative    Lives in [place], [place] alone        Review of Systems    The following portions of the patient's history were reviewed and updated as appropriate: allergies, current medications, past family history, past medical history, past social history, past surgical history and problem list.      Objective      Physical Exam  There were no vitals taken for this visit.    There is no height or weight on file to calculate BMI.      Ortho Exam      Medical Decision Making    Data Review:   none    Assessment:  No diagnosis found.    Plan:  Bilateral knee  arthritis here to begin series of Euflexxa.  Plan is to begin the series in both knees today.  He'll return in 1 week for the second injection or sooner if needed.      Holli Vinson  02/22/18  2:33 PM

## 2018-02-22 NOTE — PROGRESS NOTES
Procedure   Large Joint Arthrocentesis  Date/Time: 2/22/2018 2:30 PM  Consent given by: patient  Site marked: site marked  Timeout: Immediately prior to procedure a time out was called to verify the correct patient, procedure, equipment, support staff and site/side marked as required   Supporting Documentation  Indications: pain   Procedure Details  Location: knee - R knee  Preparation: Patient was prepped and draped in the usual sterile fashion  Needle size: 22 G  Approach: anterolateral  Medications administered: 20 mg Sodium Hyaluronate 20 MG/2ML  Patient tolerance: patient tolerated the procedure well with no immediate complications    Large Joint Arthrocentesis  Date/Time: 2/22/2018 2:31 PM  Consent given by: patient  Site marked: site marked  Timeout: Immediately prior to procedure a time out was called to verify the correct patient, procedure, equipment, support staff and site/side marked as required   Supporting Documentation  Indications: pain   Procedure Details  Location: knee - L knee  Preparation: Patient was prepped and draped in the usual sterile fashion  Needle size: 22 G  Approach: anterolateral  Medications administered: 20 mg Sodium Hyaluronate 20 MG/2ML  Patient tolerance: patient tolerated the procedure well with no immediate complications

## 2018-02-28 ENCOUNTER — TELEPHONE (OUTPATIENT)
Dept: GASTROENTEROLOGY | Facility: CLINIC | Age: 73
End: 2018-02-28

## 2018-02-28 NOTE — TELEPHONE ENCOUNTER
Patient called, stated his cardiologist put him on Lasix due to fluid on lungs. Patient did not start Linzess yet, he is waiting till he has completed the Lasix. He has three days left. Patient wants to know if he is still okay to take Linzess and if he should only take Linzess temporarily, prn, or every day. I informed the patient that Linzess is normally a daily medication, but I would ask . Patient states he has lost about 20lbs in the last two week due to nausea. Patient had questions about the gastric emptying study. He was transferred down stairs and told to ask for Nuclear Medicine. I informed the patient that either  or I one would call him back.

## 2018-03-01 ENCOUNTER — CLINICAL SUPPORT (OUTPATIENT)
Dept: ORTHOPEDIC SURGERY | Facility: CLINIC | Age: 73
End: 2018-03-01

## 2018-03-01 DIAGNOSIS — M17.0 PRIMARY OSTEOARTHRITIS OF BOTH KNEES: Primary | ICD-10-CM

## 2018-03-01 PROCEDURE — 20610 DRAIN/INJ JOINT/BURSA W/O US: CPT | Performed by: PHYSICIAN ASSISTANT

## 2018-03-01 RX ORDER — HYALURONATE SODIUM 10 MG/ML
20 SYRINGE (ML) INTRAARTICULAR
Status: COMPLETED | OUTPATIENT
Start: 2018-03-01 | End: 2018-03-01

## 2018-03-01 RX ADMIN — Medication 20 MG: at 14:55

## 2018-03-01 RX ADMIN — Medication 20 MG: at 14:58

## 2018-03-01 NOTE — PROGRESS NOTES
Procedure   Large Joint Arthrocentesis  Date/Time: 3/1/2018 2:58 PM  Consent given by: patient  Site marked: site marked  Timeout: Immediately prior to procedure a time out was called to verify the correct patient, procedure, equipment, support staff and site/side marked as required   Supporting Documentation  Indications: pain   Procedure Details  Location: knee - L knee  Preparation: Patient was prepped and draped in the usual sterile fashion  Needle size: 22 G  Approach: anterolateral  Medications administered: 20 mg Sodium Hyaluronate 20 MG/2ML  Patient tolerance: patient tolerated the procedure well with no immediate complications

## 2018-03-01 NOTE — PROGRESS NOTES
Subjective     Follow-up of the Left Knee (1 week /Bilateral Euflexxa Injection #2) and Follow-up of the Right Knee (1 week /Bilateral Euflexxa Injection #2)      Rodrigo Baum is a 73 y.o. male.     History of Present Illness   Patient comes in for second injection Euflexxa in bilateral knees.  He has tolerated previous injections well.  He reports no change in his knee pain since beginning the series  Allergies   Allergen Reactions   • Lipitor [Atorvastatin] Anxiety and Myalgia          • Lortab [Hydrocodone-Acetaminophen] Anxiety   • Azithromycin Nausea Only     Current Outpatient Prescriptions on File Prior to Visit   Medication Sig Dispense Refill   • aspirin 325 MG EC tablet Take 1 tablet by mouth Daily. (Patient taking differently: Take 81 mg by mouth Daily.) 90 tablet 3   • cholecalciferol (VITAMIN D3) 1000 UNITS tablet Take 2,000 Units by mouth Daily.     • Cyanocobalamin (B-12 PO) Take 1 tablet by mouth Daily.     • furosemide (LASIX) 40 MG tablet TK 1 T PO QAM  0   • glucosamine-chondroitin 500-400 MG capsule capsule Take 2 capsules by mouth Daily.     • levothyroxine (SYNTHROID, LEVOTHROID) 125 MCG tablet Take 125 mcg by mouth Daily.     • lisinopril (PRINIVIL,ZESTRIL) 10 MG tablet Take 10 mg by mouth Daily.     • Nijxxad-Kxigy-Tbni-PassF-LBalm (MELATONIN + L-THEANINE PO) Take 1 tablet by mouth Daily As Needed.     • metoprolol succinate XL (TOPROL-XL) 50 MG 24 hr tablet Take 50 mg by mouth Daily.     • nitroglycerin (NITROSTAT) 0.4 MG SL tablet Place 0.4 mg under the tongue Every 5 (Five) Minutes As Needed for chest pain. Take no more than 3 doses in 15 minutes.     • omeprazole (priLOSEC) 40 MG capsule Take 40 mg by mouth Daily.     • ondansetron (ZOFRAN) 4 MG tablet TK 1 T PO TID PRN  0   • potassium chloride (K-DUR,KLOR-CON) 20 MEQ CR tablet TK 1 T PO QAM  0   • pravastatin (PRAVACHOL) 10 MG tablet Take 10 mg by mouth Daily.     • predniSONE (DELTASONE) 5 MG tablet Take 5 mg by mouth Daily.     •  raNITIdine (ZANTAC) 150 MG tablet TAKE 1 TABLET BY MOUTH TWICE DAILY 180 tablet 0   • rosuvastatin (CRESTOR) 20 MG tablet Take 1 tablet by mouth Every Night. 90 tablet 3   • tamsulosin (FLOMAX) 0.4 MG capsule 24 hr capsule Take 1 capsule by mouth 2 (Two) Times a Day.       Current Facility-Administered Medications on File Prior to Visit   Medication Dose Route Frequency Provider Last Rate Last Dose   • Chlorhexidine Gluconate Cloth 2 % pads 1 application  1 application Topical Q12H PRN ROGER Betancourt         Social History     Social History   • Marital status:      Spouse name: N/A   • Number of children: 1   • Years of education: N/A     Occupational History   •  Retired     Social History Main Topics   • Smoking status: Former Smoker     Packs/day: 2.00     Years: 20.00     Types: Cigarettes     Start date: 1974     Quit date: 1994   • Smokeless tobacco: Never Used   • Alcohol use 3.6 oz/week     6 Cans of beer per week      Comment: occasionally   • Drug use: No   • Sexual activity: Defer     Other Topics Concern   • Not on file     Social History Narrative    Lives in [place], [place] alone     Past Surgical History:   Procedure Laterality Date   • CARDIAC CATHETERIZATION     • CARDIAC CATHETERIZATION N/A 1/24/2018    Procedure: Left Heart Cath;  Surgeon: Susannah Rasmussen MD;  Location:  [facility];  Service:    • COLONOSCOPY     • CORONARY ANGIOPLASTY WITH STENT PLACEMENT      X2   • CORONARY ARTERY BYPASS GRAFT N/A 1/26/2018    Procedure: CORONARY ARTERY BYPASS X 2 WITH INTERNAL MAMMARY ARTERY GRAFT, ENDOSCOPIC VEIN HARVESTING UTILIZING THE LEFT GREATER SAPPHENOUS VEIN  CYSTO BY DR WATERS;  Surgeon: Casey Morales MD;  Location:  [facility];  Service:    • CYSTOSCOPY TRANSURETHRAL RESECTION OF PROSTATE N/A 11/1/2016    Procedure: CYSTOSCOPY TRANSURETHRAL RESECTION OF PROSTATE GREENLIGHT;  Surgeon: Alverto Richards MD;  Location:  [facility];  Service:    • ENDOSCOPY      • TONSILLECTOMY       Family History   Problem Relation Age of Onset   • Diabetes Father    • Heart disease Father    • Cancer Father    • Hypertension Father    • Heart attack Father    • Osteoarthritis Father    • Stroke Mother    • Hypertension Mother    • Osteoarthritis Mother    • Stroke Other    • Hypertension Other    • Heart attack Other      Past Medical History:   Diagnosis Date   • Arthritis     Arthritis -- data deficient, on steroid therapy greater than 5 years.    • CAD (coronary artery disease)    • Chondrocalcinosis    • Claustrophobia    • Coronary angioplasty status    • Disease of thyroid gland    • Dyslipidemia    • Easy bruising    • Enlarged prostate    • Esophagitis    • Exogenous obesity    • Foot pain     Hip and foot pain, followed per Subhash Sloan and Chao.     • GERD (gastroesophageal reflux disease)    • Greater trochanteric bursitis of both hips    • Hammertoe    • Heart disease    • Hip arthritis    • Hip pain     Hip and foot pain, followed per Subhash Sloan and Chao.     • HLD (hyperlipidemia)    • Hypertension     HTN, presumed essential.   • Hypothyroidism     Hypothyroidism on replacement therapy.    • Jaw swelling     Right jaw swelling, ongoing evaluation for potential mandibular infection per Dr. Smith.    • Kidney problem    • Nicotine addiction     Nicotine addiction cessation 15 years prior.   • OA (osteoarthritis) of ankle/foot    • Osteoporosis    • RA (rheumatoid arthritis)    • Right hamstring muscle strain    • Thin blood    • Venous stasis    • Wears glasses          Review of Systems   Constitutional: Negative.    HENT: Negative.    Eyes: Negative.    Respiratory: Negative.    Cardiovascular: Negative.    Gastrointestinal: Negative.    Endocrine: Negative.    Genitourinary: Negative.    Musculoskeletal: Positive for joint swelling.   Skin: Negative.    Allergic/Immunologic: Negative.    Neurological: Negative.    Hematological: Negative.     Psychiatric/Behavioral: Negative.        The following portions of the patient's history were reviewed and updated as appropriate: allergies, current medications, past family history, past medical history, past social history, past surgical history and problem list.    Ortho Exam      Medical Decision Making    Assessment and Plan/ Diagnosis/Treatment options:   Bilateral knee arthritis undergoing a Euflexxa series.  Then is to proceed with a second injection both knees today.  He'll return in 1 week for the final injection or sooner if needed.    Using sterile technique, the left knee was sterilely prepped with Hibiclens.  Following time out, using a 22 gauge needle the left knee was aspirated and then injected with 2 ml Euflexxa. Approximately 0.5 mm of straw-colored fluid was obtained.  Patient tolerated the procedure well.  No complications.      Using sterile technique, the right knee was sterilely prepped with Hibiclens.  Following time out using a 22 gauge needle, the right knee was aspirated and then injected with 2 ml Euflexxa.  Approximately 0.5 cc of straw-colored fluid was obtained.  Patient tolerated the procedure well. No complications

## 2018-03-01 NOTE — PROGRESS NOTES
Procedure   Large Joint Arthrocentesis  Date/Time: 3/1/2018 2:55 PM  Consent given by: patient  Site marked: site marked  Timeout: Immediately prior to procedure a time out was called to verify the correct patient, procedure, equipment, support staff and site/side marked as required   Supporting Documentation  Indications: pain   Procedure Details  Location: knee - R knee  Preparation: Patient was prepped and draped in the usual sterile fashion  Needle size: 22 G  Approach: anterolateral  Medications administered: 20 mg Sodium Hyaluronate 20 MG/2ML  Patient tolerance: patient tolerated the procedure well with no immediate complications

## 2018-03-07 ENCOUNTER — OFFICE VISIT (OUTPATIENT)
Dept: CARDIAC SURGERY | Facility: CLINIC | Age: 73
End: 2018-03-07

## 2018-03-07 VITALS
TEMPERATURE: 97.4 F | BODY MASS INDEX: 29.6 KG/M2 | OXYGEN SATURATION: 97 % | WEIGHT: 211.4 LBS | DIASTOLIC BLOOD PRESSURE: 73 MMHG | HEIGHT: 71 IN | HEART RATE: 87 BPM | SYSTOLIC BLOOD PRESSURE: 122 MMHG

## 2018-03-07 DIAGNOSIS — I25.10 CORONARY ARTERY DISEASE DUE TO LIPID RICH PLAQUE: ICD-10-CM

## 2018-03-07 DIAGNOSIS — I25.83 CORONARY ARTERY DISEASE DUE TO LIPID RICH PLAQUE: ICD-10-CM

## 2018-03-07 DIAGNOSIS — J90 PLEURAL EFFUSION: Primary | ICD-10-CM

## 2018-03-07 PROCEDURE — 99024 POSTOP FOLLOW-UP VISIT: CPT | Performed by: PHYSICIAN ASSISTANT

## 2018-03-07 PROCEDURE — 71046 X-RAY EXAM CHEST 2 VIEWS: CPT | Performed by: THORACIC SURGERY (CARDIOTHORACIC VASCULAR SURGERY)

## 2018-03-07 NOTE — PROGRESS NOTES
03/07/2018  Patient Information  Rodrigo Baum                                                                                           [address] [id number]  [facility] [address]   1945  'PCP/Referring Physician'  JOSHUA Fletcher MD  [phone]  No ref. provider found    Chief Complaint   Patient presents with   • Pleural Effusion     2 week follow-up with CXR for pleural effusion. S/P CABG x2 1/26/18. Patient denies shortness of breath       History of Present Illness:  Patient presents to office today for second postoperative follow-up from CABG ×2 on 1/26/18.  Patient denies any chest pain or shortness of breath and states he is able to ambulate well without difficulty.  The patient was last seen in our office on 2/21/18 where he was found to have a small left pleural effusion.  The patient was given a regimen of Lasix for 10 days to take and decrease his pleural effusion.  The patient has an appointment see Dr. Wagner, his cardiologist, on 3/9/18.  He continues to defer cardiac rehabilitation at this time.  He is otherwise doing well.    Patient Active Problem List   Diagnosis   • Sepsis   • BPH with urinary obstruction   • Metabolic encephalopathy   • Leukocytosis   • HLD (hyperlipidemia)   • Hypothyroid   • CAD (coronary artery disease), s/p CABG x 2   • Acute kidney injury   • Lactic acidosis   • Hematuria   • Dyslipidemia   • Hypertension   • Hypothyroidism   • Exogenous obesity   • Arthritis   • GERD (gastroesophageal reflux disease)   • Jaw swelling   • Foot pain   • Hip pain   • Trochanteric bursitis of both hips   • Other chest pain   • Coronary artery disease involving native coronary artery of native heart without angina pectoris   • ETOH abuse   • Cirrhosis of liver   • Primary osteoarthritis of both knees     Past Medical History:   Diagnosis Date   • Arthritis     Arthritis -- data deficient, on steroid therapy greater than 5 years.    • CAD (coronary artery disease)    • Chondrocalcinosis    •  Claustrophobia    • Coronary angioplasty status    • Disease of thyroid gland    • Dyslipidemia    • Easy bruising    • Enlarged prostate    • Esophagitis    • Exogenous obesity    • Foot pain     Hip and foot pain, followed per Subhash Sloan and Chao.     • GERD (gastroesophageal reflux disease)    • Greater trochanteric bursitis of both hips    • Hammertoe    • Heart disease    • Hip arthritis    • Hip pain     Hip and foot pain, followed per Subhash Sloan and Chao.     • HLD (hyperlipidemia)    • Hypertension     HTN, presumed essential.   • Hypothyroidism     Hypothyroidism on replacement therapy.    • Jaw swelling     Right jaw swelling, ongoing evaluation for potential mandibular infection per Dr. Smith.    • Kidney problem    • Nicotine addiction     Nicotine addiction cessation 15 years prior.   • OA (osteoarthritis) of ankle/foot    • Osteoporosis    • RA (rheumatoid arthritis)    • Right hamstring muscle strain    • Thin blood    • Venous stasis    • Wears glasses      Past Surgical History:   Procedure Laterality Date   • CARDIAC CATHETERIZATION     • CARDIAC CATHETERIZATION N/A 1/24/2018    Procedure: Left Heart Cath;  Surgeon: Susannah Rasmussen MD;  Location:  [facility];  Service:    • COLONOSCOPY     • CORONARY ANGIOPLASTY WITH STENT PLACEMENT      X2   • CORONARY ARTERY BYPASS GRAFT N/A 1/26/2018    Procedure: CORONARY ARTERY BYPASS X 2 WITH INTERNAL MAMMARY ARTERY GRAFT, ENDOSCOPIC VEIN HARVESTING UTILIZING THE LEFT GREATER SAPPHENOUS VEIN  CYSTO BY DR WATERS;  Surgeon: Casey Morales MD;  Location:  [facility];  Service:    • CYSTOSCOPY TRANSURETHRAL RESECTION OF PROSTATE N/A 11/1/2016    Procedure: CYSTOSCOPY TRANSURETHRAL RESECTION OF PROSTATE GREENLIGHT;  Surgeon: Alverto Richards MD;  Location:  [facility];  Service:    • ENDOSCOPY     • TONSILLECTOMY         Current Outpatient Prescriptions:   •  aspirin 325 MG EC tablet, Take 1 tablet by mouth Daily. (Patient taking  differently: Take 81 mg by mouth Daily.), Disp: 90 tablet, Rfl: 3  •  cholecalciferol (VITAMIN D3) 1000 UNITS tablet, Take 2,000 Units by mouth Daily., Disp: , Rfl:   •  Cyanocobalamin (B-12 PO), Take 1 tablet by mouth Daily., Disp: , Rfl:   •  furosemide (LASIX) 40 MG tablet, TK 1 T PO QAM, Disp: , Rfl: 0  •  glucosamine-chondroitin 500-400 MG capsule capsule, Take 2 capsules by mouth Daily., Disp: , Rfl:   •  levothyroxine (SYNTHROID, LEVOTHROID) 125 MCG tablet, Take 125 mcg by mouth Daily., Disp: , Rfl:   •  lisinopril (PRINIVIL,ZESTRIL) 10 MG tablet, Take 10 mg by mouth Daily., Disp: , Rfl:   •  Jeepqpf-Zxcga-Ftos-PassF-LBalm (MELATONIN + L-THEANINE PO), Take 1 tablet by mouth Daily As Needed., Disp: , Rfl:   •  metoprolol succinate XL (TOPROL-XL) 50 MG 24 hr tablet, Take 50 mg by mouth Daily., Disp: , Rfl:   •  nitroglycerin (NITROSTAT) 0.4 MG SL tablet, Place 0.4 mg under the tongue Every 5 (Five) Minutes As Needed for chest pain. Take no more than 3 doses in 15 minutes., Disp: , Rfl:   •  omeprazole (priLOSEC) 40 MG capsule, Take 40 mg by mouth Daily., Disp: , Rfl:   •  ondansetron (ZOFRAN) 4 MG tablet, TK 1 T PO TID PRN, Disp: , Rfl: 0  •  potassium chloride (K-DUR,KLOR-CON) 20 MEQ CR tablet, TK 1 T PO QAM, Disp: , Rfl: 0  •  pravastatin (PRAVACHOL) 10 MG tablet, Take 10 mg by mouth Daily., Disp: , Rfl:   •  predniSONE (DELTASONE) 5 MG tablet, Take 5 mg by mouth Daily., Disp: , Rfl:   •  raNITIdine (ZANTAC) 150 MG tablet, TAKE 1 TABLET BY MOUTH TWICE DAILY, Disp: 180 tablet, Rfl: 0  •  rosuvastatin (CRESTOR) 20 MG tablet, Take 1 tablet by mouth Every Night., Disp: 90 tablet, Rfl: 3  •  tamsulosin (FLOMAX) 0.4 MG capsule 24 hr capsule, Take 1 capsule by mouth 2 (Two) Times a Day., Disp: , Rfl:   No current facility-administered medications for this visit.     Facility-Administered Medications Ordered in Other Visits:   •  Chlorhexidine Gluconate Cloth 2 % pads 1 application, 1 application, Topical, Q12H PRN,  "ROGER Betancourt  Allergies   Allergen Reactions   • Lipitor [Atorvastatin] Anxiety and Myalgia          • Lortab [Hydrocodone-Acetaminophen] Anxiety   • Azithromycin Nausea Only     Social History     Social History   • Marital status:      Spouse name: N/A   • Number of children: 1   • Years of education: N/A     Occupational History   •  Retired     Social History Main Topics   • Smoking status: Former Smoker     Packs/day: 2.00     Years: 20.00     Types: Cigarettes     Start date: 1974     Quit date: 1994   • Smokeless tobacco: Never Used   • Alcohol use 3.6 oz/week     6 Cans of beer per week      Comment: occasionally   • Drug use: No   • Sexual activity: Defer     Other Topics Concern   • Not on file     Social History Narrative    Lives in [place], [place] alone     Family History   Problem Relation Age of Onset   • Diabetes Father    • Heart disease Father    • Cancer Father    • Hypertension Father    • Heart attack Father    • Osteoarthritis Father    • Stroke Mother    • Hypertension Mother    • Osteoarthritis Mother    • Stroke Other    • Hypertension Other    • Heart attack Other      ROS: As per HPI, otherwise negative.   Vitals:    03/07/18 1227   BP: 122/73   BP Location: Right arm   Patient Position: Sitting   Pulse: 87   Temp: 97.4 °F (36.3 °C)   SpO2: 97%   Weight: 95.9 kg (211 lb 6.4 oz)   Height: 180.3 cm (71\")      Physical Exam:   Gen - NAD, pleasant, cooperative  CV - Regular rate and rhythm, no murmur gallop or rub  Pulm - Lungs clear to auscultation without wheeze or rhonchi   GI - Soft, normoactive bowel sounds, non-tender  Ext - Without edema  Incision - Sternum stable, no evidence of incisional dehiscence or cellulitis    Labs/Imaging:  3/7/18 CXR  Good quality chest radiograph. Bony structures are within normal limits. Trachea is midline. Left and right lung fields clear, Right costophrenic angles is sharp.  There is mild blunting of the left costophrenic angle.  " Sternal wires are manifestation of CABG procedure. No evidence of pneumothorax. Compared to most recent chest xray (1/29/18), there has been a noticeable  decrease in the left pleural effusion when reviewing the lateral radiograph.     Assessment/Plan:  Patient is a 73-year-old  male with history of coronary artery disease status post CABG ×2 on 1/26/18.  Patient is having a satisfactory postoperative cover.  He did have a mild left pleural effusion when he was seen at our previous office date of 2/21/18.  I wrote the patient a regimen of Lasix ×10 days, which she completed without difficulty.  Reviewing his chest x-ray today, he looks very similar to the previous chest x-ray, with the exception of the lateral view which shows approximately a 10% reduction of pleural effusion.  The patient has no edema on his angles and I believe that the size of the left pleural effusion resident chest x-ray today is small enough to resorb on its own.  I encouraged him to continue using his incentive spirometer and once the weather is warmer he'll be allowed and light outside, as he plan on doing.  He'll be seen by Dr. Wagner on 3/9/18, who will continue to manage his cardiovascular medication.  The patient defers cardiac rehabilitation, but states he will ambulate daily once it is warm outside.  The patient will be able to follow up with our office as needed, but should call with any questions or concerns, should any arise during interval.       Patient Active Problem List   Diagnosis   • Sepsis   • BPH with urinary obstruction   • Metabolic encephalopathy   • Leukocytosis   • HLD (hyperlipidemia)   • Hypothyroid   • CAD (coronary artery disease), s/p CABG x 2   • Acute kidney injury   • Lactic acidosis   • Hematuria   • Dyslipidemia   • Hypertension   • Hypothyroidism   • Exogenous obesity   • Arthritis   • GERD (gastroesophageal reflux disease)   • Jaw swelling   • Foot pain   • Hip pain   • Trochanteric bursitis of  both hips   • Other chest pain   • Coronary artery disease involving native coronary artery of native heart without angina pectoris   • ETOH abuse   • Cirrhosis of liver   • Primary osteoarthritis of both knees     Signed by:

## 2018-03-08 ENCOUNTER — CLINICAL SUPPORT (OUTPATIENT)
Dept: ORTHOPEDIC SURGERY | Facility: CLINIC | Age: 73
End: 2018-03-08

## 2018-03-08 DIAGNOSIS — M17.0 PRIMARY OSTEOARTHRITIS OF BOTH KNEES: Primary | ICD-10-CM

## 2018-03-08 PROCEDURE — 20610 DRAIN/INJ JOINT/BURSA W/O US: CPT | Performed by: PHYSICIAN ASSISTANT

## 2018-03-08 RX ORDER — HYALURONATE SODIUM 10 MG/ML
20 SYRINGE (ML) INTRAARTICULAR
Status: COMPLETED | OUTPATIENT
Start: 2018-03-08 | End: 2018-03-08

## 2018-03-08 RX ADMIN — Medication 20 MG: at 14:55

## 2018-03-08 NOTE — PROGRESS NOTES
Procedure   Large Joint Arthrocentesis  Date/Time: 3/8/2018 2:55 PM  Consent given by: patient  Site marked: site marked  Timeout: Immediately prior to procedure a time out was called to verify the correct patient, procedure, equipment, support staff and site/side marked as required   Supporting Documentation  Indications: pain   Procedure Details  Location: knee - R knee  Preparation: Patient was prepped and draped in the usual sterile fashion  Needle size: 22 G  Approach: superior  Medications administered: 20 mg Sodium Hyaluronate 20 MG/2ML  Aspirate: clear (to verify intraarticular placement of the needle)  Patient tolerance: patient tolerated the procedure well with no immediate complications

## 2018-03-08 NOTE — PROGRESS NOTES
Subjective     Injections (Bilateral knee Euflexxa Injection #3)      Rodrigo Baum is a 73 y.o. male.     History of Present Illness   Patient presents today for the for third injection of Euflexxa in bilateral knees.  He is tolerated previous injections well.  No new symptoms.  He reports mild improvement of knee pain since beginning the series.  Allergies   Allergen Reactions   • Lipitor [Atorvastatin] Anxiety and Myalgia          • Lortab [Hydrocodone-Acetaminophen] Anxiety   • Azithromycin Nausea Only     Current Outpatient Prescriptions on File Prior to Visit   Medication Sig Dispense Refill   • aspirin 325 MG EC tablet Take 1 tablet by mouth Daily. (Patient taking differently: Take 81 mg by mouth Daily.) 90 tablet 3   • cholecalciferol (VITAMIN D3) 1000 UNITS tablet Take 2,000 Units by mouth Daily.     • Cyanocobalamin (B-12 PO) Take 1 tablet by mouth Daily.     • furosemide (LASIX) 40 MG tablet TK 1 T PO QAM  0   • glucosamine-chondroitin 500-400 MG capsule capsule Take 2 capsules by mouth Daily.     • levothyroxine (SYNTHROID, LEVOTHROID) 125 MCG tablet Take 125 mcg by mouth Daily.     • lisinopril (PRINIVIL,ZESTRIL) 10 MG tablet Take 10 mg by mouth Daily.     • Snqzdcc-Dxlqt-Spmw-PassF-LBalm (MELATONIN + L-THEANINE PO) Take 1 tablet by mouth Daily As Needed.     • metoprolol succinate XL (TOPROL-XL) 50 MG 24 hr tablet Take 50 mg by mouth Daily.     • nitroglycerin (NITROSTAT) 0.4 MG SL tablet Place 0.4 mg under the tongue Every 5 (Five) Minutes As Needed for chest pain. Take no more than 3 doses in 15 minutes.     • omeprazole (priLOSEC) 40 MG capsule Take 40 mg by mouth Daily.     • ondansetron (ZOFRAN) 4 MG tablet TK 1 T PO TID PRN  0   • potassium chloride (K-DUR,KLOR-CON) 20 MEQ CR tablet TK 1 T PO QAM  0   • pravastatin (PRAVACHOL) 10 MG tablet Take 10 mg by mouth Daily.     • predniSONE (DELTASONE) 5 MG tablet Take 5 mg by mouth Daily.     • raNITIdine (ZANTAC) 150 MG tablet TAKE 1 TABLET BY MOUTH  TWICE DAILY 180 tablet 0   • rosuvastatin (CRESTOR) 20 MG tablet Take 1 tablet by mouth Every Night. 90 tablet 3   • tamsulosin (FLOMAX) 0.4 MG capsule 24 hr capsule Take 1 capsule by mouth 2 (Two) Times a Day.       Current Facility-Administered Medications on File Prior to Visit   Medication Dose Route Frequency Provider Last Rate Last Dose   • Chlorhexidine Gluconate Cloth 2 % pads 1 application  1 application Topical Q12H PRN ROGER Betancourt         Social History     Social History   • Marital status:      Spouse name: N/A   • Number of children: 1   • Years of education: N/A     Occupational History   •  Retired     Social History Main Topics   • Smoking status: Former Smoker     Packs/day: 2.00     Years: 20.00     Types: Cigarettes     Start date: 1974     Quit date: 1994   • Smokeless tobacco: Never Used   • Alcohol use 3.6 oz/week     6 Cans of beer per week      Comment: occasionally   • Drug use: No   • Sexual activity: Defer     Other Topics Concern   • Not on file     Social History Narrative    Lives in [place], [place] alone     Past Surgical History:   Procedure Laterality Date   • CARDIAC CATHETERIZATION     • CARDIAC CATHETERIZATION N/A 1/24/2018    Procedure: Left Heart Cath;  Surgeon: Susannah Rasmussen MD;  Location:  [facility];  Service:    • COLONOSCOPY     • CORONARY ANGIOPLASTY WITH STENT PLACEMENT      X2   • CORONARY ARTERY BYPASS GRAFT N/A 1/26/2018    Procedure: CORONARY ARTERY BYPASS X 2 WITH INTERNAL MAMMARY ARTERY GRAFT, ENDOSCOPIC VEIN HARVESTING UTILIZING THE LEFT GREATER SAPPHENOUS VEIN  CYSTO BY DR WATERS;  Surgeon: Casey Morales MD;  Location:  [facility];  Service:    • CYSTOSCOPY TRANSURETHRAL RESECTION OF PROSTATE N/A 11/1/2016    Procedure: CYSTOSCOPY TRANSURETHRAL RESECTION OF PROSTATE GREENLIGHT;  Surgeon: Alverto Richards MD;  Location:  [facility];  Service:    • ENDOSCOPY     • TONSILLECTOMY       Family History   Problem Relation  Age of Onset   • Diabetes Father    • Heart disease Father    • Cancer Father    • Hypertension Father    • Heart attack Father    • Osteoarthritis Father    • Stroke Mother    • Hypertension Mother    • Osteoarthritis Mother    • Stroke Other    • Hypertension Other    • Heart attack Other      Past Medical History:   Diagnosis Date   • Arthritis     Arthritis -- data deficient, on steroid therapy greater than 5 years.    • CAD (coronary artery disease)    • Chondrocalcinosis    • Claustrophobia    • Coronary angioplasty status    • Disease of thyroid gland    • Dyslipidemia    • Easy bruising    • Enlarged prostate    • Esophagitis    • Exogenous obesity    • Foot pain     Hip and foot pain, followed per Subhash Sloan and Chao.     • GERD (gastroesophageal reflux disease)    • Greater trochanteric bursitis of both hips    • Hammertoe    • Heart disease    • Hip arthritis    • Hip pain     Hip and foot pain, followed per Subhash Sloan and Chao.     • HLD (hyperlipidemia)    • Hypertension     HTN, presumed essential.   • Hypothyroidism     Hypothyroidism on replacement therapy.    • Jaw swelling     Right jaw swelling, ongoing evaluation for potential mandibular infection per Dr. Smith.    • Kidney problem    • Nicotine addiction     Nicotine addiction cessation 15 years prior.   • OA (osteoarthritis) of ankle/foot    • Osteoporosis    • RA (rheumatoid arthritis)    • Right hamstring muscle strain    • Thin blood    • Venous stasis    • Wears glasses          Review of Systems    The following portions of the patient's history were reviewed and updated as appropriate: allergies, current medications, past family history, past medical history, past social history, past surgical history and problem list.    Ortho Exam      Medical Decision Making    Assessment and Plan/ Diagnosis/Treatment options:   Bilateral knee arthritis undergoing any flexes series.  Plan is to finish the series in both knees today.  He'll  return in 6 months or sooner if needed.    Using sterile technique, the left knee was sterilely prepped with Hibiclens.  Following time out, using a 22 gauge needle the left knee was aspirated and then injected with 2 ml Euflexxa. Approximately 0.5 mm of straw-colored fluid was obtained.  Patient tolerated the procedure well.  No complications.      Using sterile technique, the right knee was sterilely prepped with Hibiclens.  Following time out using a 22 gauge needle, the right knee was aspirated and then injected with 2 ml Euflexxa.  Approximately 0.5 cc of straw-colored fluid was obtained.  Patient tolerated the procedure well. No complications

## 2018-03-08 NOTE — PROGRESS NOTES
Procedure   Large Joint Arthrocentesis  Date/Time: 3/8/2018 2:55 PM  Consent given by: patient  Site marked: site marked  Timeout: Immediately prior to procedure a time out was called to verify the correct patient, procedure, equipment, support staff and site/side marked as required   Supporting Documentation  Indications: pain   Procedure Details  Location: knee - L knee  Preparation: Patient was prepped and draped in the usual sterile fashion  Needle size: 22 G  Approach: superior  Medications administered: 20 mg Sodium Hyaluronate 20 MG/2ML  Aspirate: clear (to verify intraarticular placement of the needle)  Patient tolerance: patient tolerated the procedure well with no immediate complications

## 2018-03-09 ENCOUNTER — OFFICE VISIT (OUTPATIENT)
Dept: CARDIOLOGY | Facility: CLINIC | Age: 73
End: 2018-03-09

## 2018-03-09 VITALS
HEIGHT: 71 IN | HEART RATE: 81 BPM | SYSTOLIC BLOOD PRESSURE: 140 MMHG | DIASTOLIC BLOOD PRESSURE: 76 MMHG | WEIGHT: 196 LBS | BODY MASS INDEX: 27.44 KG/M2

## 2018-03-09 DIAGNOSIS — I10 ESSENTIAL HYPERTENSION: ICD-10-CM

## 2018-03-09 DIAGNOSIS — Z95.1 HX OF CABG: ICD-10-CM

## 2018-03-09 DIAGNOSIS — E78.2 MIXED HYPERLIPIDEMIA: ICD-10-CM

## 2018-03-09 DIAGNOSIS — I25.10 CORONARY ARTERY DISEASE INVOLVING NATIVE CORONARY ARTERY OF NATIVE HEART WITHOUT ANGINA PECTORIS: Primary | ICD-10-CM

## 2018-03-09 PROCEDURE — 99214 OFFICE O/P EST MOD 30 MIN: CPT | Performed by: INTERNAL MEDICINE

## 2018-03-09 NOTE — PROGRESS NOTES
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     Chief Complaint   Patient presents with   • Coronary Artery Disease   • Hyperlipidemia       PROBLEM LIST:   1. CAD:  1. 11/15/1993: PTCA and repeat PTCA 1 week following of proximal  LAD, per Dr. Lovelace with normal circumflex and RCA noted at that time.   2. April 2008: Overlapping 3.0 x 24 mm. And 3.0 x 16.0 mm Taxus to LAD and 2.5 x 8.0 PTCA to proximal first OM, inability to pass stent.  EF greater than 60.  3. 5/16 : Stress echocardiogram, which was within normal limits. EF greater than 60%. Normal hemodynamic response with exercise.   4. 1/18 CABG X2 periop hematuria/syed issues  2. Dyslipidemia:  1. November 2009:  Total cholesterol 163, triglycerides 169, HDL 55, LDL 81.  2. 02/30/2012:  Total cholesterol 242, HDL 51, triglycerides 192, , off statin therapy.  3. Nicotine addiction cessation 15 years prior.  4. HTN, presumed essential.        1/17 AAA screen wnl  5. Hypothyroidism on replacement therapy.   6. Exogenous obesity.  7. Arthritis data deficient, on steroid therapy greater than 5 years.   8. GERD.   9. Easy bruising.   10. 2015 Right jaw swelling, evaluated for potential mandibular infection per Dr. Smith.   11. Hip and foot pain, followed per Dr Espinoza  12. Prostatism- 2017 3 rounds abx    Allergies  Allergies   Allergen Reactions   • Lipitor [Atorvastatin] Anxiety and Myalgia          • Lortab [Hydrocodone-Acetaminophen] Anxiety   • Azithromycin Nausea Only       Current Medications    Current Outpatient Prescriptions:   •  aspirin 325 MG EC tablet, Take 1 tablet by mouth Daily. (Patient taking differently: Take 81 mg by mouth Daily.), Disp: 90 tablet, Rfl: 3  •  cholecalciferol (VITAMIN D3)  1000 UNITS tablet, Take 2,000 Units by mouth Daily., Disp: , Rfl:   •  Cyanocobalamin (B-12 PO), Take 1 tablet by mouth Daily., Disp: , Rfl:   •  furosemide (LASIX) 40 MG tablet, TK 1 T PO QAM, Disp: , Rfl: 0  •  glucosamine-chondroitin 500-400 MG capsule capsule, Take 2 capsules by mouth Daily., Disp: , Rfl:   •  levothyroxine (SYNTHROID, LEVOTHROID) 125 MCG tablet, Take 125 mcg by mouth Daily., Disp: , Rfl:   •  lisinopril (PRINIVIL,ZESTRIL) 10 MG tablet, Take 10 mg by mouth Daily., Disp: , Rfl:   •  Attshne-Kxagt-Eieh-PassF-LBalm (MELATONIN + L-THEANINE PO), Take 1 tablet by mouth Daily As Needed., Disp: , Rfl:   •  metoprolol succinate XL (TOPROL-XL) 50 MG 24 hr tablet, Take 50 mg by mouth Daily., Disp: , Rfl:   •  nitroglycerin (NITROSTAT) 0.4 MG SL tablet, Place 0.4 mg under the tongue Every 5 (Five) Minutes As Needed for chest pain. Take no more than 3 doses in 15 minutes., Disp: , Rfl:   •  omeprazole (priLOSEC) 40 MG capsule, Take 40 mg by mouth Daily., Disp: , Rfl:   •  ondansetron (ZOFRAN) 4 MG tablet, TK 1 T PO TID PRN, Disp: , Rfl: 0  •  potassium chloride (K-DUR,KLOR-CON) 20 MEQ CR tablet, TK 1 T PO QAM, Disp: , Rfl: 0  •  pravastatin (PRAVACHOL) 10 MG tablet, Take 10 mg by mouth Daily., Disp: , Rfl:   •  predniSONE (DELTASONE) 5 MG tablet, Take 5 mg by mouth Daily., Disp: , Rfl:   •  raNITIdine (ZANTAC) 150 MG tablet, TAKE 1 TABLET BY MOUTH TWICE DAILY, Disp: 180 tablet, Rfl: 0  •  rosuvastatin (CRESTOR) 20 MG tablet, Take 1 tablet by mouth Every Night., Disp: 90 tablet, Rfl: 3  •  tamsulosin (FLOMAX) 0.4 MG capsule 24 hr capsule, Take 1 capsule by mouth 2 (Two) Times a Day., Disp: , Rfl:   No current facility-administered medications for this visit.     Facility-Administered Medications Ordered in Other Visits:   •  Chlorhexidine Gluconate Cloth 2 % pads 1 application, 1 application, Topical, Q12H PRN, ROGER Betancourt      History of Present Illness     Pt in follow-up post complicated bypass  "surgery.  He had significant hematuria and urethral issues following surgery.  He is finally able to eat solid food as most everything causes nausea perioperatively.  He is convinced that this was related to his general anesthesia.  He is planning to try to travel some this summer      ROS:  All systems have been reviewed and are negative with the exception of those mentioned in the HPI.    OBJECTIVE:  Vitals:    03/09/18 1256   BP: 140/76   BP Location: Right arm   Patient Position: Sitting   Pulse: 81   Weight: 88.9 kg (196 lb)   Height: 180.3 cm (71\")     Physical Exam   Constitutional: He is oriented to person, place, and time. He appears well-developed and well-nourished. No distress.   Cardiovascular: Normal rate, regular rhythm and intact distal pulses.    Murmur (soft holosystolic murmur LUSB) heard.  Pulses:       Radial pulses are 2+ on the right side, and 2+ on the left side.        Dorsalis pedis pulses are 2+ on the right side, and 2+ on the left side.        Posterior tibial pulses are 2+ on the right side, and 2+ on the left side.   Pulmonary/Chest: Effort normal and breath sounds normal. He has no wheezes. He has no rales.   Abdominal: Soft. Bowel sounds are normal. There is no tenderness. There is no guarding.   Musculoskeletal: He exhibits no edema or tenderness.   Neurological: He is alert and oriented to person, place, and time.   Skin: Skin is warm and dry. No rash noted.   Psychiatric: He has a normal mood and affect.   Nursing note and vitals reviewed.      Diagnostic Data:  Procedures      ASSESSMENT:   Diagnosis Plan   1. Coronary artery disease involving native coronary artery of native heart without angina pectoris     2. Essential hypertension     3. Mixed hyperlipidemia         PLAN:  1. CAD post 2 vessel CABG preserved LV function continue regimen cardiac rehabilitation referral will be undertaken    2.  Hypertension controlled on current regimen    3.  Dyslipidemia on statin therapy  "

## 2018-03-22 ENCOUNTER — DOCUMENTATION (OUTPATIENT)
Dept: CARDIAC REHAB | Facility: HOSPITAL | Age: 73
End: 2018-03-22

## 2018-03-22 ENCOUNTER — TRANSCRIBE ORDERS (OUTPATIENT)
Dept: CARDIAC REHAB | Facility: HOSPITAL | Age: 73
End: 2018-03-22

## 2018-03-22 DIAGNOSIS — Z95.1 S/P CABG (CORONARY ARTERY BYPASS GRAFT): Primary | ICD-10-CM

## 2018-03-22 NOTE — PROGRESS NOTES
[facility] [place] discussed benefits of exercise, program protocol, and educational material provided. Teach back verified.  Patient scheduled for orientation at [facility] on 5/01/18 at 1:00 pm.

## 2018-04-04 ENCOUNTER — TELEPHONE (OUTPATIENT)
Dept: GASTROENTEROLOGY | Facility: CLINIC | Age: 73
End: 2018-04-04

## 2018-04-04 ENCOUNTER — LAB (OUTPATIENT)
Dept: LAB | Facility: HOSPITAL | Age: 73
End: 2018-04-04

## 2018-04-04 DIAGNOSIS — R11.2 NAUSEA AND VOMITING, INTRACTABILITY OF VOMITING NOT SPECIFIED, UNSPECIFIED VOMITING TYPE: ICD-10-CM

## 2018-04-04 DIAGNOSIS — R74.8 ABNORMAL LEVELS OF OTHER SERUM ENZYMES: ICD-10-CM

## 2018-04-04 DIAGNOSIS — R79.89 ABNORMAL LIVER FUNCTION TESTS: ICD-10-CM

## 2018-04-04 LAB
ALBUMIN SERPL-MCNC: 4.2 G/DL (ref 3.2–4.8)
ALBUMIN/GLOB SERPL: 1.9 G/DL (ref 1.5–2.5)
ALP SERPL-CCNC: 93 U/L (ref 25–100)
ALT SERPL W P-5'-P-CCNC: 65 U/L (ref 7–40)
ANION GAP SERPL CALCULATED.3IONS-SCNC: 11 MMOL/L (ref 3–11)
AST SERPL-CCNC: 42 U/L (ref 0–33)
BILIRUB CONJ SERPL-MCNC: 0.3 MG/DL (ref 0–0.2)
BILIRUB INDIRECT SERPL-MCNC: 0.9 MG/DL (ref 0.1–1.1)
BILIRUB SERPL-MCNC: 1.2 MG/DL (ref 0.3–1.2)
BILIRUB SERPL-MCNC: 1.2 MG/DL (ref 0.3–1.2)
BUN BLD-MCNC: 25 MG/DL (ref 9–23)
BUN/CREAT SERPL: 25 (ref 7–25)
CALCIUM SPEC-SCNC: 9 MG/DL (ref 8.7–10.4)
CHLORIDE SERPL-SCNC: 104 MMOL/L (ref 99–109)
CO2 SERPL-SCNC: 26 MMOL/L (ref 20–31)
CREAT BLD-MCNC: 1 MG/DL (ref 0.6–1.3)
DEPRECATED RDW RBC AUTO: 45.7 FL (ref 37–54)
ERYTHROCYTE [DISTWIDTH] IN BLOOD BY AUTOMATED COUNT: 13.1 % (ref 11.3–14.5)
GFR SERPL CREATININE-BSD FRML MDRD: 74 ML/MIN/1.73
GGT SERPL-CCNC: 31 U/L (ref 0–72)
GLOBULIN UR ELPH-MCNC: 2.2 GM/DL
GLUCOSE BLD-MCNC: 99 MG/DL (ref 70–100)
HCT VFR BLD AUTO: 46 % (ref 38.9–50.9)
HGB BLD-MCNC: 14.8 G/DL (ref 13.1–17.5)
MCH RBC QN AUTO: 30.3 PG (ref 27–31)
MCHC RBC AUTO-ENTMCNC: 32.2 G/DL (ref 32–36)
MCV RBC AUTO: 94.3 FL (ref 80–99)
PLATELET # BLD AUTO: 242 10*3/MM3 (ref 150–450)
PMV BLD AUTO: 11.2 FL (ref 6–12)
POTASSIUM BLD-SCNC: 4.1 MMOL/L (ref 3.5–5.5)
PROT SERPL-MCNC: 6.4 G/DL (ref 5.7–8.2)
RBC # BLD AUTO: 4.88 10*6/MM3 (ref 4.2–5.76)
SODIUM BLD-SCNC: 141 MMOL/L (ref 132–146)
WBC NRBC COR # BLD: 6.95 10*3/MM3 (ref 3.5–10.8)

## 2018-04-04 PROCEDURE — 82977 ASSAY OF GGT: CPT

## 2018-04-04 PROCEDURE — 82248 BILIRUBIN DIRECT: CPT

## 2018-04-04 PROCEDURE — 36415 COLL VENOUS BLD VENIPUNCTURE: CPT

## 2018-04-04 PROCEDURE — 82247 BILIRUBIN TOTAL: CPT

## 2018-04-04 PROCEDURE — 85027 COMPLETE CBC AUTOMATED: CPT

## 2018-04-04 PROCEDURE — 80053 COMPREHEN METABOLIC PANEL: CPT

## 2018-05-01 ENCOUNTER — TREATMENT (OUTPATIENT)
Dept: CARDIAC REHAB | Facility: HOSPITAL | Age: 73
End: 2018-05-01

## 2018-05-01 DIAGNOSIS — Z95.1 S/P CABG (CORONARY ARTERY BYPASS GRAFT): ICD-10-CM

## 2018-05-01 NOTE — PROGRESS NOTES
Patient came for Phase II Cardiac Rehab orientation.  After discussing the program with the patient, he decided not to begin participating yet because he is getting ready to move and wants to wait until a later date to begin.  Staff instructed patient to call to reschedule when he is ready.  Teach back verified.

## 2018-06-01 ENCOUNTER — TRANSCRIBE ORDERS (OUTPATIENT)
Dept: ADMINISTRATIVE | Facility: HOSPITAL | Age: 73
End: 2018-06-01

## 2018-06-01 ENCOUNTER — HOSPITAL ENCOUNTER (OUTPATIENT)
Dept: GENERAL RADIOLOGY | Facility: HOSPITAL | Age: 73
Discharge: HOME | End: 2018-06-01
Attending: FAMILY MEDICINE | Admitting: FAMILY MEDICINE

## 2018-06-01 DIAGNOSIS — J45.991 COUGH VARIANT ASTHMA: Primary | ICD-10-CM

## 2018-06-01 PROCEDURE — 71046 X-RAY EXAM CHEST 2 VIEWS: CPT

## 2018-06-15 ENCOUNTER — OFFICE VISIT (OUTPATIENT)
Dept: GASTROENTEROLOGY | Facility: CLINIC | Age: 73
End: 2018-06-15

## 2018-06-15 VITALS
OXYGEN SATURATION: 96 % | HEART RATE: 68 BPM | BODY MASS INDEX: 30.77 KG/M2 | SYSTOLIC BLOOD PRESSURE: 148 MMHG | HEIGHT: 71 IN | WEIGHT: 219.8 LBS | RESPIRATION RATE: 16 BRPM | TEMPERATURE: 97.6 F | DIASTOLIC BLOOD PRESSURE: 98 MMHG

## 2018-06-15 DIAGNOSIS — K21.00 GASTROESOPHAGEAL REFLUX DISEASE WITH ESOPHAGITIS: Primary | ICD-10-CM

## 2018-06-15 PROCEDURE — 99213 OFFICE O/P EST LOW 20 MIN: CPT | Performed by: INTERNAL MEDICINE

## 2018-06-15 NOTE — PROGRESS NOTES
PCP: JOSHUA Fletcher MD    Chief Complaint   Patient presents with   • Follow-up     ABN LIVE FUNCTION       History of Present Illness:   Rodrigo Baum is a 73 y.o. male who presents to GI clinic as a follow up for large hiatal hernia, la grade c esophagitis, odom's esophagus. Currently without symptoms on bid ppi. Constipation controlled the further he got away from ct surgery pain. No complaints. No fever.  He does have a history of abnormal CT scan with loculated effusion that Dr. Fletcher is going to reassess at later date.      Past Medical History:   Diagnosis Date   • Arthritis     Arthritis -- data deficient, on steroid therapy greater than 5 years.    • CAD (coronary artery disease)    • Chondrocalcinosis    • Claustrophobia    • Coronary angioplasty status    • Disease of thyroid gland    • Dyslipidemia    • Easy bruising    • Enlarged prostate    • Esophagitis    • Exogenous obesity    • Foot pain     Hip and foot pain, followed per Subhash Sloan and Chao.     • GERD (gastroesophageal reflux disease)    • Greater trochanteric bursitis of both hips    • Hammertoe    • Heart disease    • Hip arthritis    • Hip pain     Hip and foot pain, followed per Subhash Sloan and Chao.     • HLD (hyperlipidemia)    • Hypertension     HTN, presumed essential.   • Hypothyroidism     Hypothyroidism on replacement therapy.    • Jaw swelling     Right jaw swelling, ongoing evaluation for potential mandibular infection per Dr. Smith.    • Kidney problem    • Nicotine addiction     Nicotine addiction cessation 15 years prior.   • OA (osteoarthritis) of ankle/foot    • Osteoporosis    • RA (rheumatoid arthritis)    • Right hamstring muscle strain    • Thin blood    • Venous stasis    • Wears glasses        Past Surgical History:   Procedure Laterality Date   • CARDIAC CATHETERIZATION     • CARDIAC CATHETERIZATION N/A 1/24/2018    Procedure: Left Heart Cath;  Surgeon: Susannah Rasmussen MD;  Location: [facility] IN[facility]  LOCATION;  Service:    • COLONOSCOPY     • CORONARY ANGIOPLASTY WITH STENT PLACEMENT      X2   • CORONARY ARTERY BYPASS GRAFT N/A 1/26/2018    Procedure: CORONARY ARTERY BYPASS X 2 WITH INTERNAL MAMMARY ARTERY GRAFT, ENDOSCOPIC VEIN HARVESTING UTILIZING THE LEFT GREATER SAPPHENOUS VEIN  CYSTO BY DR WATERS;  Surgeon: Casey Morales MD;  Location:  [facility];  Service:    • CYSTOSCOPY TRANSURETHRAL RESECTION OF PROSTATE N/A 11/1/2016    Procedure: CYSTOSCOPY TRANSURETHRAL RESECTION OF PROSTATE GREENLIGHT;  Surgeon: Alverto Richards MD;  Location:  [facility];  Service:    • ENDOSCOPY     • TONSILLECTOMY           Current Outpatient Prescriptions:   •  aspirin 325 MG EC tablet, Take 1 tablet by mouth Daily. (Patient taking differently: Take 81 mg by mouth Daily.), Disp: 90 tablet, Rfl: 3  •  cholecalciferol (VITAMIN D3) 1000 UNITS tablet, Take 2,000 Units by mouth Daily., Disp: , Rfl:   •  Cyanocobalamin (B-12 PO), Take 1 tablet by mouth Daily., Disp: , Rfl:   •  furosemide (LASIX) 40 MG tablet, TK 1 T PO QAM, Disp: , Rfl: 0  •  glucosamine-chondroitin 500-400 MG capsule capsule, Take 2 capsules by mouth Daily., Disp: , Rfl:   •  levothyroxine (SYNTHROID, LEVOTHROID) 125 MCG tablet, Take 125 mcg by mouth Daily., Disp: , Rfl:   •  lisinopril (PRINIVIL,ZESTRIL) 10 MG tablet, Take 10 mg by mouth Daily., Disp: , Rfl:   •  Ukthnik-Enxlc-Aedl-PassF-LBalm (MELATONIN + L-THEANINE PO), Take 1 tablet by mouth Daily As Needed., Disp: , Rfl:   •  metoprolol succinate XL (TOPROL-XL) 50 MG 24 hr tablet, Take 50 mg by mouth Daily., Disp: , Rfl:   •  nitroglycerin (NITROSTAT) 0.4 MG SL tablet, Place 0.4 mg under the tongue Every 5 (Five) Minutes As Needed for chest pain. Take no more than 3 doses in 15 minutes., Disp: , Rfl:   •  omeprazole (priLOSEC) 40 MG capsule, Take 40 mg by mouth Daily., Disp: , Rfl:   •  ondansetron (ZOFRAN) 4 MG tablet, TK 1 T PO TID PRN, Disp: , Rfl: 0  •  potassium chloride (K-DUR,KLOR-CON) 20 MEQ CR  tablet, TK 1 T PO QAM, Disp: , Rfl: 0  •  pravastatin (PRAVACHOL) 10 MG tablet, Take 10 mg by mouth Daily., Disp: , Rfl:   •  predniSONE (DELTASONE) 5 MG tablet, Take 5 mg by mouth Daily., Disp: , Rfl:   •  raNITIdine (ZANTAC) 150 MG tablet, TAKE 1 TABLET BY MOUTH TWICE DAILY, Disp: 180 tablet, Rfl: 0  •  rosuvastatin (CRESTOR) 20 MG tablet, Take 1 tablet by mouth Every Night., Disp: 90 tablet, Rfl: 3  •  tamsulosin (FLOMAX) 0.4 MG capsule 24 hr capsule, Take 1 capsule by mouth 2 (Two) Times a Day., Disp: , Rfl:   No current facility-administered medications for this visit.     Facility-Administered Medications Ordered in Other Visits:   •  Chlorhexidine Gluconate Cloth 2 % pads 1 application, 1 application, Topical, Q12H PRN, ROGER Betancourt    Allergies   Allergen Reactions   • Lipitor [Atorvastatin] Anxiety and Myalgia          • Lortab [Hydrocodone-Acetaminophen] Anxiety   • Azithromycin Nausea Only       Family History   Problem Relation Age of Onset   • Diabetes Father    • Heart disease Father    • Cancer Father    • Hypertension Father    • Heart attack Father    • Osteoarthritis Father    • Stroke Mother    • Hypertension Mother    • Osteoarthritis Mother    • Stroke Other    • Hypertension Other    • Heart attack Other        Social History     Social History   • Marital status:      Spouse name: N/A   • Number of children: 1   • Years of education: N/A     Occupational History   •  Retired     Social History Main Topics   • Smoking status: Former Smoker     Packs/day: 2.00     Years: 20.00     Types: Cigarettes     Start date: 1974     Quit date: 1994   • Smokeless tobacco: Never Used   • Alcohol use 3.6 oz/week     6 Cans of beer per week      Comment: occasionally   • Drug use: No   • Sexual activity: Defer     Other Topics Concern   • Not on file     Social History Narrative    Lives in [place], [place] alone       Review of Systems   All other systems reviewed and are  negative.        Vitals:    06/15/18 1225   BP: 148/98   Pulse: 68   Resp: 16   Temp: 97.6 °F (36.4 °C)   SpO2: 96%       Physical Exam  General Appearance:  Vitals as above. no acute distress  Head/face:  Normocephalic, atraumatic  Eyes:   EOMI, no conjunctivitis or icterus   Nose/Sinuses:  Nares patent bilaterally without discharge or lesions  Mouth/Throat:  Posterior pharynx is pink without drainage or exudates;  dentition is in good condition and repair  Neck:  trachea is midline, no thyromegaly  Lungs:  Clear to auscultation bilaterally  Heart:  Regular rate and rhythm without murmur, gallop or rub  Abdomen:  Soft, non-tender to palpation, no obvious masses, bowel sounds positive in four quadrants; no abdominal bruits; no guarding or rebound tenderness  Neurologic:  Alert; no focal deficits; age appropriate behavior and speech  Psychiatric: mood and affect are congruent  Vascular: extremities without edema  Skin: no rash or cyanosis.      Assessment/Plan  1.) Saenz's esophagus, c2m3  2.) La grade c esophagitis  3.) Large hiatal hernia  Continue ppi bid until egd to reassess esophagitis healing/missed nodule. Plan to perform this when he can, thinks august to September.  Following examination, recommend once daily ppi.    6.) elevated bilirubin  Bili  1.2 with 0.9 unconjugated.  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      7.) history of pleural loculated effusion and abnormal imaging  Per Dr. Fletcher.    F/u in 6 months after egd.        Troy Diaz MD  6/15/2018

## 2018-08-07 ENCOUNTER — OFFICE VISIT (OUTPATIENT)
Dept: ORTHOPEDIC SURGERY | Facility: CLINIC | Age: 73
End: 2018-08-07

## 2018-08-07 VITALS — BODY MASS INDEX: 30.77 KG/M2 | HEART RATE: 74 BPM | WEIGHT: 219.8 LBS | OXYGEN SATURATION: 97 % | HEIGHT: 71 IN

## 2018-08-07 DIAGNOSIS — M70.61 TROCHANTERIC BURSITIS OF BOTH HIPS: Primary | ICD-10-CM

## 2018-08-07 DIAGNOSIS — M70.62 TROCHANTERIC BURSITIS OF BOTH HIPS: Primary | ICD-10-CM

## 2018-08-07 DIAGNOSIS — M17.0 PRIMARY OSTEOARTHRITIS OF BOTH KNEES: ICD-10-CM

## 2018-08-07 PROCEDURE — 99213 OFFICE O/P EST LOW 20 MIN: CPT | Performed by: PHYSICIAN ASSISTANT

## 2018-08-07 PROCEDURE — 20610 DRAIN/INJ JOINT/BURSA W/O US: CPT | Performed by: PHYSICIAN ASSISTANT

## 2018-08-07 RX ADMIN — LIDOCAINE HYDROCHLORIDE 4 ML: 10 INJECTION, SOLUTION INFILTRATION; PERINEURAL at 15:36

## 2018-08-07 RX ADMIN — METHYLPREDNISOLONE ACETATE 40 MG: 40 INJECTION, SUSPENSION INTRA-ARTICULAR; INTRALESIONAL; INTRAMUSCULAR; SOFT TISSUE at 15:36

## 2018-08-07 RX ADMIN — BUPIVACAINE HYDROCHLORIDE 4 ML: 2.5 INJECTION, SOLUTION INFILTRATION; PERINEURAL at 15:35

## 2018-08-07 RX ADMIN — METHYLPREDNISOLONE ACETATE 40 MG: 40 INJECTION, SUSPENSION INTRA-ARTICULAR; INTRALESIONAL; INTRAMUSCULAR; SOFT TISSUE at 15:35

## 2018-08-07 RX ADMIN — LIDOCAINE HYDROCHLORIDE 4 ML: 10 INJECTION, SOLUTION INFILTRATION; PERINEURAL at 15:35

## 2018-08-07 RX ADMIN — BUPIVACAINE HYDROCHLORIDE 4 ML: 2.5 INJECTION, SOLUTION INFILTRATION; PERINEURAL at 15:36

## 2018-08-07 NOTE — PROGRESS NOTES
Procedure   Large Joint Arthrocentesis  Date/Time: 8/7/2018 3:35 PM  Consent given by: patient  Site marked: site marked  Timeout: Immediately prior to procedure a time out was called to verify the correct patient, procedure, equipment, support staff and site/side marked as required   Supporting Documentation  Indications: pain   Procedure Details  Location: hip - R greater trochanteric bursa  Preparation: Patient was prepped and draped in the usual sterile fashion  Needle size: 22 G  Approach: anterolateral  Medications administered: 4 mL bupivacaine 0.25 %; 4 mL lidocaine 1 %; 40 mg methylPREDNISolone acetate 40 MG/ML  Patient tolerance: patient tolerated the procedure well with no immediate complications    Large Joint Arthrocentesis  Date/Time: 8/7/2018 3:36 PM  Consent given by: patient  Site marked: site marked  Timeout: Immediately prior to procedure a time out was called to verify the correct patient, procedure, equipment, support staff and site/side marked as required   Supporting Documentation  Indications: pain   Procedure Details  Location: hip - L greater trochanteric bursa  Preparation: Patient was prepped and draped in the usual sterile fashion  Needle size: 22 G  Approach: anterolateral  Medications administered: 4 mL bupivacaine 0.25 %; 4 mL lidocaine 1 %; 40 mg methylPREDNISolone acetate 40 MG/ML  Patient tolerance: patient tolerated the procedure well with no immediate complications

## 2018-08-07 NOTE — PROGRESS NOTES
[facility]    Subjective     Patient: Rodrigo Baum  : 1945    Primary Care Provider: TORI Fletcher MD    Requesting Provider: As above    Follow-up of the Left Knee (Primary Osteoarthritis of Both Knees) and Follow-up of the Right Knee (Primary Osteoarthritis of Both Knees)      History    Chief Complaint: Bilateral knee pain and bilateral lateral hip pain    History of Present Illness: Patient returns today for both his bilateral trochanteric bursitis as well his bilateral knee arthritis.  He reports trochanteric bursitis his been bothering him for about a month or more.  He is been treated in the past with intermittent injection with good relief for 6 or more months.  Last injection was .  He denies any groin pain he denies any radiating pain he has pain with stairs as well as lying on his sides.  He reports his use of the same symptoms he has always had.  He has done at home exercises.  He would like repeat injections today.    Patient also complains of increasing bilateral knee pain.  He had steroid injection as well as Euflexxa in the past.  He reports no new symptoms with medial functional pain and no night pain.  No radiating pain or mechanical symptoms.  Pain at this point is mild but is slowly increasing.  He finds a flex to get some more relief than anything else.  He is not interested knee replacement.  He would like repeat Euflexxa.    Current Outpatient Prescriptions on File Prior to Visit   Medication Sig Dispense Refill   • aspirin 325 MG EC tablet Take 1 tablet by mouth Daily. (Patient taking differently: Take 81 mg by mouth Daily.) 90 tablet 3   • cholecalciferol (VITAMIN D3) 1000 UNITS tablet Take 2,000 Units by mouth Daily.     • Cyanocobalamin (B-12 PO) Take 1 tablet by mouth Daily.     • furosemide (LASIX) 40 MG tablet TK 1 T PO QAM  0   • glucosamine-chondroitin 500-400 MG capsule capsule Take 2 capsules by mouth Daily.     • levothyroxine  (SYNTHROID, LEVOTHROID) 125 MCG tablet Take 125 mcg by mouth Daily.     • lisinopril (PRINIVIL,ZESTRIL) 10 MG tablet Take 10 mg by mouth Daily.     • Oexamfq-Vnamk-Izpe-PassF-LBalm (MELATONIN + L-THEANINE PO) Take 1 tablet by mouth Daily As Needed.     • metoprolol succinate XL (TOPROL-XL) 50 MG 24 hr tablet Take 50 mg by mouth Daily.     • nitroglycerin (NITROSTAT) 0.4 MG SL tablet Place 0.4 mg under the tongue Every 5 (Five) Minutes As Needed for chest pain. Take no more than 3 doses in 15 minutes.     • omeprazole (priLOSEC) 40 MG capsule Take 40 mg by mouth Daily.     • ondansetron (ZOFRAN) 4 MG tablet TK 1 T PO TID PRN  0   • potassium chloride (K-DUR,KLOR-CON) 20 MEQ CR tablet TK 1 T PO QAM  0   • pravastatin (PRAVACHOL) 10 MG tablet Take 10 mg by mouth Daily.     • predniSONE (DELTASONE) 5 MG tablet Take 5 mg by mouth Daily.     • raNITIdine (ZANTAC) 150 MG tablet TAKE 1 TABLET BY MOUTH TWICE DAILY 180 tablet 0   • rosuvastatin (CRESTOR) 20 MG tablet Take 1 tablet by mouth Every Night. 90 tablet 3   • tamsulosin (FLOMAX) 0.4 MG capsule 24 hr capsule Take 1 capsule by mouth 2 (Two) Times a Day.       Current Facility-Administered Medications on File Prior to Visit   Medication Dose Route Frequency Provider Last Rate Last Dose   • Chlorhexidine Gluconate Cloth 2 % pads 1 application  1 application Topical Q12H PRN Sd Mathew PA          Allergies   Allergen Reactions   • Lipitor [Atorvastatin] Anxiety and Myalgia          • Lortab [Hydrocodone-Acetaminophen] Anxiety   • Azithromycin Nausea Only      Past Medical History:   Diagnosis Date   • Arthritis     Arthritis -- data deficient, on steroid therapy greater than 5 years.    • CAD (coronary artery disease)    • Chondrocalcinosis    • Claustrophobia    • Coronary angioplasty status    • Disease of thyroid gland    • Dyslipidemia    • Easy bruising    • Enlarged prostate    • Esophagitis    • Exogenous obesity    • Foot pain     Hip and foot pain,  followed per Subhash Sloan and Chao.     • GERD (gastroesophageal reflux disease)    • Greater trochanteric bursitis of both hips    • Hammertoe    • Heart disease    • Hip arthritis    • Hip pain     Hip and foot pain, followed per Subhash Sloan and Chao.     • HLD (hyperlipidemia)    • Hypertension     HTN, presumed essential.   • Hypothyroidism     Hypothyroidism on replacement therapy.    • Jaw swelling     Right jaw swelling, ongoing evaluation for potential mandibular infection per Dr. Smith.    • Kidney problem    • Nicotine addiction     Nicotine addiction cessation 15 years prior.   • OA (osteoarthritis) of ankle/foot    • Osteoporosis    • RA (rheumatoid arthritis) (CMS/HCC)    • Right hamstring muscle strain    • Thin blood (CMS/HCC)    • Venous stasis    • Wears glasses      Past Surgical History:   Procedure Laterality Date   • CARDIAC CATHETERIZATION     • CARDIAC CATHETERIZATION N/A 1/24/2018    Procedure: Left Heart Cath;  Surgeon: Susannah Rasmussen MD;  Location:  [facility];  Service:    • COLONOSCOPY     • CORONARY ANGIOPLASTY WITH STENT PLACEMENT      X2   • CORONARY ARTERY BYPASS GRAFT N/A 1/26/2018    Procedure: CORONARY ARTERY BYPASS X 2 WITH INTERNAL MAMMARY ARTERY GRAFT, ENDOSCOPIC VEIN HARVESTING UTILIZING THE LEFT GREATER SAPPHENOUS VEIN  CYSTO BY DR WATERS;  Surgeon: Casey Morales MD;  Location:  [facility];  Service:    • CYSTOSCOPY TRANSURETHRAL RESECTION OF PROSTATE N/A 11/1/2016    Procedure: CYSTOSCOPY TRANSURETHRAL RESECTION OF PROSTATE GREENLIGHT;  Surgeon: Alverto Richards MD;  Location:  [facility];  Service:    • ENDOSCOPY     • TONSILLECTOMY       Family History   Problem Relation Age of Onset   • Diabetes Father    • Heart disease Father    • Cancer Father    • Hypertension Father    • Heart attack Father    • Osteoarthritis Father    • Stroke Mother    • Hypertension Mother    • Osteoarthritis Mother    • Stroke Other    • Hypertension Other    • Heart  "attack Other       Social History     Social History   • Marital status:      Spouse name: N/A   • Number of children: 1   • Years of education: N/A     Occupational History   •  Retired     Social History Main Topics   • Smoking status: Former Smoker     Packs/day: 2.00     Years: 20.00     Types: Cigarettes     Start date: 1974     Quit date: 1994   • Smokeless tobacco: Never Used   • Alcohol use 3.6 oz/week     6 Cans of beer per week      Comment: occasionally   • Drug use: No   • Sexual activity: Defer     Other Topics Concern   • Not on file     Social History Narrative    Lives in [place], [place] alone        Review of Systems   Constitutional: Negative.    HENT: Negative.    Eyes: Negative.    Respiratory: Negative.    Cardiovascular: Negative.    Gastrointestinal: Negative.    Endocrine: Negative.    Genitourinary: Negative.    Musculoskeletal: Positive for joint swelling.   Skin: Negative.    Allergic/Immunologic: Negative.    Neurological: Negative.    Hematological: Negative.    Psychiatric/Behavioral: Negative.        The following portions of the patient's history were reviewed and updated as appropriate: allergies, current medications, past family history, past medical history, past social history, past surgical history and problem list.      Objective      Physical Exam  Pulse 74   Ht 180.3 cm (70.98\")   Wt 99.7 kg (219 lb 12.8 oz)   SpO2 97%   BMI 30.67 kg/m²     Body mass index is 30.67 kg/m².    Patient is well nourished and well developed.      Ortho Exam  Right  hip    RANGE OF MOTION:   FLEXION CONTRACTURE: None   FLEXION: 110 degrees   INTERNAL ROTATION: 20 degrees at 90 degrees of flexion   EXTERNAL ROTATION: 40 degrees at 90 degrees of flexion    PAIN WITH HIP MOTION: no  PAIN WITH LOGROLL: no  STINCHFIELD TEST: negative    STRENGTH:  5/5 hip adduction, abduction, flexion. 5/5 strength knee flexion, extension. 5/5 strength ankle dorsiflexion and plantarflexion. "     GREATER TROCHANTER BURSAL PAIN:  Moderately tender    SENSATION TO LIGHT TOUCH:  DEEP PERONEAL/SUPERFICIAL PERONEAL/SURAL/SAPHENOUS/TIBIAL:  intact    EDEMA:   no  ERYTHEMA:  no  WOUNDS/INCISIONS: none, no overlying skin problems.        Right Knee Exam  ----------  ALIGNMENT: Right: neutral----------  RANGE OF MOTION:  Right: Normal (0-130 degrees) with no extensor lag or flexion contracture  LIGAMENTOUS STABILITY:   Right:stable to varus and valgus stress at terminal extension and 30 degrees without any evidence of laxity----------  STRENGTH:  KNEE FLEXION Right 5/5  KNEE EXTENSION Right 5/5 ----------  PAIN WITH PALPATION: Right denies tenderness to palpation about the knee  PAIN WITH KNEE ROM: Right no  PATELLAR CREPITUS: Right yes   ----------  SENSATION TO LIGHT TOUCH:  DEEP PERONEAL/SUPERFICIAL PERONEAL/SURAL/SAPHENOUS/TIBIAL:   Right intact----------  EFFUSION  Right:  no;  ERYTHEMA:  Right: no;  WOUNDS/INCISIONS: none, no overlying skin problems.    Left hip exam:    RANGE OF MOTION:   FLEXION CONTRACTURE: None   FLEXION: 110 degrees   INTERNAL ROTATION: 20 degrees at 90 degrees of flexion   EXTERNAL ROTATION: 40 degrees at 90 degrees of flexion    PAIN WITH HIP MOTION: no  PAIN WITH LOGROLL: no  STINCHFIELD TEST: negative    STRENGTH:  5/5 hip adduction, abduction, flexion. 5/5 strength knee flexion, extension. 5/5 strength ankle dorsiflexion and plantarflexion.     GREATER TROCHANTER BURSAL PAIN:  Moderately tender    SENSATION TO LIGHT TOUCH:  DEEP PERONEAL/SUPERFICIAL PERONEAL/SURAL/SAPHENOUS/TIBIAL:  intact    EDEMA:   no  ERYTHEMA:  no  WOUNDS/INCISIONS: none, no overlying skin problems.        Left Knee Exam  ----------  Knee Exam:  ----------  ALIGNMENT:  Left: neutral  ----------  RANGE OF MOTION:  Left: Normal (0-130 degrees) with no extensor lag or flexion contracture  LIGAMENTOUS STABILITY:   Left:stable to varus and valgus stress at terminal extension and 30 degrees without any evidence of  laxity   ----------  STRENGTH:  KNEE FLEXION  Left 5/5  KNEE EXTENSION Left 5/5  ----------  PAIN WITH PALPATION: Left denies tenderness to palpation about the knee  PAIN WITH KNEE ROM:  Left no  PATELLAR CREPITUS:  Left yes  ----------  SENSATION TO LIGHT TOUCH:  DEEP PERONEAL/SUPERFICIAL PERONEAL/SURAL/SAPHENOUS/TIBIAL:   Left intact  ----------  EFFUSION:   Left:  no  ERYTHEMA:  ; Left:  no  WOUNDS/INCISIONS: none, no overlying skin problems.      Medical Decision Making    Data Review:   none    Assessment:  1. Trochanteric bursitis of both hips    2. Primary osteoarthritis of both knees        Plan:  1.  Bilateral trochanteric bursitis.  I reviewed clinical findings, past and current treatment with the patient.  On exam, he is tender over bilateral greater trochanter with normal range of motion and strength with no reproducible pain with hip motion.  I think his pain and functional limitations is secondary to trochanteric bursitis.  He has been treated in the past with intermittent injection as well as at home exercises.  Last injections were 11/17.  Plan today is repeat steroid injection into bilateral trochanteric bursae.  I encouraged him to continue his at home exercises.  He'll return as needed for the hips.    2.  Bilateral knee arthritis.  I reviewed clinical findings, past and current treatment with the patient.  On exam he is nontender with no evidence of ligament or meniscal pathology.  Patient has been treated in the past with Euflexxa with good relief for nearly 6 months.  He is not interested in replacement.  He would like to continue with Euflexxa.  Plan today is to get Euflexxa preauthorized for both knees.  He'll return to begin the series when eligible in early September or sooner if needed.    Using sterile technique, the left hip was sterilely prepped with Hibiclens.  Using a 22 gauge needle, the left trochanteric bursa was injected with 40mg Depo Medrol, 4 cc lidocaine and 4 cc marcaine.   Patient tolerated the procedure well. No complications.    Using sterile technique, the right hip was sterilely prepped with Hibiclens.  Using a 22 gauge needle, the right trochanteric bursa was injected with 40mg Depo Medrol, 4 cc lidocaine and 4 cc marcaine.  Patient tolerated the procedure well. No complications.          Krystina Li PA-C  08/08/18  8:51 AM

## 2018-08-08 RX ORDER — LIDOCAINE HYDROCHLORIDE 10 MG/ML
4 INJECTION, SOLUTION INFILTRATION; PERINEURAL
Status: COMPLETED | OUTPATIENT
Start: 2018-08-07 | End: 2018-08-07

## 2018-08-08 RX ORDER — METHYLPREDNISOLONE ACETATE 40 MG/ML
40 INJECTION, SUSPENSION INTRA-ARTICULAR; INTRALESIONAL; INTRAMUSCULAR; SOFT TISSUE
Status: COMPLETED | OUTPATIENT
Start: 2018-08-07 | End: 2018-08-07

## 2018-08-08 RX ORDER — BUPIVACAINE HYDROCHLORIDE 2.5 MG/ML
4 INJECTION, SOLUTION INFILTRATION; PERINEURAL
Status: COMPLETED | OUTPATIENT
Start: 2018-08-07 | End: 2018-08-07

## 2018-08-20 ENCOUNTER — OUTSIDE FACILITY SERVICE (OUTPATIENT)
Dept: GASTROENTEROLOGY | Facility: CLINIC | Age: 73
End: 2018-08-20

## 2018-08-20 ENCOUNTER — LAB REQUISITION (OUTPATIENT)
Dept: LAB | Facility: HOSPITAL | Age: 73
End: 2018-08-20

## 2018-08-20 DIAGNOSIS — R10.84 GENERALIZED ABDOMINAL PAIN: ICD-10-CM

## 2018-08-20 DIAGNOSIS — R10.10 UPPER ABDOMINAL PAIN: ICD-10-CM

## 2018-08-20 PROCEDURE — 88305 TISSUE EXAM BY PATHOLOGIST: CPT | Performed by: INTERNAL MEDICINE

## 2018-08-20 PROCEDURE — 43239 EGD BIOPSY SINGLE/MULTIPLE: CPT | Performed by: INTERNAL MEDICINE

## 2018-08-21 LAB
CYTO UR: NORMAL
LAB AP CASE REPORT: NORMAL
LAB AP CLINICAL INFORMATION: NORMAL
PATH REPORT.FINAL DX SPEC: NORMAL
PATH REPORT.GROSS SPEC: NORMAL

## 2018-09-11 ENCOUNTER — CLINICAL SUPPORT (OUTPATIENT)
Dept: ORTHOPEDIC SURGERY | Facility: CLINIC | Age: 73
End: 2018-09-11

## 2018-09-11 DIAGNOSIS — M17.0 PRIMARY OSTEOARTHRITIS OF BOTH KNEES: Primary | ICD-10-CM

## 2018-09-11 PROCEDURE — 20610 DRAIN/INJ JOINT/BURSA W/O US: CPT | Performed by: PHYSICIAN ASSISTANT

## 2018-09-11 NOTE — PROGRESS NOTES
Subjective     Injections (bilateral knees orthovisc #1)      Rodrigo Baum is a 73 y.o. male.     History of Present Illness   Patient presents for the first injection of Orthovisc in bilateral knees.  He has had prior injections with good relief.  Allergies   Allergen Reactions   • Lipitor [Atorvastatin] Anxiety and Myalgia          • Lortab [Hydrocodone-Acetaminophen] Anxiety   • Azithromycin Nausea Only     Current Outpatient Prescriptions on File Prior to Visit   Medication Sig Dispense Refill   • aspirin 325 MG EC tablet Take 1 tablet by mouth Daily. (Patient taking differently: Take 81 mg by mouth Daily.) 90 tablet 3   • cholecalciferol (VITAMIN D3) 1000 UNITS tablet Take 2,000 Units by mouth Daily.     • Cyanocobalamin (B-12 PO) Take 1 tablet by mouth Daily.     • furosemide (LASIX) 40 MG tablet TK 1 T PO QAM  0   • glucosamine-chondroitin 500-400 MG capsule capsule Take 2 capsules by mouth Daily.     • levothyroxine (SYNTHROID, LEVOTHROID) 125 MCG tablet Take 125 mcg by mouth Daily.     • lisinopril (PRINIVIL,ZESTRIL) 10 MG tablet Take 10 mg by mouth Daily.     • Jhvojkg-Yswyk-Evgz-PassF-LBalm (MELATONIN + L-THEANINE PO) Take 1 tablet by mouth Daily As Needed.     • metoprolol succinate XL (TOPROL-XL) 50 MG 24 hr tablet Take 50 mg by mouth Daily.     • nitroglycerin (NITROSTAT) 0.4 MG SL tablet Place 0.4 mg under the tongue Every 5 (Five) Minutes As Needed for chest pain. Take no more than 3 doses in 15 minutes.     • omeprazole (priLOSEC) 40 MG capsule Take 40 mg by mouth Daily.     • ondansetron (ZOFRAN) 4 MG tablet TK 1 T PO TID PRN  0   • potassium chloride (K-DUR,KLOR-CON) 20 MEQ CR tablet TK 1 T PO QAM  0   • pravastatin (PRAVACHOL) 10 MG tablet Take 10 mg by mouth Daily.     • predniSONE (DELTASONE) 5 MG tablet Take 5 mg by mouth Daily.     • raNITIdine (ZANTAC) 150 MG tablet TAKE 1 TABLET BY MOUTH TWICE DAILY 180 tablet 0   • rosuvastatin (CRESTOR) 20 MG tablet Take 1 tablet by mouth Every Night.  90 tablet 3   • tamsulosin (FLOMAX) 0.4 MG capsule 24 hr capsule Take 1 capsule by mouth 2 (Two) Times a Day.       Current Facility-Administered Medications on File Prior to Visit   Medication Dose Route Frequency Provider Last Rate Last Dose   • Chlorhexidine Gluconate Cloth 2 % pads 1 application  1 application Topical Q12H PRN Sd Mathew PA         Social History     Social History   • Marital status:      Spouse name: N/A   • Number of children: 1   • Years of education: N/A     Occupational History   •  Retired     Social History Main Topics   • Smoking status: Former Smoker     Packs/day: 2.00     Years: 20.00     Types: Cigarettes     Start date: 1974     Quit date: 1994   • Smokeless tobacco: Never Used   • Alcohol use 3.6 oz/week     6 Cans of beer per week      Comment: occasionally   • Drug use: No   • Sexual activity: Defer     Other Topics Concern   • Not on file     Social History Narrative    Lives in [place], [place] alone     Past Surgical History:   Procedure Laterality Date   • CARDIAC CATHETERIZATION     • CARDIAC CATHETERIZATION N/A 1/24/2018    Procedure: Left Heart Cath;  Surgeon: Susannah Rasmussen MD;  Location:  [facility];  Service:    • COLONOSCOPY     • CORONARY ANGIOPLASTY WITH STENT PLACEMENT      X2   • CORONARY ARTERY BYPASS GRAFT N/A 1/26/2018    Procedure: CORONARY ARTERY BYPASS X 2 WITH INTERNAL MAMMARY ARTERY GRAFT, ENDOSCOPIC VEIN HARVESTING UTILIZING THE LEFT GREATER SAPPHENOUS VEIN  CYSTO BY DR WATERS;  Surgeon: Casey Morales MD;  Location:  [facility];  Service:    • CYSTOSCOPY TRANSURETHRAL RESECTION OF PROSTATE N/A 11/1/2016    Procedure: CYSTOSCOPY TRANSURETHRAL RESECTION OF PROSTATE GREENLIGHT;  Surgeon: Alverto Richards MD;  Location:  [facility];  Service:    • ENDOSCOPY     • TONSILLECTOMY       Family History   Problem Relation Age of Onset   • Diabetes Father    • Heart disease Father    • Cancer Father    • Hypertension  Father    • Heart attack Father    • Osteoarthritis Father    • Stroke Mother    • Hypertension Mother    • Osteoarthritis Mother    • Stroke Other    • Hypertension Other    • Heart attack Other      Past Medical History:   Diagnosis Date   • Arthritis     Arthritis -- data deficient, on steroid therapy greater than 5 years.    • CAD (coronary artery disease)    • Chondrocalcinosis    • Claustrophobia    • Coronary angioplasty status    • Disease of thyroid gland    • Dyslipidemia    • Easy bruising    • Enlarged prostate    • Esophagitis    • Exogenous obesity    • Foot pain     Hip and foot pain, followed per Subhash Sloan and Chao.     • GERD (gastroesophageal reflux disease)    • Greater trochanteric bursitis of both hips    • Hammertoe    • Heart disease    • Hip arthritis    • Hip pain     Hip and foot pain, followed per Subhash Sloan and Chao.     • HLD (hyperlipidemia)    • Hypertension     HTN, presumed essential.   • Hypothyroidism     Hypothyroidism on replacement therapy.    • Jaw swelling     Right jaw swelling, ongoing evaluation for potential mandibular infection per Dr. Smith.    • Kidney problem    • Nicotine addiction     Nicotine addiction cessation 15 years prior.   • OA (osteoarthritis) of ankle/foot    • Osteoporosis    • RA (rheumatoid arthritis) (CMS/HCC)    • Right hamstring muscle strain    • Thin blood (CMS/HCC)    • Venous stasis    • Wears glasses          Review of Systems    The following portions of the patient's history were reviewed and updated as appropriate: allergies, current medications, past family history, past medical history, past social history, past surgical history and problem list.    Ortho Exam      Medical Decision Making    Assessment and Plan/ Diagnosis/Treatment options:   Bilateral knee arthritis here to be in an Orthovisc series.  Plan is to proceed with first injection today.  He'll return in 1 week for the second injection or sooner if needed.    Using  sterile technique, the right knee was sterilely prepped with Hibiclens.  Following time out, using a 22 gauge needle the right knee was aspirated and then injected with 2 ml Orthovisc. Approximately 0.5 mm of straw-colored fluid was obtained.  Patient tolerated the procedure well.  No complications.      Using sterile technique, the left knee was sterilely prepped with Hibiclens.  Following time out, using a 22 gauge needle the left knee was aspirated and then injected with 2 ml Orthovisc. Approximately 0.5 mm of straw-colored fluid was obtained.  Patient tolerated the procedure well.  No complications.

## 2018-09-11 NOTE — PROGRESS NOTES
Procedure   Large Joint Arthrocentesis  Date/Time: 9/11/2018 2:04 PM  Consent given by: patient  Site marked: site marked  Timeout: Immediately prior to procedure a time out was called to verify the correct patient, procedure, equipment, support staff and site/side marked as required   Supporting Documentation  Indications: pain   Procedure Details  Location: knee - R knee  Preparation: Patient was prepped and draped in the usual sterile fashion  Needle size: 22 G  Approach: anterolateral  Medications administered: 30 mg Hyaluronan 30 MG/2ML  Patient tolerance: patient tolerated the procedure well with no immediate complications    Large Joint Arthrocentesis  Date/Time: 9/11/2018 2:05 PM  Consent given by: patient  Site marked: site marked  Timeout: Immediately prior to procedure a time out was called to verify the correct patient, procedure, equipment, support staff and site/side marked as required   Supporting Documentation  Indications: pain   Procedure Details  Location: knee - L knee  Preparation: Patient was prepped and draped in the usual sterile fashion  Needle size: 22 G  Approach: anterolateral  Medications administered: 30 mg Hyaluronan 30 MG/2ML  Patient tolerance: patient tolerated the procedure well with no immediate complications

## 2018-09-14 ENCOUNTER — OFFICE VISIT (OUTPATIENT)
Dept: CARDIOLOGY | Facility: CLINIC | Age: 73
End: 2018-09-14

## 2018-09-14 VITALS
BODY MASS INDEX: 31.5 KG/M2 | HEART RATE: 68 BPM | HEIGHT: 71 IN | DIASTOLIC BLOOD PRESSURE: 98 MMHG | SYSTOLIC BLOOD PRESSURE: 148 MMHG | WEIGHT: 225 LBS

## 2018-09-14 DIAGNOSIS — I10 ESSENTIAL HYPERTENSION: ICD-10-CM

## 2018-09-14 DIAGNOSIS — E78.2 MIXED HYPERLIPIDEMIA: ICD-10-CM

## 2018-09-14 DIAGNOSIS — I25.10 CORONARY ARTERY DISEASE INVOLVING NATIVE CORONARY ARTERY OF NATIVE HEART WITHOUT ANGINA PECTORIS: Primary | ICD-10-CM

## 2018-09-14 PROCEDURE — 99214 OFFICE O/P EST MOD 30 MIN: CPT | Performed by: INTERNAL MEDICINE

## 2018-09-14 RX ORDER — MELOXICAM 15 MG/1
15 TABLET ORAL DAILY
COMMUNITY
End: 2019-07-01 | Stop reason: HOSPADM

## 2018-09-14 RX ORDER — LISINOPRIL 10 MG/1
10 TABLET ORAL DAILY
Qty: 90 TABLET | Refills: 3 | Status: SHIPPED | OUTPATIENT
Start: 2018-09-14 | End: 2019-08-20 | Stop reason: HOSPADM

## 2018-09-14 NOTE — PROGRESS NOTES
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     Chief Complaint   Patient presents with   • Coronary Artery Disease   • Hypertension   • Hyperlipidemia       PROBLEM LIST:   1. CAD:  1. 11/15/1993: PTCA and repeat PTCA 1 week following of proximal  LAD, per Dr. Lovelace with normal circumflex and RCA noted at that time.   2. April 2008: Overlapping 3.0 x 24 mm. And 3.0 x 16.0 mm Taxus to LAD and 2.5 x 8.0 PTCA to proximal first OM, inability to pass stent.  EF greater than 60.  3. 5/16 : Stress echocardiogram, which was within normal limits. EF greater than 60%. Normal hemodynamic response with exercise.   4. 1/18 CABG X2 periop hematuria/syed issues  2. Dyslipidemia:  1. November 2009:  Total cholesterol 163, triglycerides 169, HDL 55, LDL 81.  2. 02/30/2012:  Total cholesterol 242, HDL 51, triglycerides 192, , off statin therapy.  3. Nicotine addiction cessation 15 years prior.  4. HTN, presumed essential.  5. 1/17 AAA screen wnl  6. Hypothyroidism on replacement therapy.   7. Exogenous obesity.  8. Arthritis data deficient, on steroid therapy greater than 5 years.   9. GERD.   10. Easy bruising.   11. 2015 Right jaw swelling, evaluated for potential mandibular infection per Dr. Smith.   12. Hip and foot pain, followed per Dr Espinoza  13. Prostatism- 2017 3 rounds abx    Allergies  Allergies   Allergen Reactions   • Lipitor [Atorvastatin] Anxiety and Myalgia          • Lortab [Hydrocodone-Acetaminophen] Anxiety   • Azithromycin Nausea Only       Current Medications    Current Outpatient Prescriptions:   •  aspirin 325 MG EC tablet, Take 1 tablet by mouth Daily. (Patient taking differently: Take 81 mg by mouth Daily.), Disp: 90 tablet, Rfl: 3  •  glucosamine-chondroitin  "500-400 MG capsule capsule, Take 2 capsules by mouth Daily., Disp: , Rfl:   •  levothyroxine (SYNTHROID, LEVOTHROID) 125 MCG tablet, Take 125 mcg by mouth Daily., Disp: , Rfl:   •  Xlqicik-Apneg-Cukk-PassF-LBalm (MELATONIN + L-THEANINE PO), Take 1 tablet by mouth Daily As Needed., Disp: , Rfl:   •  meloxicam (MOBIC) 15 MG tablet, Take 15 mg by mouth Daily., Disp: , Rfl:   •  metoprolol succinate XL (TOPROL-XL) 50 MG 24 hr tablet, Take 50 mg by mouth Daily., Disp: , Rfl:   •  nitroglycerin (NITROSTAT) 0.4 MG SL tablet, Place 0.4 mg under the tongue Every 5 (Five) Minutes As Needed for chest pain. Take no more than 3 doses in 15 minutes., Disp: , Rfl:   •  omeprazole (priLOSEC) 40 MG capsule, Take 40 mg by mouth Daily., Disp: , Rfl:   •  pravastatin (PRAVACHOL) 10 MG tablet, Take 10 mg by mouth Daily., Disp: , Rfl:   •  predniSONE (DELTASONE) 5 MG tablet, Take 5 mg by mouth Daily., Disp: , Rfl:   •  tamsulosin (FLOMAX) 0.4 MG capsule 24 hr capsule, Take 1 capsule by mouth 2 (Two) Times a Day., Disp: , Rfl:   No current facility-administered medications for this visit.     Facility-Administered Medications Ordered in Other Visits:   •  Chlorhexidine Gluconate Cloth 2 % pads 1 application, 1 application, Topical, Q12H PRN, Sd Mathew PA      History of Present Illness     Pt in follow-up post complicated bypass surgery.  He had significant hematuria and urethral issues following surgery.  He is finally able to eat solid food as most everything causes nausea perioperatively.  He is convinced that this was related to his general anesthesia.  He is planning to try to travel some this summer      ROS:  All systems have been reviewed and are negative with the exception of those mentioned in the HPI.    OBJECTIVE:  Vitals:    09/14/18 1113   BP: 148/98   BP Location: Right arm   Patient Position: Sitting   Pulse: 68   Weight: 102 kg (225 lb)   Height: 180.3 cm (71\")     Physical Exam   Constitutional: He is oriented " to person, place, and time. He appears well-developed and well-nourished. No distress.   Cardiovascular: Normal rate, regular rhythm and intact distal pulses.    Murmur (soft holosystolic murmur LUSB) heard.  Pulses:       Radial pulses are 2+ on the right side, and 2+ on the left side.        Dorsalis pedis pulses are 2+ on the right side, and 2+ on the left side.        Posterior tibial pulses are 2+ on the right side, and 2+ on the left side.   Pulmonary/Chest: Effort normal and breath sounds normal. He has no wheezes. He has no rales.   Abdominal: Soft. Bowel sounds are normal. There is no tenderness. There is no guarding.   Musculoskeletal: He exhibits no edema or tenderness.   Neurological: He is alert and oriented to person, place, and time.   Skin: Skin is warm and dry. No rash noted.   Psychiatric: He has a normal mood and affect.   Nursing note and vitals reviewed.      Diagnostic Data:  Procedures      ASSESSMENT:   Diagnosis Plan   1. Coronary artery disease involving native coronary artery of native heart without angina pectoris     2. Essential hypertension     3. Mixed hyperlipidemia         PLAN:  1. CAD post 2 vessel CABG preserved LV function continue regimen cardiac rehabilitation referral will be undertaken    2.  Hypertension not controlled on current regimen, restart lisinopril.    3.  Dyslipidemia on statin therapy    Scribed for Tal Wagner MD by Anum Lizama PA-C. 9/14/2018  12:10 PM  ITal MD, personally performed the services described in this documentation as scribed by the above named individual in my presence, and it is both accurate and complete.  9/14/2018  12:10 PM

## 2018-09-18 ENCOUNTER — CLINICAL SUPPORT (OUTPATIENT)
Dept: ORTHOPEDIC SURGERY | Facility: CLINIC | Age: 73
End: 2018-09-18

## 2018-09-18 DIAGNOSIS — M17.0 PRIMARY OSTEOARTHRITIS OF BOTH KNEES: Primary | ICD-10-CM

## 2018-09-18 PROCEDURE — 20610 DRAIN/INJ JOINT/BURSA W/O US: CPT | Performed by: PHYSICIAN ASSISTANT

## 2018-09-18 NOTE — PROGRESS NOTES
Subjective     Follow-up of the Left Knee (Orthovisc injection #2) and Follow-up of the Right Knee (Orthovisc injection #2/)      Rodrigo Buam is a 73 y.o. male.     History of Present Illness   Patient presented today for the second injection of Orthovisc in bilateral knees.  He is tolerated previous injections well.  No new symptoms.  Allergies   Allergen Reactions   • Lipitor [Atorvastatin] Anxiety and Myalgia          • Lortab [Hydrocodone-Acetaminophen] Anxiety   • Azithromycin Nausea Only     Current Outpatient Prescriptions on File Prior to Visit   Medication Sig Dispense Refill   • aspirin 325 MG EC tablet Take 1 tablet by mouth Daily. (Patient taking differently: Take 81 mg by mouth Daily.) 90 tablet 3   • glucosamine-chondroitin 500-400 MG capsule capsule Take 2 capsules by mouth Daily.     • levothyroxine (SYNTHROID, LEVOTHROID) 125 MCG tablet Take 125 mcg by mouth Daily.     • lisinopril (PRINIVIL,ZESTRIL) 10 MG tablet Take 1 tablet by mouth Daily. 90 tablet 3   • Ygheksx-Ddepq-Ogke-PassF-LBalm (MELATONIN + L-THEANINE PO) Take 1 tablet by mouth Daily As Needed.     • meloxicam (MOBIC) 15 MG tablet Take 15 mg by mouth Daily.     • metoprolol succinate XL (TOPROL-XL) 50 MG 24 hr tablet Take 50 mg by mouth Daily.     • nitroglycerin (NITROSTAT) 0.4 MG SL tablet Place 0.4 mg under the tongue Every 5 (Five) Minutes As Needed for chest pain. Take no more than 3 doses in 15 minutes.     • omeprazole (priLOSEC) 40 MG capsule Take 40 mg by mouth Daily.     • pravastatin (PRAVACHOL) 10 MG tablet Take 10 mg by mouth Daily.     • predniSONE (DELTASONE) 5 MG tablet Take 5 mg by mouth Daily.     • tamsulosin (FLOMAX) 0.4 MG capsule 24 hr capsule Take 1 capsule by mouth 2 (Two) Times a Day.       Current Facility-Administered Medications on File Prior to Visit   Medication Dose Route Frequency Provider Last Rate Last Dose   • Chlorhexidine Gluconate Cloth 2 % pads 1 application  1 application Topical Q12H PRN  Sd Mathew PA         Social History     Social History   • Marital status:      Spouse name: N/A   • Number of children: 1   • Years of education: N/A     Occupational History   •  Retired     Social History Main Topics   • Smoking status: Former Smoker     Packs/day: 2.00     Years: 20.00     Types: Cigarettes     Start date: 1974     Quit date: 1994   • Smokeless tobacco: Never Used   • Alcohol use 3.6 oz/week     6 Cans of beer per week      Comment: occasionally   • Drug use: No   • Sexual activity: Defer     Other Topics Concern   • Not on file     Social History Narrative    Lives in [place], [place] alone     Past Surgical History:   Procedure Laterality Date   • CARDIAC CATHETERIZATION     • CARDIAC CATHETERIZATION N/A 1/24/2018    Procedure: Left Heart Cath;  Surgeon: Susannah Rasmussen MD;  Location:  [facility];  Service:    • COLONOSCOPY     • CORONARY ANGIOPLASTY WITH STENT PLACEMENT      X2   • CORONARY ARTERY BYPASS GRAFT N/A 1/26/2018    Procedure: CORONARY ARTERY BYPASS X 2 WITH INTERNAL MAMMARY ARTERY GRAFT, ENDOSCOPIC VEIN HARVESTING UTILIZING THE LEFT GREATER SAPPHENOUS VEIN  CYSTO BY DR WATERS;  Surgeon: Casey Morales MD;  Location:  [facility];  Service:    • CYSTOSCOPY TRANSURETHRAL RESECTION OF PROSTATE N/A 11/1/2016    Procedure: CYSTOSCOPY TRANSURETHRAL RESECTION OF PROSTATE GREENLIGHT;  Surgeon: Alverto Richards MD;  Location:  [facility];  Service:    • ENDOSCOPY     • TONSILLECTOMY       Family History   Problem Relation Age of Onset   • Diabetes Father    • Heart disease Father    • Cancer Father    • Hypertension Father    • Heart attack Father    • Osteoarthritis Father    • Stroke Mother    • Hypertension Mother    • Osteoarthritis Mother    • Stroke Other    • Hypertension Other    • Heart attack Other      Past Medical History:   Diagnosis Date   • Arthritis     Arthritis -- data deficient, on steroid therapy greater than 5 years.    • CAD  (coronary artery disease)    • Chondrocalcinosis    • Claustrophobia    • Coronary angioplasty status    • Disease of thyroid gland    • Dyslipidemia    • Easy bruising    • Enlarged prostate    • Esophagitis    • Exogenous obesity    • Foot pain     Hip and foot pain, followed per Subhash Sloan and Chao.     • GERD (gastroesophageal reflux disease)    • Greater trochanteric bursitis of both hips    • Hammertoe    • Heart disease    • Hip arthritis    • Hip pain     Hip and foot pain, followed per Subhash Sloan and Chao.     • HLD (hyperlipidemia)    • Hypertension     HTN, presumed essential.   • Hypothyroidism     Hypothyroidism on replacement therapy.    • Jaw swelling     Right jaw swelling, ongoing evaluation for potential mandibular infection per Dr. Smith.    • Kidney problem    • Nicotine addiction     Nicotine addiction cessation 15 years prior.   • OA (osteoarthritis) of ankle/foot    • Osteoporosis    • RA (rheumatoid arthritis) (CMS/HCC)    • Right hamstring muscle strain    • Thin blood (CMS/HCC)    • Venous stasis    • Wears glasses          Review of Systems    The following portions of the patient's history were reviewed and updated as appropriate: allergies, current medications, past family history, past medical history, past social history, past surgical history and problem list.    Ortho Exam      Medical Decision Making    Assessment and Plan/ Diagnosis/Treatment options:   Bilateral knee arthritis undergoing an Orthovisc series.  Plan is to proceed with second injection in both knees today he'll return in 1 week for the final injection or sooner if needed.    Using sterile technique, the left knee was sterilely prepped with Hibiclens.  Following time out, using a 22 gauge needle the left knee was aspirated and then injected with 2 ml Orthovisc. Approximately 0.5 mm of straw-colored fluid was obtained.  Patient tolerated the procedure well.  No complications.      Using sterile technique,  the right knee was sterilely prepped with Hibiclens.  Following time out, using a 22 gauge needle the right knee was aspirated and then injected with 2 ml Orthovisc. Approximately 0.5 mm of straw-colored fluid was obtained.  Patient tolerated the procedure well.  No complications.

## 2018-09-18 NOTE — PROGRESS NOTES
Procedure   Large Joint Arthrocentesis  Date/Time: 9/18/2018 2:09 PM  Consent given by: patient  Site marked: site marked  Timeout: Immediately prior to procedure a time out was called to verify the correct patient, procedure, equipment, support staff and site/side marked as required   Supporting Documentation  Indications: pain   Procedure Details  Location: knee - L knee  Preparation: Patient was prepped and draped in the usual sterile fashion  Needle size: 22 G  Approach: anterolateral  Medications administered: 30 mg Hyaluronan 30 MG/2ML  Patient tolerance: patient tolerated the procedure well with no immediate complications    Large Joint Arthrocentesis  Date/Time: 9/18/2018 2:10 PM  Consent given by: patient  Site marked: site marked  Timeout: Immediately prior to procedure a time out was called to verify the correct patient, procedure, equipment, support staff and site/side marked as required   Supporting Documentation  Indications: pain   Procedure Details  Location: knee - R knee  Preparation: Patient was prepped and draped in the usual sterile fashion  Needle size: 22 G  Approach: anterolateral  Medications administered: 30 mg Hyaluronan 30 MG/2ML  Patient tolerance: patient tolerated the procedure well with no immediate complications

## 2018-09-27 ENCOUNTER — CLINICAL SUPPORT (OUTPATIENT)
Dept: ORTHOPEDIC SURGERY | Facility: CLINIC | Age: 73
End: 2018-09-27

## 2018-09-27 DIAGNOSIS — M17.0 PRIMARY OSTEOARTHRITIS OF BOTH KNEES: Primary | ICD-10-CM

## 2018-09-27 PROCEDURE — 20610 DRAIN/INJ JOINT/BURSA W/O US: CPT | Performed by: PHYSICIAN ASSISTANT

## 2018-09-27 RX ORDER — FINASTERIDE 5 MG/1
TABLET, FILM COATED ORAL DAILY
Refills: 11 | COMMUNITY
Start: 2018-09-20 | End: 2019-04-18

## 2018-10-22 RX ORDER — OMEPRAZOLE 40 MG/1
CAPSULE, DELAYED RELEASE ORAL
Qty: 180 CAPSULE | Refills: 0 | Status: SHIPPED | OUTPATIENT
Start: 2018-10-22 | End: 2019-01-20 | Stop reason: SDUPTHER

## 2019-01-21 RX ORDER — OMEPRAZOLE 40 MG/1
CAPSULE, DELAYED RELEASE ORAL
Qty: 180 CAPSULE | Refills: 0 | Status: SHIPPED | OUTPATIENT
Start: 2019-01-21 | End: 2020-04-21 | Stop reason: SDUPTHER

## 2019-04-18 ENCOUNTER — OFFICE VISIT (OUTPATIENT)
Dept: GASTROENTEROLOGY | Facility: CLINIC | Age: 74
End: 2019-04-18

## 2019-04-18 VITALS
RESPIRATION RATE: 14 BRPM | HEIGHT: 71 IN | WEIGHT: 225 LBS | TEMPERATURE: 97 F | DIASTOLIC BLOOD PRESSURE: 82 MMHG | HEART RATE: 89 BPM | OXYGEN SATURATION: 97 % | BODY MASS INDEX: 31.5 KG/M2 | SYSTOLIC BLOOD PRESSURE: 142 MMHG

## 2019-04-18 DIAGNOSIS — K21.00 GASTROESOPHAGEAL REFLUX DISEASE WITH ESOPHAGITIS: Primary | ICD-10-CM

## 2019-04-18 PROCEDURE — 99212 OFFICE O/P EST SF 10 MIN: CPT | Performed by: INTERNAL MEDICINE

## 2019-04-18 NOTE — PROGRESS NOTES
PCP: TORI Fletchre MD    Chief Complaint   Patient presents with   • Follow-up     6 MTH, LFT, GERD       History of Present Illness:   Rodrigo Baum is a 74 y.o. male who presents to GI clinic as a follow up for GERD. Doing well on ppi. Did have an episode where he woke up in his recliner with refluxate causing discomfort. Overall no daily substernal burning.     Past Medical History:   Diagnosis Date   • Arthritis     Arthritis -- data deficient, on steroid therapy greater than 5 years.    • CAD (coronary artery disease)    • Chondrocalcinosis    • Claustrophobia    • Coronary angioplasty status    • Disease of thyroid gland    • Dyslipidemia    • Easy bruising    • Enlarged prostate    • Esophagitis    • Exogenous obesity    • Foot pain     Hip and foot pain, followed per Subhash Sloan and Chao.     • GERD (gastroesophageal reflux disease)    • Greater trochanteric bursitis of both hips    • Hammertoe    • Heart disease    • Hip arthritis    • Hip pain     Hip and foot pain, followed per Subhash Sloan and Chao.     • HLD (hyperlipidemia)    • Hypertension     HTN, presumed essential.   • Hypothyroidism     Hypothyroidism on replacement therapy.    • Jaw swelling     Right jaw swelling, ongoing evaluation for potential mandibular infection per Dr. Smith.    • Kidney problem    • Nicotine addiction     Nicotine addiction cessation 15 years prior.   • OA (osteoarthritis) of ankle/foot    • Osteoporosis    • RA (rheumatoid arthritis) (CMS/HCC)    • Right hamstring muscle strain    • Thin blood (CMS/HCC)    • Venous stasis    • Wears glasses        Past Surgical History:   Procedure Laterality Date   • CARDIAC CATHETERIZATION     • CARDIAC CATHETERIZATION N/A 1/24/2018    Procedure: Left Heart Cath;  Surgeon: Susannah Rasmussen MD;  Location: [facility] INVASIVE LOCATION;  Service:    • COLONOSCOPY     • CORONARY ANGIOPLASTY WITH STENT PLACEMENT      X2   • CORONARY ARTERY BYPASS GRAFT N/A 1/26/2018    Procedure:  CORONARY ARTERY BYPASS X 2 WITH INTERNAL MAMMARY ARTERY GRAFT, ENDOSCOPIC VEIN HARVESTING UTILIZING THE LEFT GREATER SAPPHENOUS VEIN  CYSTO BY DR WATERS;  Surgeon: Casey Morales MD;  Location:  [facility];  Service:    • CYSTOSCOPY TRANSURETHRAL RESECTION OF PROSTATE N/A 11/1/2016    Procedure: CYSTOSCOPY TRANSURETHRAL RESECTION OF PROSTATE GREENLIGHT;  Surgeon: Alverto Richards MD;  Location:  [facility];  Service:    • ENDOSCOPY     • TONSILLECTOMY           Current Outpatient Medications:   •  aspirin 325 MG EC tablet, Take 1 tablet by mouth Daily. (Patient taking differently: Take 81 mg by mouth Daily.), Disp: 90 tablet, Rfl: 3  •  glucosamine-chondroitin 500-400 MG capsule capsule, Take 2 capsules by mouth Daily., Disp: , Rfl:   •  levothyroxine (SYNTHROID, LEVOTHROID) 125 MCG tablet, Take 125 mcg by mouth Daily., Disp: , Rfl:   •  lisinopril (PRINIVIL,ZESTRIL) 10 MG tablet, Take 1 tablet by mouth Daily., Disp: 90 tablet, Rfl: 3  •  Bdyxper-Jbqjf-Vxtc-PassF-LBalm (MELATONIN + L-THEANINE PO), Take 1 tablet by mouth Daily As Needed., Disp: , Rfl:   •  meloxicam (MOBIC) 15 MG tablet, Take 15 mg by mouth Daily., Disp: , Rfl:   •  metoprolol succinate XL (TOPROL-XL) 50 MG 24 hr tablet, Take 50 mg by mouth Daily., Disp: , Rfl:   •  Multiple Vitamins-Minerals (CENTRUM SILVER 50+MEN PO), Take  by mouth., Disp: , Rfl:   •  nitroglycerin (NITROSTAT) 0.4 MG SL tablet, Place 0.4 mg under the tongue Every 5 (Five) Minutes As Needed for chest pain. Take no more than 3 doses in 15 minutes., Disp: , Rfl:   •  omeprazole (priLOSEC) 40 MG capsule, TAKE 1 CAPSULE BY MOUTH TWICE DAILY, Disp: 180 capsule, Rfl: 0  •  pravastatin (PRAVACHOL) 10 MG tablet, Take 10 mg by mouth Daily., Disp: , Rfl:   •  predniSONE (DELTASONE) 5 MG tablet, Take 5 mg by mouth Daily., Disp: , Rfl:   •  tamsulosin (FLOMAX) 0.4 MG capsule 24 hr capsule, Take 1 capsule by mouth 2 (Two) Times a Day., Disp: , Rfl:   No current facility-administered  medications for this visit.     Facility-Administered Medications Ordered in Other Visits:   •  Chlorhexidine Gluconate Cloth 2 % pads 1 application, 1 application, Topical, Q12H PRN, Sd Mathew PA    Allergies   Allergen Reactions   • Lipitor [Atorvastatin] Anxiety and Myalgia          • Lortab [Hydrocodone-Acetaminophen] Anxiety   • Azithromycin Nausea Only       Family History   Problem Relation Age of Onset   • Diabetes Father    • Heart disease Father    • Cancer Father    • Hypertension Father    • Heart attack Father    • Osteoarthritis Father    • Stroke Mother    • Hypertension Mother    • Osteoarthritis Mother    • Stroke Other    • Hypertension Other    • Heart attack Other        Social History     Socioeconomic History   • Marital status:      Spouse name: Not on file   • Number of children: 1   • Years of education: Not on file   • Highest education level: Not on file   Occupational History   • Occupation:      Employer: RETIRED   Tobacco Use   • Smoking status: Former Smoker     Packs/day: 2.00     Years: 20.00     Pack years: 40.00     Types: Cigarettes     Start date:      Last attempt to quit:      Years since quittin.3   • Smokeless tobacco: Never Used   Substance and Sexual Activity   • Alcohol use: Yes     Alcohol/week: 3.6 oz     Types: 6 Cans of beer per week     Comment: occasionally   • Drug use: No   • Sexual activity: Defer   Social History Narrative    Lives in [place], [place] alone       Review of Systems   All other systems reviewed and are negative.        Vitals:    19 1509   BP: 142/82   Pulse: 89   Resp: 14   Temp: 97 °F (36.1 °C)   SpO2: 97%       Physical Exam  General Appearance:  Vitals as above. no acute distress  Head/face:  Normocephalic, atraumatic  Eyes:   EOMI, no conjunctivitis or icterus   Nose/Sinuses:  Nares patent bilaterally without discharge or lesions  Mouth/Throat:  Posterior pharynx is pink without drainage or  exudates;  dentition is in good condition and repair  Neck:  trachea is midline, no thyromegaly  Neurologic:  Alert; no focal deficits; age appropriate behavior and speech  Psychiatric: mood and affect are congruent  Skin: no rash or cyanosis.  Abdomen: obese and soft/nt      Assessment/Plan  1.) Saenz's esophagus, c2m3  2.) History of La grade c esophagitis  3.) Large hiatal hernia  4.) Gilbert's disease  Continue once daily.  RTC in 6 months to 1 year.      Troy Diaz MD  4/18/2019

## 2019-06-29 ENCOUNTER — HOSPITAL ENCOUNTER (INPATIENT)
Facility: HOSPITAL | Age: 74
LOS: 1 days | Discharge: HOME | End: 2019-07-01
Attending: EMERGENCY MEDICINE | Admitting: INTERNAL MEDICINE

## 2019-06-29 ENCOUNTER — APPOINTMENT (OUTPATIENT)
Dept: GENERAL RADIOLOGY | Facility: HOSPITAL | Age: 74
End: 2019-06-29

## 2019-06-29 DIAGNOSIS — I25.83 CORONARY ARTERY DISEASE DUE TO LIPID RICH PLAQUE: ICD-10-CM

## 2019-06-29 DIAGNOSIS — R55 VASOVAGAL NEAR SYNCOPE: ICD-10-CM

## 2019-06-29 DIAGNOSIS — I25.10 CORONARY ARTERY DISEASE INVOLVING NATIVE CORONARY ARTERY OF NATIVE HEART WITHOUT ANGINA PECTORIS: ICD-10-CM

## 2019-06-29 DIAGNOSIS — I20.9 ANGINA PECTORIS (HCC): Primary | ICD-10-CM

## 2019-06-29 DIAGNOSIS — I25.10 CORONARY ARTERY DISEASE DUE TO LIPID RICH PLAQUE: ICD-10-CM

## 2019-06-29 DIAGNOSIS — R77.8 ELEVATED TROPONIN: ICD-10-CM

## 2019-06-29 PROBLEM — I20.0 UNSTABLE ANGINA (HCC): Status: ACTIVE | Noted: 2019-06-29

## 2019-06-29 PROBLEM — I25.110 CORONARY ARTERY DISEASE INVOLVING NATIVE CORONARY ARTERY OF NATIVE HEART WITH UNSTABLE ANGINA PECTORIS: Status: ACTIVE | Noted: 2018-01-24

## 2019-06-29 LAB
ALBUMIN SERPL-MCNC: 4 G/DL (ref 3.5–5.2)
ALBUMIN/GLOB SERPL: 1.6 G/DL
ALP SERPL-CCNC: 63 U/L (ref 39–117)
ALT SERPL W P-5'-P-CCNC: 18 U/L (ref 1–41)
ANION GAP SERPL CALCULATED.3IONS-SCNC: 12 MMOL/L (ref 5–15)
AST SERPL-CCNC: 20 U/L (ref 1–40)
BASOPHILS # BLD AUTO: 0.07 10*3/MM3 (ref 0–0.2)
BASOPHILS NFR BLD AUTO: 0.6 % (ref 0–1.5)
BILIRUB SERPL-MCNC: 1.7 MG/DL (ref 0.2–1.2)
BUN BLD-MCNC: 21 MG/DL (ref 8–23)
BUN/CREAT SERPL: 16.4 (ref 7–25)
CALCIUM SPEC-SCNC: 9.3 MG/DL (ref 8.6–10.5)
CHLORIDE SERPL-SCNC: 103 MMOL/L (ref 98–107)
CO2 SERPL-SCNC: 26 MMOL/L (ref 22–29)
CREAT BLD-MCNC: 1.28 MG/DL (ref 0.76–1.27)
DEPRECATED RDW RBC AUTO: 45 FL (ref 37–54)
EOSINOPHIL # BLD AUTO: 0.38 10*3/MM3 (ref 0–0.4)
EOSINOPHIL NFR BLD AUTO: 3.3 % (ref 0.3–6.2)
ERYTHROCYTE [DISTWIDTH] IN BLOOD BY AUTOMATED COUNT: 13.1 % (ref 12.3–15.4)
GFR SERPL CREATININE-BSD FRML MDRD: 55 ML/MIN/1.73
GLOBULIN UR ELPH-MCNC: 2.5 GM/DL
GLUCOSE BLD-MCNC: 207 MG/DL (ref 65–99)
HCT VFR BLD AUTO: 47.3 % (ref 37.5–51)
HGB BLD-MCNC: 15.9 G/DL (ref 13–17.7)
HOLD SPECIMEN: NORMAL
HOLD SPECIMEN: NORMAL
IMM GRANULOCYTES # BLD AUTO: 0.04 10*3/MM3 (ref 0–0.05)
IMM GRANULOCYTES NFR BLD AUTO: 0.3 % (ref 0–0.5)
LIPASE SERPL-CCNC: 35 U/L (ref 13–60)
LYMPHOCYTES # BLD AUTO: 0.69 10*3/MM3 (ref 0.7–3.1)
LYMPHOCYTES NFR BLD AUTO: 6 % (ref 19.6–45.3)
MCH RBC QN AUTO: 31.5 PG (ref 26.6–33)
MCHC RBC AUTO-ENTMCNC: 33.6 G/DL (ref 31.5–35.7)
MCV RBC AUTO: 93.7 FL (ref 79–97)
MONOCYTES # BLD AUTO: 0.71 10*3/MM3 (ref 0.1–0.9)
MONOCYTES NFR BLD AUTO: 6.2 % (ref 5–12)
NEUTROPHILS # BLD AUTO: 9.6 10*3/MM3 (ref 1.7–7)
NEUTROPHILS NFR BLD AUTO: 83.6 % (ref 42.7–76)
NRBC BLD AUTO-RTO: 0 /100 WBC (ref 0–0.2)
NT-PROBNP SERPL-MCNC: 40.6 PG/ML (ref 5–900)
PLATELET # BLD AUTO: 193 10*3/MM3 (ref 140–450)
PMV BLD AUTO: 10.6 FL (ref 6–12)
POTASSIUM BLD-SCNC: 4.1 MMOL/L (ref 3.5–5.2)
PROT SERPL-MCNC: 6.5 G/DL (ref 6–8.5)
RBC # BLD AUTO: 5.05 10*6/MM3 (ref 4.14–5.8)
SODIUM BLD-SCNC: 141 MMOL/L (ref 136–145)
TROPONIN T SERPL-MCNC: 0.07 NG/ML (ref 0–0.03)
TROPONIN T SERPL-MCNC: <0.01 NG/ML (ref 0–0.03)
WBC NRBC COR # BLD: 11.49 10*3/MM3 (ref 3.4–10.8)
WHOLE BLOOD HOLD SPECIMEN: NORMAL
WHOLE BLOOD HOLD SPECIMEN: NORMAL

## 2019-06-29 PROCEDURE — 83690 ASSAY OF LIPASE: CPT | Performed by: EMERGENCY MEDICINE

## 2019-06-29 PROCEDURE — 80053 COMPREHEN METABOLIC PANEL: CPT | Performed by: EMERGENCY MEDICINE

## 2019-06-29 PROCEDURE — 85025 COMPLETE CBC W/AUTO DIFF WBC: CPT | Performed by: EMERGENCY MEDICINE

## 2019-06-29 PROCEDURE — 99285 EMERGENCY DEPT VISIT HI MDM: CPT

## 2019-06-29 PROCEDURE — 71045 X-RAY EXAM CHEST 1 VIEW: CPT

## 2019-06-29 PROCEDURE — 93005 ELECTROCARDIOGRAM TRACING: CPT | Performed by: FAMILY MEDICINE

## 2019-06-29 PROCEDURE — 84484 ASSAY OF TROPONIN QUANT: CPT | Performed by: EMERGENCY MEDICINE

## 2019-06-29 PROCEDURE — 93005 ELECTROCARDIOGRAM TRACING: CPT | Performed by: EMERGENCY MEDICINE

## 2019-06-29 PROCEDURE — 84484 ASSAY OF TROPONIN QUANT: CPT | Performed by: FAMILY MEDICINE

## 2019-06-29 PROCEDURE — G0378 HOSPITAL OBSERVATION PER HR: HCPCS

## 2019-06-29 PROCEDURE — 99223 1ST HOSP IP/OBS HIGH 75: CPT | Performed by: FAMILY MEDICINE

## 2019-06-29 PROCEDURE — 83880 ASSAY OF NATRIURETIC PEPTIDE: CPT | Performed by: EMERGENCY MEDICINE

## 2019-06-29 PROCEDURE — 93010 ELECTROCARDIOGRAM REPORT: CPT | Performed by: INTERNAL MEDICINE

## 2019-06-29 RX ORDER — ONDANSETRON 2 MG/ML
4 INJECTION INTRAMUSCULAR; INTRAVENOUS EVERY 6 HOURS PRN
Status: DISCONTINUED | OUTPATIENT
Start: 2019-06-29 | End: 2019-07-01 | Stop reason: HOSPADM

## 2019-06-29 RX ORDER — SODIUM CHLORIDE 9 MG/ML
100 INJECTION, SOLUTION INTRAVENOUS CONTINUOUS
Status: DISCONTINUED | OUTPATIENT
Start: 2019-06-29 | End: 2019-06-30

## 2019-06-29 RX ORDER — SODIUM CHLORIDE 0.9 % (FLUSH) 0.9 %
3-10 SYRINGE (ML) INJECTION AS NEEDED
Status: DISCONTINUED | OUTPATIENT
Start: 2019-06-29 | End: 2019-07-01 | Stop reason: HOSPADM

## 2019-06-29 RX ORDER — NITROGLYCERIN 0.4 MG/1
0.4 TABLET SUBLINGUAL
Status: DISCONTINUED | OUTPATIENT
Start: 2019-06-29 | End: 2019-07-01 | Stop reason: HOSPADM

## 2019-06-29 RX ORDER — CLOPIDOGREL BISULFATE 75 MG/1
75 TABLET ORAL DAILY
Status: DISCONTINUED | OUTPATIENT
Start: 2019-06-30 | End: 2019-07-01 | Stop reason: HOSPADM

## 2019-06-29 RX ORDER — TAMSULOSIN HYDROCHLORIDE 0.4 MG/1
0.4 CAPSULE ORAL DAILY
Status: DISCONTINUED | OUTPATIENT
Start: 2019-06-30 | End: 2019-07-01 | Stop reason: HOSPADM

## 2019-06-29 RX ORDER — ISOSORBIDE MONONITRATE 30 MG/1
30 TABLET, EXTENDED RELEASE ORAL DAILY
Qty: 30 TABLET | Refills: 0 | Status: SHIPPED | OUTPATIENT
Start: 2019-06-29 | End: 2019-07-01 | Stop reason: SDUPTHER

## 2019-06-29 RX ORDER — SODIUM CHLORIDE 0.9 % (FLUSH) 0.9 %
10 SYRINGE (ML) INJECTION AS NEEDED
Status: DISCONTINUED | OUTPATIENT
Start: 2019-06-29 | End: 2019-07-01 | Stop reason: HOSPADM

## 2019-06-29 RX ORDER — METOPROLOL SUCCINATE 50 MG/1
50 TABLET, EXTENDED RELEASE ORAL DAILY
Status: DISCONTINUED | OUTPATIENT
Start: 2019-06-30 | End: 2019-07-01 | Stop reason: HOSPADM

## 2019-06-29 RX ORDER — MORPHINE SULFATE 2 MG/ML
1 INJECTION, SOLUTION INTRAMUSCULAR; INTRAVENOUS EVERY 4 HOURS PRN
Status: DISCONTINUED | OUTPATIENT
Start: 2019-06-29 | End: 2019-07-01 | Stop reason: HOSPADM

## 2019-06-29 RX ORDER — NALOXONE HCL 0.4 MG/ML
0.4 VIAL (ML) INJECTION
Status: DISCONTINUED | OUTPATIENT
Start: 2019-06-29 | End: 2019-07-01 | Stop reason: HOSPADM

## 2019-06-29 RX ORDER — LORAZEPAM 0.5 MG/1
0.5 TABLET ORAL EVERY 6 HOURS PRN
Status: DISCONTINUED | OUTPATIENT
Start: 2019-06-29 | End: 2019-07-01 | Stop reason: HOSPADM

## 2019-06-29 RX ORDER — ROSUVASTATIN CALCIUM 20 MG/1
40 TABLET, COATED ORAL NIGHTLY
Status: DISCONTINUED | OUTPATIENT
Start: 2019-06-29 | End: 2019-07-01 | Stop reason: HOSPADM

## 2019-06-29 RX ORDER — LISINOPRIL 10 MG/1
10 TABLET ORAL DAILY
Status: DISCONTINUED | OUTPATIENT
Start: 2019-06-30 | End: 2019-07-01 | Stop reason: HOSPADM

## 2019-06-29 RX ORDER — LEVOTHYROXINE SODIUM 0.12 MG/1
125 TABLET ORAL
Status: DISCONTINUED | OUTPATIENT
Start: 2019-06-30 | End: 2019-07-01 | Stop reason: HOSPADM

## 2019-06-29 RX ORDER — NITROGLYCERIN 0.4 MG/1
0.4 TABLET SUBLINGUAL
Qty: 25 TABLET | Refills: 0 | Status: SHIPPED | OUTPATIENT
Start: 2019-06-29 | End: 2020-07-22

## 2019-06-29 RX ORDER — ASPIRIN 81 MG/1
324 TABLET, CHEWABLE ORAL ONCE
Status: DISCONTINUED | OUTPATIENT
Start: 2019-06-29 | End: 2019-07-01 | Stop reason: HOSPADM

## 2019-06-29 RX ORDER — ASPIRIN 81 MG/1
81 TABLET ORAL DAILY
Status: DISCONTINUED | OUTPATIENT
Start: 2019-06-30 | End: 2019-07-01 | Stop reason: HOSPADM

## 2019-06-29 RX ORDER — ONDANSETRON 4 MG/1
4 TABLET, FILM COATED ORAL EVERY 6 HOURS PRN
Status: DISCONTINUED | OUTPATIENT
Start: 2019-06-29 | End: 2019-07-01 | Stop reason: HOSPADM

## 2019-06-29 RX ORDER — PANTOPRAZOLE SODIUM 40 MG/1
40 TABLET, DELAYED RELEASE ORAL DAILY
Status: DISCONTINUED | OUTPATIENT
Start: 2019-06-30 | End: 2019-07-01 | Stop reason: HOSPADM

## 2019-06-29 RX ORDER — SODIUM CHLORIDE 0.9 % (FLUSH) 0.9 %
3 SYRINGE (ML) INJECTION EVERY 12 HOURS SCHEDULED
Status: DISCONTINUED | OUTPATIENT
Start: 2019-06-29 | End: 2019-07-01 | Stop reason: HOSPADM

## 2019-06-29 RX ADMIN — NITROGLYCERIN 0.5 INCH: 20 OINTMENT TOPICAL at 14:27

## 2019-06-29 RX ADMIN — SODIUM CHLORIDE, PRESERVATIVE FREE 3 ML: 5 INJECTION INTRAVENOUS at 23:18

## 2019-06-29 RX ADMIN — SODIUM CHLORIDE 100 ML/HR: 9 INJECTION, SOLUTION INTRAVENOUS at 23:17

## 2019-06-30 PROBLEM — I20.9 ANGINA PECTORIS (HCC): Status: ACTIVE | Noted: 2019-06-30

## 2019-06-30 LAB
ANION GAP SERPL CALCULATED.3IONS-SCNC: 16 MMOL/L (ref 5–15)
BASOPHILS # BLD AUTO: 0.06 10*3/MM3 (ref 0–0.2)
BASOPHILS NFR BLD AUTO: 0.8 % (ref 0–1.5)
BUN BLD-MCNC: 16 MG/DL (ref 8–23)
BUN/CREAT SERPL: 17.6 (ref 7–25)
CALCIUM SPEC-SCNC: 9 MG/DL (ref 8.6–10.5)
CHLORIDE SERPL-SCNC: 104 MMOL/L (ref 98–107)
CHOLEST SERPL-MCNC: 146 MG/DL (ref 0–200)
CO2 SERPL-SCNC: 22 MMOL/L (ref 22–29)
CREAT BLD-MCNC: 0.91 MG/DL (ref 0.76–1.27)
DEPRECATED RDW RBC AUTO: 44.5 FL (ref 37–54)
EOSINOPHIL # BLD AUTO: 0.69 10*3/MM3 (ref 0–0.4)
EOSINOPHIL NFR BLD AUTO: 8.7 % (ref 0.3–6.2)
ERYTHROCYTE [DISTWIDTH] IN BLOOD BY AUTOMATED COUNT: 13 % (ref 12.3–15.4)
GFR SERPL CREATININE-BSD FRML MDRD: 82 ML/MIN/1.73
GLUCOSE BLD-MCNC: 91 MG/DL (ref 65–99)
HBA1C MFR BLD: 5.6 % (ref 4.8–5.6)
HCT VFR BLD AUTO: 46.9 % (ref 37.5–51)
HDLC SERPL-MCNC: 39 MG/DL (ref 40–60)
HGB BLD-MCNC: 15.3 G/DL (ref 13–17.7)
IMM GRANULOCYTES # BLD AUTO: 0.02 10*3/MM3 (ref 0–0.05)
IMM GRANULOCYTES NFR BLD AUTO: 0.3 % (ref 0–0.5)
LDLC SERPL CALC-MCNC: 76 MG/DL (ref 0–100)
LDLC/HDLC SERPL: 1.94 {RATIO}
LYMPHOCYTES # BLD AUTO: 1.73 10*3/MM3 (ref 0.7–3.1)
LYMPHOCYTES NFR BLD AUTO: 21.8 % (ref 19.6–45.3)
MCH RBC QN AUTO: 30.8 PG (ref 26.6–33)
MCHC RBC AUTO-ENTMCNC: 32.6 G/DL (ref 31.5–35.7)
MCV RBC AUTO: 94.4 FL (ref 79–97)
MONOCYTES # BLD AUTO: 0.64 10*3/MM3 (ref 0.1–0.9)
MONOCYTES NFR BLD AUTO: 8.1 % (ref 5–12)
NEUTROPHILS # BLD AUTO: 4.8 10*3/MM3 (ref 1.7–7)
NEUTROPHILS NFR BLD AUTO: 60.3 % (ref 42.7–76)
NRBC BLD AUTO-RTO: 0 /100 WBC (ref 0–0.2)
PLATELET # BLD AUTO: 212 10*3/MM3 (ref 140–450)
PMV BLD AUTO: 10.9 FL (ref 6–12)
POTASSIUM BLD-SCNC: 4 MMOL/L (ref 3.5–5.2)
RBC # BLD AUTO: 4.97 10*6/MM3 (ref 4.14–5.8)
SODIUM BLD-SCNC: 142 MMOL/L (ref 136–145)
TRIGL SERPL-MCNC: 157 MG/DL (ref 0–150)
TROPONIN T SERPL-MCNC: 0.13 NG/ML (ref 0–0.03)
TROPONIN T SERPL-MCNC: 0.17 NG/ML (ref 0–0.03)
TROPONIN T SERPL-MCNC: 0.2 NG/ML (ref 0–0.03)
UFH PPP CHRO-ACNC: 0.1 IU/ML (ref 0.3–0.7)
UFH PPP CHRO-ACNC: 0.3 IU/ML (ref 0.3–0.7)
VLDLC SERPL-MCNC: 31.4 MG/DL
WBC NRBC COR # BLD: 7.94 10*3/MM3 (ref 3.4–10.8)

## 2019-06-30 PROCEDURE — 93010 ELECTROCARDIOGRAM REPORT: CPT | Performed by: INTERNAL MEDICINE

## 2019-06-30 PROCEDURE — 85520 HEPARIN ASSAY: CPT

## 2019-06-30 PROCEDURE — 93005 ELECTROCARDIOGRAM TRACING: CPT | Performed by: FAMILY MEDICINE

## 2019-06-30 PROCEDURE — 85025 COMPLETE CBC W/AUTO DIFF WBC: CPT | Performed by: FAMILY MEDICINE

## 2019-06-30 PROCEDURE — 25010000002 HEPARIN (PORCINE) PER 1000 UNITS

## 2019-06-30 PROCEDURE — 99222 1ST HOSP IP/OBS MODERATE 55: CPT | Performed by: INTERNAL MEDICINE

## 2019-06-30 PROCEDURE — 83036 HEMOGLOBIN GLYCOSYLATED A1C: CPT | Performed by: FAMILY MEDICINE

## 2019-06-30 PROCEDURE — 80061 LIPID PANEL: CPT | Performed by: FAMILY MEDICINE

## 2019-06-30 PROCEDURE — 99232 SBSQ HOSP IP/OBS MODERATE 35: CPT | Performed by: INTERNAL MEDICINE

## 2019-06-30 PROCEDURE — 84484 ASSAY OF TROPONIN QUANT: CPT | Performed by: FAMILY MEDICINE

## 2019-06-30 PROCEDURE — 25010000002 HEPARIN (PORCINE) IN NACL 25000-0.45 UT/250ML-% SOLUTION: Performed by: PHYSICIAN ASSISTANT

## 2019-06-30 PROCEDURE — 80048 BASIC METABOLIC PNL TOTAL CA: CPT | Performed by: FAMILY MEDICINE

## 2019-06-30 PROCEDURE — 84484 ASSAY OF TROPONIN QUANT: CPT | Performed by: INTERNAL MEDICINE

## 2019-06-30 RX ORDER — HEPARIN SODIUM 1000 [USP'U]/ML
4000 INJECTION, SOLUTION INTRAVENOUS; SUBCUTANEOUS ONCE
Status: COMPLETED | OUTPATIENT
Start: 2019-06-30 | End: 2019-06-30

## 2019-06-30 RX ORDER — NITROGLYCERIN 20 MG/100ML
5-200 INJECTION INTRAVENOUS CONTINUOUS PRN
Status: DISCONTINUED | OUTPATIENT
Start: 2019-06-30 | End: 2019-07-01 | Stop reason: HOSPADM

## 2019-06-30 RX ADMIN — ROSUVASTATIN CALCIUM 40 MG: 20 TABLET, FILM COATED ORAL at 22:28

## 2019-06-30 RX ADMIN — PANTOPRAZOLE SODIUM 40 MG: 40 TABLET, DELAYED RELEASE ORAL at 09:45

## 2019-06-30 RX ADMIN — LISINOPRIL 10 MG: 10 TABLET ORAL at 09:45

## 2019-06-30 RX ADMIN — HEPARIN SODIUM 11 UNITS/KG/HR: 10000 INJECTION, SOLUTION INTRAVENOUS at 16:05

## 2019-06-30 RX ADMIN — LEVOTHYROXINE SODIUM 125 MCG: 125 TABLET ORAL at 09:46

## 2019-06-30 RX ADMIN — HEPARIN SODIUM 4000 UNITS: 1000 INJECTION, SOLUTION INTRAVENOUS; SUBCUTANEOUS at 16:05

## 2019-06-30 RX ADMIN — ASPIRIN 81 MG: 81 TABLET, COATED ORAL at 09:45

## 2019-06-30 RX ADMIN — METOPROLOL SUCCINATE 50 MG: 50 TABLET, EXTENDED RELEASE ORAL at 09:45

## 2019-06-30 RX ADMIN — CLOPIDOGREL BISULFATE 75 MG: 75 TABLET ORAL at 09:45

## 2019-06-30 RX ADMIN — TAMSULOSIN HYDROCHLORIDE 0.4 MG: 0.4 CAPSULE ORAL at 09:45

## 2019-06-30 RX ADMIN — SODIUM CHLORIDE, PRESERVATIVE FREE 3 ML: 5 INJECTION INTRAVENOUS at 22:28

## 2019-06-30 RX ADMIN — SODIUM CHLORIDE 100 ML/HR: 9 INJECTION, SOLUTION INTRAVENOUS at 09:52

## 2019-07-01 VITALS
SYSTOLIC BLOOD PRESSURE: 90 MMHG | BODY MASS INDEX: 27.86 KG/M2 | HEIGHT: 71 IN | WEIGHT: 199 LBS | RESPIRATION RATE: 18 BRPM | TEMPERATURE: 98.1 F | DIASTOLIC BLOOD PRESSURE: 69 MMHG | OXYGEN SATURATION: 95 % | HEART RATE: 68 BPM

## 2019-07-01 PROBLEM — I20.0 UNSTABLE ANGINA (HCC): Status: RESOLVED | Noted: 2019-06-29 | Resolved: 2019-07-01

## 2019-07-01 LAB
ACT BLD: 257 SECONDS (ref 82–152)
ANION GAP SERPL CALCULATED.3IONS-SCNC: 13 MMOL/L (ref 5–15)
BASOPHILS # BLD AUTO: 0.11 10*3/MM3 (ref 0–0.2)
BASOPHILS NFR BLD AUTO: 1.7 % (ref 0–1.5)
BUN BLD-MCNC: 13 MG/DL (ref 8–23)
BUN/CREAT SERPL: 12.7 (ref 7–25)
CALCIUM SPEC-SCNC: 8.7 MG/DL (ref 8.6–10.5)
CHLORIDE SERPL-SCNC: 106 MMOL/L (ref 98–107)
CO2 SERPL-SCNC: 23 MMOL/L (ref 22–29)
CREAT BLD-MCNC: 1.02 MG/DL (ref 0.76–1.27)
DEPRECATED RDW RBC AUTO: 45 FL (ref 37–54)
EOSINOPHIL # BLD AUTO: 1.07 10*3/MM3 (ref 0–0.4)
EOSINOPHIL NFR BLD AUTO: 16.4 % (ref 0.3–6.2)
ERYTHROCYTE [DISTWIDTH] IN BLOOD BY AUTOMATED COUNT: 13.2 % (ref 12.3–15.4)
GFR SERPL CREATININE-BSD FRML MDRD: 72 ML/MIN/1.73
GLUCOSE BLD-MCNC: 106 MG/DL (ref 65–99)
HCT VFR BLD AUTO: 49.4 % (ref 37.5–51)
HGB BLD-MCNC: 16.3 G/DL (ref 13–17.7)
IMM GRANULOCYTES # BLD AUTO: 0.01 10*3/MM3 (ref 0–0.05)
IMM GRANULOCYTES NFR BLD AUTO: 0.2 % (ref 0–0.5)
LYMPHOCYTES # BLD AUTO: 1.69 10*3/MM3 (ref 0.7–3.1)
LYMPHOCYTES NFR BLD AUTO: 25.8 % (ref 19.6–45.3)
MCH RBC QN AUTO: 31 PG (ref 26.6–33)
MCHC RBC AUTO-ENTMCNC: 33 G/DL (ref 31.5–35.7)
MCV RBC AUTO: 93.9 FL (ref 79–97)
MONOCYTES # BLD AUTO: 0.54 10*3/MM3 (ref 0.1–0.9)
MONOCYTES NFR BLD AUTO: 8.3 % (ref 5–12)
NEUTROPHILS # BLD AUTO: 3.12 10*3/MM3 (ref 1.7–7)
NEUTROPHILS NFR BLD AUTO: 47.6 % (ref 42.7–76)
NRBC BLD AUTO-RTO: 0 /100 WBC (ref 0–0.2)
PLATELET # BLD AUTO: 192 10*3/MM3 (ref 140–450)
PMV BLD AUTO: 10.8 FL (ref 6–12)
POTASSIUM BLD-SCNC: 3.7 MMOL/L (ref 3.5–5.2)
RBC # BLD AUTO: 5.26 10*6/MM3 (ref 4.14–5.8)
SODIUM BLD-SCNC: 142 MMOL/L (ref 136–145)
TROPONIN T SERPL-MCNC: 0.11 NG/ML (ref 0–0.03)
UFH PPP CHRO-ACNC: 0.1 IU/ML (ref 0.3–0.7)
WBC NRBC COR # BLD: 6.54 10*3/MM3 (ref 3.4–10.8)

## 2019-07-01 PROCEDURE — C1769 GUIDE WIRE: HCPCS | Performed by: INTERNAL MEDICINE

## 2019-07-01 PROCEDURE — B2051ZZ PLAIN RADIOGRAPHY OF LEFT HEART USING LOW OSMOLAR CONTRAST: ICD-10-PCS | Performed by: INTERNAL MEDICINE

## 2019-07-01 PROCEDURE — B2031ZZ PLAIN RADIOGRAPHY OF MULTIPLE CORONARY ARTERY BYPASS GRAFTS USING LOW OSMOLAR CONTRAST: ICD-10-PCS | Performed by: INTERNAL MEDICINE

## 2019-07-01 PROCEDURE — 93459 L HRT ART/GRFT ANGIO: CPT | Performed by: INTERNAL MEDICINE

## 2019-07-01 PROCEDURE — 4A023N7 MEASUREMENT OF CARDIAC SAMPLING AND PRESSURE, LEFT HEART, PERCUTANEOUS APPROACH: ICD-10-PCS | Performed by: INTERNAL MEDICINE

## 2019-07-01 PROCEDURE — 80048 BASIC METABOLIC PNL TOTAL CA: CPT | Performed by: INTERNAL MEDICINE

## 2019-07-01 PROCEDURE — 85520 HEPARIN ASSAY: CPT

## 2019-07-01 PROCEDURE — 25010000002 HEPARIN (PORCINE) PER 1000 UNITS

## 2019-07-01 PROCEDURE — 25010000002 FENTANYL CITRATE (PF) 100 MCG/2ML SOLUTION: Performed by: INTERNAL MEDICINE

## 2019-07-01 PROCEDURE — 0 IOPAMIDOL PER 1 ML: Performed by: INTERNAL MEDICINE

## 2019-07-01 PROCEDURE — B2081ZZ PLAIN RADIOGRAPHY OF LEFT INTERNAL MAMMARY BYPASS GRAFT USING LOW OSMOLAR CONTRAST: ICD-10-PCS | Performed by: INTERNAL MEDICINE

## 2019-07-01 PROCEDURE — 25010000002 MIDAZOLAM PER 1 MG: Performed by: INTERNAL MEDICINE

## 2019-07-01 PROCEDURE — 99239 HOSP IP/OBS DSCHRG MGMT >30: CPT | Performed by: INTERNAL MEDICINE

## 2019-07-01 PROCEDURE — 85025 COMPLETE CBC W/AUTO DIFF WBC: CPT | Performed by: INTERNAL MEDICINE

## 2019-07-01 PROCEDURE — 99232 SBSQ HOSP IP/OBS MODERATE 35: CPT | Performed by: INTERNAL MEDICINE

## 2019-07-01 PROCEDURE — B2011ZZ PLAIN RADIOGRAPHY OF MULTIPLE CORONARY ARTERIES USING LOW OSMOLAR CONTRAST: ICD-10-PCS | Performed by: INTERNAL MEDICINE

## 2019-07-01 PROCEDURE — C1894 INTRO/SHEATH, NON-LASER: HCPCS | Performed by: INTERNAL MEDICINE

## 2019-07-01 PROCEDURE — 85347 COAGULATION TIME ACTIVATED: CPT

## 2019-07-01 PROCEDURE — 84484 ASSAY OF TROPONIN QUANT: CPT | Performed by: PHYSICIAN ASSISTANT

## 2019-07-01 RX ORDER — MIDAZOLAM HYDROCHLORIDE 1 MG/ML
INJECTION INTRAMUSCULAR; INTRAVENOUS AS NEEDED
Status: DISCONTINUED | OUTPATIENT
Start: 2019-07-01 | End: 2019-07-01 | Stop reason: HOSPADM

## 2019-07-01 RX ORDER — FENTANYL CITRATE 50 UG/ML
INJECTION, SOLUTION INTRAMUSCULAR; INTRAVENOUS AS NEEDED
Status: DISCONTINUED | OUTPATIENT
Start: 2019-07-01 | End: 2019-07-01 | Stop reason: HOSPADM

## 2019-07-01 RX ORDER — LIDOCAINE HYDROCHLORIDE 10 MG/ML
INJECTION, SOLUTION EPIDURAL; INFILTRATION; INTRACAUDAL; PERINEURAL AS NEEDED
Status: DISCONTINUED | OUTPATIENT
Start: 2019-07-01 | End: 2019-07-01 | Stop reason: HOSPADM

## 2019-07-01 RX ORDER — CLOPIDOGREL BISULFATE 75 MG/1
75 TABLET ORAL DAILY
Qty: 30 TABLET | Refills: 0 | Status: SHIPPED | OUTPATIENT
Start: 2019-07-02 | End: 2019-08-20 | Stop reason: HOSPADM

## 2019-07-01 RX ORDER — HEPARIN SODIUM 1000 [USP'U]/ML
5000 INJECTION, SOLUTION INTRAVENOUS; SUBCUTANEOUS ONCE
Status: COMPLETED | OUTPATIENT
Start: 2019-07-01 | End: 2019-07-01

## 2019-07-01 RX ORDER — SODIUM CHLORIDE 9 MG/ML
100 INJECTION, SOLUTION INTRAVENOUS CONTINUOUS
Status: ACTIVE | OUTPATIENT
Start: 2019-07-01 | End: 2019-07-01

## 2019-07-01 RX ORDER — ROSUVASTATIN CALCIUM 40 MG/1
40 TABLET, COATED ORAL NIGHTLY
Qty: 30 TABLET | Refills: 0 | Status: SHIPPED | OUTPATIENT
Start: 2019-07-01 | End: 2019-08-05 | Stop reason: SDUPTHER

## 2019-07-01 RX ORDER — ISOSORBIDE MONONITRATE 60 MG/1
60 TABLET, EXTENDED RELEASE ORAL
Status: DISCONTINUED | OUTPATIENT
Start: 2019-07-01 | End: 2019-07-01 | Stop reason: HOSPADM

## 2019-07-01 RX ORDER — ISOSORBIDE MONONITRATE 30 MG/1
30 TABLET, EXTENDED RELEASE ORAL DAILY
Qty: 30 TABLET | Refills: 0 | Status: SHIPPED | OUTPATIENT
Start: 2019-07-01 | End: 2019-08-02

## 2019-07-01 RX ADMIN — CLOPIDOGREL BISULFATE 75 MG: 75 TABLET ORAL at 08:00

## 2019-07-01 RX ADMIN — HEPARIN SODIUM 5000 UNITS: 1000 INJECTION, SOLUTION INTRAVENOUS; SUBCUTANEOUS at 06:42

## 2019-07-01 RX ADMIN — LEVOTHYROXINE SODIUM 125 MCG: 125 TABLET ORAL at 05:24

## 2019-07-01 RX ADMIN — LISINOPRIL 10 MG: 10 TABLET ORAL at 08:00

## 2019-07-01 RX ADMIN — ISOSORBIDE MONONITRATE 60 MG: 60 TABLET, EXTENDED RELEASE ORAL at 10:09

## 2019-07-01 RX ADMIN — TAMSULOSIN HYDROCHLORIDE 0.4 MG: 0.4 CAPSULE ORAL at 08:00

## 2019-07-01 RX ADMIN — ASPIRIN 81 MG: 81 TABLET, COATED ORAL at 08:00

## 2019-07-01 RX ADMIN — METOPROLOL SUCCINATE 50 MG: 50 TABLET, EXTENDED RELEASE ORAL at 08:00

## 2019-07-01 RX ADMIN — PANTOPRAZOLE SODIUM 40 MG: 40 TABLET, DELAYED RELEASE ORAL at 08:01

## 2019-07-01 NOTE — PROGRESS NOTES
Continued Stay Note  [facility]     Patient Name: Rodrigo Baum  MRN: [id number]  Today's Date: 7/1/2019    Admit Date: 6/29/2019    Discharge Plan     Row Name 07/01/19 1653       Plan    Plan  Home    Patient/Family in Agreement with Plan  yes    Plan Comments  Met briefly in the room prior to discharge. Patient was off the floor earlier for heart cath. Patient states his goal is home and he denies any discharge needs.     Final Discharge Disposition Code  01 - home or self-care        Discharge Codes    No documentation.       Expected Discharge Date and Time     Expected Discharge Date Expected Discharge Time    Jul 1, 2019             Deborah Young

## 2019-07-01 NOTE — DISCHARGE SUMMARY
[facility] Medicine Services  DISCHARGE SUMMARY    Patient Name: Rodrigo Baum  : 1945  MRN: [id number]    Date of Admission: 2019  Date of Discharge:  19  Primary Care Physician: TORI Fletcher MD    Consults     Date and Time Order Name Status Description    2019 0030 Inpatient Cardiology Consult Completed           Hospital Course     Presenting Problem:   Unstable angina (CMS/HCC) [I20.0]  Angina pectoris (CMS/HCC) [I20.9]    Active Hospital Problems    Diagnosis  POA   • **Coronary artery disease involving native coronary artery of native heart with unstable angina pectoris (CMS/HCC) [I25.110]  Yes   • Angina pectoris (CMS/HCC) [I20.9]  Yes   • Hypothyroidism [E03.9]  Yes   • Hypertension [I10]  Yes   • Acute kidney injury (CMS/HCC) [N17.9]  Yes   • CAD (coronary artery disease), s/p CABG x 2 [I25.10]  Yes   • HLD (hyperlipidemia) [E78.5]  Yes      Resolved Hospital Problems    Diagnosis Date Resolved POA   • Unstable angina (CMS/HCC) [I20.0] 2019 Yes          Hospital Course:  Rodrigo Baum is a 74 y.o. male Rodrigo Baum is a 74 y.o. male with CAD s/p 2v CABG 2017, HLD, HTN, Hypothyroidism and CKD3 that presents to [facility] ED secondary to chest pain.       - continued ASA, plavix, statin, beta-blocker. He was started on a heparin drip. Patient underwent LHC  which showed that etiology of NSTEMI likely spontaneous plaque rupture with possible vasospasm. No intervention was done but he did have spasm of LIMA as well as apical LAD which improved with nitro.   - He will continue on ASA, plavix, high dose statin, and imdur was added.   - Close follow up with PCP and cardiology. For his fatigue that has been chronic since his bypass, recommended considering cardiac rehab.     - continued home metoprolol and lisinopril   - Will resume home prednisone on discharge - management per PCP. Will DC meloxicam and can use tylenol PRN     Discharge Follow Up  Recommendations for labs/diagnostics:  PCP 1 week  Cardiology 2-4 weeks     Day of Discharge     HPI:   No acute events. [facility] today with no intervention. Very concerned that he has been SOA and having intermittent chest discomfort since his bypass. Reviewed medications and changes.     Review of Systems  Gen- No fevers, chills  CV- No chest pain, palpitations  Resp- No cough, dyspnea  GI- No N/V/D, abd pain    Otherwise ROS is negative except as mentioned in the HPI.    Vital Signs:   Temp:  [98.1 °F (36.7 °C)-98.4 °F (36.9 °C)] 98.1 °F (36.7 °C)  Heart Rate:  [59-74] 68  Resp:  [18-20] 18  BP: ()/() 90/69     Physical Exam:  Constitutional: No acute distress, awake, alert  HENT: NCAT, mucous membranes moist  Respiratory: Clear to auscultation bilaterally, respiratory effort normal   Cardiovascular: RRR, no murmurs, rubs, or gallops, palpable pedal pulses bilaterally  Gastrointestinal: Positive bowel sounds, soft, nontender, nondistended  Musculoskeletal: No bilateral ankle edema   Psychiatric: Appropriate affect, cooperative  Neurologic: Oriented x 3, strength symmetric in all extremities, Cranial Nerves grossly intact to confrontation, speech clear  Skin: No rashes      Pertinent  and/or Most Recent Results     Results from last 7 days   Lab Units 07/01/19  0423 06/30/19  0455 06/29/19  1436   WBC 10*3/mm3 6.54 7.94 11.49*   HEMOGLOBIN g/dL 16.3 15.3 15.9   HEMATOCRIT % 49.4 46.9 47.3   PLATELETS 10*3/mm3 192 212 193   SODIUM mmol/L 142 142 141   POTASSIUM mmol/L 3.7 4.0 4.1   CHLORIDE mmol/L 106 104 103   CO2 mmol/L 23.0 22.0 26.0   BUN mg/dL 13 16 21   CREATININE mg/dL 1.02 0.91 1.28*   GLUCOSE mg/dL 106* 91 207*   CALCIUM mg/dL 8.7 9.0 9.3     Results from last 7 days   Lab Units 06/29/19  1436   BILIRUBIN mg/dL 1.7*   ALK PHOS U/L 63   ALT (SGPT) U/L 18   AST (SGOT) U/L 20     Results from last 7 days   Lab Units 06/30/19  0455   CHOLESTEROL mg/dL 146   TRIGLYCERIDES mg/dL 157*   HDL CHOL mg/dL 39*      Results from last 7 days   Lab Units 07/01/19  0423 06/30/19  1348 06/30/19  0455 06/29/19  2349 06/29/19  1654 06/29/19  1436   HEMOGLOBIN A1C %  --   --  5.60  --   --   --    PROBNP pg/mL  --   --   --   --   --  40.6   TROPONIN T ng/mL 0.111* 0.129* 0.196* 0.167* 0.069* <0.010       Brief Urine Lab Results     None          Microbiology Results Abnormal     None          Imaging Results (all)     Procedure Component Value Units Date/Time    XR Chest 1 View [[id number]] Collected:  06/29/19 1504     Updated:  07/01/19 1307    Narrative:          EXAMINATION: XR CHEST 1 VW - 06/29/2019     INDICATION: Chest pain.     COMPARISON: Chest x-ray 01/29/2018     FINDINGS: Status post sternotomy and CABG with cardiac silhouette only  mildly enlarged and no overt edema. No focal consolidation. No  pneumothorax or pleural effusion. Degenerative changes of the spine.           Impression:       Mild chronic changes including status post median sternotomy  sternotomy and CABG without overt edema or significant effusion to  suggest acute parenchymal process.     DICTATED:   06/29/2019  EDITED/ls :   06/29/2019      This report was finalized on 7/1/2019 1:04 PM by Dr. Chris Manrique.             Results for orders placed during the hospital encounter of 01/24/18   Duplex Carotid Ultrasound CAR    Narrative · No evidence of hemodynamically significant stenosis of bilateral carotid   arteries.  · Bilateral intimal thickening and mild plaque is noted.          Results for orders placed during the hospital encounter of 01/24/18   Duplex Carotid Ultrasound CAR    Narrative · No evidence of hemodynamically significant stenosis of bilateral carotid   arteries.  · Bilateral intimal thickening and mild plaque is noted.          Results for orders placed during the hospital encounter of 01/24/18   Adult Transthoracic Echo Complete W/ Cont if Necessary Per Protocol    Narrative · Left ventricular wall thickness is consistent with  borderline concentric   hypertrophy.  · Left ventricular systolic function is normal. Estimated EF = 65%.  · Trace mitral ejection, trace tricuspid regurgitation.  · Mild biatrial enlargement.            Discharge Details        Discharge Medications      New Medications      Instructions Start Date   clopidogrel 75 MG tablet  Commonly known as:  PLAVIX   75 mg, Oral, Daily   Start Date:  7/2/2019     isosorbide mononitrate 30 MG 24 hr tablet  Commonly known as:  IMDUR   30 mg, Oral, Daily      rosuvastatin 40 MG tablet  Commonly known as:  CRESTOR   40 mg, Oral, Nightly         Changes to Medications      Instructions Start Date   aspirin 325 MG EC tablet  What changed:  how much to take   325 mg, Oral, Daily      omeprazole 40 MG capsule  Commonly known as:  priLOSEC  What changed:    · how much to take  · how to take this  · when to take this   TAKE 1 CAPSULE BY MOUTH TWICE DAILY         Continue These Medications      Instructions Start Date   CENTRUM SILVER 50+MEN PO   Oral      glucosamine-chondroitin 500-400 MG capsule capsule   2 capsules, Oral, Daily      levothyroxine 125 MCG tablet  Commonly known as:  SYNTHROID, LEVOTHROID   125 mcg, Oral, Daily      lisinopril 10 MG tablet  Commonly known as:  PRINIVIL,ZESTRIL   10 mg, Oral, Daily      MELATONIN + L-THEANINE PO   1 tablet, Oral, Daily PRN      metoprolol succinate XL 50 MG 24 hr tablet  Commonly known as:  TOPROL-XL   50 mg, Oral, Daily      nitroglycerin 0.4 MG SL tablet  Commonly known as:  NITROSTAT   0.4 mg, Sublingual, Every 5 Minutes PRN, Take no more than 3 doses in 15 minutes.       predniSONE 5 MG tablet  Commonly known as:  DELTASONE   5 mg, Oral, Daily      tamsulosin 0.4 MG capsule 24 hr capsule  Commonly known as:  FLOMAX   1 capsule, Oral, 2 Times Daily - RT         Stop These Medications    meloxicam 15 MG tablet  Commonly known as:  MOBIC     pravastatin 10 MG tablet  Commonly known as:  PRAVACHOL            Allergies   Allergen  Reactions   • Lipitor [Atorvastatin] Anxiety and Myalgia          • Lortab [Hydrocodone-Acetaminophen] Anxiety   • Azithromycin Nausea Only         Discharge Disposition:  Home or Self Care    Discharge Diet:  Diet Order   Procedures   • Diet Regular; Consistent Carbohydrate, Cardiac         Discharge Activity:   Activity Instructions     Activity as Tolerated              CODE STATUS:    Code Status and Medical Interventions:   Ordered at: 06/29/19 2034     Level Of Support Discussed With:    Patient     Code Status:    CPR     Medical Interventions (Level of Support Prior to Arrest):    Full         Future Appointments   Date Time Provider Department Center   8/1/2019 10:45 AM Tal Wagner MD [facility] None       Additional Instructions for the Follow-ups that You Need to Schedule     Discharge Follow-up with PCP   As directed       Currently Documented PCP:    TORI Fletcher MD    PCP Phone Number:    [phone]     Follow Up Details:  PCP 1 week         Discharge Follow-up with Specified Provider: Cardiology 2-4 weeks   As directed      To:  Cardiology 2-4 weeks               Time Spent on Discharge:  35 minutes    Electronically signed by Lizy Alexander DO, 07/01/19, 3:03 PM.

## 2019-07-01 NOTE — PROGRESS NOTES
"[facility]  IP Progress Note      Chief Complaint: Follow-up of CAD/non-STEMI    Subjective   States that he has had off-and-on chest pain since his bypass surgery not sure why he needs a cardiac catheterization now.  Has no complaints of current chest pain.    Objective     Blood pressure 102/69, pulse 62, temperature 98.1 °F (36.7 °C), temperature source Oral, resp. rate 18, height 180.3 cm (71\"), weight 90.3 kg (199 lb), SpO2 94 %.     Intake/Output Summary (Last 24 hours) at 7/1/2019 0921  Last data filed at 6/30/2019 1700  Gross per 24 hour   Intake 1911.83 ml   Output 1850 ml   Net 61.83 ml       Physical Exam:  General: No acute distress.   Neck: no JVD.  Chest:No respiratory distress, breath sounds are normal. No wheezes,  rhonchi or rales.  Cardiovascular: Normal S1 and S2, no murmer, gallop or rub.    Extremities: No edema.    Results Review:     I reviewed the patient's new clinical results.    Results from last 7 days   Lab Units 07/01/19  0423   WBC 10*3/mm3 6.54   HEMOGLOBIN g/dL 16.3   HEMATOCRIT % 49.4   PLATELETS 10*3/mm3 192     Results from last 7 days   Lab Units 07/01/19  0423  06/29/19  1436   SODIUM mmol/L 142   < > 141   POTASSIUM mmol/L 3.7   < > 4.1   CHLORIDE mmol/L 106   < > 103   CO2 mmol/L 23.0   < > 26.0   BUN mg/dL 13   < > 21   CREATININE mg/dL 1.02   < > 1.28*   CALCIUM mg/dL 8.7   < > 9.3   BILIRUBIN mg/dL  --   --  1.7*   ALK PHOS U/L  --   --  63   ALT (SGPT) U/L  --   --  18   AST (SGOT) U/L  --   --  20   GLUCOSE mg/dL 106*   < > 207*    < > = values in this interval not displayed.     Results from last 7 days   Lab Units 07/01/19  0423   SODIUM mmol/L 142   POTASSIUM mmol/L 3.7   CHLORIDE mmol/L 106   CO2 mmol/L 23.0   BUN mg/dL 13   CREATININE mg/dL 1.02   GLUCOSE mg/dL 106*   CALCIUM mg/dL 8.7         Lab Results   Component Value Date    TROPONINT 0.111 (C) 07/01/2019         Results from last 7 days   Lab Units 06/30/19  0455 "   CHOLESTEROL mg/dL 146   TRIGLYCERIDES mg/dL 157*   HDL CHOL mg/dL 39*   LDL CHOL mg/dL 76           Tele: Sinus Ryth[facility]      Assessment:  1. ACS/non-STEMI.  2. CAD, status post previous LAD interventions with subsequent two-vessel CABG January 2018.  3. Hypertension, controlled.  4. Dyslipidemia, borderline controlled.    Plan:  1. Cardiac catheterization study today showed adequate revascularization.  Both grafts are patent. Spasm of the LAD and the LIMA graft was noted.  The likely etiology of ACS/non-STEMI is spontaneous plaque rupture with additional contributing role of urinary spasms.  2. Continued current medical therapy with dual antiplatelet therapy, high intensity statin, beta-blocker and further addition of long-acting nitrates is recommended.  3. Okay to discharge to home from cardiology standpoint.    Susannah Rasmussen MD, FACC, Psychiatric

## 2019-07-02 ENCOUNTER — DOCUMENTATION (OUTPATIENT)
Dept: CARDIAC REHAB | Facility: HOSPITAL | Age: 74
End: 2019-07-02

## 2019-07-02 ENCOUNTER — TRANSCRIBE ORDERS (OUTPATIENT)
Dept: CARDIAC REHAB | Facility: HOSPITAL | Age: 74
End: 2019-07-02

## 2019-07-02 ENCOUNTER — READMISSION MANAGEMENT (OUTPATIENT)
Dept: CALL CENTER | Facility: HOSPITAL | Age: 74
End: 2019-07-02

## 2019-07-02 DIAGNOSIS — I21.4 NSTEMI (NON-ST ELEVATED MYOCARDIAL INFARCTION) (HCC): Primary | ICD-10-CM

## 2019-07-02 NOTE — PROGRESS NOTES
Patient identified as qualifier for Phase II Cardiac Rehab.  Staff will call patient regarding program information and scheduling.

## 2019-07-02 NOTE — PROGRESS NOTES
Pt. Referred for Phase II Cardiac Rehab. Staff discussed benefits of exercise, program protocol, and educational material provided. Teach back verified.  Patient scheduled for orientation at [facility] on Tuesday, July 30th at 0900.

## 2019-07-02 NOTE — OUTREACH NOTE
Prep Survey      Responses   Facility patient discharged from?  [facility]   Is patient eligible?  Yes   Discharge diagnosis  NSTEMI,  heart cath   Does the patient have one of the following disease processes/diagnoses(primary or secondary)?  Acute MI (STEMI,NSTEMI)   Does the patient have Home health ordered?  No   Is there a DME ordered?  No   Comments regarding appointments  see AVS   Medication alerts for this patient  plavix, imdur, crestor   Prep survey completed?  Yes          Eri Cid RN

## 2019-07-03 ENCOUNTER — READMISSION MANAGEMENT (OUTPATIENT)
Dept: CALL CENTER | Facility: HOSPITAL | Age: 74
End: 2019-07-03

## 2019-07-03 NOTE — OUTREACH NOTE
AMI Week 1 Survey      Responses   Facility patient discharged from?  [facility]   Does the patient have one of the following disease processes/diagnoses(primary or secondary)?  Acute MI (STEMI,NSTEMI)   Is there a successful TCM telephone encounter documented?  No   Week 1 attempt successful?  Yes   Call start time  1523   Call end time  1531   Discharge diagnosis  NSTEMI,  heart cath   Is patient permission given to speak with other caregiver?  Yes   List who call center can speak with  son   Meds reviewed with patient/caregiver?  Yes   Is the patient having any side effects they believe may be caused by any medication additions or changes?  No   Does the patient have all prescriptions related to this admission filled (includes statins,anticoagulants,HTN meds,anti-arrhythmia meds)  Yes   Is the patient taking all medications as directed (includes completed medication regime)?  Yes   Does the patient have a primary care provider?   Yes   Does the patient have an appointment with their PCP,cardiologist,or clinic within 7 days of discharge?  Yes   Has the patient kept scheduled appointments due by today?  N/A   Psychosocial issues?  No   Comments  [facility] site- left wrist bruised.    Did the patient receive a copy of their discharge instructions?  Yes   Nursing interventions  Reviewed instructions with patient   What is the patient's perception of their health status since discharge?  Improving   Nursing interventions  Nurse provided patient education   Is the patient/caregiver able to teach back signs and symptoms of when to call for help immediately:  Sudden discomfort in arms, back, neck or jaw, Sudden chest discomfort, Nausea or vomiting   Nursing interventions  Nurse provided patient education   Is the patient/caregiver able to teach back lifestyle changes to help prevent MIs  Quit smoking, Regular exercise as approved by provider, Maintaining a healthy weight, Heart healthy diet   Is the patient/caregiver able to  teach back ways to prevent a second heart attack:  Take medications, Follow up with MD   Is the patient/caregiver able to teach back the hierarchy of who to call/visit for symptoms/problems? PCP, Specialist, Home health nurse, Urgent Care, ED, 911  Yes   Week 1 call completed?  Yes   Revoked  No further contact(revokes)-requires comment   Graduated/Revoked comments  Pt not interested in further calls          Bethany Osborne RN

## 2019-07-08 ENCOUNTER — OFFICE VISIT (OUTPATIENT)
Dept: CARDIOLOGY | Facility: HOSPITAL | Age: 74
End: 2019-07-08

## 2019-07-08 VITALS
WEIGHT: 211 LBS | BODY MASS INDEX: 29.54 KG/M2 | DIASTOLIC BLOOD PRESSURE: 78 MMHG | OXYGEN SATURATION: 95 % | RESPIRATION RATE: 20 BRPM | SYSTOLIC BLOOD PRESSURE: 134 MMHG | TEMPERATURE: 97.6 F | HEART RATE: 64 BPM | HEIGHT: 71 IN

## 2019-07-08 DIAGNOSIS — E78.2 MIXED HYPERLIPIDEMIA: ICD-10-CM

## 2019-07-08 DIAGNOSIS — I25.10 CORONARY ARTERY DISEASE INVOLVING NATIVE CORONARY ARTERY OF NATIVE HEART WITHOUT ANGINA PECTORIS: Primary | ICD-10-CM

## 2019-07-08 DIAGNOSIS — I10 ESSENTIAL HYPERTENSION: ICD-10-CM

## 2019-07-08 PROCEDURE — 99214 OFFICE O/P EST MOD 30 MIN: CPT | Performed by: NURSE PRACTITIONER

## 2019-07-08 RX ORDER — ASPIRIN 81 MG/1
325 TABLET ORAL DAILY
COMMUNITY
End: 2019-08-20 | Stop reason: HOSPADM

## 2019-07-08 RX ORDER — ONDANSETRON 4 MG/1
4 TABLET, FILM COATED ORAL EVERY 8 HOURS PRN
COMMUNITY
End: 2022-01-20

## 2019-07-08 RX ORDER — FINASTERIDE 5 MG/1
5 TABLET, FILM COATED ORAL DAILY
COMMUNITY

## 2019-07-08 NOTE — PROGRESS NOTES
Encounter Date:07/08/2019      Patient ID: Rodrigo Baum is a 74 y.o. male.        Subjective:     Chief Complaint: Establish Care (CAD)     History of Present Illness patient presents to the office today for ongoing evaluation of his chest pain and coronary artery disease. Patient was recently hospitalized to [facility] for chest pain.  He underwent a left heart cath on 7 1 per Dr. Rasmussen which showed etiology of his non-STEMI was likely spontaneous plaque rupture with possible vasospasm.  Patient did have evidence of vasospasm of LIMA as well as apical LAD which improved with intra-arterial nitro.  He was continued on aspirin, Plavix, statin and Imdur was initiated.  He notes he is feeling better overall.  He does report occasional twinges in his left chest but notes they occur with exertion and improves with rest.  He notes overall improvement since initiation of Imdur.  He notes he is watching his diet closely and is trying to follow heart healthy diet.  He has had 20 pound weight loss over the last few months.    Patient Active Problem List   Diagnosis   • Sepsis (CMS/HCC)   • BPH with urinary obstruction   • Metabolic encephalopathy   • Leukocytosis   • HLD (hyperlipidemia)   • Hypothyroid   • CAD (coronary artery disease), s/p CABG x 2   • Acute kidney injury (CMS/HCC)   • Lactic acidosis   • Hematuria   • Dyslipidemia   • Hypertension   • Hypothyroidism   • Exogenous obesity   • Arthritis   • GERD (gastroesophageal reflux disease)   • Jaw swelling   • Foot pain   • Hip pain   • Trochanteric bursitis of both hips   • Other chest pain   • Coronary artery disease involving native coronary artery of native heart with unstable angina pectoris (CMS/HCC)   • ETOH abuse   • Cirrhosis of liver (CMS/HCC)   • Primary osteoarthritis of both knees   • Angina pectoris (CMS/HCC)       Past Surgical History:   Procedure Laterality Date   • CARDIAC CATHETERIZATION N/A 1/24/2018    Procedure: Left Heart Cath;   Surgeon: Susannah Rasmussen MD;  Location:  [facility];  Service:    • CARDIAC CATHETERIZATION N/A 7/1/2019    Procedure: Left Heart Cath;  Surgeon: Susannah Rasmussen MD;  Location:  [facility];  Service: Cardiovascular   • COLONOSCOPY     • CORONARY ANGIOPLASTY WITH STENT PLACEMENT      X2   • CORONARY ARTERY BYPASS GRAFT N/A 1/26/2018    Procedure: CORONARY ARTERY BYPASS X 2 WITH INTERNAL MAMMARY ARTERY GRAFT, ENDOSCOPIC VEIN HARVESTING UTILIZING THE LEFT GREATER SAPPHENOUS VEIN  CYSTO BY DR WATERS;  Surgeon: Casey Morales MD;  Location:  [facility];  Service:    • CYSTOSCOPY TRANSURETHRAL RESECTION OF PROSTATE N/A 11/1/2016    Procedure: CYSTOSCOPY TRANSURETHRAL RESECTION OF PROSTATE GREENLIGHT;  Surgeon: Alverto Richards MD;  Location:  [facility];  Service:    • TONSILLECTOMY     • UPPER GASTROINTESTINAL ENDOSCOPY         Allergies   Allergen Reactions   • Lipitor [Atorvastatin] Anxiety and Myalgia          • Lortab [Hydrocodone-Acetaminophen] Anxiety   • Azithromycin Nausea Only         Current Outpatient Medications:   •  aspirin 81 MG EC tablet, Take 81 mg by mouth Daily., Disp: , Rfl:   •  clopidogrel (PLAVIX) 75 MG tablet, Take 1 tablet by mouth Daily., Disp: 30 tablet, Rfl: 0  •  finasteride (PROSCAR) 5 MG tablet, Take 5 mg by mouth Daily., Disp: , Rfl:   •  glucosamine-chondroitin 500-400 MG capsule capsule, Take 2 capsules by mouth Daily., Disp: , Rfl:   •  isosorbide mononitrate (IMDUR) 30 MG 24 hr tablet, Take 1 tablet by mouth Daily., Disp: 30 tablet, Rfl: 0  •  levothyroxine (SYNTHROID, LEVOTHROID) 125 MCG tablet, Take 125 mcg by mouth Daily., Disp: , Rfl:   •  lisinopril (PRINIVIL,ZESTRIL) 10 MG tablet, Take 1 tablet by mouth Daily., Disp: 90 tablet, Rfl: 3  •  Qcpjkkr-Rjaza-Sgug-PassF-LBalm (MELATONIN + L-THEANINE PO), Take 1 tablet by mouth Daily As Needed., Disp: , Rfl:   •  metoprolol succinate XL (TOPROL-XL) 50 MG 24 hr tablet, Take 50 mg by mouth Daily., Disp: ,  Rfl:   •  Multiple Vitamins-Minerals (CENTRUM SILVER 50+MEN PO), Take  by mouth., Disp: , Rfl:   •  nitroglycerin (NITROSTAT) 0.4 MG SL tablet, Place 1 tablet under the tongue Every 5 (Five) Minutes As Needed for Chest Pain. Take no more than 3 doses in 15 minutes., Disp: 25 tablet, Rfl: 0  •  omeprazole (priLOSEC) 40 MG capsule, TAKE 1 CAPSULE BY MOUTH TWICE DAILY (Patient taking differently: TAKE 1 CAPSULE BY MOUTH ONCE DAILY), Disp: 180 capsule, Rfl: 0  •  ondansetron (ZOFRAN) 4 MG tablet, Take 4 mg by mouth Every 8 (Eight) Hours As Needed for Nausea or Vomiting., Disp: , Rfl:   •  predniSONE (DELTASONE) 5 MG tablet, Take 5 mg by mouth Daily., Disp: , Rfl:   •  rosuvastatin (CRESTOR) 40 MG tablet, Take 1 tablet by mouth Every Night., Disp: 30 tablet, Rfl: 0  •  tamsulosin (FLOMAX) 0.4 MG capsule 24 hr capsule, Take 1 capsule by mouth 2 (Two) Times a Day., Disp: , Rfl:   No current facility-administered medications for this visit.     Facility-Administered Medications Ordered in Other Visits:   •  Chlorhexidine Gluconate Cloth 2 % pads 1 application, 1 application, Topical, Q12H PRN, Sd Mathew PA    The following portions of the chart were reviewed today and updated as appropriate: Allergies, current medications, past family history, social history, past medical history.     Review of Systems   Constitution: Negative for chills, decreased appetite, diaphoresis, fever, weakness, malaise/fatigue, night sweats, weight gain and weight loss.   HENT: Negative for congestion, hearing loss, hoarse voice and nosebleeds.    Eyes: Negative for blurred vision, visual disturbance and visual halos.   Cardiovascular: Positive for chest pain (chest twinges). Negative for claudication, cyanosis, dyspnea on exertion, irregular heartbeat, leg swelling, near-syncope, orthopnea, palpitations, paroxysmal nocturnal dyspnea and syncope.   Respiratory: Negative for cough, hemoptysis, shortness of breath, sleep disturbances due to  "breathing, snoring, sputum production and wheezing.    Hematologic/Lymphatic: Negative for bleeding problem. Does not bruise/bleed easily.   Skin: Negative for dry skin, itching and rash.   Musculoskeletal: Negative for arthritis, falls, joint pain, joint swelling and myalgias.   Gastrointestinal: Negative for bloating, abdominal pain, constipation, diarrhea, flatus, heartburn, hematemesis, hematochezia, melena, nausea and vomiting.   Genitourinary: Negative for dysuria, frequency, hematuria, nocturia and urgency.   Neurological: Negative for excessive daytime sleepiness, dizziness, headaches, light-headedness and loss of balance.   Psychiatric/Behavioral: Negative for depression. The patient does not have insomnia and is not nervous/anxious.            Objective:     Vitals:    07/08/19 1121 07/08/19 1122 07/08/19 1123 07/08/19 1144   BP: 138/80 139/72 152/88 134/78   BP Location: Left arm Right arm Right arm Left arm   Patient Position: Sitting Sitting Standing Sitting   Cuff Size: Adult Adult Adult    Pulse: 56  64    Resp: 20      Temp: 97.6 °F (36.4 °C)      TempSrc: Temporal      SpO2: 96%  95%    Weight: 95.7 kg (211 lb)      Height: 180.3 cm (71\")            Physical Exam   Constitutional: He is oriented to person, place, and time. He appears well-developed and well-nourished. He is active and cooperative. No distress.   HENT:   Head: Normocephalic and atraumatic.   Mouth/Throat: Oropharynx is clear and moist.   Eyes: Conjunctivae and EOM are normal. Pupils are equal, round, and reactive to light.   Neck: Normal range of motion. Neck supple. No JVD present. No tracheal deviation present. No thyromegaly present.   Cardiovascular: Normal rate, regular rhythm, normal heart sounds and intact distal pulses.   Pulmonary/Chest: Effort normal and breath sounds normal.   Abdominal: Soft. Bowel sounds are normal. He exhibits no distension. There is no tenderness.   Musculoskeletal: Normal range of motion. "   Neurological: He is alert and oriented to person, place, and time.   Skin: Skin is warm, dry and intact.   Psychiatric: He has a normal mood and affect. His behavior is normal.   Nursing note and vitals reviewed.      Lab and Diagnostic Review:      Lab Results   Component Value Date    GLUCOSE 106 (H) 07/01/2019    CALCIUM 8.7 07/01/2019     07/01/2019    K 3.7 07/01/2019    CO2 23.0 07/01/2019     07/01/2019    BUN 13 07/01/2019    CREATININE 1.02 07/01/2019    EGFRIFNONA 72 07/01/2019    BCR 12.7 07/01/2019    ANIONGAP 13.0 07/01/2019         Assessment and Plan:         1. Coronary artery disease involving native coronary artery of native heart without angina pectoris  Continue aspirin, Plavix, Imdur, Crestor, Toprol  Without angina  Due to start cardiac rehab soon   2. Essential hypertension  Well controlled  HTN Education provided today including signs and symptoms, medication management, daily blood pressure monitoring. Patient encouraged to call the [facility] with any abnormal readings.     3. Mixed hyperlipidemia  Statin therapy    It has been a pleasure to participate in the care of this patient.  Patient was instructed to call the [facility] with any questions, concerns, or worsening symptoms.        * Please note that portions of this note were completed with a voice recognition program. Efforts were made to edit the dictation but occasionally words are transcribed.

## 2019-07-24 ENCOUNTER — OFFICE VISIT (OUTPATIENT)
Dept: ORTHOPEDIC SURGERY | Facility: CLINIC | Age: 74
End: 2019-07-24

## 2019-07-24 VITALS — HEART RATE: 68 BPM | WEIGHT: 211 LBS | HEIGHT: 71 IN | BODY MASS INDEX: 29.54 KG/M2 | OXYGEN SATURATION: 96 %

## 2019-07-24 DIAGNOSIS — M70.61 TROCHANTERIC BURSITIS OF BOTH HIPS: Primary | ICD-10-CM

## 2019-07-24 DIAGNOSIS — M17.0 PRIMARY OSTEOARTHRITIS OF BOTH KNEES: ICD-10-CM

## 2019-07-24 DIAGNOSIS — M70.62 TROCHANTERIC BURSITIS OF BOTH HIPS: Primary | ICD-10-CM

## 2019-07-24 PROCEDURE — 99213 OFFICE O/P EST LOW 20 MIN: CPT | Performed by: PHYSICIAN ASSISTANT

## 2019-07-24 NOTE — PROGRESS NOTES
[facility] Note    Subjective     Patient: Rodrigo Baum  : 1945    Primary Care Provider: TORI Fletcher MD    Requesting Provider: As above    Follow-up and Pain of the Left Knee; Follow-up and Pain of the Right Knee; Follow-up and Pain of the Left Hip; and Follow-up and Pain of the Right Hip      History    Chief Complaint: Bilateral knee and bilateral lateral hip pain    History of Present Illness: Patient returns today for his bilateral knee pain and chronic bilateral trochanteric bursitis.  He reports no new symptoms.  The knees have begun bothering him again for several weeks.  He reports no new pain with medial functional pain and no night pain.  Is also worse with climbing stairs.  He has no radiating pain or mechanical symptoms.  He has been treated in the past with Visco supplementation with good relief for 6 months.  He is not interested in replacement like repeat Visco in both knees.    Patient also complains of return to bilateral trochanteric bursitis.  He denies any groin pain or buttock pain.  He denies any radiating pain.  He is been treated for years with intermittent trochanteric bursal injection with good relief for several months.  He is here wanting repeat injections today.    Current Outpatient Medications on File Prior to Visit   Medication Sig Dispense Refill   • aspirin 81 MG EC tablet Take 81 mg by mouth Daily.     • clopidogrel (PLAVIX) 75 MG tablet Take 1 tablet by mouth Daily. 30 tablet 0   • glucosamine-chondroitin 500-400 MG capsule capsule Take 2 capsules by mouth Daily.     • isosorbide mononitrate (IMDUR) 30 MG 24 hr tablet Take 1 tablet by mouth Daily. 30 tablet 0   • levothyroxine (SYNTHROID, LEVOTHROID) 125 MCG tablet Take 125 mcg by mouth Daily.     • lisinopril (PRINIVIL,ZESTRIL) 10 MG tablet Take 1 tablet by mouth Daily. 90 tablet 3   • Kxwylfb-Fxnjs-Iozk-PassF-LBalm (MELATONIN + L-THEANINE PO) Take 1 tablet by mouth Daily As Needed.      • metoprolol succinate XL (TOPROL-XL) 50 MG 24 hr tablet Take 50 mg by mouth Daily.     • Multiple Vitamins-Minerals (CENTRUM SILVER 50+MEN PO) Take  by mouth.     • nitroglycerin (NITROSTAT) 0.4 MG SL tablet Place 1 tablet under the tongue Every 5 (Five) Minutes As Needed for Chest Pain. Take no more than 3 doses in 15 minutes. 25 tablet 0   • omeprazole (priLOSEC) 40 MG capsule TAKE 1 CAPSULE BY MOUTH TWICE DAILY (Patient taking differently: TAKE 1 CAPSULE BY MOUTH ONCE DAILY) 180 capsule 0   • ondansetron (ZOFRAN) 4 MG tablet Take 4 mg by mouth Every 8 (Eight) Hours As Needed for Nausea or Vomiting.     • predniSONE (DELTASONE) 5 MG tablet Take 5 mg by mouth Daily.     • rosuvastatin (CRESTOR) 40 MG tablet Take 1 tablet by mouth Every Night. 30 tablet 0   • tamsulosin (FLOMAX) 0.4 MG capsule 24 hr capsule Take 1 capsule by mouth 2 (Two) Times a Day.     • finasteride (PROSCAR) 5 MG tablet Take 5 mg by mouth Daily.       Current Facility-Administered Medications on File Prior to Visit   Medication Dose Route Frequency Provider Last Rate Last Dose   • Chlorhexidine Gluconate Cloth 2 % pads 1 application  1 application Topical Q12H PRN Sd Mathew PA          Allergies   Allergen Reactions   • Lipitor [Atorvastatin] Anxiety and Myalgia          • Lortab [Hydrocodone-Acetaminophen] Anxiety   • Azithromycin Nausea Only      Past Medical History:   Diagnosis Date   • BPH (benign prostatic hyperplasia)    • CAD (coronary artery disease)    • Chronic venous stasis    • CKD (chronic kidney disease), stage III (CMS/HCC)    • Claustrophobia    • GERD (gastroesophageal reflux disease)    • HLD (hyperlipidemia)    • HTN (hypertension)    • Hypothyroidism    • OA (osteoarthritis) of ankle/foot    • OA (osteoarthritis) of hip    • Osteoporosis    • RA (rheumatoid arthritis) (CMS/HCC)      Past Surgical History:   Procedure Laterality Date   • CARDIAC CATHETERIZATION N/A 1/24/2018    Procedure: Left Heart Cath;   Surgeon: Susannah Rasmussen MD;  Location:  [facility];  Service:    • CARDIAC CATHETERIZATION N/A 2019    Procedure: Left Heart Cath;  Surgeon: Susannah Rasmussen MD;  Location:  [facility];  Service: Cardiovascular   • COLONOSCOPY     • CORONARY ANGIOPLASTY WITH STENT PLACEMENT      X2   • CORONARY ARTERY BYPASS GRAFT N/A 2018    Procedure: CORONARY ARTERY BYPASS X 2 WITH INTERNAL MAMMARY ARTERY GRAFT, ENDOSCOPIC VEIN HARVESTING UTILIZING THE LEFT GREATER SAPPHENOUS VEIN  CYSTO BY DR WATERS;  Surgeon: Casey Morales MD;  Location:  [facility];  Service:    • CYSTOSCOPY TRANSURETHRAL RESECTION OF PROSTATE N/A 2016    Procedure: CYSTOSCOPY TRANSURETHRAL RESECTION OF PROSTATE GREENLIGHT;  Surgeon: Alverto Richards MD;  Location:  [facility];  Service:    • TONSILLECTOMY     • UPPER GASTROINTESTINAL ENDOSCOPY       Family History   Problem Relation Age of Onset   • Diabetes Father    • Heart disease Father    • Hypertension Father    • Heart attack Father    • Osteoarthritis Father    • Stroke Mother    • Hypertension Mother    • Osteoarthritis Mother    • No Known Problems Maternal Grandmother    • No Known Problems Maternal Grandfather    • Stroke Paternal Grandmother    • Stroke Paternal Grandfather       Social History     Socioeconomic History   • Marital status:      Spouse name: N/A   • Number of children: 1   • Years of education: H.S.   • Highest education level: High school graduate   Occupational History   • Occupation:      Employer: RETIRED   Tobacco Use   • Smoking status: Former Smoker     Packs/day: 2.00     Years: 20.00     Pack years: 40.00     Types: Cigarettes     Start date:      Last attempt to quit:      Years since quittin.5   • Smokeless tobacco: Never Used   Substance and Sexual Activity   • Alcohol use: Yes     Alcohol/week: 3.6 oz     Types: 6 Cans of beer per week     Frequency: Monthly or less   • Drug use: No   •  "Sexual activity: Not Currently     Partners: Female   Social History Narrative    Caffeine: 1 servings daily        Review of Systems   Constitutional: Positive for fatigue.   HENT: Negative.    Eyes: Negative.    Respiratory: Positive for shortness of breath.    Cardiovascular: Negative.    Gastrointestinal: Negative.    Endocrine: Negative.    Genitourinary: Negative.    Musculoskeletal: Positive for arthralgias and neck stiffness.   Skin: Negative.    Allergic/Immunologic: Negative.    Neurological: Negative.    Hematological: Bruises/bleeds easily.   Psychiatric/Behavioral: Negative.        The following portions of the patient's history were reviewed and updated as appropriate: allergies, current medications, past family history, past medical history, past social history, past surgical history and problem list.      Objective      Physical Exam  Pulse 68   Ht 180.3 cm (71\")   Wt 95.7 kg (211 lb)   SpO2 96%   BMI 29.43 kg/m²     Body mass index is 29.43 kg/m².    Patient is well developed, well nourished and in no acute distress.  Alert and oriented x 3.    Ortho Exam  Left hip exam:    RANGE OF MOTION:   FLEXION CONTRACTURE: None   FLEXION: 110 degrees   INTERNAL ROTATION: 20 degrees at 90 degrees of flexion   EXTERNAL ROTATION: 40 degrees at 90 degrees of flexion      STRENGTH:  5/5 hip adduction, abduction, flexion. 5/5 strength knee flexion, extension. 5/5 strength ankle dorsiflexion and plantarflexion.     GREATER TROCHANTER BURSAL PAIN:  moderate    SENSATION TO LIGHT TOUCH:  DEEP PERONEAL/SUPERFICIAL PERONEAL/SURAL/SAPHENOUS/TIBIAL:  intact      Right  hip    RANGE OF MOTION:   FLEXION CONTRACTURE: None   FLEXION: 110 degrees   INTERNAL ROTATION: 20 degrees at 90 degrees of flexion   EXTERNAL ROTATION: 40 degrees at 90 degrees of flexion    PAIN WITH HIP MOTION: no    STRENGTH:  5/5 hip adduction, abduction, flexion. 5/5 strength knee flexion, extension. 5/5 strength ankle dorsiflexion and " plantarflexion.     GREATER TROCHANTER BURSAL PAIN: moderate  SENSATION TO LIGHT TOUCH:  DEEP PERONEAL/SUPERFICIAL PERONEAL/SURAL/SAPHENOUS/TIBIAL:  intact    Right Knee Exam  ----------  ALIGNMENT: Right: neutral----------  RANGE OF MOTION:  Right: Normal (0-120 degrees) with no extensor lag or flexion contracture  LIGAMENTOUS STABILITY:   Right:stable to varus and valgus stress at terminal extension and 30 degrees without any evidence of laxity----------  STRENGTH:  KNEE FLEXION Right 5/5  KNEE EXTENSION Right 5/5 ----------  PAIN WITH PALPATION: Right medial joint line  PAIN WITH KNEE ROM: Right no  PATELLAR CREPITUS: Right yes   ----------    Left Knee Exam  ----------  Knee Exam:  ----------  ALIGNMENT:  Left: neutral  ----------  RANGE OF MOTION:  Left: Normal (0-120 degrees) with no extensor lag or flexion contracture  LIGAMENTOUS STABILITY:   Left:stable to varus and valgus stress at terminal extension and 30 degrees without any evidence of laxity   ----------  STRENGTH:  KNEE FLEXION  Left 5/5  KNEE EXTENSION Left 5/5  ----------  PAIN WITH PALPATION: Left medial joint line  PAIN WITH KNEE ROM:  Left no  PATELLAR CREPITUS:  Left yes  ----------        Medical Decision Making    Data Review:   ordered and reviewed x-rays today    Assessment:  1. Trochanteric bursitis of both hips    2. Primary osteoarthritis of both knees        Plan:  1.  Recurrent bilateral trochanteric bursitis.  I reviewed today's x-rays and clinical findings, past and current treatment the patient.  On exam, he is tender over the bilateral greater trochanters with normal motion hip and no reproducible groin pain.  X-rays today show joint space narrowing, with femoral head neck junction osteophyte laterally, with no acute bony abnormalities, and good alignment.  No significant change compared to previous films.  Patient has been treated with intermittent steroid injection in the trochanteric bursa for several years.  I explained my  concern for repeating injections any longer given concern for tendon pathology.  Plan today is MRI of bilateral hips in a prone position because of his claustrophobia.  He will return following the MRI to discuss further treatment options.  Upon ordering the MRI, the system has given me a warning that the patient has a metallic implant that may interfere with MRI.  I will have the staff call the patient to further discuss this and what it is.  We will decide further treatment after the staff talks to the patient.    2.  Bilateral knee arthritis.  Patient is done well in the past with intermittent Visco supplementation.  He gets nearly 6 months relief.  He reports no new symptoms with slowly returning pain.  Plan today is that we get Visco approved for both knees and he will begin to start the series once eligible.      Krystina Li PA-C  07/24/19  5:03 PM

## 2019-07-26 ENCOUNTER — TELEPHONE (OUTPATIENT)
Dept: ORTHOPEDIC SURGERY | Facility: CLINIC | Age: 74
End: 2019-07-26

## 2019-07-26 NOTE — TELEPHONE ENCOUNTER
Jessi,    Patient would like to know if his shots were covered. I looked in the chart and it says it is a covered benefit. I told the patient you would be calling him to schedule the appointments.  Abril

## 2019-07-30 ENCOUNTER — APPOINTMENT (OUTPATIENT)
Dept: CARDIAC REHAB | Facility: HOSPITAL | Age: 74
End: 2019-07-30

## 2019-08-02 ENCOUNTER — OFFICE VISIT (OUTPATIENT)
Dept: CARDIOLOGY | Facility: CLINIC | Age: 74
End: 2019-08-02

## 2019-08-02 VITALS
HEART RATE: 82 BPM | BODY MASS INDEX: 29.26 KG/M2 | OXYGEN SATURATION: 95 % | DIASTOLIC BLOOD PRESSURE: 76 MMHG | HEIGHT: 71 IN | SYSTOLIC BLOOD PRESSURE: 130 MMHG | WEIGHT: 209 LBS

## 2019-08-02 DIAGNOSIS — I10 ESSENTIAL HYPERTENSION: ICD-10-CM

## 2019-08-02 DIAGNOSIS — E78.2 MIXED HYPERLIPIDEMIA: ICD-10-CM

## 2019-08-02 DIAGNOSIS — I25.10 CORONARY ARTERY DISEASE INVOLVING NATIVE CORONARY ARTERY OF NATIVE HEART WITHOUT ANGINA PECTORIS: Primary | ICD-10-CM

## 2019-08-02 PROCEDURE — 99213 OFFICE O/P EST LOW 20 MIN: CPT | Performed by: INTERNAL MEDICINE

## 2019-08-02 RX ORDER — DOXYCYCLINE HYCLATE 100 MG/1
100 CAPSULE ORAL DAILY
COMMUNITY
End: 2019-08-21

## 2019-08-02 NOTE — PROGRESS NOTES
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     Chief Complaint   Patient presents with   • Coronary Artery Disease       PROBLEM LIST:   1. CAD:  1. 11/15/1993: PTCA and repeat PTCA 1 week following of proximal  LAD, per Dr. Lovelace with normal circumflex and RCA noted at that time.   2. April 2008: Overlapping 3.0 x 24 mm. And 3.0 x 16.0 mm Taxus to LAD and 2.5 x 8.0 PTCA to proximal first OM, inability to pass stent.  EF greater than 60.  3. 5/16 : Stress echocardiogram, which was within normal limits. EF greater than 60%. Normal hemodynamic response with exercise.   4. 1/18 CABG X2 periop hematuria/syed issues  5. 7/1/2019 LHC per AAA, patent LIMA to LAD, SVG to diagonal, spasm of the LIMA as well as apical LAD noted which improved with nitro, normal EF  2. Dyslipidemia:  1. November 2009:  Total cholesterol 163, triglycerides 169, HDL 55, LDL 81.  2. 02/30/2012:  Total cholesterol 242, HDL 51, triglycerides 192, , off statin therapy.  3. Nicotine addiction cessation 15 years prior.  4. HTN, presumed essential.  5. 1/17 AAA screen wnl  6. Hypothyroidism on replacement therapy.   7. Exogenous obesity.  8. Arthritis data deficient, on steroid therapy greater than 5 years.   9. GERD.   10. Easy bruising.   11. 2015 Right jaw swelling, evaluated for potential mandibular infection per Dr. Smith.   12. Hip and foot pain, followed per Dr Espinoza  13. Prostatism- 2017 3 rounds abx    Allergies  Allergies   Allergen Reactions   • Lipitor [Atorvastatin] Anxiety and Myalgia          • Lortab [Hydrocodone-Acetaminophen] Anxiety   • Azithromycin Nausea Only       Current Medications    Current Outpatient Medications:   •  aspirin 81 MG EC tablet, Take 325 mg by mouth Daily., Disp: ,  Rfl:   •  clopidogrel (PLAVIX) 75 MG tablet, Take 1 tablet by mouth Daily., Disp: 30 tablet, Rfl: 0  •  doxycycline (VIBRAMYCIN) 100 MG capsule, Take 100 mg by mouth Daily., Disp: , Rfl:   •  finasteride (PROSCAR) 5 MG tablet, Take 5 mg by mouth Daily., Disp: , Rfl:   •  glucosamine-chondroitin 500-400 MG capsule capsule, Take 2 capsules by mouth Daily., Disp: , Rfl:   •  levothyroxine (SYNTHROID, LEVOTHROID) 125 MCG tablet, Take 125 mcg by mouth Daily., Disp: , Rfl:   •  lisinopril (PRINIVIL,ZESTRIL) 10 MG tablet, Take 1 tablet by mouth Daily., Disp: 90 tablet, Rfl: 3  •  Qkkeezt-Vaxxk-Hcof-PassF-LBalm (MELATONIN + L-THEANINE PO), Take 1 tablet by mouth Daily As Needed., Disp: , Rfl:   •  metoprolol succinate XL (TOPROL-XL) 50 MG 24 hr tablet, Take 50 mg by mouth Daily., Disp: , Rfl:   •  Multiple Vitamins-Minerals (CENTRUM SILVER 50+MEN PO), Take  by mouth., Disp: , Rfl:   •  nitroglycerin (NITROSTAT) 0.4 MG SL tablet, Place 1 tablet under the tongue Every 5 (Five) Minutes As Needed for Chest Pain. Take no more than 3 doses in 15 minutes., Disp: 25 tablet, Rfl: 0  •  omeprazole (priLOSEC) 40 MG capsule, TAKE 1 CAPSULE BY MOUTH TWICE DAILY (Patient taking differently: TAKE 1 CAPSULE BY MOUTH ONCE DAILY), Disp: 180 capsule, Rfl: 0  •  ondansetron (ZOFRAN) 4 MG tablet, Take 4 mg by mouth Every 8 (Eight) Hours As Needed for Nausea or Vomiting., Disp: , Rfl:   •  predniSONE (DELTASONE) 5 MG tablet, Take 2.5 mg by mouth Daily., Disp: , Rfl:   •  rosuvastatin (CRESTOR) 40 MG tablet, Take 1 tablet by mouth Every Night., Disp: 30 tablet, Rfl: 0  •  tamsulosin (FLOMAX) 0.4 MG capsule 24 hr capsule, Take 1 capsule by mouth 2 (Two) Times a Day., Disp: , Rfl:   No current facility-administered medications for this visit.     Facility-Administered Medications Ordered in Other Visits:   •  Chlorhexidine Gluconate Cloth 2 % pads 1 application, 1 application, Topical, Q12H PRN, Sd Mathew PA      History of Present Illness  "    Pt in follow-up post complicated bypass surgery.  He had significant hematuria and urethral issues again this week requiring hospitalization.  Now off DAPT.  He did not tolerate imdur.    OBJECTIVE:  Vitals:    08/02/19 1327   BP: 130/76   BP Location: Right arm   Patient Position: Sitting   Pulse: 82   SpO2: 95%   Weight: 94.8 kg (209 lb)   Height: 180.3 cm (71\")     Physical Exam   Constitutional: He is oriented to person, place, and time. He appears well-developed and well-nourished. No distress.   Cardiovascular: Normal rate, regular rhythm and intact distal pulses.   Murmur (soft holosystolic murmur LUSB) heard.  Pulses:       Radial pulses are 2+ on the right side, and 2+ on the left side.        Dorsalis pedis pulses are 2+ on the right side, and 2+ on the left side.        Posterior tibial pulses are 2+ on the right side, and 2+ on the left side.   Pulmonary/Chest: Effort normal and breath sounds normal. He has no wheezes. He has no rales.   Abdominal: Soft. Bowel sounds are normal. There is no tenderness. There is no guarding.   Musculoskeletal: He exhibits no edema or tenderness.   Neurological: He is alert and oriented to person, place, and time.   Skin: Skin is warm and dry. No rash noted.   Psychiatric: He has a normal mood and affect.   Nursing note and vitals reviewed.      Diagnostic Data:  Procedures      ASSESSMENT:   Diagnosis Plan   1. Coronary artery disease involving native coronary artery of native heart without angina pectoris     2. Essential hypertension     3. Mixed hyperlipidemia         PLAN:  1. CAD post cabg w small vessel dz- cont asa 81 only w concomitant hematuria.  2.  Htn controlled on flomax, lisinopril  3.  Hl on statin        "

## 2019-08-05 ENCOUNTER — TELEPHONE (OUTPATIENT)
Dept: CARDIOLOGY | Facility: CLINIC | Age: 74
End: 2019-08-05

## 2019-08-05 RX ORDER — ROSUVASTATIN CALCIUM 40 MG/1
40 TABLET, COATED ORAL NIGHTLY
Qty: 90 TABLET | Refills: 2 | Status: SHIPPED | OUTPATIENT
Start: 2019-08-05 | End: 2019-08-06 | Stop reason: SDUPTHER

## 2019-08-05 NOTE — TELEPHONE ENCOUNTER
Pt is calling in concerning his plavix and his bleeding. He started the plavix back over the weekend and is having urinary bleeding again. He wants to know exactly what he should be taking. Looks like from you note you only want him to take the 81 asprin but the plavix was not dc'd and he did not gather that you wanted him to stop the plavix.     What would you like him on?

## 2019-08-06 RX ORDER — ROSUVASTATIN CALCIUM 40 MG/1
40 TABLET, COATED ORAL NIGHTLY
Qty: 90 TABLET | Refills: 2 | Status: SHIPPED | OUTPATIENT
Start: 2019-08-06

## 2019-08-07 ENCOUNTER — CLINICAL SUPPORT (OUTPATIENT)
Dept: ORTHOPEDIC SURGERY | Facility: CLINIC | Age: 74
End: 2019-08-07

## 2019-08-07 DIAGNOSIS — M70.62 TROCHANTERIC BURSITIS OF BOTH HIPS: ICD-10-CM

## 2019-08-07 DIAGNOSIS — M70.61 TROCHANTERIC BURSITIS OF BOTH HIPS: ICD-10-CM

## 2019-08-07 DIAGNOSIS — M17.0 PRIMARY OSTEOARTHRITIS OF BOTH KNEES: Primary | ICD-10-CM

## 2019-08-07 PROCEDURE — 20610 DRAIN/INJ JOINT/BURSA W/O US: CPT | Performed by: PHYSICIAN ASSISTANT

## 2019-08-07 RX ADMIN — METHYLPREDNISOLONE ACETATE 40 MG: 40 INJECTION, SUSPENSION INTRA-ARTICULAR; INTRALESIONAL; INTRAMUSCULAR; SOFT TISSUE at 15:04

## 2019-08-07 RX ADMIN — LIDOCAINE HYDROCHLORIDE 4 ML: 10 INJECTION, SOLUTION EPIDURAL; INFILTRATION; INTRACAUDAL; PERINEURAL at 15:04

## 2019-08-07 RX ADMIN — METHYLPREDNISOLONE ACETATE 40 MG: 40 INJECTION, SUSPENSION INTRA-ARTICULAR; INTRALESIONAL; INTRAMUSCULAR; SOFT TISSUE at 15:05

## 2019-08-07 RX ADMIN — LIDOCAINE HYDROCHLORIDE 4 ML: 10 INJECTION, SOLUTION EPIDURAL; INFILTRATION; INTRACAUDAL; PERINEURAL at 15:05

## 2019-08-07 NOTE — PROGRESS NOTES
Procedure   Large Joint Arthrocentesis: R knee  Date/Time: 8/7/2019 3:04 PM  Consent given by: patient  Site marked: site marked  Timeout: Immediately prior to procedure a time out was called to verify the correct patient, procedure, equipment, support staff and site/side marked as required   Supporting Documentation  Indications: pain   Procedure Details  Location: knee - R knee  Preparation: Patient was prepped and draped in the usual sterile fashion  Needle size: 22 G  Approach: anterolateral  Medications administered: 30 mg Hyaluronan 30 MG/2ML  Patient tolerance: patient tolerated the procedure well with no immediate complications    Large Joint Arthrocentesis: L knee  Date/Time: 8/7/2019 3:04 PM  Consent given by: patient  Site marked: site marked  Timeout: Immediately prior to procedure a time out was called to verify the correct patient, procedure, equipment, support staff and site/side marked as required   Supporting Documentation  Indications: pain   Procedure Details  Location: knee - L knee  Preparation: Patient was prepped and draped in the usual sterile fashion  Needle size: 22 G  Approach: anterolateral  Medications administered: 30 mg Hyaluronan 30 MG/2ML  Patient tolerance: patient tolerated the procedure well with no immediate complications    Large Joint Arthrocentesis: R greater trochanteric bursa  Date/Time: 8/7/2019 3:04 PM  Consent given by: patient  Site marked: site marked  Timeout: Immediately prior to procedure a time out was called to verify the correct patient, procedure, equipment, support staff and site/side marked as required   Supporting Documentation  Indications: pain   Procedure Details  Location: hip - R greater trochanteric bursa  Preparation: Patient was prepped and draped in the usual sterile fashion  Needle size: 22 G  Approach: lateral  Medications administered: 4 mL lidocaine PF 1% 1 %; 40 mg methylPREDNISolone acetate 40 MG/ML  Patient tolerance: patient tolerated the  procedure well with no immediate complications    Large Joint Arthrocentesis: L greater trochanteric bursa  Date/Time: 8/7/2019 3:05 PM  Consent given by: patient  Site marked: site marked  Timeout: Immediately prior to procedure a time out was called to verify the correct patient, procedure, equipment, support staff and site/side marked as required   Supporting Documentation  Indications: pain   Procedure Details  Location: hip - L greater trochanteric bursa  Preparation: Patient was prepped and draped in the usual sterile fashion  Needle size: 22 G  Approach: anterolateral  Medications administered: 4 mL lidocaine PF 1% 1 %; 40 mg methylPREDNISolone acetate 40 MG/ML  Patient tolerance: patient tolerated the procedure well with no immediate complications

## 2019-08-07 NOTE — PROGRESS NOTES
Subjective     Follow-up (bilateral knees orthovisc #1 and bilateral hips)      Rodrigo Baum is a 74 y.o. male.     History of Present Illness   Resents today for the first injection of Orthovisc in bilateral knees as well as bilateral trochanteric injection.  He has had multiple bilateral greater trochanteric bursal injections in the past and done well.  As well as multiple series of Visco.  Allergies   Allergen Reactions   • Lipitor [Atorvastatin] Anxiety and Myalgia          • Lortab [Hydrocodone-Acetaminophen] Anxiety   • Azithromycin Nausea Only     Current Outpatient Medications on File Prior to Visit   Medication Sig Dispense Refill   • aspirin 81 MG EC tablet Take 325 mg by mouth Daily.     • clopidogrel (PLAVIX) 75 MG tablet Take 1 tablet by mouth Daily. 30 tablet 0   • doxycycline (VIBRAMYCIN) 100 MG capsule Take 100 mg by mouth Daily.     • finasteride (PROSCAR) 5 MG tablet Take 5 mg by mouth Daily.     • glucosamine-chondroitin 500-400 MG capsule capsule Take 2 capsules by mouth Daily.     • levothyroxine (SYNTHROID, LEVOTHROID) 125 MCG tablet Take 125 mcg by mouth Daily.     • lisinopril (PRINIVIL,ZESTRIL) 10 MG tablet Take 1 tablet by mouth Daily. 90 tablet 3   • Ivnczmx-Xwrlw-Urhn-PassF-LBalm (MELATONIN + L-THEANINE PO) Take 1 tablet by mouth Daily As Needed.     • metoprolol succinate XL (TOPROL-XL) 50 MG 24 hr tablet Take 50 mg by mouth Daily.     • Multiple Vitamins-Minerals (CENTRUM SILVER 50+MEN PO) Take  by mouth.     • nitroglycerin (NITROSTAT) 0.4 MG SL tablet Place 1 tablet under the tongue Every 5 (Five) Minutes As Needed for Chest Pain. Take no more than 3 doses in 15 minutes. 25 tablet 0   • omeprazole (priLOSEC) 40 MG capsule TAKE 1 CAPSULE BY MOUTH TWICE DAILY (Patient taking differently: TAKE 1 CAPSULE BY MOUTH ONCE DAILY) 180 capsule 0   • ondansetron (ZOFRAN) 4 MG tablet Take 4 mg by mouth Every 8 (Eight) Hours As Needed for Nausea or Vomiting.     • predniSONE (DELTASONE) 5 MG  tablet Take 2.5 mg by mouth Daily.     • rosuvastatin (CRESTOR) 40 MG tablet Take 1 tablet by mouth Every Night. 90 tablet 2   • tamsulosin (FLOMAX) 0.4 MG capsule 24 hr capsule Take 1 capsule by mouth 2 (Two) Times a Day.       Current Facility-Administered Medications on File Prior to Visit   Medication Dose Route Frequency Provider Last Rate Last Dose   • Chlorhexidine Gluconate Cloth 2 % pads 1 application  1 application Topical Q12H PRN Sd Mathew PA         Social History     Socioeconomic History   • Marital status:      Spouse name: N/A   • Number of children: 1   • Years of education: H.S.   • Highest education level: High school graduate   Occupational History   • Occupation:      Employer: RETIRED   Tobacco Use   • Smoking status: Former Smoker     Packs/day: 2.00     Years: 20.00     Pack years: 40.00     Types: Cigarettes     Start date:      Last attempt to quit:      Years since quittin.6   • Smokeless tobacco: Never Used   Substance and Sexual Activity   • Alcohol use: Yes     Alcohol/week: 3.6 oz     Types: 6 Cans of beer per week     Frequency: Monthly or less   • Drug use: No   • Sexual activity: Not Currently     Partners: Female   Social History Narrative    Caffeine: 1 servings daily     Past Surgical History:   Procedure Laterality Date   • CARDIAC CATHETERIZATION N/A 2018    Procedure: Left Heart Cath;  Surgeon: Susannah Rasmussen MD;  Location:  [facility];  Service:    • CARDIAC CATHETERIZATION N/A 2019    Procedure: Left Heart Cath;  Surgeon: Susannah Rasmussen MD;  Location:  [facility];  Service: Cardiovascular   • COLONOSCOPY     • CORONARY ANGIOPLASTY WITH STENT PLACEMENT      X2   • CORONARY ARTERY BYPASS GRAFT N/A 2018    Procedure: CORONARY ARTERY BYPASS X 2 WITH INTERNAL MAMMARY ARTERY GRAFT, ENDOSCOPIC VEIN HARVESTING UTILIZING THE LEFT GREATER SAPPHENOUS VEIN  CYSTO BY DR WATERS;  Surgeon: Casey ESTEVEZ  MD Andrew;  Location:  [facility];  Service:    • CYSTOSCOPY TRANSURETHRAL RESECTION OF PROSTATE N/A 11/1/2016    Procedure: CYSTOSCOPY TRANSURETHRAL RESECTION OF PROSTATE GREENLIGHT;  Surgeon: Alverto Richards MD;  Location:  [facility];  Service:    • TONSILLECTOMY     • UPPER GASTROINTESTINAL ENDOSCOPY       Family History   Problem Relation Age of Onset   • Diabetes Father    • Heart disease Father    • Hypertension Father    • Heart attack Father    • Osteoarthritis Father    • Stroke Mother    • Hypertension Mother    • Osteoarthritis Mother    • No Known Problems Maternal Grandmother    • No Known Problems Maternal Grandfather    • Stroke Paternal Grandmother    • Stroke Paternal Grandfather      Past Medical History:   Diagnosis Date   • BPH (benign prostatic hyperplasia)    • CAD (coronary artery disease)    • Chronic venous stasis    • CKD (chronic kidney disease), stage III (CMS/[facility])    • Claustrophobia    • GERD (gastroesophageal reflux disease)    • HLD (hyperlipidemia)    • HTN (hypertension)    • Hypothyroidism    • OA (osteoarthritis) of ankle/foot    • OA (osteoarthritis) of hip    • Osteoporosis    • RA (rheumatoid arthritis) (CMS/[facility])          Review of Systems    The following portions of the patient's history were reviewed and updated as appropriate: allergies, current medications, past family history, past medical history, past social history, past surgical history and problem list.    Ortho Exam      Medical Decision Making    Assessment and Plan/ Diagnosis/Treatment options:   Bilateral knee arthritis and bilateral greater trochanter enteric bursitis.  Plan is to proceed with the first injection of Orthovisc in both knees.  We will also proceed with bilateral steroid injection to trochanteric bursa.  I will see him back in 1 week for the second injection of Orthovisc or sooner if needed.    Using sterile technique, the left knee was sterilely prepped with Hibiclens.  Following time out, using  a 22 gauge needle the left knee was aspirated and then injected with 2 ml Orthovisc. Approximately 0.5 mm of straw-colored fluid was obtained.  Patient tolerated the procedure well.  No complications.      Using sterile technique, the right knee was sterilely prepped with Hibiclens.  Following time out, using a 22 gauge needle the right knee was aspirated and then injected with 2 ml Orthovisc. Approximately 0.5 mm of straw-colored fluid was obtained.  Patient tolerated the procedure well.  No complications.      Using sterile technique, the left hip was sterilely prepped with Hibiclens.  Using a 22 gauge needle, the left trochanteric bursa was injected with 40mg Depo Medrol, 4 cc lidocaine and   Patient tolerated the procedure well. No complications.    Using sterile technique, the right hip was sterilely prepped with Hibiclens.  Using a 22 gauge needle, the right trochanteric bursa was injected with 40mg Depo Medrol, 4 cc lidocaine and   Patient tolerated the procedure well. No complications.

## 2019-08-13 RX ORDER — LIDOCAINE HYDROCHLORIDE 10 MG/ML
4 INJECTION, SOLUTION EPIDURAL; INFILTRATION; INTRACAUDAL; PERINEURAL
Status: COMPLETED | OUTPATIENT
Start: 2019-08-07 | End: 2019-08-07

## 2019-08-13 RX ORDER — METHYLPREDNISOLONE ACETATE 40 MG/ML
40 INJECTION, SUSPENSION INTRA-ARTICULAR; INTRALESIONAL; INTRAMUSCULAR; SOFT TISSUE
Status: COMPLETED | OUTPATIENT
Start: 2019-08-07 | End: 2019-08-07

## 2019-08-14 ENCOUNTER — CLINICAL SUPPORT (OUTPATIENT)
Dept: ORTHOPEDIC SURGERY | Facility: CLINIC | Age: 74
End: 2019-08-14

## 2019-08-14 ENCOUNTER — TREATMENT (OUTPATIENT)
Dept: CARDIAC REHAB | Facility: HOSPITAL | Age: 74
End: 2019-08-14

## 2019-08-14 DIAGNOSIS — I21.4 NSTEMI (NON-ST ELEVATED MYOCARDIAL INFARCTION) (HCC): Primary | ICD-10-CM

## 2019-08-14 DIAGNOSIS — M17.0 PRIMARY OSTEOARTHRITIS OF BOTH KNEES: Primary | ICD-10-CM

## 2019-08-14 PROCEDURE — 20610 DRAIN/INJ JOINT/BURSA W/O US: CPT | Performed by: PHYSICIAN ASSISTANT

## 2019-08-14 PROCEDURE — 93798 PHYS/QHP OP CAR RHAB W/ECG: CPT

## 2019-08-14 NOTE — PROGRESS NOTES
Subjective     Follow-up of the Left Knee (orthovisc injection #2) and Follow-up of the Right Knee (orthovisc injection #2/)      Rodrigo Baum is a 74 y.o. male.     History of Present Illness   Patient presents for the second injection and Orthovisc in bilateral knees.  He has tolerated previous injections well.  No new symptoms.  Allergies   Allergen Reactions   • Lipitor [Atorvastatin] Anxiety and Myalgia          • Lortab [Hydrocodone-Acetaminophen] Anxiety   • Azithromycin Nausea Only     Current Facility-Administered Medications on File Prior to Visit   Medication Dose Route Frequency Provider Last Rate Last Dose   • Chlorhexidine Gluconate Cloth 2 % pads 1 application  1 application Topical Q12H PRN Sd Mathew PA         Current Outpatient Medications on File Prior to Visit   Medication Sig Dispense Refill   • aspirin 81 MG EC tablet Take 325 mg by mouth Daily.     • clopidogrel (PLAVIX) 75 MG tablet Take 1 tablet by mouth Daily. 30 tablet 0   • doxycycline (VIBRAMYCIN) 100 MG capsule Take 100 mg by mouth Daily.     • finasteride (PROSCAR) 5 MG tablet Take 5 mg by mouth Daily.     • glucosamine-chondroitin 500-400 MG capsule capsule Take 2 capsules by mouth Daily.     • levothyroxine (SYNTHROID, LEVOTHROID) 125 MCG tablet Take 125 mcg by mouth Daily.     • lisinopril (PRINIVIL,ZESTRIL) 10 MG tablet Take 1 tablet by mouth Daily. 90 tablet 3   • Xedtelg-Mfmlt-Avir-PassF-LBalm (MELATONIN + L-THEANINE PO) Take 1 tablet by mouth Daily As Needed.     • metoprolol succinate XL (TOPROL-XL) 50 MG 24 hr tablet Take 50 mg by mouth Daily.     • Multiple Vitamins-Minerals (CENTRUM SILVER 50+MEN PO) Take  by mouth.     • nitroglycerin (NITROSTAT) 0.4 MG SL tablet Place 1 tablet under the tongue Every 5 (Five) Minutes As Needed for Chest Pain. Take no more than 3 doses in 15 minutes. 25 tablet 0   • omeprazole (priLOSEC) 40 MG capsule TAKE 1 CAPSULE BY MOUTH TWICE DAILY (Patient taking differently: TAKE 1 CAPSULE  BY MOUTH ONCE DAILY) 180 capsule 0   • ondansetron (ZOFRAN) 4 MG tablet Take 4 mg by mouth Every 8 (Eight) Hours As Needed for Nausea or Vomiting.     • predniSONE (DELTASONE) 5 MG tablet Take 2.5 mg by mouth Daily.     • rosuvastatin (CRESTOR) 40 MG tablet Take 1 tablet by mouth Every Night. 90 tablet 2   • tamsulosin (FLOMAX) 0.4 MG capsule 24 hr capsule Take 1 capsule by mouth 2 (Two) Times a Day.       Social History     Socioeconomic History   • Marital status:      Spouse name: N/A   • Number of children: 1   • Years of education: H.S.   • Highest education level: High school graduate   Occupational History   • Occupation:      Employer: RETIRED   Tobacco Use   • Smoking status: Former Smoker     Packs/day: 2.00     Years: 20.00     Pack years: 40.00     Types: Cigarettes     Start date:      Last attempt to quit:      Years since quittin.6   • Smokeless tobacco: Never Used   Substance and Sexual Activity   • Alcohol use: Yes     Alcohol/week: 1.8 oz     Types: 3 Cans of beer per week     Frequency: Monthly or less   • Drug use: No   • Sexual activity: Not Currently     Partners: Female   Social History Narrative    Caffeine: 1 servings daily     Past Surgical History:   Procedure Laterality Date   • CARDIAC CATHETERIZATION N/A 2018    Procedure: Left Heart Cath;  Surgeon: Susannah Rasmussen MD;  Location:  [facility];  Service:    • CARDIAC CATHETERIZATION N/A 2019    Procedure: Left Heart Cath;  Surgeon: Susannah Rasmussen MD;  Location:  [facility];  Service: Cardiovascular   • COLONOSCOPY     • CORONARY ANGIOPLASTY WITH STENT PLACEMENT      X2   • CORONARY ARTERY BYPASS GRAFT N/A 2018    Procedure: CORONARY ARTERY BYPASS X 2 WITH INTERNAL MAMMARY ARTERY GRAFT, ENDOSCOPIC VEIN HARVESTING UTILIZING THE LEFT GREATER SAPPHENOUS VEIN  CYSTO BY DR WATERS;  Surgeon: Casey Morales MD;  Location: [facility];  Service:    • CYSTOSCOPY  TRANSURETHRAL RESECTION OF PROSTATE N/A 11/1/2016    Procedure: CYSTOSCOPY TRANSURETHRAL RESECTION OF PROSTATE GREENEBONY;  Surgeon: Alverto Richards MD;  Location: [facility];  Service:    • TONSILLECTOMY     • UPPER GASTROINTESTINAL ENDOSCOPY       Family History   Problem Relation Age of Onset   • Diabetes Father    • Heart disease Father    • Hypertension Father    • Heart attack Father    • Osteoarthritis Father    • Stroke Mother    • Hypertension Mother    • Osteoarthritis Mother    • No Known Problems Maternal Grandmother    • No Known Problems Maternal Grandfather    • Stroke Paternal Grandmother    • Stroke Paternal Grandfather      Past Medical History:   Diagnosis Date   • BPH (benign prostatic hyperplasia)    • CAD (coronary artery disease)    • Chronic venous stasis    • CKD (chronic kidney disease), stage III (CMS/[facility])    • Claustrophobia    • GERD (gastroesophageal reflux disease)    • HLD (hyperlipidemia)    • HTN (hypertension)    • Hypothyroidism    • OA (osteoarthritis) of ankle/foot    • OA (osteoarthritis) of hip    • Osteoporosis    • RA (rheumatoid arthritis) (CMS/[facility])          Review of Systems    The following portions of the patient's history were reviewed and updated as appropriate: allergies, current medications, past family history, past medical history, past social history, past surgical history and problem list.    Ortho Exam      Medical Decision Making    Assessment and Plan/ Diagnosis/Treatment options:   Bilateral knee arthritis undergoing an Orthovisc series.  Plan is to proceed with a second injection in both knees today.  He will return in 1 week for the final injection or sooner if needed.    Using sterile technique, the left knee was sterilely prepped with Hibiclens.  Following time out, using a 22 gauge needle the left knee was aspirated and then injected with 2 ml Orthovisc. Approximately 0.5 mm of straw-colored fluid was obtained.  Patient tolerated the procedure well.  No  complications.      Using sterile technique, the right knee was sterilely prepped with Hibiclens.  Following time out, using a 22 gauge needle the right knee was aspirated and then injected with 2 ml Orthovisc. Approximately 0.5 mm of straw-colored fluid was obtained.  Patient tolerated the procedure well.  No complications.

## 2019-08-14 NOTE — PROGRESS NOTES
Cardiac Rehab Initial Assessment      Name: Rodrigo Baum  :1945 Allergies:Lipitor [atorvastatin]; Lortab [hydrocodone-acetaminophen]; and Azithromycin   MRN: [id number] 74 y.o. Physician: TORI Fletcher MD   Primary Diagnosis:    Diagnosis Plan   1. NSTEMI (non-ST elevated myocardial infarction) (CMS/[facility])      Event Date: 19 Specialist: Dr. Tal Wagner   Secondary Diagnosis:  Risk Stratification:High Risk Note Author: Debra Martínez     Cardiovascular History: Previous PCI and Previous CABS     EXERCISE AT HOME  no        EF: 65%      Source: [facility] 19          Ambulatory Status:Independent  Ambulatory Fall Risk Assessed on Initial Visit: yes 6 Minute Walk Pre- Cardiac Rehab:  Distance:1280ft      RPE:11  Max. HR: 86       SPO2:100%    MET: 2.8  Resting BP: 104/70 LA, 102/70 RA    Peak BP: 122/62  Recovery BP: 102/68  Comments:       NUTRITION  Lipids:yes If yes, labs as follows;  Total: No components found for: CHOLESTEROL  HDL:   HDL Cholesterol   Date Value Ref Range Status   2019 39 (L) 40 - 60 mg/dL Final    Lipids continued:  LDL:  LDL Cholesterol    Date Value Ref Range Status   2019 76 0 - 100 mg/dL Final     Triglyceride: No components found for: TRIGLYCERIDE   Weight Management:                 Weight: 202.2  Height: 71                                   BMI: There is no height or weight on file to calculate BMI.  Waist Circumference:   inches   Alcohol Use: 3-4 beers weekly Diabetes:No    Last HGBA1C with date if applicable:No components found for: A1C         SOCIAL HISTORY  Social History     Socioeconomic History   • Marital status:      Spouse name: N/A   • Number of children: 1   • Years of education: H.S.   • Highest education level: High school graduate   Occupational History   • Occupation:      Employer: RETIRED   Tobacco Use   • Smoking status: Former Smoker     Packs/day: 2.00     Years: 20.00     Pack years: 40.00     Types: Cigarettes      Start date:      Last attempt to quit:      Years since quittin.6   • Smokeless tobacco: Never Used   Substance and Sexual Activity   • Alcohol use: Yes     Alcohol/week: 3.6 oz     Types: 6 Cans of beer per week     Frequency: Monthly or less   • Drug use: No   • Sexual activity: Not Currently     Partners: Female   Social History Narrative    Caffeine: 1 servings daily       Educational Level (choose one that applies) college graduate Learning Barriers:Ready to Learn    Family Support:yes    Living Arrangement: lives alone    Risk Factors: Hyperlipidemia  Yes     Tobacco Adjunct: No  Former smoker. Quit in       Comorbidities: Liver disease and Renal disease     PSYCHOSOCIAL  Clinical Depression: no    Stress: yes  Patient did not elaborate. Assess presence or absence of depression using a valid screening tool: yes      PHYSICAL ASSESSMENT  Influenza vaccine: yes  Pneumococcal vaccine: yes          Angina: no    Describe angina scale of 0 - 4: 0 = none    Today are you having incisional pain? No. If, Yes, Scale: 0        Today are you having any other pain? No. If, Yes, Scale: 0     Diagnosed with Hypertension:yes    Heart Sounds: S1, S2     Lung Sounds: normal air entry, lungs clear to auscultation        PER RN   Assessment:  Orthopedic Problems: Patient has O/A and R/A in hips and knees.     Are you being hurt, hit, or frightened by anyone at home or in your life? no    Are you being neglected by a caregiver? No Shoulder flexibility/Range of motion: Average     Recommended arm activity: Any    Chair sit and reach within:    Leg flexibility: Average    Leg Strength/Balance/Five times sit to stand:     Chose one: Average    Recommended stretching: Standing    Assessment:     Family attends IA: yes Time of arrival: 1300  Time of departure: 1415.     Patient Goals: Learn a heart healthy diet by meeting with the dietitian by the end of the program.          2019  2:59 PM  Debra CAMPA  [place]

## 2019-08-14 NOTE — PROGRESS NOTES
Procedure   Large Joint Arthrocentesis: R knee  Date/Time: 8/14/2019 2:48 PM  Consent given by: patient  Site marked: site marked  Timeout: Immediately prior to procedure a time out was called to verify the correct patient, procedure, equipment, support staff and site/side marked as required   Supporting Documentation  Indications: pain   Procedure Details  Location: knee - R knee  Preparation: Patient was prepped and draped in the usual sterile fashion  Needle size: 22 G  Approach: anterolateral  Medications administered: 30 mg Hyaluronan 30 MG/2ML  Patient tolerance: patient tolerated the procedure well with no immediate complications    Large Joint Arthrocentesis: L knee  Date/Time: 8/14/2019 2:48 PM  Consent given by: patient  Site marked: site marked  Timeout: Immediately prior to procedure a time out was called to verify the correct patient, procedure, equipment, support staff and site/side marked as required   Supporting Documentation  Indications: pain   Procedure Details  Location: knee - L knee  Preparation: Patient was prepped and draped in the usual sterile fashion  Needle size: 22 G  Approach: anterolateral  Medications administered: 30 mg Hyaluronan 30 MG/2ML  Patient tolerance: patient tolerated the procedure well with no immediate complications

## 2019-08-16 ENCOUNTER — TREATMENT (OUTPATIENT)
Dept: CARDIAC REHAB | Facility: HOSPITAL | Age: 74
End: 2019-08-16

## 2019-08-16 DIAGNOSIS — I21.4 NSTEMI (NON-ST ELEVATED MYOCARDIAL INFARCTION) (HCC): Primary | ICD-10-CM

## 2019-08-16 PROCEDURE — 93798 PHYS/QHP OP CAR RHAB W/ECG: CPT

## 2019-08-18 ENCOUNTER — HOSPITAL ENCOUNTER (OUTPATIENT)
Facility: HOSPITAL | Age: 74
Setting detail: OBSERVATION
Discharge: HOME | End: 2019-08-20
Attending: EMERGENCY MEDICINE | Admitting: HOSPITALIST

## 2019-08-18 ENCOUNTER — APPOINTMENT (OUTPATIENT)
Dept: GENERAL RADIOLOGY | Facility: HOSPITAL | Age: 74
End: 2019-08-18

## 2019-08-18 DIAGNOSIS — F41.9 ANXIETY: ICD-10-CM

## 2019-08-18 DIAGNOSIS — I25.111 CORONARY ARTERY DISEASE INVOLVING NATIVE HEART WITH ANGINA PECTORIS AND DOCUMENTED SPASM, UNSPECIFIED VESSEL OR LESION TYPE (HCC): ICD-10-CM

## 2019-08-18 DIAGNOSIS — I24.9 ACUTE CORONARY SYNDROME (HCC): Primary | ICD-10-CM

## 2019-08-18 PROBLEM — R07.9 CHEST PAIN: Status: ACTIVE | Noted: 2019-08-18

## 2019-08-18 LAB
ALBUMIN SERPL-MCNC: 4.3 G/DL (ref 3.5–5.2)
ALBUMIN/GLOB SERPL: 1.8 G/DL
ALP SERPL-CCNC: 85 U/L (ref 39–117)
ALT SERPL W P-5'-P-CCNC: 29 U/L (ref 1–41)
ANION GAP SERPL CALCULATED.3IONS-SCNC: 15 MMOL/L (ref 5–15)
APTT PPP: 28 SECONDS (ref 24–37)
AST SERPL-CCNC: 44 U/L (ref 1–40)
BASOPHILS # BLD AUTO: 0.04 10*3/MM3 (ref 0–0.2)
BASOPHILS NFR BLD AUTO: 0.5 % (ref 0–1.5)
BILIRUB SERPL-MCNC: 1.7 MG/DL (ref 0.2–1.2)
BUN BLD-MCNC: 20 MG/DL (ref 8–23)
BUN/CREAT SERPL: 16.3 (ref 7–25)
CALCIUM SPEC-SCNC: 9.1 MG/DL (ref 8.6–10.5)
CHLORIDE SERPL-SCNC: 101 MMOL/L (ref 98–107)
CO2 SERPL-SCNC: 24 MMOL/L (ref 22–29)
CREAT BLD-MCNC: 1.23 MG/DL (ref 0.76–1.27)
DEPRECATED RDW RBC AUTO: 43.4 FL (ref 37–54)
EOSINOPHIL # BLD AUTO: 0.21 10*3/MM3 (ref 0–0.4)
EOSINOPHIL NFR BLD AUTO: 2.5 % (ref 0.3–6.2)
ERYTHROCYTE [DISTWIDTH] IN BLOOD BY AUTOMATED COUNT: 12.7 % (ref 12.3–15.4)
GFR SERPL CREATININE-BSD FRML MDRD: 58 ML/MIN/1.73
GLOBULIN UR ELPH-MCNC: 2.4 GM/DL
GLUCOSE BLD-MCNC: 139 MG/DL (ref 65–99)
HCT VFR BLD AUTO: 47.9 % (ref 37.5–51)
HGB BLD-MCNC: 15.6 G/DL (ref 13–17.7)
HOLD SPECIMEN: NORMAL
HOLD SPECIMEN: NORMAL
IMM GRANULOCYTES # BLD AUTO: 0.04 10*3/MM3 (ref 0–0.05)
IMM GRANULOCYTES NFR BLD AUTO: 0.5 % (ref 0–0.5)
INR PPP: 1.07 (ref 0.85–1.16)
LIPASE SERPL-CCNC: 61 U/L (ref 13–60)
LYMPHOCYTES # BLD AUTO: 1.3 10*3/MM3 (ref 0.7–3.1)
LYMPHOCYTES NFR BLD AUTO: 15.6 % (ref 19.6–45.3)
MCH RBC QN AUTO: 30.4 PG (ref 26.6–33)
MCHC RBC AUTO-ENTMCNC: 32.6 G/DL (ref 31.5–35.7)
MCV RBC AUTO: 93.4 FL (ref 79–97)
MONOCYTES # BLD AUTO: 0.58 10*3/MM3 (ref 0.1–0.9)
MONOCYTES NFR BLD AUTO: 7 % (ref 5–12)
NEUTROPHILS # BLD AUTO: 6.15 10*3/MM3 (ref 1.7–7)
NEUTROPHILS NFR BLD AUTO: 73.9 % (ref 42.7–76)
NRBC BLD AUTO-RTO: 0 /100 WBC (ref 0–0.2)
NT-PROBNP SERPL-MCNC: 67.1 PG/ML (ref 5–900)
PLATELET # BLD AUTO: 222 10*3/MM3 (ref 140–450)
PMV BLD AUTO: 10.4 FL (ref 6–12)
POTASSIUM BLD-SCNC: 3.8 MMOL/L (ref 3.5–5.2)
PROT SERPL-MCNC: 6.7 G/DL (ref 6–8.5)
PROTHROMBIN TIME: 13.4 SECONDS (ref 11.2–14.3)
RBC # BLD AUTO: 5.13 10*6/MM3 (ref 4.14–5.8)
SODIUM BLD-SCNC: 140 MMOL/L (ref 136–145)
TROPONIN T SERPL-MCNC: <0.01 NG/ML (ref 0–0.03)
TROPONIN T SERPL-MCNC: <0.01 NG/ML (ref 0–0.03)
UFH PPP CHRO-ACNC: 0.1 IU/ML (ref 0.3–0.7)
WBC NRBC COR # BLD: 8.32 10*3/MM3 (ref 3.4–10.8)
WHOLE BLOOD HOLD SPECIMEN: NORMAL
WHOLE BLOOD HOLD SPECIMEN: NORMAL

## 2019-08-18 PROCEDURE — 85025 COMPLETE CBC W/AUTO DIFF WBC: CPT | Performed by: EMERGENCY MEDICINE

## 2019-08-18 PROCEDURE — 83880 ASSAY OF NATRIURETIC PEPTIDE: CPT | Performed by: EMERGENCY MEDICINE

## 2019-08-18 PROCEDURE — 96365 THER/PROPH/DIAG IV INF INIT: CPT

## 2019-08-18 PROCEDURE — 71045 X-RAY EXAM CHEST 1 VIEW: CPT

## 2019-08-18 PROCEDURE — 83690 ASSAY OF LIPASE: CPT | Performed by: EMERGENCY MEDICINE

## 2019-08-18 PROCEDURE — 25010000002 HEPARIN (PORCINE) PER 1000 UNITS: Performed by: EMERGENCY MEDICINE

## 2019-08-18 PROCEDURE — 99285 EMERGENCY DEPT VISIT HI MDM: CPT

## 2019-08-18 PROCEDURE — 93005 ELECTROCARDIOGRAM TRACING: CPT | Performed by: EMERGENCY MEDICINE

## 2019-08-18 PROCEDURE — 25010000002 MORPHINE PER 10 MG: Performed by: EMERGENCY MEDICINE

## 2019-08-18 PROCEDURE — 85730 THROMBOPLASTIN TIME PARTIAL: CPT | Performed by: EMERGENCY MEDICINE

## 2019-08-18 PROCEDURE — 85610 PROTHROMBIN TIME: CPT | Performed by: EMERGENCY MEDICINE

## 2019-08-18 PROCEDURE — 84484 ASSAY OF TROPONIN QUANT: CPT | Performed by: EMERGENCY MEDICINE

## 2019-08-18 PROCEDURE — 85520 HEPARIN ASSAY: CPT | Performed by: EMERGENCY MEDICINE

## 2019-08-18 PROCEDURE — 25010000002 ONDANSETRON PER 1 MG: Performed by: EMERGENCY MEDICINE

## 2019-08-18 PROCEDURE — 80053 COMPREHEN METABOLIC PANEL: CPT | Performed by: EMERGENCY MEDICINE

## 2019-08-18 PROCEDURE — 96375 TX/PRO/DX INJ NEW DRUG ADDON: CPT

## 2019-08-18 RX ORDER — HEPARIN SODIUM 10000 [USP'U]/100ML
14 INJECTION, SOLUTION INTRAVENOUS
Status: DISCONTINUED | OUTPATIENT
Start: 2019-08-18 | End: 2019-08-19

## 2019-08-18 RX ORDER — HEPARIN SODIUM 1000 [USP'U]/ML
4000 INJECTION, SOLUTION INTRAVENOUS; SUBCUTANEOUS ONCE
Status: COMPLETED | OUTPATIENT
Start: 2019-08-18 | End: 2019-08-18

## 2019-08-18 RX ORDER — SODIUM CHLORIDE 0.9 % (FLUSH) 0.9 %
10 SYRINGE (ML) INJECTION AS NEEDED
Status: DISCONTINUED | OUTPATIENT
Start: 2019-08-18 | End: 2019-08-20 | Stop reason: HOSPADM

## 2019-08-18 RX ORDER — ONDANSETRON 2 MG/ML
4 INJECTION INTRAMUSCULAR; INTRAVENOUS ONCE
Status: COMPLETED | OUTPATIENT
Start: 2019-08-18 | End: 2019-08-18

## 2019-08-18 RX ORDER — HEPARIN SODIUM 1000 [USP'U]/ML
60 INJECTION, SOLUTION INTRAVENOUS; SUBCUTANEOUS AS NEEDED
Status: DISCONTINUED | OUTPATIENT
Start: 2019-08-18 | End: 2019-08-18

## 2019-08-18 RX ORDER — ASPIRIN 81 MG/1
324 TABLET, CHEWABLE ORAL ONCE
Status: COMPLETED | OUTPATIENT
Start: 2019-08-18 | End: 2019-08-18

## 2019-08-18 RX ORDER — MORPHINE SULFATE 4 MG/ML
4 INJECTION, SOLUTION INTRAMUSCULAR; INTRAVENOUS ONCE
Status: COMPLETED | OUTPATIENT
Start: 2019-08-18 | End: 2019-08-18

## 2019-08-18 RX ORDER — NITROGLYCERIN 20 MG/100ML
5-200 INJECTION INTRAVENOUS
Status: DISCONTINUED | OUTPATIENT
Start: 2019-08-18 | End: 2019-08-19

## 2019-08-18 RX ORDER — CIPROFLOXACIN 500 MG/1
500 TABLET, FILM COATED ORAL 2 TIMES DAILY
COMMUNITY
End: 2019-08-21

## 2019-08-18 RX ORDER — HEPARIN SODIUM 1000 [USP'U]/ML
30 INJECTION, SOLUTION INTRAVENOUS; SUBCUTANEOUS AS NEEDED
Status: DISCONTINUED | OUTPATIENT
Start: 2019-08-18 | End: 2019-08-18

## 2019-08-18 RX ADMIN — DILTIAZEM HYDROCHLORIDE 30 MG: 30 TABLET ORAL at 23:38

## 2019-08-18 RX ADMIN — MORPHINE SULFATE 4 MG: 4 INJECTION INTRAVENOUS at 21:21

## 2019-08-18 RX ADMIN — HEPARIN SODIUM 11.8 UNITS/KG/HR: 10000 INJECTION, SOLUTION INTRAVENOUS at 22:42

## 2019-08-18 RX ADMIN — HEPARIN SODIUM 4000 UNITS: 1000 INJECTION INTRAVENOUS; SUBCUTANEOUS at 22:41

## 2019-08-18 RX ADMIN — SODIUM CHLORIDE 1000 ML: 9 INJECTION, SOLUTION INTRAVENOUS at 20:39

## 2019-08-18 RX ADMIN — NITROGLYCERIN 10 MCG/MIN: 20 INJECTION INTRAVENOUS at 20:42

## 2019-08-18 RX ADMIN — ASPIRIN 81 MG 324 MG: 81 TABLET ORAL at 20:23

## 2019-08-18 RX ADMIN — ONDANSETRON 4 MG: 2 INJECTION INTRAMUSCULAR; INTRAVENOUS at 23:41

## 2019-08-19 PROBLEM — R07.89 OTHER CHEST PAIN: Status: RESOLVED | Noted: 2018-01-22 | Resolved: 2019-08-19

## 2019-08-19 PROBLEM — I25.119 CORONARY ARTERY DISEASE INVOLVING NATIVE CORONARY ARTERY OF NATIVE HEART WITH ANGINA PECTORIS (HCC): Status: ACTIVE | Noted: 2018-01-24

## 2019-08-19 LAB
ANION GAP SERPL CALCULATED.3IONS-SCNC: 10 MMOL/L (ref 5–15)
BUN BLD-MCNC: 18 MG/DL (ref 8–23)
BUN/CREAT SERPL: 20.2 (ref 7–25)
CALCIUM SPEC-SCNC: 8.3 MG/DL (ref 8.6–10.5)
CHLORIDE SERPL-SCNC: 107 MMOL/L (ref 98–107)
CHOLEST SERPL-MCNC: 89 MG/DL (ref 0–200)
CO2 SERPL-SCNC: 22 MMOL/L (ref 22–29)
CREAT BLD-MCNC: 0.89 MG/DL (ref 0.76–1.27)
GFR SERPL CREATININE-BSD FRML MDRD: 84 ML/MIN/1.73
GLUCOSE BLD-MCNC: 110 MG/DL (ref 65–99)
HBA1C MFR BLD: 5.7 % (ref 4.8–5.6)
HDLC SERPL-MCNC: 48 MG/DL (ref 40–60)
LDLC SERPL CALC-MCNC: 16 MG/DL (ref 0–100)
LDLC/HDLC SERPL: 0.34 {RATIO}
POTASSIUM BLD-SCNC: 4.1 MMOL/L (ref 3.5–5.2)
SODIUM BLD-SCNC: 139 MMOL/L (ref 136–145)
TRIGL SERPL-MCNC: 124 MG/DL (ref 0–150)
TROPONIN T SERPL-MCNC: <0.01 NG/ML (ref 0–0.03)
UFH PPP CHRO-ACNC: 0.29 IU/ML (ref 0.3–0.7)
VLDLC SERPL-MCNC: 24.8 MG/DL

## 2019-08-19 PROCEDURE — 63710000001 PREDNISONE PER 5 MG: Performed by: HOSPITALIST

## 2019-08-19 PROCEDURE — G0378 HOSPITAL OBSERVATION PER HR: HCPCS

## 2019-08-19 PROCEDURE — 85520 HEPARIN ASSAY: CPT

## 2019-08-19 PROCEDURE — 80061 LIPID PANEL: CPT | Performed by: INTERNAL MEDICINE

## 2019-08-19 PROCEDURE — 83036 HEMOGLOBIN GLYCOSYLATED A1C: CPT | Performed by: INTERNAL MEDICINE

## 2019-08-19 PROCEDURE — 93005 ELECTROCARDIOGRAM TRACING: CPT | Performed by: NURSE PRACTITIONER

## 2019-08-19 PROCEDURE — 93010 ELECTROCARDIOGRAM REPORT: CPT | Performed by: INTERNAL MEDICINE

## 2019-08-19 PROCEDURE — 99220 PR INITIAL OBSERVATION CARE/DAY 70 MINUTES: CPT | Performed by: INTERNAL MEDICINE

## 2019-08-19 PROCEDURE — 99214 OFFICE O/P EST MOD 30 MIN: CPT | Performed by: INTERNAL MEDICINE

## 2019-08-19 PROCEDURE — 96366 THER/PROPH/DIAG IV INF ADDON: CPT

## 2019-08-19 PROCEDURE — 80048 BASIC METABOLIC PNL TOTAL CA: CPT | Performed by: NURSE PRACTITIONER

## 2019-08-19 PROCEDURE — 84484 ASSAY OF TROPONIN QUANT: CPT | Performed by: NURSE PRACTITIONER

## 2019-08-19 RX ORDER — SODIUM CHLORIDE 9 MG/ML
75 INJECTION, SOLUTION INTRAVENOUS CONTINUOUS
Status: DISCONTINUED | OUTPATIENT
Start: 2019-08-19 | End: 2019-08-20 | Stop reason: HOSPADM

## 2019-08-19 RX ORDER — METOPROLOL SUCCINATE 50 MG/1
50 TABLET, EXTENDED RELEASE ORAL DAILY
Status: DISCONTINUED | OUTPATIENT
Start: 2019-08-19 | End: 2019-08-19

## 2019-08-19 RX ORDER — SODIUM CHLORIDE 9 MG/ML
75 INJECTION, SOLUTION INTRAVENOUS ONCE
Status: COMPLETED | OUTPATIENT
Start: 2019-08-19 | End: 2019-08-19

## 2019-08-19 RX ORDER — LISINOPRIL 10 MG/1
10 TABLET ORAL DAILY
Status: DISCONTINUED | OUTPATIENT
Start: 2019-08-19 | End: 2019-08-19

## 2019-08-19 RX ORDER — TAMSULOSIN HYDROCHLORIDE 0.4 MG/1
0.4 CAPSULE ORAL 2 TIMES DAILY
Status: DISCONTINUED | OUTPATIENT
Start: 2019-08-19 | End: 2019-08-20 | Stop reason: HOSPADM

## 2019-08-19 RX ORDER — SODIUM CHLORIDE 0.9 % (FLUSH) 0.9 %
3-10 SYRINGE (ML) INJECTION AS NEEDED
Status: DISCONTINUED | OUTPATIENT
Start: 2019-08-19 | End: 2019-08-20 | Stop reason: HOSPADM

## 2019-08-19 RX ORDER — PANTOPRAZOLE SODIUM 40 MG/1
40 TABLET, DELAYED RELEASE ORAL
Status: DISCONTINUED | OUTPATIENT
Start: 2019-08-20 | End: 2019-08-20 | Stop reason: HOSPADM

## 2019-08-19 RX ORDER — ROSUVASTATIN CALCIUM 20 MG/1
40 TABLET, COATED ORAL NIGHTLY
Status: DISCONTINUED | OUTPATIENT
Start: 2019-08-19 | End: 2019-08-19

## 2019-08-19 RX ORDER — PREDNISONE 1 MG/1
2.5 TABLET ORAL
Status: DISCONTINUED | OUTPATIENT
Start: 2019-08-19 | End: 2019-08-20 | Stop reason: HOSPADM

## 2019-08-19 RX ORDER — SODIUM CHLORIDE 0.9 % (FLUSH) 0.9 %
3 SYRINGE (ML) INJECTION EVERY 12 HOURS SCHEDULED
Status: DISCONTINUED | OUTPATIENT
Start: 2019-08-19 | End: 2019-08-20 | Stop reason: HOSPADM

## 2019-08-19 RX ORDER — ASPIRIN 81 MG/1
81 TABLET ORAL DAILY
Status: DISCONTINUED | OUTPATIENT
Start: 2019-08-20 | End: 2019-08-20 | Stop reason: HOSPADM

## 2019-08-19 RX ORDER — AMLODIPINE BESYLATE 5 MG/1
5 TABLET ORAL
Qty: 90 TABLET | Refills: 0 | Status: SHIPPED | OUTPATIENT
Start: 2019-08-19

## 2019-08-19 RX ORDER — ROSUVASTATIN CALCIUM 20 MG/1
40 TABLET, COATED ORAL
Status: DISCONTINUED | OUTPATIENT
Start: 2019-08-19 | End: 2019-08-20 | Stop reason: HOSPADM

## 2019-08-19 RX ORDER — METOPROLOL SUCCINATE 25 MG/1
25 TABLET, EXTENDED RELEASE ORAL DAILY
Status: DISCONTINUED | OUTPATIENT
Start: 2019-08-20 | End: 2019-08-20 | Stop reason: HOSPADM

## 2019-08-19 RX ORDER — LEVOTHYROXINE SODIUM 0.12 MG/1
125 TABLET ORAL DAILY
Status: DISCONTINUED | OUTPATIENT
Start: 2019-08-19 | End: 2019-08-20 | Stop reason: HOSPADM

## 2019-08-19 RX ORDER — ONDANSETRON 2 MG/ML
4 INJECTION INTRAMUSCULAR; INTRAVENOUS EVERY 6 HOURS PRN
Status: DISCONTINUED | OUTPATIENT
Start: 2019-08-19 | End: 2019-08-20 | Stop reason: HOSPADM

## 2019-08-19 RX ORDER — METOPROLOL SUCCINATE 25 MG/1
25 TABLET, EXTENDED RELEASE ORAL DAILY
Qty: 90 TABLET | Refills: 0 | Status: SHIPPED | OUTPATIENT
Start: 2019-08-20

## 2019-08-19 RX ORDER — RANOLAZINE 500 MG/1
500 TABLET, EXTENDED RELEASE ORAL EVERY 12 HOURS SCHEDULED
Status: DISCONTINUED | OUTPATIENT
Start: 2019-08-19 | End: 2019-08-20 | Stop reason: HOSPADM

## 2019-08-19 RX ORDER — AMLODIPINE BESYLATE 5 MG/1
5 TABLET ORAL
Status: DISCONTINUED | OUTPATIENT
Start: 2019-08-19 | End: 2019-08-20 | Stop reason: HOSPADM

## 2019-08-19 RX ORDER — FINASTERIDE 5 MG/1
5 TABLET, FILM COATED ORAL DAILY
Status: DISCONTINUED | OUTPATIENT
Start: 2019-08-19 | End: 2019-08-20 | Stop reason: HOSPADM

## 2019-08-19 RX ADMIN — TAMSULOSIN HYDROCHLORIDE 0.4 MG: 0.4 CAPSULE ORAL at 21:59

## 2019-08-19 RX ADMIN — LEVOTHYROXINE SODIUM 125 MCG: 125 TABLET ORAL at 06:39

## 2019-08-19 RX ADMIN — AMLODIPINE BESYLATE 5 MG: 5 TABLET ORAL at 12:20

## 2019-08-19 RX ADMIN — PREDNISONE 2.5 MG: 5 TABLET ORAL at 15:16

## 2019-08-19 RX ADMIN — FINASTERIDE 5 MG: 5 TABLET, FILM COATED ORAL at 08:53

## 2019-08-19 RX ADMIN — SODIUM CHLORIDE 75 ML/HR: 9 INJECTION, SOLUTION INTRAVENOUS at 03:17

## 2019-08-19 RX ADMIN — TAMSULOSIN HYDROCHLORIDE 0.4 MG: 0.4 CAPSULE ORAL at 08:53

## 2019-08-19 RX ADMIN — SODIUM CHLORIDE, PRESERVATIVE FREE 3 ML: 5 INJECTION INTRAVENOUS at 21:59

## 2019-08-19 RX ADMIN — RANOLAZINE 500 MG: 500 TABLET, EXTENDED RELEASE ORAL at 15:03

## 2019-08-19 RX ADMIN — ROSUVASTATIN CALCIUM 40 MG: 20 TABLET, FILM COATED ORAL at 08:57

## 2019-08-19 NOTE — PROGRESS NOTES
HEPARIN INFUSION  Rodrigo Baum is a  74 y.o. male receiving heparin infusion.     Therapy for (VTE/Cardiac):   Cardiac  Patient Weight: 84.8kg  Initial Bolus (Y/N):   Y  Any Bolus (Y/N):   Y     Signs or Symptoms of Bleeding: None noted      Cardiac or Other (Not VTE)   Initial Bolus: 60 units/kg (Max 4,000 units)  Initial rate: 12 units/kg/hr (Max 1,000 units/hr)   Anti-Xa (IU/mL) Bolus Dose Stop Infusion Rate Change Repeat Anti-Xa      ?0.19 60 units/kg 0 hrs Increase rate by   4 units/kg/hr 6 hrs       0.2 - 0.29  30 units/kg 0 hrs Increase rate by 2 units/kg/hr 6 hrs    0.3 - 0.7 0 0 hrs No change 6 hrs      0.71 - 0.99 0  0 hrs Decrease rate by 2 units/kg/hr 6 hrs            ?1 0 Hold 1 hr Decrease rate by 3 units/kg/hr 6 hrs        Results from last 7 days   Lab Units 08/18/19 2032   INR  1.07   HEMOGLOBIN g/dL 15.6   HEMATOCRIT % 47.9   PLATELETS 10*3/mm3 222          Date   Time   Anti-Xa Current Rate (Unit/kg/hr) Bolus   (Units) Rate Change   (Unit/kg/hr) New Rate (Unit/kg/hr) Next   Anti-Xa Comments  Pump Check Daily   8/18 2032 0.1  4000 +11.8 11.8 0500 Dw RN   8/19 0518 0.29 11.8 -- +2.2 14 1400 [address] 3249, verified                                                                                                                                                                                                                      Del Del Angel RP  8/19/2019  7:03 AM

## 2019-08-19 NOTE — CONSULTS
Inpatient Cardiology Consult  Consult performed by: Judith Clarke APRN  Consult ordered by: Christa Wu APRN        [facility]  Cardiovascular Consultation Note        Reason for consult: Chest pain     Patient is a 74-year-old male with a history of coronary artery disease status post CABG in 2018 and recent cardiac cath last month for non-STEMI felt to be due to coronary vasospasm that we are being asked to consult after he presented to [facility] late last night with complaints of chest pain.  The patient reports while sitting on his couch last evening he developed substernal chest tightness associated with mild nausea and diaphoresis.  When his symptoms did not improve after the use of 3 sublingual nitroglycerin he contacted his son who brought him to [facility] emergency room for further evaluation.  EKGs have showed normal sinus rhythm/bradycardia without ST elevation concerning for acute ischemia.  Trended troponins x3 have been negative.  A few weeks ago the patient was hospitalized at [place] in care of Dr. Meade for hematuria and urethral issues.  His Plavix was discontinued but aspirin restarted after a few days.  The patient once again developed hematuria so he has not taken it over the past week.  During his hospitalization last month he was placed on isosorbide at discharge for coronary vasospasm but stopped taking it after a couple of days because he could not tolerate due to headaches. Last evening the on-call cardiologist Dr. Muñoz placed him on IV heparin and NTG drips and low-dose diltiazem and aspirin.  He reports having mild chest tightness during the night but it has now completely resolved.  The nurse reports holding his metoprolol this morning due to lower blood pressures.    Cardiac risk factors: Known CAD, hypertension, hyperlipidemia, advanced age    Past medical and surgical history, social and  family history reviewed in EMR.    REVIEW OF SYSTEMS:   H&P ROS reviewed and pertinent CV ROS as noted in HPI.         Vital Sign Min/Max for last 24 hours  Temp  Min: 97.4 °F (36.3 °C)  Max: 97.7 °F (36.5 °C)   BP  Min: 95/62  Max: 137/84   Pulse  Min: 51  Max: 71   Resp  Min: 16  Max: 18   SpO2  Min: 88 %  Max: 100 %   No Data Recorded      Intake/Output Summary (Last 24 hours) at 8/19/2019 0955  Last data filed at 8/19/2019 0919  Gross per 24 hour   Intake 1000 ml   Output 730 ml   Net 270 ml           Physical Exam   Constitutional: He is oriented to person, place, and time. He appears well-developed and well-nourished.   HENT:   Head: Normocephalic and atraumatic.   Eyes: Pupils are equal, round, and reactive to light. No scleral icterus.   Neck: No JVD present. Carotid bruit is not present. No thyromegaly present.   Cardiovascular: Normal rate and regular rhythm. Exam reveals no gallop.   No murmur heard.  Pulmonary/Chest: Effort normal and breath sounds normal.   Abdominal: Soft. He exhibits no distension. There is no hepatosplenomegaly.   Musculoskeletal: He exhibits no edema.   Neurological: He is alert and oriented to person, place, and time.   Skin: Skin is warm and dry.   Psychiatric: He has a normal mood and affect. His behavior is normal.     Physical Exam   General: No acute distress, well-developed and well-nourished.    Skin: Skin is warm and dry. No obvious cyanosis, erythema or pallor.   HEENT: Atraumatic, normocephalic, no conjunctival pallor, no scleral icterus.   Neck: Supple, no JVD. Normal carotid upstrokes, no bruits.    Chest:No respiratory distres No chest wall tenderness. Breath sounds are normal. No wheezes,  rhonchi or rales.  Cardiovascular: Normal S1 and S2, no murmer, gallop or rub. PMI is not displaced.    Pulses:Radial and pedal pulses are 2+ and symmetric.    Abdomen: Soft, non tender, normal bowel sounds.   Musculoskeletal/Extremities:  No clubbing, cyanosis or edema. No gross  deformity.   Neurological: Alert and oriented to person, place, and time, no gross focal deficits.   Psychiatric: Normal mood and affect.Speech and behavior is normal.        EKG: Sinus bradycardia/inferior infarct 8/19/2019    Lab Review:   Labs reviewed in the electronic medical record.  Pertinent findings include:  Lab Results   Component Value Date    GLUCOSE 110 (H) 08/19/2019    BUN 18 08/19/2019    CREATININE 0.89 08/19/2019    EGFRIFNONA 84 08/19/2019    BCR 20.2 08/19/2019    K 4.1 08/19/2019    CO2 22.0 08/19/2019    CALCIUM 8.3 (L) 08/19/2019    ALBUMIN 4.30 08/18/2019    AST 44 (H) 08/18/2019    ALT 29 08/18/2019     Lab Results   Component Value Date    WBC 8.32 08/18/2019    HGB 15.6 08/18/2019    HCT 47.9 08/18/2019    MCV 93.4 08/18/2019     08/18/2019     Lab Results   Component Value Date    CHOL 89 08/19/2019    TRIG 124 08/19/2019    HDL 48 08/19/2019    LDL 16 08/19/2019     Results from last 7 days   Lab Units 08/19/19  0518   HEMOGLOBIN A1C % 5.70*     EKG: Sinus rhythm, no acute changes.       Active Hospital Problems    Diagnosis   • **Coronary artery disease involving native coronary artery of native heart with unstable angina pectoris (CMS/[facility])     · Cardiac cath (11/15/1993): PTCA and repeat PTCA 1 week following of proximal  LAD, per Dr. Lovelace  · Cardiac cath (4/2008): Overlapping 3.0 x 24 mm. And 3.0 x 16.0 mm Taxus to LAD and 2.5 x 8.0 PTCA to proximal first OM, inability to pass stent.  Normal LVEF.  · Stress echo (5/2016): Normal without ischemia.  Normal LVEF  · Cardiac cath (1/24/2018):Cardiac surgery consultation for CABG  · CABG (1/26/2018): LIMA to LAD.  SVG to diagonal   · Echo (1/25/2018) normal LVEF 65%.  Mild biatrial enlargement.  · Cardiac cath (7/1/2019): 2/2 grafts patent  Spasm of the LIMA as well as apical of LAD resolved with nitro.  Nonobstructive CAD of RCA.  · Plavix discontinued 08/2019 due to hematuria         • Essential hypertension   • Hyperlipidemia  LDL goal <70   • Cirrhosis of liver (CMS/HCC)   • GERD (gastroesophageal reflux disease)   • Hypothyroidism     Assessment and recommendations:  1. The patient is well-known to me.  He has CAD with history of CABG and underwent catheterization study last month revealing adequate revascularization and significant coronary spasm involving the left internal mammary artery as well as the LAD.  Isosorbide was prescribed which is discontinued due to side effects.  He now presents after experiencing episode of chest pain which she states was worse than his usual pain and did not respond to nitroglycerin.  Myocardial infarction has been excluded.    2. It is likely that he is experiencing vasospastic angina however it is equally likely that his chest pain may be due to a GI etiology in the setting of esophageal spasms with known history of Saenz's esophagus/GERD.   3. At this time we will optimize his medical therapy, add amlodipine for spastic angina and esophageal spasms and also add Ranexa 500 mg twice daily.  Continue rest of the current medications.    4. He was holding his dual antiplatelet therapy due to hematuria and anticipated urology procedures.  He has been restarted on low-dose aspirin which can be continued.    5. Discontinue heparin and IV nitroglycerin, ambulate and okay to discharge to home later today or tomorrow from cardiology standpoint.    6. Follow-up with Dr. Wagner as previously scheduled.  If he is still here Dr. Wagner will see him tomorrow.     · Discontinue heparin and nitroglycerin drips  · Discontinue diltiazem and start amlodipine 5 mg po daily  · Decrease metoprolol succinate to 25 mg daily  · Discontinue lisinopril.  · Add Ranexa 500 mg twice daily.  · Continue aspirin 81 mg daily but discontinue Plavix  · Okay for discharge from cardiovascular standpoint.  Follow-up with Dr. Bruce on Thursday at already scheduled appointment  · Schedule outpatient evaluation with GI.  · Follow-up with  Dr. Randall Wagner in 3 months    Judith Clarke scribe for Dr. Jordan Rasmussen I, Susannah Rasmussen MD, personally performed the services described in this documentation as scribed by the above named individual in my presence, and it is both accurate and complete.  8/19/2019  2:02 PM

## 2019-08-19 NOTE — PLAN OF CARE
Problem: Patient Care Overview  Goal: Plan of Care Review  Outcome: Ongoing (interventions implemented as appropriate)   08/19/19 0618   Coping/Psychosocial   Plan of Care Reviewed With patient   Plan of Care Review   Progress no change   OTHER   Outcome Summary Pt's vitals stable, denied chest pain or shortness of air. Pt expressed concern about return of hematuria while being on Heparin drip. Pt voiced understanding that he will be monitored for bleeding. Pt had no other events through ou the night.

## 2019-08-19 NOTE — PROGRESS NOTES
Discharge Planning Assessment  [facility]     Patient Name: Rodrigo Baum  MRN: [id number]  Today's Date: 8/19/2019    Admit Date: 8/18/2019    Discharge Needs Assessment     Row Name 08/19/19 1216       Living Environment    Lives With  alone    Current Living Arrangements  home/apartment/condo Lives in [place]    Primary Care Provided by  self    Provides Primary Care For  no one    Family Caregiver if Needed  child(camryn), adult    Family Caregiver Names  Son can assist and transport    Quality of Family Relationships  unable to assess    Able to Return to Prior Arrangements  yes       Resource/Environmental Concerns    Resource/Environmental Concerns  none    Transportation Concerns  car, none       Transition Planning    Patient/Family Anticipates Transition to  home    Patient/Family Anticipated Services at Transition  none    Transportation Anticipated  family or friend will provide Son will transport       Discharge Needs Assessment    Concerns to be Addressed  denies needs/concerns at this time    Equipment Currently Used at Home  none    Anticipated Changes Related to Illness  none    Equipment Needed After Discharge  none    Current Discharge Risk  lives alone        Discharge Plan     Row Name 08/19/19 1217       Plan    Plan  Home    Patient/Family in Agreement with Plan  yes    Plan Comments  Pt lives alone in [place]. He denies DME or HH. He recently started outpatient cardiac rehab here at [facility]. He denies any d/c needs and his son will transport him home. CM will cont to follow.     Final Discharge Disposition Code  01 - home or self-care        Destination      No service coordination in this encounter.      Durable Medical Equipment      No service coordination in this encounter.      Dialysis/Infusion      No service coordination in this encounter.      Home Medical Care      No service coordination in this encounter.      Therapy      No service coordination in this encounter.       Community Resources      No service coordination in this encounter.        Expected Discharge Date and Time     Expected Discharge Date Expected Discharge Time    Aug 20, 2019         Demographic Summary     Row Name 08/19/19 1215       General Information    Referral Source  admission list    Preferred Language  English     Used During This Interaction  no    General Information Comments  PCP is Del Fletcher       Contact Information    Permission Granted to Share Info With          Functional Status     Row Name 08/19/19 1215       Functional Status    Usual Activity Tolerance  good    Current Activity Tolerance  good       Functional Status, IADL    Medications  independent    Meal Preparation  independent    Housekeeping  independent    Laundry  independent    Shopping  independent       Employment/    Employment/ Comments  Has Medicare [employer] and [employer] supp. Denies any issues affording medications and uses [facility] in [place].         Psychosocial    No documentation.       Abuse/Neglect    No documentation.       Legal    No documentation.       Substance Abuse    No documentation.       Patient Forms    No documentation.           Patti Guerrier

## 2019-08-19 NOTE — PLAN OF CARE
Problem: Patient Care Overview  Goal: Plan of Care Review  Outcome: Ongoing (interventions implemented as appropriate)   08/19/19 8829   Coping/Psychosocial   Plan of Care Reviewed With patient   Plan of Care Review   Progress no change   OTHER   Outcome Summary VSS, on room air, SB/SR on monitor. Denies chest pain. Nitro and Heparin gtts off. Held Metoprolol and Lisinopril this AM for bradycardia and hypotension. Does have hematuria, no clots present. NS infusing at 75ml/hr. Has been anxious today. Ranexa and Norvasc added, Metoprolol decreased and Lisinopril D/C'd. Will continue to monitor.

## 2019-08-19 NOTE — PROGRESS NOTES
[facility] Medicine Services  PROGRESS NOTE    Patient Name: Rodrigo Baum  : 1945  MRN: [id number]    Date of Admission: 2019  Length of Stay: 0  Primary Care Physician: TORI Fletcher MD    Subjective   Subjective     CC:  F/u chest pain     HPI:  Patient has had some hematuria today without passage of clots while on heparin infusion.  He feels that his urine is starting to lighten after heparin was discontinued.  He has had no recurrent chest pain today and denies palpitations or lightheadedness.     Review of Systems  Gen- No fevers, chills  CV- No chest pain, palpitations  Resp- No cough, dyspnea  GI- No abd pain      Objective   Objective     Vital Signs:   Temp:  [97.4 °F (36.3 °C)-97.7 °F (36.5 °C)] 97.7 °F (36.5 °C)  Heart Rate:  [51-73] 73  Resp:  [16-18] 16  BP: ()/(56-84) 112/72  Total (NIH Stroke Scale): 0     Physical Exam:  Constitutional: No acute distress, awake, alert  HENT: NCAT, mucous membranes moist  Respiratory: Clear to auscultation bilaterally, respiratory effort normal   Cardiovascular: RRR, no murmurs, rubs, or gallops, palpable pedal pulses bilaterally  Gastrointestinal: Positive bowel sounds, soft, nontender, nondistended  Musculoskeletal: Trace bilateral ankle edema  Psychiatric: Appropriate affect, cooperative  Neurologic: Strength symmetric in all extremities, speech clear      Results Reviewed:    Results from last 7 days   Lab Units 19   WBC 10*3/mm3 8.32   HEMOGLOBIN g/dL 15.6   HEMATOCRIT % 47.9   PLATELETS 10*3/mm3 222   INR  1.07     Results from last 7 days   Lab Units 19  0518 19   SODIUM mmol/L 139  --  140   POTASSIUM mmol/L 4.1  --  3.8   CHLORIDE mmol/L 107  --  101   CO2 mmol/L 22.0  --  24.0   BUN mg/dL 18  --  20   CREATININE mg/dL 0.89  --  1.23   GLUCOSE mg/dL 110*  --  139*   CALCIUM mg/dL 8.3*  --  9.1   ALT (SGPT) U/L  --   --  29   AST (SGOT) U/L  --   --  44*   TROPONIN  T ng/mL <0.010 <0.010 <0.010   PROBNP pg/mL  --   --  67.1     Estimated Creatinine Clearance: 95.6 mL/min (by C-G formula based on SCr of 0.89 mg/dL).    Microbiology Results Abnormal     None          Imaging Results (last 24 hours)     Procedure Component Value Units Date/Time    XR Chest 1 View [[id number]] Collected:  08/18/19 2104     Updated:  08/18/19 2106    Narrative:       CR Chest 1 Vw 8/18/2019    INDICATION:   Acute onset of chest pain tonight. Coronary artery disease and CABG.     COMPARISON:    6/29/2019    FINDINGS:  Single portable AP view(s) of the chest.  The heart is normal in size status post median sternotomy. The lungs are clear. There are no pleural effusions.      Impression:       No acute cardiopulmonary findings.    Signer Name: Shaan Crawford MD   Signed: 8/18/2019 9:04 PM   Workstation Name: [id number]    [facility]          Results for orders placed during the hospital encounter of 01/24/18   Adult Transthoracic Echo Complete W/ Cont if Necessary Per Protocol    Narrative · Left ventricular wall thickness is consistent with borderline concentric   hypertrophy.  · Left ventricular systolic function is normal. Estimated EF = 65%.  · Trace mitral ejection, trace tricuspid regurgitation.  · Mild biatrial enlargement.          I have reviewed the medications:  Scheduled Meds:  amLODIPine 5 mg Oral Q24H   finasteride 5 mg Oral Daily   levothyroxine 125 mcg Oral Daily   [START ON 8/20/2019] metoprolol succinate XL 25 mg Oral Daily   Pharmacy Meds to Bed Consult  Does not apply Daily   ranolazine 500 mg Oral Q12H   rosuvastatin 40 mg Oral Nightly   sodium chloride 3 mL Intravenous Q12H   tamsulosin 0.4 mg Oral BID     Continuous Infusions:   PRN Meds:.ondansetron  •  sodium chloride  •  sodium chloride      Assessment/Plan   Assessment / Plan     Active Hospital Problems    Diagnosis  POA   • **Chest pain [R07.9]  Yes   • Cirrhosis of liver (CMS/HCC) [K74.60]  Yes   •  Coronary artery disease involving native coronary artery of native heart with angina pectoris (CMS/HCC) [I25.119]  Yes   • GERD (gastroesophageal reflux disease) [K21.9]  Yes   • Essential hypertension [I10]  Yes   • Hypothyroidism [E03.9]  Yes   • Hyperlipidemia LDL goal <70 [E78.5]  Yes      Resolved Hospital Problems   No resolved problems to display.      Brief Hospital Course to date:  Rodrigo Baum is a 74 y.o. male with a past medical hx of HTN, HLP, CAD with CABG x 2 in 2017, hypothyroidism, and CKD III who presented to the [facility] on 8/18 with chest pain in the setting of recent NSTEMI July 1st 2019 attributed to spontaneous plaque rupture with vasopasm involving LIMA to LAD.  In the interim, pt had been taken off of plavix due to hematuria from BPH.  Evaluation here has ruled out NSTEMI and Dr. Rasmussen suspects that pt either has vasopastic angina or noncardiac chest pain due to GERD. Hospitalization has been complicated by gross hematuria while on heparin.     Chest Pain, probable vasopastic angina vs. 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 Rasmussen recommends    Aspirin 81mg daily   Ranexa 500mg BID   Metoprolol Succinate 25mg daily   Amlodipine 5mg daily  - Stop diltiazem, lisinopril  - Heparin and nitro gtts stopped   - Discuss outpatient GI eval with PCP  - Dr. Wagner will see tomorrow  - Follow up with Dr. Wagner as an outpatient in 3 months     Hematuria due to BPH  - s/p cystoscopy in July with cauterization of areas of bleeding by Dr. Bruce at [facility]  - Spontaneously recurrent earlier this week and then recurrent today while on Heparin gtt  - IVF, oral hydration, and monitor overnight off heparin gtt  - Continue flomax and finasteride  - Discussed case with Dr. Bruce. He has appt to see pt on Thursday. As long as bleeding improves overnight, pt is to  keep that appointment. If bleeding worsens, then Dr. Bruce is available for a formal consult. I will ask RN to call hospitalist APC at 6:30AM 8/20 if bleeding has worsened so they can put in a formal consult.    CKD III, Cr at baseline  Hypertension, monitor BP with changes as above  Hyperlipidemia, LDL 16 on FLP here   RA, restart chronic low dose prednisone    DVT Prophylaxis:  Mechanical only  Disposition: I expect the patient to be discharged home tomorrow morning after Dr. Wagner has seen and depending on improvement in hematuria. Offered pt discharge home this evening but he would like to stay to monitor response to medication changes and hematuria.      CODE STATUS:   Code Status and Medical Interventions:   Ordered at: 08/19/19 0218     Level Of Support Discussed With:    Patient     Code Status:    CPR     Medical Interventions (Level of Support Prior to Arrest):    Full       Electronically signed by Dario Espinosa MD, 08/19/19, 2:42 PM.

## 2019-08-19 NOTE — ED PROVIDER NOTES
Subjective   Mr. Baum is a 73 y/o male with known CAD and recent nonSTEMI.  His heart cath 7/1 revealed 2/2 patent grafts, no significant obstructive CAD but presence of vasospasm of the LIMA and the apical LAD which was relieved by NTG.  He had been doing well heart wise since but one week ago had more problems with hematuria and urinary clots, saw Dr. Bruce, and had his ASA and Plavix DC'd.  About one hour PTA here tonight, he developed mid-chest pain with some dyspnea and nausea, mild sweats, which reminded him of his MI recently.  He felt no better after NTG x 3 at home and came to the ED.  At this time he reports that the pain is about 5/10, but he just is not feeling good.  In addition, he is really worried that he is having more vasospasm and wonders if he should have a heart cath tonight.  He cannot describe what the pain feels like.    PMH, PSH, SH reviewed.            Review of Systems   Constitutional: Negative.    HENT: Negative.    Respiratory: Positive for shortness of breath (mild). Negative for cough.    Cardiovascular: Positive for chest pain. Negative for palpitations and leg swelling.   Gastrointestinal: Positive for nausea. Negative for abdominal pain and vomiting.   Genitourinary:        Recent problems with hematuria and urinary blood clots.   Musculoskeletal: Negative.    Skin: Negative for rash.   Neurological: Negative.    Psychiatric/Behavioral:        Anxious    All other systems reviewed and are negative.      Past Medical History:   Diagnosis Date   • BPH (benign prostatic hyperplasia)    • CAD (coronary artery disease)    • Chronic venous stasis    • CKD (chronic kidney disease), stage III (CMS/[facility])    • Claustrophobia    • GERD (gastroesophageal reflux disease)    • HLD (hyperlipidemia)    • HTN (hypertension)    • Hypothyroidism    • OA (osteoarthritis) of ankle/foot    • OA (osteoarthritis) of hip    • Osteoporosis    • RA (rheumatoid arthritis) (CMS/[facility])        Allergies    Allergen Reactions   • Lipitor [Atorvastatin] Anxiety and Myalgia          • Lortab [Hydrocodone-Acetaminophen] Anxiety   • Azithromycin Nausea Only       Past Surgical History:   Procedure Laterality Date   • CARDIAC CATHETERIZATION N/A 1/24/2018    Procedure: Left Heart Cath;  Surgeon: Susannah Rasmussen MD;  Location:  [facility];  Service:    • CARDIAC CATHETERIZATION N/A 7/1/2019    Procedure: Left Heart Cath;  Surgeon: Susannah Rasmussen MD;  Location:  [facility];  Service: Cardiovascular   • COLONOSCOPY     • CORONARY ANGIOPLASTY WITH STENT PLACEMENT      X2   • CORONARY ARTERY BYPASS GRAFT N/A 1/26/2018    Procedure: CORONARY ARTERY BYPASS X 2 WITH INTERNAL MAMMARY ARTERY GRAFT, ENDOSCOPIC VEIN HARVESTING UTILIZING THE LEFT GREATER SAPPHENOUS VEIN  CYSTO BY DR WATERS;  Surgeon: Casey Morales MD;  Location:  [facility];  Service:    • CYSTOSCOPY TRANSURETHRAL RESECTION OF PROSTATE N/A 11/1/2016    Procedure: CYSTOSCOPY TRANSURETHRAL RESECTION OF PROSTATE GREENLIGHT;  Surgeon: Alverto Richards MD;  Location:  [facility];  Service:    • TONSILLECTOMY     • UPPER GASTROINTESTINAL ENDOSCOPY         Family History   Problem Relation Age of Onset   • Diabetes Father    • Heart disease Father    • Hypertension Father    • Heart attack Father    • Osteoarthritis Father    • Stroke Mother    • Hypertension Mother    • Osteoarthritis Mother    • No Known Problems Maternal Grandmother    • No Known Problems Maternal Grandfather    • Stroke Paternal Grandmother    • Stroke Paternal Grandfather        Social History     Socioeconomic History   • Marital status:      Spouse name: N/A   • Number of children: 1   • Years of education: H.S.   • Highest education level: High school graduate   Occupational History   • Occupation:      Employer: RETIRED   Tobacco Use   • Smoking status: Former Smoker     Packs/day: 2.00     Years: 20.00     Pack years: 40.00     Types:  Cigarettes     Start date:      Last attempt to quit:      Years since quittin.6   • Smokeless tobacco: Never Used   Substance and Sexual Activity   • Alcohol use: Yes     Alcohol/week: 3.6 oz     Types: 6 Cans of beer per week     Frequency: Monthly or less   • Drug use: No   • Sexual activity: Not Currently     Partners: Female   Social History Narrative    Caffeine: 1 servings daily           Objective   Physical Exam   Constitutional: He is oriented to person, place, and time. He appears well-developed and well-nourished. No distress.   Very anxious, ruminative older male   HENT:   Head: Atraumatic.   Airway patent   Eyes: Conjunctivae are normal.   Neck: Phonation normal. Neck supple. No JVD present.   Cardiovascular: Normal rate, regular rhythm and normal heart sounds.   Pulmonary/Chest: Effort normal and breath sounds normal. No respiratory distress.   Abdominal: Soft. There is no tenderness.   Musculoskeletal: He exhibits no edema.   Lymphadenopathy:     He has no cervical adenopathy.   Neurological: He is alert and oriented to person, place, and time.   Skin: Skin is warm and dry.   Psychiatric:   Anxious, somewhat OCD demeanor.   Nursing note and vitals reviewed.      Critical Care  Performed by: Luis Eduardo Inman MD  Authorized by: Luis Eudardo Inman MD     Critical care provider statement:     Critical care time (minutes):  50    Critical care time was exclusive of:  Separately billable procedures and treating other patients and teaching time    Critical care was necessary to treat or prevent imminent or life-threatening deterioration of the following conditions:  Cardiac failure    Critical care was time spent personally by me on the following activities:  Development of treatment plan with patient or surrogate, discussions with consultants, evaluation of patient's response to treatment, examination of patient, obtaining history from patient or surrogate, review of old charts, re-evaluation of  patient's condition, pulse oximetry, ordering and review of radiographic studies, ordering and review of laboratory studies and ordering and performing treatments and interventions  Comments:      Acute coronary syndrome presentation requiring multiple medications, medication adjustments, multiple rechecks, and conversations with cardiologist and hospitalist.  Numerous conversations with patient.               ED Course  ED Course as of Aug 18 2327   Sun Aug 18, 2019   2136 He is having mild increase in his pain with some radiation to his left chest and is now complaining of some left neck numbness and is worried about having a stroke.  His nurse will perform NIHSS but I doubt that he is having neuro ischemia.  He appears very anxious and is quite obsessed about whether he is having artery spasm again.  He has asked repeatedly about having another heart cath tonight and wishes that he could.  I have paged Dr. Muñoz to discuss this situation.  [LI]   2151 Discussed with Dr. Muñoz who recommends low dose diltiazem in addition to continuing NTG.  No STEMI, no need for emergent heart cath.  He also recommends heparin dosing, but hold the Plavix.  [LI]   2324 Second troponin still undetectable.  He is feeling a little nauseated now but is having a little less chest tightness.  Awaiting hospitalist call for admission.  [LI]      ED Course User Index  [LI] Luis Eduardo Inman MD      Recent Results (from the past 24 hour(s))   Troponin    Collection Time: 08/18/19  8:32 PM   Result Value Ref Range    Troponin T <0.010 0.000 - 0.030 ng/mL   Comprehensive Metabolic Panel    Collection Time: 08/18/19  8:32 PM   Result Value Ref Range    Glucose 139 (H) 65 - 99 mg/dL    BUN 20 8 - 23 mg/dL    Creatinine 1.23 0.76 - 1.27 mg/dL    Sodium 140 136 - 145 mmol/L    Potassium 3.8 3.5 - 5.2 mmol/L    Chloride 101 98 - 107 mmol/L    CO2 24.0 22.0 - 29.0 mmol/L    Calcium 9.1 8.6 - 10.5 mg/dL    Total Protein 6.7 6.0 - 8.5 g/dL     Albumin 4.30 3.50 - 5.20 g/dL    ALT (SGPT) 29 1 - 41 U/L    AST (SGOT) 44 (H) 1 - 40 U/L    Alkaline Phosphatase 85 39 - 117 U/L    Total Bilirubin 1.7 (H) 0.2 - 1.2 mg/dL    eGFR Non African Amer 58 (L) >60 mL/min/1.73    Globulin 2.4 gm/dL    A/G Ratio 1.8 g/dL    BUN/Creatinine Ratio 16.3 7.0 - 25.0    Anion Gap 15.0 5.0 - 15.0 mmol/L   Lipase    Collection Time: 08/18/19  8:32 PM   Result Value Ref Range    Lipase 61 (H) 13 - 60 U/L   BNP    Collection Time: 08/18/19  8:32 PM   Result Value Ref Range    proBNP 67.1 5.0 - 900.0 pg/mL   Light Blue Top    Collection Time: 08/18/19  8:32 PM   Result Value Ref Range    Extra Tube hold for add-on    Green Top (Gel)    Collection Time: 08/18/19  8:32 PM   Result Value Ref Range    Extra Tube Hold for add-ons.    Lavender Top    Collection Time: 08/18/19  8:32 PM   Result Value Ref Range    Extra Tube hold for add-on    Gold Top - SST    Collection Time: 08/18/19  8:32 PM   Result Value Ref Range    Extra Tube Hold for add-ons.    CBC Auto Differential    Collection Time: 08/18/19  8:32 PM   Result Value Ref Range    WBC 8.32 3.40 - 10.80 10*3/mm3    RBC 5.13 4.14 - 5.80 10*6/mm3    Hemoglobin 15.6 13.0 - 17.7 g/dL    Hematocrit 47.9 37.5 - 51.0 %    MCV 93.4 79.0 - 97.0 fL    MCH 30.4 26.6 - 33.0 pg    MCHC 32.6 31.5 - 35.7 g/dL    RDW 12.7 12.3 - 15.4 %    RDW-SD 43.4 37.0 - 54.0 fl    MPV 10.4 6.0 - 12.0 fL    Platelets 222 140 - 450 10*3/mm3    Neutrophil % 73.9 42.7 - 76.0 %    Lymphocyte % 15.6 (L) 19.6 - 45.3 %    Monocyte % 7.0 5.0 - 12.0 %    Eosinophil % 2.5 0.3 - 6.2 %    Basophil % 0.5 0.0 - 1.5 %    Immature Grans % 0.5 0.0 - 0.5 %    Neutrophils, Absolute 6.15 1.70 - 7.00 10*3/mm3    Lymphocytes, Absolute 1.30 0.70 - 3.10 10*3/mm3    Monocytes, Absolute 0.58 0.10 - 0.90 10*3/mm3    Eosinophils, Absolute 0.21 0.00 - 0.40 10*3/mm3    Basophils, Absolute 0.04 0.00 - 0.20 10*3/mm3    Immature Grans, Absolute 0.04 0.00 - 0.05 10*3/mm3    nRBC 0.0 0.0 - 0.2  /100 WBC   Protime-INR    Collection Time: 08/18/19  8:32 PM   Result Value Ref Range    Protime 13.4 11.2 - 14.3 Seconds    INR 1.07 0.85 - 1.16   aPTT    Collection Time: 08/18/19  8:32 PM   Result Value Ref Range    PTT 28.0 24.0 - 37.0 seconds   Heparin Anti-Xa    Collection Time: 08/18/19  8:32 PM   Result Value Ref Range    Heparin Anti-Xa (UFH) 0.10 (L) 0.30 - 0.70 IU/ml   Troponin    Collection Time: 08/18/19 10:38 PM   Result Value Ref Range    Troponin T <0.010 0.000 - 0.030 ng/mL     Note: In addition to lab results from this visit, the labs listed above may include labs taken at another facility or during a different encounter within the last 24 hours. Please correlate lab times with ED admission and discharge times for further clarification of the services performed during this visit.    XR Chest 1 View   Final Result   No acute cardiopulmonary findings.      Signer Name: Shaan Crawford MD    Signed: 8/18/2019 9:04 PM    Workstation Name: [id number]     [facility]        Vitals:    08/18/19 2100 08/18/19 2105 08/18/19 2110 08/18/19 2115   BP: 108/70 126/73 127/77 126/71   Pulse: 66 66 63 67   Resp:       Temp:       TempSrc:       SpO2: 100% 100% 99% 100%   Weight:       Height:         Medications   sodium chloride 0.9 % flush 10 mL (not administered)   nitroglycerin 50 mg/250 mL (0.2 mg/mL) infusion (25 mcg/min Intravenous Rate/Dose Change 8/18/19 2244)   diltiaZEM (CARDIZEM) tablet 30 mg (not administered)   heparin 56966 units/250 mL (100 units/mL) in 0.45 % NaCl infusion (11.8 Units/kg/hr × 84.8 kg Intravenous New Bag 8/18/19 2242)   Pharmacy to Dose Heparin (not administered)   ondansetron (ZOFRAN) injection 4 mg (not administered)   aspirin chewable tablet 324 mg (324 mg Oral Given 8/18/19 2023)   sodium chloride 0.9 % bolus 1,000 mL (0 mL Intravenous Stopped 8/18/19 2128)   Morphine sulfate (PF) injection 4 mg (4 mg Intravenous Given 8/18/19 2121)   heparin (porcine)  injection 4,000 Units (4,000 Units Intravenous Given 8/18/19 2241)     ECG/EMG Results (last 24 hours)     Procedure Component Value Units Date/Time    ECG 12 Lead [[id number]] Collected:  08/18/19 2005     Updated:  08/18/19 2010    Narrative:       Test Reason : chest pain  Blood Pressure : **/** mmHG  Vent. Rate : 070 BPM     Atrial Rate : 070 BPM     P-R Int : 118 ms          QRS Dur : 094 ms      QT Int : 424 ms       P-R-T Axes : 062 035 056 degrees     QTc Int : 457 ms    Normal sinus rhythm  Cannot rule out Inferior infarct , age undetermined  Abnormal ECG  When compared with ECG of 30-JUN-2019 05:13,  Minimal criteria for Inferior infarct are now present  Confirmed by ATUL ANDERSON MD ([id number]) on 8/18/2019 8:09:53 PM    Referred By:  NESS           Confirmed By:ATUL ANDERSON MD    ECG 12 Lead [[id number]] Collected:  08/18/19 2105     Updated:  08/18/19 2150    Narrative:       Test Reason : angina  Blood Pressure : **/** mmHG  Vent. Rate : 066 BPM     Atrial Rate : 066 BPM     P-R Int : 128 ms          QRS Dur : 092 ms      QT Int : 440 ms       P-R-T Axes : 040 -02 038 degrees     QTc Int : 461 ms    Normal sinus rhythm  Inferior infarct (cited on or before 18-AUG-2019)  Abnormal ECG  When compared with ECG of 18-AUG-2019 20:05,  No significant change was found  Confirmed by ATUL ANDERSON MD ([id number]) on 8/18/2019 9:49:52 PM    Referred By:  JUSTIN           Confirmed By:ATUL ANDERSON MD    ECG 12 Lead [[id number]] Collected:  08/18/19 2239     Updated:  08/18/19 2326    Narrative:       Test Reason : 2nd set  Blood Pressure : **/** mmHG  Vent. Rate : 064 BPM     Atrial Rate : 064 BPM     P-R Int : 128 ms          QRS Dur : 088 ms      QT Int : 432 ms       P-R-T Axes : 062 005 044 degrees     QTc Int : 445 ms    Normal sinus rhythm  Inferior infarct (cited on or before 18-AUG-2019)  Abnormal ECG  When compared with ECG of 18-AUG-2019 21:05,  No significant change was found  Confirmed by ATUL ANDERSON MD ([id number]) on  8/18/2019 11:25:59 PM    Referred By:  TOMEKA MCKNIGHT           Confirmed By:ATUL ANDERSON MD        ECG 12 Lead   Final Result   Test Reason : 2nd set   Blood Pressure : **/** mmHG   Vent. Rate : 064 BPM     Atrial Rate : 064 BPM      P-R Int : 128 ms          QRS Dur : 088 ms       QT Int : 432 ms       P-R-T Axes : 062 005 044 degrees      QTc Int : 445 ms      Normal sinus rhythm   Inferior infarct (cited on or before 18-AUG-2019)   Abnormal ECG   When compared with ECG of 18-AUG-2019 21:05,   No significant change was found   Confirmed by ATUL ANDERSON MD ([id number]) on 8/18/2019 11:25:59 PM      Referred By:  TOMEKA MCKNIGHT           Confirmed By:ATUL ANDERSON MD      ECG 12 Lead   Final Result   Test Reason : angina   Blood Pressure : **/** mmHG   Vent. Rate : 066 BPM     Atrial Rate : 066 BPM      P-R Int : 128 ms          QRS Dur : 092 ms       QT Int : 440 ms       P-R-T Axes : 040 -02 038 degrees      QTc Int : 461 ms      Normal sinus rhythm   Inferior infarct (cited on or before 18-AUG-2019)   Abnormal ECG   When compared with ECG of 18-AUG-2019 20:05,   No significant change was found   Confirmed by ATUL ANDERSON MD ([id number]) on 8/18/2019 9:49:52 PM      Referred By:  JUSTIN           Confirmed By:ATUL ANDERSON MD      ECG 12 Lead   Final Result   Test Reason : chest pain   Blood Pressure : **/** mmHG   Vent. Rate : 070 BPM     Atrial Rate : 070 BPM      P-R Int : 118 ms          QRS Dur : 094 ms       QT Int : 424 ms       P-R-T Axes : 062 035 056 degrees      QTc Int : 457 ms      Normal sinus rhythm   Cannot rule out Inferior infarct , age undetermined   Abnormal ECG   When compared with ECG of 30-JUN-2019 05:13,   Minimal criteria for Inferior infarct are now present   Confirmed by ATUL ANDERSON MD ([id number]) on 8/18/2019 8:09:53 PM      Referred By:  NESS           Confirmed By:ATUL ANDERSON MD                  [facility]      Final diagnoses:   Acute coronary syndrome (CMS/HCC)   Coronary artery disease involving native heart with  angina pectoris and documented spasm, unspecified vessel or lesion type (CMS/[facility])   Anxiety            Luis Eduardo Inman MD  08/18/19 3688       Luis Eduardo Inman MD  08/18/19 3744

## 2019-08-19 NOTE — H&P
[facility] Medicine Services  HISTORY AND PHYSICAL    Patient Name: Rodrigo Baum  : 1945  MRN: [id number]  Primary Care Physician: TORI Fletcher MD  Date of admission: 2019      Subjective   Subjective     Chief Complaint:  Chest pain    HPI:  Rodrigo Baum is a 74 y.o. male with CAD s/p 2v CABG 2017, HLD, HTN, Hypothyroidism and CKD3 that presents to the ED with complaints of chest pain.  Patient was discharged from here on 19 after he underwent a LHC which showed that etiology of his NSTEMI was likely spontaneous plaque rupture with possible vasospasm. No intervention was done but he did have spasm of LIMA as well as apical LAD which improved with nitro.  Patient reports that his aspirin and plavix were DC'd at the beginning of August due to hematuria.  Tonight he developed midsternal chest pain that radiated to his left neck with associated shortness of air, nausea, and diaphoresis.  He reports his pain was similar to when he had his MI.  He did take nitro x 3 doses at home with no relief of his pain.  He denies fever, cough, vomiting, abdominal pain, diarrhea, edema, or any other complaints at this time.  Troponin x 2 is negative.  ED discussed case with Dr. Muñoz who recommended heparin drip, hold plavix, and low dose diltiazem.  Patient is being admitted to the Hospitalist for further evaluation and management.         Review of Systems   Constitutional: Positive for diaphoresis. Negative for activity change, appetite change, chills, fatigue and fever.   HENT: Negative.    Eyes: Negative.    Respiratory: Positive for shortness of breath. Negative for cough and wheezing.    Cardiovascular: Positive for chest pain. Negative for palpitations and leg swelling.   Gastrointestinal: Positive for nausea. Negative for abdominal distention, abdominal pain, constipation, diarrhea and vomiting.   Endocrine: Negative.    Genitourinary: Negative.    Musculoskeletal:  Negative.    Skin: Negative.    Allergic/Immunologic: Negative.    Neurological: Negative.    Hematological: Negative.    Psychiatric/Behavioral: Negative.           All other systems reviewed and are negative.     Personal History     Past Medical History:   Diagnosis Date   • BPH (benign prostatic hyperplasia)    • CAD (coronary artery disease)    • Chronic venous stasis    • CKD (chronic kidney disease), stage III (CMS/[facility])    • Claustrophobia    • GERD (gastroesophageal reflux disease)    • HLD (hyperlipidemia)    • HTN (hypertension)    • Hypothyroidism    • OA (osteoarthritis) of ankle/foot    • OA (osteoarthritis) of hip    • Osteoporosis    • RA (rheumatoid arthritis) (CMS/[facility])        Past Surgical History:   Procedure Laterality Date   • CARDIAC CATHETERIZATION N/A 1/24/2018    Procedure: Left Heart Cath;  Surgeon: Susannah Rasmussen MD;  Location:  [facility];  Service:    • CARDIAC CATHETERIZATION N/A 7/1/2019    Procedure: Left Heart Cath;  Surgeon: Susannah Rasmussen MD;  Location:  [facility];  Service: Cardiovascular   • COLONOSCOPY     • CORONARY ANGIOPLASTY WITH STENT PLACEMENT      X2   • CORONARY ARTERY BYPASS GRAFT N/A 1/26/2018    Procedure: CORONARY ARTERY BYPASS X 2 WITH INTERNAL MAMMARY ARTERY GRAFT, ENDOSCOPIC VEIN HARVESTING UTILIZING THE LEFT GREATER SAPPHENOUS VEIN  CYSTO BY DR WATERS;  Surgeon: Casey Morales MD;  Location:  [facility] OR;  Service:    • CYSTOSCOPY TRANSURETHRAL RESECTION OF PROSTATE N/A 11/1/2016    Procedure: CYSTOSCOPY TRANSURETHRAL RESECTION OF PROSTATE GREENLIGHT;  Surgeon: Alverto Richards MD;  Location:  [facility];  Service:    • TONSILLECTOMY     • UPPER GASTROINTESTINAL ENDOSCOPY         Family History: family history includes Diabetes in his father; Heart attack in his father; Heart disease in his father; Hypertension in his father and mother; No Known Problems in his maternal grandfather and maternal grandmother; Osteoarthritis in his  father and mother; Stroke in his mother, paternal grandfather, and paternal grandmother. Otherwise pertinent FHx was reviewed and unremarkable.     Social History:  reports that he quit smoking about 25 years ago. His smoking use included cigarettes. He started smoking about 45 years ago. He has a 40.00 pack-year smoking history. He has never used smokeless tobacco. He reports that he drinks about 1.8 oz of alcohol per week. He reports that he does not use drugs.  Social History     Social History Narrative    Caffeine: 1 servings daily       Medications:    Available home medication information reviewed.  Medications Prior to Admission   Medication Sig Dispense Refill Last Dose   • ciprofloxacin (CIPRO) 500 MG tablet Take 500 mg by mouth 2 (Two) Times a Day.      • finasteride (PROSCAR) 5 MG tablet Take 5 mg by mouth Daily.   Taking   • glucosamine-chondroitin 500-400 MG capsule capsule Take 2 capsules by mouth Daily.   Taking   • levothyroxine (SYNTHROID, LEVOTHROID) 125 MCG tablet Take 125 mcg by mouth Daily.   Taking   • lisinopril (PRINIVIL,ZESTRIL) 10 MG tablet Take 1 tablet by mouth Daily. 90 tablet 3 Taking   • Oyheoeh-Ffvya-Vbqx-PassF-LBalm (MELATONIN + L-THEANINE PO) Take 1 tablet by mouth Daily As Needed.   Taking   • metoprolol succinate XL (TOPROL-XL) 50 MG 24 hr tablet Take 50 mg by mouth Daily.   Taking   • Multiple Vitamins-Minerals (CENTRUM SILVER 50+MEN PO) Take  by mouth.   Taking   • nitroglycerin (NITROSTAT) 0.4 MG SL tablet Place 1 tablet under the tongue Every 5 (Five) Minutes As Needed for Chest Pain. Take no more than 3 doses in 15 minutes. 25 tablet 0 Taking   • omeprazole (priLOSEC) 40 MG capsule TAKE 1 CAPSULE BY MOUTH TWICE DAILY (Patient taking differently: TAKE 1 CAPSULE BY MOUTH ONCE DAILY) 180 capsule 0 Taking   • predniSONE (DELTASONE) 5 MG tablet Take 2.5 mg by mouth Daily.   Taking   • rosuvastatin (CRESTOR) 40 MG tablet Take 1 tablet by mouth Every Night. 90 tablet 2 Taking   •  tamsulosin (FLOMAX) 0.4 MG capsule 24 hr capsule Take 1 capsule by mouth 2 (Two) Times a Day.   Taking   • aspirin 81 MG EC tablet Take 325 mg by mouth Daily.   Taking   • clopidogrel (PLAVIX) 75 MG tablet Take 1 tablet by mouth Daily. 30 tablet 0 Not Taking   • doxycycline (VIBRAMYCIN) 100 MG capsule Take 100 mg by mouth Daily.   Taking   • ondansetron (ZOFRAN) 4 MG tablet Take 4 mg by mouth Every 8 (Eight) Hours As Needed for Nausea or Vomiting.   Not Taking       Allergies   Allergen Reactions   • Lipitor [Atorvastatin] Anxiety and Myalgia          • Lortab [Hydrocodone-Acetaminophen] Anxiety   • Azithromycin Nausea Only       Objective   Objective     Vital Signs:   Temp:  [97.4 °F (36.3 °C)-97.5 °F (36.4 °C)] 97.5 °F (36.4 °C)  Heart Rate:  [59-71] 59  Resp:  [16] 16  BP: (101-137)/(58-84) 112/73   Total (NIH Stroke Scale): 0    Physical Exam   Constitutional: He is oriented to person, place, and time. He appears well-developed. No distress.   HENT:   Head: Normocephalic.   Eyes: Pupils are equal, round, and reactive to light.   Neck: Normal range of motion. Neck supple.   Cardiovascular: Normal rate, regular rhythm, normal heart sounds and intact distal pulses. Exam reveals no gallop and no friction rub.   No murmur heard.  Pulmonary/Chest: Effort normal and breath sounds normal. No stridor. No respiratory distress. He has no wheezes. He has no rales.   Abdominal: Soft. Bowel sounds are normal. He exhibits no distension and no mass. There is no tenderness. There is no rebound and no guarding.   Musculoskeletal: Normal range of motion. He exhibits no edema, tenderness or deformity.   Neurological: He is alert and oriented to person, place, and time. No cranial nerve deficit.   Skin: Skin is warm and dry. No rash noted. He is not diaphoretic. No erythema. No pallor.   Psychiatric: He has a normal mood and affect. His behavior is normal. Judgment and thought content normal.        Results Reviewed:  I have  personally reviewed current lab and radiology data.    Results from last 7 days   Lab Units 08/18/19 2032   WBC 10*3/mm3 8.32   HEMOGLOBIN g/dL 15.6   HEMATOCRIT % 47.9   PLATELETS 10*3/mm3 222   INR  1.07     Results from last 7 days   Lab Units 08/18/19 2238 08/18/19 2032   SODIUM mmol/L  --  140   POTASSIUM mmol/L  --  3.8   CHLORIDE mmol/L  --  101   CO2 mmol/L  --  24.0   BUN mg/dL  --  20   CREATININE mg/dL  --  1.23   GLUCOSE mg/dL  --  139*   CALCIUM mg/dL  --  9.1   ALT (SGPT) U/L  --  29   AST (SGOT) U/L  --  44*   TROPONIN T ng/mL <0.010 <0.010   PROBNP pg/mL  --  67.1     Estimated Creatinine Clearance: 69.2 mL/min (by C-G formula based on SCr of 1.23 mg/dL).  Brief Urine Lab Results     None        Imaging Results (last 24 hours)     Procedure Component Value Units Date/Time    XR Chest 1 View [[id number]] Collected:  08/18/19 2104     Updated:  08/18/19 2106    Narrative:       CR Chest 1 Vw 8/18/2019    INDICATION:   Acute onset of chest pain tonight. Coronary artery disease and CABG.     COMPARISON:    6/29/2019    FINDINGS:  Single portable AP view(s) of the chest.  The heart is normal in size status post median sternotomy. The lungs are clear. There are no pleural effusions.      Impression:       No acute cardiopulmonary findings.    Signer Name: Shaan Crawford MD   Signed: 8/18/2019 9:04 PM   Workstation Name: [id number]    [facility]        Results for orders placed during the hospital encounter of 01/24/18   Adult Transthoracic Echo Complete W/ Cont if Necessary Per Protocol    Narrative · Left ventricular wall thickness is consistent with borderline concentric   hypertrophy.  · Left ventricular systolic function is normal. Estimated EF = 65%.  · Trace mitral ejection, trace tricuspid regurgitation.  · Mild biatrial enlargement.          Assessment/Plan   Assessment / Plan     Active Hospital Problems    Diagnosis POA   • **Chest pain [R07.9] Yes   • Cirrhosis of liver  (CMS/[facility]) [K74.60] Yes   • Dyslipidemia [E78.5] Yes     1. Dyslipidemia:  a. November 2009:  Total cholesterol 163, triglycerides 169, HDL 55, LDL 81.  b. 02/30/2012:  Total cholesterol 242, HDL 51, triglycerides 192, , off statin therapy.     • GERD (gastroesophageal reflux disease) [K21.9] Yes   • Hypertension [I10] Yes   • Hypothyroidism [E03.9] Yes   • CAD (coronary artery disease), s/p CABG x 2 [I25.10] Yes     2. CAD:  a. 11/15/1993: PTCA and repeat PTCA 1 week following of proximal LAD, per Dr. Lovelace with normal circumflex and RCA noted at that time.   b. April 2008: Overlapping 3.0 x 24 mm. And 3.0 x 16.0 mm Taxus to LAD and 2.5 x 8.0 PTCA to proximal first OM, inability to pass stent.  EF greater than 60.  c. Stress echocardiogram, 03/08/2012, within normal limits: EF 60%, 10.1 SHARIFA.    d. March 2012: Stress echocardiogram, which was within normal limits. EF greater than 60%. Normal hemodynamic response with exercise.        • HLD (hyperlipidemia) [E78.5] Yes       ASSESSMENT and PLAN:    1.  Chest pain r/o ACS   -received aspirin in the ED   -continue nitro drip   -continue heparin drip   -consult cardiology in the am   -trend troponin   -continue metoprolol   -cardizem 30 mg Q 6 hours   -cbc, bmp in the am    2.  CAD s/p CABG   -plavix was DC'd around the beginning of August due to hematuria and urinary clots   -aspirin given in the ED       3.  HTN   -metoprolol    4.  HLD   -continue statin    5.  Hypothyroidism   -continue synthroid    6.  GERD    7.  H/O cirrhosis of the liver    DVT prophylaxis:  On Heparin drip, mechanical    CODE STATUS:    Code Status and Medical Interventions:   Ordered at: 08/19/19 0218     Level Of Support Discussed With:    Patient     Code Status:    CPR     Medical Interventions (Level of Support Prior to Arrest):    Full       Admission Status:  I believe this patient meets OBSERVATION status, however if further evaluation or treatment plans warrant, status may  change.  Based upon current information, I predict patient's care encounter to be less than or equal to 2 midnights.        Electronically signed by JEREMY Mata, 08/19/19, 1:14 AM.    Brief Attending Admission Attestation     I have seen and examined the patient, performing an independent face-to-face diagnostic evaluation with plan of care reviewed and developed with the advanced practice clinician (APC).      Brief Summary Statement:   Rodrigo Baum is a 74 y.o. male patient with a PMH significant for CAD status post CABG, HTN/HLD, GERD, cirrhosis of the liver who presents to the ED with complaints of chest pain.  Patient reports onset of midsternal chest pain radiating to his left neck tonight while at home.  He reports associated nausea, diaphoresis, shortness of breath.  He took 3 nitroglycerin at home with no relief of symptoms.  He presented to the ED for further evaluation and care.  Of note, patient was evaluated here July 1, 2019 due to chest pain where he was found to have a vasospasm of the LIMA and apical LAD which is felt to be secondary to a spontaneous plaque rupture.    Remainder of detailed HPI is as noted above and has been reviewed and/or edited by me for completeness.      Attending Physical Exam:  Constitutional: Awake, alert  Eyes: PERRLA, sclerae anicteric, no conjunctival injection  HENT: NCAT, mucous membranes moist  Neck: Supple, no thyromegaly, no lymphadenopathy, trachea midline  Respiratory: Clear to auscultation bilaterally, nonlabored respirations   Cardiovascular: RRR, no murmurs, rubs, or gallops, palpable pedal pulses bilaterally  Gastrointestinal: Positive bowel sounds, soft, nontender, nondistended  Musculoskeletal: No bilateral ankle edema, no clubbing or cyanosis to extremities  Psychiatric: Appropriate affect, cooperative  Neurologic: Oriented x 3, strength symmetric in all extremities, Cranial Nerves grossly intact to confrontation, speech clear  Skin: No  tawanna        Brief Assessment/Plan :  See above for further detailed assessment and plan developed with APC which I have reviewed and/or edited for completeness.      Electronically signed by Missy David DO, 08/19/19, 4:11 AM.

## 2019-08-20 VITALS
OXYGEN SATURATION: 95 % | TEMPERATURE: 98.4 F | HEIGHT: 71 IN | DIASTOLIC BLOOD PRESSURE: 80 MMHG | BODY MASS INDEX: 27.8 KG/M2 | HEART RATE: 60 BPM | RESPIRATION RATE: 18 BRPM | SYSTOLIC BLOOD PRESSURE: 135 MMHG | WEIGHT: 198.6 LBS

## 2019-08-20 PROCEDURE — 99217 PR OBSERVATION CARE DISCHARGE MANAGEMENT: CPT | Performed by: HOSPITALIST

## 2019-08-20 PROCEDURE — 63710000001 PREDNISONE PER 5 MG: Performed by: HOSPITALIST

## 2019-08-20 PROCEDURE — 96361 HYDRATE IV INFUSION ADD-ON: CPT

## 2019-08-20 PROCEDURE — G0378 HOSPITAL OBSERVATION PER HR: HCPCS

## 2019-08-20 PROCEDURE — 99214 OFFICE O/P EST MOD 30 MIN: CPT | Performed by: PHYSICIAN ASSISTANT

## 2019-08-20 RX ORDER — ASPIRIN 81 MG/1
81 TABLET ORAL DAILY
Qty: 30 TABLET | Refills: 3 | Status: SHIPPED | OUTPATIENT
Start: 2019-08-21 | End: 2019-12-19

## 2019-08-20 RX ORDER — RANOLAZINE 500 MG/1
500 TABLET, EXTENDED RELEASE ORAL EVERY 12 HOURS SCHEDULED
Start: 2019-08-20 | End: 2019-08-21

## 2019-08-20 RX ORDER — RANOLAZINE 500 MG/1
500 TABLET, EXTENDED RELEASE ORAL EVERY 12 HOURS SCHEDULED
Qty: 60 TABLET | Refills: 3 | Status: CANCELLED | OUTPATIENT
Start: 2019-08-20

## 2019-08-20 RX ADMIN — PREDNISONE 2.5 MG: 5 TABLET ORAL at 09:12

## 2019-08-20 RX ADMIN — RANOLAZINE 500 MG: 500 TABLET, EXTENDED RELEASE ORAL at 06:14

## 2019-08-20 RX ADMIN — TAMSULOSIN HYDROCHLORIDE 0.4 MG: 0.4 CAPSULE ORAL at 09:12

## 2019-08-20 RX ADMIN — ASPIRIN 81 MG: 81 TABLET, COATED ORAL at 09:12

## 2019-08-20 RX ADMIN — LEVOTHYROXINE SODIUM 125 MCG: 125 TABLET ORAL at 06:13

## 2019-08-20 RX ADMIN — ROSUVASTATIN CALCIUM 40 MG: 20 TABLET, FILM COATED ORAL at 11:22

## 2019-08-20 RX ADMIN — METOPROLOL SUCCINATE 25 MG: 25 TABLET, EXTENDED RELEASE ORAL at 09:12

## 2019-08-20 RX ADMIN — SODIUM CHLORIDE 75 ML/HR: 9 INJECTION, SOLUTION INTRAVENOUS at 04:12

## 2019-08-20 RX ADMIN — PANTOPRAZOLE SODIUM 40 MG: 40 TABLET, DELAYED RELEASE ORAL at 06:14

## 2019-08-20 RX ADMIN — FINASTERIDE 5 MG: 5 TABLET, FILM COATED ORAL at 09:11

## 2019-08-20 RX ADMIN — AMLODIPINE BESYLATE 5 MG: 5 TABLET ORAL at 09:12

## 2019-08-20 NOTE — DISCHARGE SUMMARY
[facility] Medicine Services  DISCHARGE SUMMARY    Patient Name: Rodrigo Baum  : 1945  MRN: [id number]    Date of Admission: 2019  Date of Discharge:  2019 (Tuesday)  Primary Care Physician: TORI Fletcher MD    Consults     Date and Time Order Name Status Description    2019 0218 Inpatient Cardiology Consult Completed         Susannah Rasmussen MD - Cardiology  Tal Wagner MD - Cardiology    Hospital Course     Presenting Problem:   Chest pain [R07.9]    Active Hospital Problems    Diagnosis  POA   • **Chest pain [R07.9]  Yes   • Cirrhosis of liver (CMS/HCC) [K74.60]  Yes   • Coronary artery disease involving native coronary artery of native heart with angina pectoris (CMS/HCC) [I25.119]  Yes   • GERD (gastroesophageal reflux disease) [K21.9]  Yes   • Essential hypertension [I10]  Yes   • Hypothyroidism [E03.9]  Yes   • Hyperlipidemia LDL goal <70 [E78.5]  Yes      Resolved Hospital Problems   No resolved problems to display.          Hospital Course:  Rodrigo Baum is a 74 y.o. male with known coronary artery disease with history of CABG and coronary stenting who presented with chest pain.  He was placed on heparin infusion, but developed hematuria while in the hospital.  He was seen by Cardiology and metoprolol was held due to low blood pressure.  Isosorbide was also discontinued due to side effects.  It was fel that his chest pain could be due to vasospastic angina vs esophageal spasms and acid reflux disease.  His medical therapy was optimized by medication adjustments and Ranexa was added for chronic angina.  Additionally, dual antiplatelet therapy was held due to hematuria and anticipated urologic procedures.  He was restarted on low dose aspirin therapy and was continued while here.  The metoprolol was decreased to 25 mg daily and lisinopril was discontinued as he had low blood pressure of 88/56 captured while here.  He may benefit from outpatient GI  evaluation, but many cardiac medications were adjusted and discontinued here including d/c diltiazem, d/c lisinopril, decreased aspirin from 325 to 81 and discontined Plavix.  He will follow up with Dr. Bruce on Thursday of this week due to hematuria.          Day of Discharge     HPI: no hematuria today.  Says he has had this before and has seen Dr. Bruce previously.  Says he has appt on  Thursday.  Denies chest pain today.  Says he would like to go home today.      Review of Systems    Gen- No fevers, chills  CV- No chest pain, palpitations  Resp- No cough, dyspnea  GI- No N/V/D, abd pain    Otherwise ROS is negative except as mentioned in the HPI.    Vital Signs:   Temp:  [98 °F (36.7 °C)-98.4 °F (36.9 °C)] 98.4 °F (36.9 °C)  Heart Rate:  [60-82] 60  Resp:  [16-18] 18  BP: ()/(56-80) 135/80     Physical Exam:    Constitutional: No acute distress, awake, alert  HENT: NCAT, mucous membranes moist  Respiratory: Clear to auscultation bilaterally, respiratory effort normal   Cardiovascular: RRR, s1 and s2  Gastrointestinal: Positive bowel sounds, soft, nontender, obese abdomen  Musculoskeletal: No bilateral ankle edema  Psychiatric: flat affect, cooperative  Neurologic: Oriented x 3, strength symmetric in all extremities, Cranial Nerves grossly intact to confrontation, speech clear  Skin: No rashes    Pertinent  and/or Most Recent Results     Results from last 7 days   Lab Units 08/19/19 0518 08/18/19 2032   WBC 10*3/mm3  --  8.32   HEMOGLOBIN g/dL  --  15.6   HEMATOCRIT %  --  47.9   PLATELETS 10*3/mm3  --  222   SODIUM mmol/L 139 140   POTASSIUM mmol/L 4.1 3.8   CHLORIDE mmol/L 107 101   CO2 mmol/L 22.0 24.0   BUN mg/dL 18 20   CREATININE mg/dL 0.89 1.23   GLUCOSE mg/dL 110* 139*   CALCIUM mg/dL 8.3* 9.1     Results from last 7 days   Lab Units 08/18/19 2032   BILIRUBIN mg/dL 1.7*   ALK PHOS U/L 85   ALT (SGPT) U/L 29   AST (SGOT) U/L 44*   PROTIME Seconds 13.4   INR  1.07   APTT seconds 28.0      Results from last 7 days   Lab Units 08/19/19  0518   CHOLESTEROL mg/dL 89   TRIGLYCERIDES mg/dL 124   HDL CHOL mg/dL 48     Results from last 7 days   Lab Units 08/19/19  0518 08/18/19 2238 08/18/19 2032   HEMOGLOBIN A1C % 5.70*  --   --    PROBNP pg/mL  --   --  67.1   TROPONIN T ng/mL <0.010 <0.010 <0.010       Brief Urine Lab Results     None        Microbiology Results Abnormal     None        Imaging Results (all)     Procedure Component Value Units Date/Time    XR Chest 1 View [[id number]] Collected:  08/18/19 2104     Updated:  08/18/19 2106    Narrative:       CR Chest 1 Vw 8/18/2019    INDICATION:   Acute onset of chest pain tonight. Coronary artery disease and CABG.     COMPARISON:    6/29/2019    FINDINGS:  Single portable AP view(s) of the chest.  The heart is normal in size status post median sternotomy. The lungs are clear. There are no pleural effusions.      Impression:       No acute cardiopulmonary findings.    Signer Name: Shaan Crawford MD   Signed: 8/18/2019 9:04 PM   Workstation Name: [id number]    [facility]          Results for orders placed during the hospital encounter of 01/24/18   Duplex Carotid Ultrasound CAR    Narrative · No evidence of hemodynamically significant stenosis of bilateral carotid   arteries.  · Bilateral intimal thickening and mild plaque is noted.        Results for orders placed during the hospital encounter of 01/24/18   Duplex Carotid Ultrasound CAR    Narrative · No evidence of hemodynamically significant stenosis of bilateral carotid   arteries.  · Bilateral intimal thickening and mild plaque is noted.        Results for orders placed during the hospital encounter of 01/24/18   Adult Transthoracic Echo Complete W/ Cont if Necessary Per Protocol    Narrative · Left ventricular wall thickness is consistent with borderline concentric   hypertrophy.  · Left ventricular systolic function is normal. Estimated EF = 65%.  · Trace mitral  ejection, trace tricuspid regurgitation.  · Mild biatrial enlargement.            Discharge Details        Discharge Medications      New Medications      Instructions Start Date   amLODIPine 5 MG tablet  Commonly known as:  NORVASC   5 mg, Oral, Every 24 Hours Scheduled      ranolazine 500 MG 12 hr tablet  Commonly known as:  RANEXA   500 mg, Oral, Every 12 Hours Scheduled         Changes to Medications      Instructions Start Date   aspirin 81 MG EC tablet  What changed:  how much to take   81 mg, Oral, Daily   Start Date:  8/21/2019     metoprolol succinate XL 25 MG 24 hr tablet  Commonly known as:  TOPROL-XL  What changed:    · medication strength  · how much to take   25 mg, Oral, Daily      omeprazole 40 MG capsule  Commonly known as:  priLOSEC  What changed:    · how much to take  · how to take this  · when to take this   TAKE 1 CAPSULE BY MOUTH TWICE DAILY         Continue These Medications      Instructions Start Date   CENTRUM SILVER 50+MEN PO   Oral      ciprofloxacin 500 MG tablet  Commonly known as:  CIPRO   500 mg, Oral, 2 Times Daily      doxycycline 100 MG capsule  Commonly known as:  VIBRAMYCIN   100 mg, Oral, Daily      finasteride 5 MG tablet  Commonly known as:  PROSCAR   5 mg, Oral, Daily      glucosamine-chondroitin 500-400 MG capsule capsule   2 capsules, Oral, Daily      levothyroxine 125 MCG tablet  Commonly known as:  SYNTHROID, LEVOTHROID   125 mcg, Oral, Daily      MELATONIN + L-THEANINE PO   1 tablet, Oral, Daily PRN      nitroglycerin 0.4 MG SL tablet  Commonly known as:  NITROSTAT   0.4 mg, Sublingual, Every 5 Minutes PRN, Take no more than 3 doses in 15 minutes.       ondansetron 4 MG tablet  Commonly known as:  ZOFRAN   4 mg, Oral, Every 8 Hours PRN      predniSONE 5 MG tablet  Commonly known as:  DELTASONE   2.5 mg, Oral, Daily      rosuvastatin 40 MG tablet  Commonly known as:  CRESTOR   40 mg, Oral, Nightly      tamsulosin 0.4 MG capsule 24 hr capsule  Commonly known as:  FLOMAX    1 capsule, Oral, 2 Times Daily - RT         Stop These Medications    clopidogrel 75 MG tablet  Commonly known as:  PLAVIX     lisinopril 10 MG tablet  Commonly known as:  PRINIVIL,ZESTRIL            Allergies   Allergen Reactions   • Lipitor [Atorvastatin] Anxiety and Myalgia          • Lortab [Hydrocodone-Acetaminophen] Anxiety   • Azithromycin Nausea Only     Discharge Disposition:  Home or Self Care    Discharge Diet:  Diet Order   Procedures   • Diet Regular; Cardiac     Discharge Activity: as tolerated    CODE STATUS:    Code Status and Medical Interventions:   Ordered at: 08/19/19 0218     Level Of Support Discussed With:    Patient     Code Status:    CPR     Medical Interventions (Level of Support Prior to Arrest):    Full         Future Appointments   Date Time Provider Department Center   8/21/2019  3:20 PM Krystina Li PA-C [facility] None   8/21/2019  4:00 PM [facility] - [facility]   8/23/2019  4:00 PM [facility]   8/26/2019  4:00 PM [facility]   8/28/2019  4:00 PM [facility]   8/30/2019  4:00 PM [facility]   9/4/2019  3:00 PM MEDICATION CLASS - [facility]  [facility]   9/4/2019  4:00 PM [facility] II -  [facility]   9/6/2019  4:00 PM [facility] II -  [facility]   9/9/2019  4:00 PM [facility] PHASE II -  [facility]   9/11/2019  4:00 PM [facility] PHASE II -  [facility]  [facility]   9/13/2019  4:00 PM [facility] PHASE II -  [facility]  [facility]   9/16/2019  4:00 PM [facility] PHASE II - [facility]  [facility]   9/18/2019  4:00 PM [facility] PHASE II - [facility]  [facility]   9/20/2019  4:00 PM [facility] PHASE II -  [facility]  [facility]   9/23/2019  4:00 PM [facility] PHASE II -  [facility]   9/25/2019  4:00 PM [facility]  PHASE II - [facility]   9/27/2019  4:00 PM [facility] PHASE II - [facility]   9/30/2019  4:00 PM [facility] PH[facility] II - [facility]   10/2/2019  4:00 PM [facility] PHASE II - [facility]   10/4/2019  4:00 PM [facility] PHASE II - [facility]   10/7/2019  4:00 PM [facility] PHASE II - [facility]   10/9/2019  4:00 PM [facility] PHASE II - [facility]   10/11/2019  3:00 PM DIETITIAN - [facility]   10/11/2019  4:00 PM [facility] PHASE II - [facility]   10/14/2019  4:00 PM [facility] PHASE II - [facility]  [facility]   10/16/2019  4:00 PM [facility] PHASE II - [facility]  [facility]   10/18/2019  4:00 PM [facility] PHASE II - [facility]  [facility]   10/21/2019  4:00 PM [facility] PHASE II - [facility]  [facility]   10/23/2019  4:00 PM [facility] PHASE II - [facility]  [facility]   10/25/2019  4:00 PM [facility] PHASE II - [facility]  [facility]   10/28/2019  4:00 PM [facility] PHASE II - [facility]  [facility]   10/30/2019  4:00 PM [facility] PHASE II - [facility]  [facility]   11/1/2019  4:00 PM [facility] PHASE II - [facility]  [facility]   11/4/2019  4:00 PM [facility] PHASE II - [facility]   11/6/2019  4:00 PM [facility] PHASE II - [facility]   11/8/2019  4:00 PM [facility] PHASE II - [facility]   11/11/2019  4:00 PM [facility] PHASE II - [facility]   11/13/2019  4:00 PM [facility] PHASE II - [facility]   11/15/2019  4:00 PM [facility] PHASE II - [facility]  [facility]   11/18/2019  4:00 PM [facility] PHASE II - [facility]  [facility]   11/20/2019  4:00 PM [facility] PHASE II - [facility]  [facility]   11/22/2019  4:00 PM [facility] PHASE II -  [facility]  [facility]   11/25/2019  4:00 PM [facility] PHASE II -  [facility]  [facility]    11/27/2019  4:00 PM [facility] PH[facility] II - [facility]   5/15/2020 11:45 AM Tal Wagner MD [facility] None       Additional Instructions for the Follow-ups that You Need to Schedule     Discharge Follow-up with PCP   As directed       Currently Documented PCP:    TORI Fletcher MD    PCP Phone Number:    [phone]     Follow Up Details:  Primary Care Doctor - Dr. Fletcher - 1 week - blood pressure check - referral for outpatient sleep study         Discharge Follow-up with Specialty: [facility] - Gastroenterology; 1 Month   As directed      Specialty:  [facility] - Gastroenterology    Follow Up:  1 Month    Follow Up Details:  Cardiology recommending GI evaluation due to concern of esophageal spasms with known history of Saenz's esophagus/GERD.         Discharge Follow-up with Specialty: Cardiology - Dr. Wagner; 6 Weeks   As directed      Specialty:  Cardiology - Dr. Wagner    Follow Up:  6 Weeks    Follow Up Details:  recommended follow up after admission for chest pain         Discharge Follow-up with Specialty: Dr. Bruce - Urology; 2 Days   As directed      Specialty:  Dr. Bruce - Urology    Follow Up:  2 Days    Follow Up Details:  hematuria evaluation             Seen by Cardiology team with hand written prescription on the chart for new medications.      Encouraged to follow up with Dr. Bruce for hematuria seen here while on heparin infusion.    Time Spent on Discharge:  36  minutes    Electronically signed by Celestino Guzman MD, 08/20/19, 10:15 AM.

## 2019-08-20 NOTE — PROGRESS NOTES
"[facility] at [facility] Progress Note     LOS: 0 days   Patient Care Team:  TORI Fletcher MD as PCP - General  TORI Fletcher MD as PCP - Family Medicine  TORI Fletcher MD as PCP - Claims Attributed  Tal Wagner MD as Consulting Physician (Cardiology)  PCP:  TORI Fletcher MD    Chief Complaint:  cp    SUBJECTIVE:   No further CP. Pt reports litany of concerns involving his medication changes, possible side effects, insurance coverage, and communication with our office. No further hematuria. Eager to go home.       Review of Systems:   All systems have been reviewed and are negative with the exception of those mentioned above.      OBJECTIVE:    Vital Sign Min/Max for last 24 hours  Temp  Min: 98 °F (36.7 °C)  Max: 98.4 °F (36.9 °C)   BP  Min: 88/56  Max: 135/80   Pulse  Min: 56  Max: 82   Resp  Min: 16  Max: 18   SpO2  Min: 95 %  Max: 95 %   No Data Recorded   No Data Recorded     Flowsheet Rows      First Filed Value   Admission Height  180.3 cm (71\") Documented at 08/18/2019 2005   Admission Weight  84.8 kg (187 lb) Documented at 08/18/2019 2005          Telemetry: sr 67      Intake/Output Summary (Last 24 hours) at 8/20/2019 0857  Last data filed at 8/20/2019 0612  Gross per 24 hour   Intake 1608 ml   Output 1400 ml   Net 208 ml     Intake & Output (last 3 days)       08/17 0701 - 08/18 0700 08/18 0701 - 08/19 0700 08/19 0701 - 08/20 0700 08/20 0701 - 08/21 0700    P.O.   720     I.V. (mL/kg)   888 (9.9)     IV Piggyback  1000      Total Intake(mL/kg)  1000 (11.1) 1608 (17.8)     Urine (mL/kg/hr)  330 1400 (0.6)     Total Output  330 1400     Net  +670 +208             Urine Unmeasured Occurrence   1 x            Physical Exam:  Physical Exam   Constitutional: He is oriented to person, place, and time. He appears well-developed and well-nourished. No distress.   Cardiovascular: Normal rate, regular rhythm, normal heart sounds and intact distal pulses.   No murmur " heard.  Pulses:       Radial pulses are 2+ on the right side, and 2+ on the left side.        Dorsalis pedis pulses are 2+ on the right side, and 2+ on the left side.        Posterior tibial pulses are 2+ on the right side, and 2+ on the left side.   Pulmonary/Chest: Effort normal and breath sounds normal. He has no wheezes. He has no rales.   Abdominal: Soft. Bowel sounds are normal. There is no tenderness. There is no guarding.   Musculoskeletal: He exhibits no edema or tenderness.   Neurological: He is alert and oriented to person, place, and time.   Skin: Skin is warm and dry. No rash noted.   Psychiatric: He has a normal mood and affect.   Nursing note and vitals reviewed.       LABS/DIAGNOSTIC DATA:  Results from last 7 days   Lab Units 08/18/19 2032   WBC 10*3/mm3 8.32   HEMOGLOBIN g/dL 15.6   HEMATOCRIT % 47.9   PLATELETS 10*3/mm3 222     Lab Results   Lab Value Date/Time    TROPONINT <0.010 08/19/2019 0518    TROPONINT <0.010 08/18/2019 2238    TROPONINT <0.010 08/18/2019 2032    TROPONINT 0.111 (C) 07/01/2019 0423    TROPONINT 0.129 (C) 06/30/2019 1348    TROPONINT 0.196 (C) 06/30/2019 0455    TROPONINT 0.167 (C) 06/29/2019 2349    TROPONINT 0.069 (C) 06/29/2019 1654    TROPONINT <0.010 06/29/2019 1436     Results from last 7 days   Lab Units 08/18/19 2032   INR  1.07   APTT seconds 28.0     Results from last 7 days   Lab Units 08/19/19  0518 08/18/19 2032   SODIUM mmol/L 139 140   POTASSIUM mmol/L 4.1 3.8   CHLORIDE mmol/L 107 101   CO2 mmol/L 22.0 24.0   BUN mg/dL 18 20   CREATININE mg/dL 0.89 1.23   CALCIUM mg/dL 8.3* 9.1   BILIRUBIN mg/dL  --  1.7*   ALK PHOS U/L  --  85   ALT (SGPT) U/L  --  29   AST (SGOT) U/L  --  44*   GLUCOSE mg/dL 110* 139*     Results from last 7 days   Lab Units 08/19/19  0518   HEMOGLOBIN A1C % 5.70*     Results from last 7 days   Lab Units 08/19/19  0518   CHOLESTEROL mg/dL 89   TRIGLYCERIDES mg/dL 124   HDL CHOL mg/dL 48   LDL CHOL mg/dL 16               Medication  Review:     amLODIPine 5 mg Oral Q24H   aspirin 81 mg Oral Daily   finasteride 5 mg Oral Daily   levothyroxine 125 mcg Oral Daily   metoprolol succinate XL 25 mg Oral Daily   pantoprazole 40 mg Oral Q AM   Pharmacy Meds to Bed Consult  Does not apply Daily   predniSONE 2.5 mg Oral Daily With Breakfast   ranolazine 500 mg Oral Q12H   rosuvastatin 40 mg Oral Daily   sodium chloride 3 mL Intravenous Q12H   tamsulosin 0.4 mg Oral BID        sodium chloride 75 mL/hr Last Rate: 75 mL/hr (08/20/19 0616)          Chest pain    Hyperlipidemia LDL goal <70    Essential hypertension    Hypothyroidism    GERD (gastroesophageal reflux disease)    Coronary artery disease involving native coronary artery of native heart with angina pectoris (CMS/HCC)    Cirrhosis of liver (CMS/HCC)      ASSESSMENT/PLAN:  CAD  Vasospastic angina  - s/p CABG 2018  - s/p LHC 7/2019 patent grafts, significant spasm of the LIMA and LAD.  Isosorbide prescribed, discontinued due to side effects  -Patient presented with worsening CP symptoms  -EKG is nonischemic, troponin negative x3  -Optimize medical therapy by adding amlodipine and Ranexa  -Patient to go back on ASA 81 mg.  Now off Plavix due to recurrent hematuria.  -Plan to DC today.  Outpatient GI evaluation.  Follow-up with Dr. Wagner in 6-8 weeks. New cardiac meds printed per pt request.     HTN  - controlled, continue current regimen    HLD  - continue statin    Hematuria  - Recurrent on heparin gtt, has since resolved. Recent admission to [facility] for such where plavix was d/c'd by Dr. Bruce  - stay on asa 81mg. Follow up w Dr. Bruce Thursday as previously scheduled.     Long discussion w patient. Hand wrote new cardiac meds on script per pt request. Ok for d/c this am. Fu w Dr. Wagner in 6-8 weeks.     Electronically signed by Anum Lizama PA-C, 08/20/19, 7:46 AM.

## 2019-08-20 NOTE — PLAN OF CARE
[id number]        NAME: Chyna Reid is a 64 y o  female  : 1957    MRN: [id number]  DATE: 2018  TIME: 6:41 PM    Assessment and Plan   Abscess of right axilla [L02 411]  1  Abscess of right axilla  Transfer to other facility         Patient Instructions     Patient Instructions   Will send patient to the hospital emergency department now for further evaluation and care (I and D of the right axilla abscess); patient states she will go to [facility] Department by private vehicle      Chief Complaint     Chief Complaint   Patient presents with    Abscess     arm pit abscess on the right side, it's warm to the touch, swollen and redness it's been for 4 days  History of Present Illness       Patient with a non draining abscess of the right axilla        Review of Systems   Review of Systems   Skin:        Non draining abscess of the right axilla         Current Medications     No current outpatient prescriptions on file  Current Allergies     Allergies as of 2018    (No Known Allergies)            The following portions of the patient's history were reviewed and updated as appropriate: allergies, current medications, past family history, past medical history, past social history, past surgical history and problem list      History reviewed  No pertinent past medical history  History reviewed  No pertinent surgical history  History reviewed  No pertinent family history  Medications have been verified  Objective   /55   Pulse 69   Temp 97 5 °F (36 4 °C) (Temporal)   Resp 20   Ht 5' 4" (1 626 m)   Wt 71 2 kg (157 lb)   SpO2 98%   BMI 26 95 kg/m²        Physical Exam     Physical Exam   Skin:   Tenderness, erythema of a non draining abscess of the right axilla   Nursing note and vitals reviewed  Problem: Patient Care Overview  Goal: Plan of Care Review  Outcome: Ongoing (interventions implemented as appropriate)   08/20/19 0447   Coping/Psychosocial   Plan of Care Reviewed With patient   Plan of Care Review   Progress improving   OTHER   Outcome Summary Pt's vitals stable, denied chest pain or shortness of air. Pt denies hematuria, expressed readiness for discharge today. Pt had no complaints and slept majority of the night, will continue to monitor.        Problem: Cardiac: ACS (Acute Coronary Syndrome) (Adult)  Goal: Signs and Symptoms of Listed Potential Problems Will be Absent, Minimized or Managed (Cardiac: ACS)  Outcome: Ongoing (interventions implemented as appropriate)   08/20/19 0307   Goal/Outcome Evaluation   Problems Assessed (Acute Coronary Syndrome) all   Problems Present (Acute Coronary Syn) none

## 2019-08-21 ENCOUNTER — TELEPHONE (OUTPATIENT)
Dept: CARDIOLOGY | Facility: CLINIC | Age: 74
End: 2019-08-21

## 2019-08-21 ENCOUNTER — CLINICAL SUPPORT (OUTPATIENT)
Dept: ORTHOPEDIC SURGERY | Facility: CLINIC | Age: 74
End: 2019-08-21

## 2019-08-21 ENCOUNTER — TREATMENT (OUTPATIENT)
Dept: CARDIAC REHAB | Facility: HOSPITAL | Age: 74
End: 2019-08-21

## 2019-08-21 DIAGNOSIS — I21.4 NSTEMI (NON-ST ELEVATED MYOCARDIAL INFARCTION) (HCC): Primary | ICD-10-CM

## 2019-08-21 DIAGNOSIS — M17.0 PRIMARY OSTEOARTHRITIS OF BOTH KNEES: Primary | ICD-10-CM

## 2019-08-21 PROCEDURE — 20610 DRAIN/INJ JOINT/BURSA W/O US: CPT | Performed by: PHYSICIAN ASSISTANT

## 2019-08-21 PROCEDURE — 93798 PHYS/QHP OP CAR RHAB W/ECG: CPT

## 2019-08-21 RX ORDER — ISOSORBIDE MONONITRATE 30 MG/1
30 TABLET, EXTENDED RELEASE ORAL DAILY
COMMUNITY
End: 2019-08-21 | Stop reason: SDUPTHER

## 2019-08-21 RX ORDER — ISOSORBIDE MONONITRATE 30 MG/1
30 TABLET, EXTENDED RELEASE ORAL DAILY
Qty: 30 TABLET | Refills: 5 | Status: SHIPPED | OUTPATIENT
Start: 2019-08-21

## 2019-08-21 NOTE — PROGRESS NOTES
Procedure   Large Joint Arthrocentesis: R knee  Date/Time: 8/21/2019 3:19 PM  Consent given by: patient  Site marked: site marked  Timeout: Immediately prior to procedure a time out was called to verify the correct patient, procedure, equipment, support staff and site/side marked as required   Supporting Documentation  Indications: pain   Procedure Details  Location: knee - R knee  Preparation: Patient was prepped and draped in the usual sterile fashion  Needle size: 22 G  Approach: anterolateral  Medications administered: 30 mg Hyaluronan 30 MG/2ML  Patient tolerance: patient tolerated the procedure well with no immediate complications    Large Joint Arthrocentesis: L knee  Date/Time: 8/21/2019 3:19 PM  Consent given by: patient  Site marked: site marked  Timeout: Immediately prior to procedure a time out was called to verify the correct patient, procedure, equipment, support staff and site/side marked as required   Supporting Documentation  Indications: pain   Procedure Details  Location: knee - L knee  Preparation: Patient was prepped and draped in the usual sterile fashion  Needle size: 22 G  Approach: anterolateral  Medications administered: 30 mg Hyaluronan 30 MG/2ML  Patient tolerance: patient tolerated the procedure well with no immediate complications

## 2019-08-21 NOTE — PROGRESS NOTES
Subjective     Follow-up (bilateral knees orthovisc #3)      Rodrigo Baum is a 74 y.o. male.     History of Present Illness   Patient presents for the third injection of Orthovisc in bilateral knees.  He is tolerated previous injections well.  Allergies   Allergen Reactions   • Lipitor [Atorvastatin] Anxiety and Myalgia          • Lortab [Hydrocodone-Acetaminophen] Anxiety   • Azithromycin Nausea Only     Current Outpatient Medications on File Prior to Visit   Medication Sig Dispense Refill   • amLODIPine (NORVASC) 5 MG tablet Take 1 tablet by mouth Daily. 90 tablet 0   • aspirin 81 MG EC tablet Take 1 tablet by mouth Daily for 120 days. 30 tablet 3   • finasteride (PROSCAR) 5 MG tablet Take 5 mg by mouth Daily.     • glucosamine-chondroitin 500-400 MG capsule capsule Take 2 capsules by mouth Daily.     • isosorbide mononitrate (IMDUR) 30 MG 24 hr tablet Take 30 mg by mouth Daily.     • levothyroxine (SYNTHROID, LEVOTHROID) 125 MCG tablet Take 125 mcg by mouth Daily.     • Yhrkuqx-Oxglo-Omkx-PassF-LBalm (MELATONIN + L-THEANINE PO) Take 1 tablet by mouth Daily As Needed.     • metoprolol succinate XL (TOPROL-XL) 25 MG 24 hr tablet Take 1 tablet by mouth Daily. 90 tablet 0   • Multiple Vitamins-Minerals (CENTRUM SILVER 50+MEN PO) Take  by mouth.     • nitroglycerin (NITROSTAT) 0.4 MG SL tablet Place 1 tablet under the tongue Every 5 (Five) Minutes As Needed for Chest Pain. Take no more than 3 doses in 15 minutes. 25 tablet 0   • omeprazole (priLOSEC) 40 MG capsule TAKE 1 CAPSULE BY MOUTH TWICE DAILY (Patient taking differently: TAKE 1 CAPSULE BY MOUTH ONCE DAILY) 180 capsule 0   • ondansetron (ZOFRAN) 4 MG tablet Take 4 mg by mouth Every 8 (Eight) Hours As Needed for Nausea or Vomiting.     • predniSONE (DELTASONE) 5 MG tablet Take 2.5 mg by mouth Daily.     • rosuvastatin (CRESTOR) 40 MG tablet Take 1 tablet by mouth Every Night. 90 tablet 2   • tamsulosin (FLOMAX) 0.4 MG capsule 24 hr capsule Take 1 capsule by  mouth 2 (Two) Times a Day.     • ranolazine (RANEXA) 500 MG 12 hr tablet Take 1 tablet by mouth Every 12 (Twelve) Hours.     • [DISCONTINUED] ciprofloxacin (CIPRO) 500 MG tablet Take 500 mg by mouth 2 (Two) Times a Day.     • [DISCONTINUED] doxycycline (VIBRAMYCIN) 100 MG capsule Take 100 mg by mouth Daily.       Current Facility-Administered Medications on File Prior to Visit   Medication Dose Route Frequency Provider Last Rate Last Dose   • Chlorhexidine Gluconate Cloth 2 % pads 1 application  1 application Topical Q12H PRN Sd Mathew PA         Social History     Socioeconomic History   • Marital status:      Spouse name: N/A   • Number of children: 1   • Years of education: H.S.   • Highest education level: High school graduate   Occupational History   • Occupation:      Employer: RETIRED   Tobacco Use   • Smoking status: Former Smoker     Packs/day: 2.00     Years: 20.00     Pack years: 40.00     Types: Cigarettes     Start date:      Last attempt to quit:      Years since quittin.6   • Smokeless tobacco: Never Used   Substance and Sexual Activity   • Alcohol use: Yes     Alcohol/week: 1.8 oz     Types: 3 Cans of beer per week     Frequency: Monthly or less   • Drug use: No   • Sexual activity: Not Currently     Partners: Female   Social History Narrative    Caffeine: 1 servings daily     Past Surgical History:   Procedure Laterality Date   • CARDIAC CATHETERIZATION N/A 2018    Procedure: Left Heart Cath;  Surgeon: Susannah Rasmussen MD;  Location:  [facility];  Service:    • CARDIAC CATHETERIZATION N/A 2019    Procedure: Left Heart Cath;  Surgeon: Susannah Rasmussen MD;  Location:  [facility];  Service: Cardiovascular   • COLONOSCOPY     • CORONARY ANGIOPLASTY WITH STENT PLACEMENT      X2   • CORONARY ARTERY BYPASS GRAFT N/A 2018    Procedure: CORONARY ARTERY BYPASS X 2 WITH INTERNAL MAMMARY ARTERY GRAFT, ENDOSCOPIC VEIN HARVESTING  UTILIZING THE LEFT GREATER SAPPHENOUS VEIN  CYSTO BY DR WATERS;  Surgeon: Casey Morales MD;  Location:  [facility];  Service:    • CYSTOSCOPY TRANSURETHRAL RESECTION OF PROSTATE N/A 11/1/2016    Procedure: CYSTOSCOPY TRANSURETHRAL RESECTION OF PROSTATE GREENLIGHT;  Surgeon: Alverto Richards MD;  Location:  [facility];  Service:    • TONSILLECTOMY     • UPPER GASTROINTESTINAL ENDOSCOPY       Family History   Problem Relation Age of Onset   • Diabetes Father    • Heart disease Father    • Hypertension Father    • Heart attack Father    • Osteoarthritis Father    • Stroke Mother    • Hypertension Mother    • Osteoarthritis Mother    • No Known Problems Maternal Grandmother    • No Known Problems Maternal Grandfather    • Stroke Paternal Grandmother    • Stroke Paternal Grandfather      Past Medical History:   Diagnosis Date   • BPH (benign prostatic hyperplasia)    • CAD (coronary artery disease)    • Chronic venous stasis    • CKD (chronic kidney disease), stage III (CMS/[facility])    • Claustrophobia    • GERD (gastroesophageal reflux disease)    • HLD (hyperlipidemia)    • HTN (hypertension)    • Hypothyroidism    • OA (osteoarthritis) of ankle/foot    • OA (osteoarthritis) of hip    • Osteoporosis    • RA (rheumatoid arthritis) (CMS/[facility])          Review of Systems    The following portions of the patient's history were reviewed and updated as appropriate: allergies, current medications, past family history, past medical history, past social history, past surgical history and problem list.    Ortho Exam      Medical Decision Making    Assessment and Plan/ Diagnosis/Treatment options:   Bilateral knee arthritis undergoing an Orthovisc series.  Plan is to proceed with final injection both knees today.  I will see him back in 6 months or sooner if needed.    Using sterile technique, the left knee was sterilely prepped with Hibiclens.  Following time out, using a 22 gauge needle the left knee was aspirated and then  injected with 2 ml Orthovisc. Approximately 0.5 mm of straw-colored fluid was obtained.  Patient tolerated the procedure well.  No complications.      Using sterile technique, the right knee was sterilely prepped with Hibiclens.  Following time out, using a 22 gauge needle the right knee was aspirated and then injected with 2 ml Orthovisc. Approximately 0.5 mm of straw-colored fluid was obtained.  Patient tolerated the procedure well.  No complications.

## 2019-08-21 NOTE — TELEPHONE ENCOUNTER
Patient reports copay for ranexa is $161.00 and he can not afford this.  He wants to know if it can be changed.  He was discharged from the hospital yesterday.    Vitals today: /85, 78    Patient has been on Imdur 30mg in the past - patient reports having dizziness/lightheaded.  Says he would be willing to try this medication again.  He is also taking amlodipine 5mg, and Toprol 25mg.  Would you like to order something else?  Please advise.

## 2019-08-23 ENCOUNTER — TREATMENT (OUTPATIENT)
Dept: CARDIAC REHAB | Facility: HOSPITAL | Age: 74
End: 2019-08-23

## 2019-08-23 DIAGNOSIS — I21.4 NSTEMI (NON-ST ELEVATED MYOCARDIAL INFARCTION) (HCC): Primary | ICD-10-CM

## 2019-08-23 PROCEDURE — 93798 PHYS/QHP OP CAR RHAB W/ECG: CPT

## 2019-08-26 ENCOUNTER — TREATMENT (OUTPATIENT)
Dept: CARDIAC REHAB | Facility: HOSPITAL | Age: 74
End: 2019-08-26

## 2019-08-26 DIAGNOSIS — I21.4 NSTEMI (NON-ST ELEVATED MYOCARDIAL INFARCTION) (HCC): Primary | ICD-10-CM

## 2019-08-26 PROCEDURE — 93798 PHYS/QHP OP CAR RHAB W/ECG: CPT

## 2019-08-28 ENCOUNTER — TREATMENT (OUTPATIENT)
Dept: CARDIAC REHAB | Facility: HOSPITAL | Age: 74
End: 2019-08-28

## 2019-08-28 DIAGNOSIS — I21.4 NSTEMI (NON-ST ELEVATED MYOCARDIAL INFARCTION) (HCC): Primary | ICD-10-CM

## 2019-08-28 PROCEDURE — 93798 PHYS/QHP OP CAR RHAB W/ECG: CPT

## 2019-08-30 ENCOUNTER — TREATMENT (OUTPATIENT)
Dept: CARDIAC REHAB | Facility: HOSPITAL | Age: 74
End: 2019-08-30

## 2019-08-30 DIAGNOSIS — I21.4 NSTEMI (NON-ST ELEVATED MYOCARDIAL INFARCTION) (HCC): Primary | ICD-10-CM

## 2019-08-30 PROCEDURE — 93798 PHYS/QHP OP CAR RHAB W/ECG: CPT

## 2019-08-30 NOTE — PROGRESS NOTES
Attended Phase II Cardiac Rehab. No medication or health history changes reported. See [facility] for details.

## 2019-09-04 ENCOUNTER — TREATMENT (OUTPATIENT)
Dept: CARDIAC REHAB | Facility: HOSPITAL | Age: 74
End: 2019-09-04

## 2019-09-04 DIAGNOSIS — I21.4 NSTEMI (NON-ST ELEVATED MYOCARDIAL INFARCTION) (HCC): Primary | ICD-10-CM

## 2019-09-04 PROCEDURE — 93798 PHYS/QHP OP CAR RHAB W/ECG: CPT

## 2019-09-06 ENCOUNTER — TREATMENT (OUTPATIENT)
Dept: CARDIAC REHAB | Facility: HOSPITAL | Age: 74
End: 2019-09-06

## 2019-09-06 DIAGNOSIS — I21.4 NSTEMI (NON-ST ELEVATED MYOCARDIAL INFARCTION) (HCC): Primary | ICD-10-CM

## 2019-09-06 PROCEDURE — 93798 PHYS/QHP OP CAR RHAB W/ECG: CPT

## 2019-09-06 NOTE — PROGRESS NOTES
Attended Phase II Cardiac Rehab. No medication or health history changes reported. See [facility] for details.

## 2019-09-09 ENCOUNTER — TREATMENT (OUTPATIENT)
Dept: CARDIAC REHAB | Facility: HOSPITAL | Age: 74
End: 2019-09-09

## 2019-09-09 DIAGNOSIS — I21.4 NSTEMI (NON-ST ELEVATED MYOCARDIAL INFARCTION) (HCC): Primary | ICD-10-CM

## 2019-09-09 PROCEDURE — 93798 PHYS/QHP OP CAR RHAB W/ECG: CPT

## 2019-09-11 ENCOUNTER — TREATMENT (OUTPATIENT)
Dept: CARDIAC REHAB | Facility: HOSPITAL | Age: 74
End: 2019-09-11

## 2019-09-11 DIAGNOSIS — I21.4 NSTEMI (NON-ST ELEVATED MYOCARDIAL INFARCTION) (HCC): Primary | ICD-10-CM

## 2019-09-11 PROCEDURE — 93798 PHYS/QHP OP CAR RHAB W/ECG: CPT

## 2019-09-13 ENCOUNTER — TREATMENT (OUTPATIENT)
Dept: CARDIAC REHAB | Facility: HOSPITAL | Age: 74
End: 2019-09-13

## 2019-09-13 DIAGNOSIS — I21.4 NSTEMI (NON-ST ELEVATED MYOCARDIAL INFARCTION) (HCC): Primary | ICD-10-CM

## 2019-09-13 PROCEDURE — 93798 PHYS/QHP OP CAR RHAB W/ECG: CPT

## 2019-09-16 ENCOUNTER — TREATMENT (OUTPATIENT)
Dept: CARDIAC REHAB | Facility: HOSPITAL | Age: 74
End: 2019-09-16

## 2019-09-16 DIAGNOSIS — I21.4 NSTEMI (NON-ST ELEVATED MYOCARDIAL INFARCTION) (HCC): Primary | ICD-10-CM

## 2019-09-16 PROCEDURE — 93798 PHYS/QHP OP CAR RHAB W/ECG: CPT

## 2019-09-18 ENCOUNTER — TREATMENT (OUTPATIENT)
Dept: CARDIAC REHAB | Facility: HOSPITAL | Age: 74
End: 2019-09-18

## 2019-09-18 DIAGNOSIS — I21.4 NSTEMI (NON-ST ELEVATED MYOCARDIAL INFARCTION) (HCC): Primary | ICD-10-CM

## 2019-09-18 PROCEDURE — 93798 PHYS/QHP OP CAR RHAB W/ECG: CPT

## 2019-09-18 NOTE — PROGRESS NOTES
Attended Phase II Cardiac Rehab. No medication or health history changes reported. See [facility] for details.

## 2019-09-20 ENCOUNTER — TREATMENT (OUTPATIENT)
Dept: CARDIAC REHAB | Facility: HOSPITAL | Age: 74
End: 2019-09-20

## 2019-09-20 ENCOUNTER — OFFICE VISIT (OUTPATIENT)
Dept: GASTROENTEROLOGY | Facility: CLINIC | Age: 74
End: 2019-09-20

## 2019-09-20 VITALS
RESPIRATION RATE: 18 BRPM | HEIGHT: 71 IN | BODY MASS INDEX: 27.58 KG/M2 | WEIGHT: 197 LBS | SYSTOLIC BLOOD PRESSURE: 132 MMHG | OXYGEN SATURATION: 98 % | DIASTOLIC BLOOD PRESSURE: 80 MMHG | HEART RATE: 62 BPM | TEMPERATURE: 97.9 F

## 2019-09-20 DIAGNOSIS — I21.4 NSTEMI (NON-ST ELEVATED MYOCARDIAL INFARCTION) (HCC): Primary | ICD-10-CM

## 2019-09-20 DIAGNOSIS — K21.00 GASTROESOPHAGEAL REFLUX DISEASE WITH ESOPHAGITIS: Primary | ICD-10-CM

## 2019-09-20 PROCEDURE — 93798 PHYS/QHP OP CAR RHAB W/ECG: CPT

## 2019-09-20 PROCEDURE — 99214 OFFICE O/P EST MOD 30 MIN: CPT | Performed by: INTERNAL MEDICINE

## 2019-09-20 NOTE — PROGRESS NOTES
PCP: TORI Fletcher MD    Chief Complaint   Patient presents with   • HOSPITAL F/U       History of Present Illness:   Rodrigo Baum is a 74 y.o. male who presents to GI clinic as a follow up for substernal pain. He has been to hospital twice for substernal sharp pain worse with activity. On one visit he did have mild troponin increase. His medications have been changed with regards to preload/afterload optimization and he feels well. Does not have daily burning.    Past Medical History:   Diagnosis Date   • BPH (benign prostatic hyperplasia)    • CAD (coronary artery disease)    • Chronic venous stasis    • CKD (chronic kidney disease), stage III (CMS/[facility])    • Claustrophobia    • GERD (gastroesophageal reflux disease)    • HLD (hyperlipidemia)    • HTN (hypertension)    • Hypothyroidism    • OA (osteoarthritis) of ankle/foot    • OA (osteoarthritis) of hip    • Osteoporosis    • RA (rheumatoid arthritis) (CMS/[facility])        Past Surgical History:   Procedure Laterality Date   • CARDIAC CATHETERIZATION N/A 1/24/2018    Procedure: Left Heart Cath;  Surgeon: Susannah Rasmussen MD;  Location:  [facility];  Service:    • CARDIAC CATHETERIZATION N/A 7/1/2019    Procedure: Left Heart Cath;  Surgeon: Susannah Rasmussen MD;  Location:  [facility];  Service: Cardiovascular   • COLONOSCOPY     • CORONARY ANGIOPLASTY WITH STENT PLACEMENT      X2   • CORONARY ARTERY BYPASS GRAFT N/A 1/26/2018    Procedure: CORONARY ARTERY BYPASS X 2 WITH INTERNAL MAMMARY ARTERY GRAFT, ENDOSCOPIC VEIN HARVESTING UTILIZING THE LEFT GREATER SAPPHENOUS VEIN  CYSTO BY DR WATERS;  Surgeon: Casey Morales MD;  Location:  [facility];  Service:    • CYSTOSCOPY TRANSURETHRAL RESECTION OF PROSTATE N/A 11/1/2016    Procedure: CYSTOSCOPY TRANSURETHRAL RESECTION OF PROSTATE GREENLIGHT;  Surgeon: Alverto Richards MD;  Location:  [facility];  Service:    • TONSILLECTOMY     • UPPER GASTROINTESTINAL ENDOSCOPY           Current  Outpatient Medications:   •  amLODIPine (NORVASC) 5 MG tablet, Take 1 tablet by mouth Daily., Disp: 90 tablet, Rfl: 0  •  aspirin 81 MG EC tablet, Take 1 tablet by mouth Daily for 120 days., Disp: 30 tablet, Rfl: 3  •  finasteride (PROSCAR) 5 MG tablet, Take 5 mg by mouth Daily., Disp: , Rfl:   •  glucosamine-chondroitin 500-400 MG capsule capsule, Take 2 capsules by mouth Daily., Disp: , Rfl:   •  isosorbide mononitrate (IMDUR) 30 MG 24 hr tablet, Take 1 tablet by mouth Daily., Disp: 30 tablet, Rfl: 5  •  levothyroxine (SYNTHROID, LEVOTHROID) 125 MCG tablet, Take 125 mcg by mouth Daily., Disp: , Rfl:   •  Zpeyquu-Ekxqy-Uxgs-PassF-LBalm (MELATONIN + L-THEANINE PO), Take 1 tablet by mouth Daily As Needed., Disp: , Rfl:   •  metoprolol succinate XL (TOPROL-XL) 25 MG 24 hr tablet, Take 1 tablet by mouth Daily., Disp: 90 tablet, Rfl: 0  •  Multiple Vitamins-Minerals (CENTRUM SILVER 50+MEN PO), Take  by mouth., Disp: , Rfl:   •  nitroglycerin (NITROSTAT) 0.4 MG SL tablet, Place 1 tablet under the tongue Every 5 (Five) Minutes As Needed for Chest Pain. Take no more than 3 doses in 15 minutes., Disp: 25 tablet, Rfl: 0  •  omeprazole (priLOSEC) 40 MG capsule, TAKE 1 CAPSULE BY MOUTH TWICE DAILY (Patient taking differently: TAKE 1 CAPSULE BY MOUTH ONCE DAILY), Disp: 180 capsule, Rfl: 0  •  ondansetron (ZOFRAN) 4 MG tablet, Take 4 mg by mouth Every 8 (Eight) Hours As Needed for Nausea or Vomiting., Disp: , Rfl:   •  predniSONE (DELTASONE) 5 MG tablet, Take 2.5 mg by mouth Daily., Disp: , Rfl:   •  rosuvastatin (CRESTOR) 40 MG tablet, Take 1 tablet by mouth Every Night., Disp: 90 tablet, Rfl: 2  •  tamsulosin (FLOMAX) 0.4 MG capsule 24 hr capsule, Take 1 capsule by mouth 2 (Two) Times a Day., Disp: , Rfl:   No current facility-administered medications for this visit.     Facility-Administered Medications Ordered in Other Visits:   •  Chlorhexidine Gluconate Cloth 2 % pads 1 application, 1 application, Topical, Q12H PRN,  Sd Mathew PA    Allergies   Allergen Reactions   • Lipitor [Atorvastatin] Anxiety and Myalgia          • Lortab [Hydrocodone-Acetaminophen] Anxiety   • Azithromycin Nausea Only       Family History   Problem Relation Age of Onset   • Diabetes Father    • Heart disease Father    • Hypertension Father    • Heart attack Father    • Osteoarthritis Father    • Stroke Mother    • Hypertension Mother    • Osteoarthritis Mother    • No Known Problems Maternal Grandmother    • No Known Problems Maternal Grandfather    • Stroke Paternal Grandmother    • Stroke Paternal Grandfather        Social History     Socioeconomic History   • Marital status:      Spouse name: N/A   • Number of children: 1   • Years of education: H.S.   • Highest education level: High school graduate   Occupational History   • Occupation:      Employer: RETIRED   Tobacco Use   • Smoking status: Former Smoker     Packs/day: 2.00     Years: 20.00     Pack years: 40.00     Types: Cigarettes     Start date:      Last attempt to quit:      Years since quittin.7   • Smokeless tobacco: Never Used   Substance and Sexual Activity   • Alcohol use: Yes     Alcohol/week: 1.8 oz     Types: 3 Cans of beer per week     Frequency: Monthly or less   • Drug use: No   • Sexual activity: Not Currently     Partners: Female   Social History Narrative    Caffeine: 1 servings daily       Review of Systems   Constitutional: Negative.    HENT: Negative.    Eyes: Negative.    Respiratory: Negative.    Cardiovascular: Negative.    Gastrointestinal: Negative.    Endocrine: Negative.    Genitourinary: Negative.    Musculoskeletal: Negative.    Skin: Negative.    Allergic/Immunologic: Negative.    Neurological: Negative.    Hematological: Negative.    Psychiatric/Behavioral: Negative.          There were no vitals filed for this visit.    Physical Exam  General Appearance:  Vitals as above. no acute distress  Head/face:  Normocephalic,  atraumatic  Eyes:   EOMI, no conjunctivitis or icterus   Nose/Sinuses:  Nares patent bilaterally without discharge or lesions  Mouth/Throat:  Posterior pharynx is pink without drainage or exudates;  dentition is in good condition and repair  Neck:  trachea is midline, no thyromegaly  Neurologic:  Alert; no focal deficits; age appropriate behavior and speech  Psychiatric: mood and affect are congruent  Skin: no rash or cyanosis.  Abdomen: nd, soft/nt      Assessment/Plan  1.) CAD with h/o substernal pain thought to be due to angina  The differential would include GERD/ esophageal spasm. He did have a visit with troponin increase and associated symptoms of dizziness and blood pressure changes. He would not like to add any medical therapy for possible ZABRINA which I deem to be low likelihood but not impossible.     2.) large hiatal hernia  3.) GERD  Continue daily ppi    RTC in 6 months.      Troy Diaz MD  9/20/2019

## 2019-09-20 NOTE — PROGRESS NOTES
Attended Phase II Cardiac Rehab. No medication or health history changes reported. See [facility] for details.

## 2019-09-23 ENCOUNTER — TREATMENT (OUTPATIENT)
Dept: CARDIAC REHAB | Facility: HOSPITAL | Age: 74
End: 2019-09-23

## 2019-09-23 DIAGNOSIS — I21.4 NSTEMI (NON-ST ELEVATED MYOCARDIAL INFARCTION) (HCC): Primary | ICD-10-CM

## 2019-09-23 PROCEDURE — 93798 PHYS/QHP OP CAR RHAB W/ECG: CPT

## 2019-09-25 ENCOUNTER — TREATMENT (OUTPATIENT)
Dept: CARDIAC REHAB | Facility: HOSPITAL | Age: 74
End: 2019-09-25

## 2019-09-25 DIAGNOSIS — I21.4 NSTEMI (NON-ST ELEVATED MYOCARDIAL INFARCTION) (HCC): Primary | ICD-10-CM

## 2019-09-25 PROCEDURE — 93798 PHYS/QHP OP CAR RHAB W/ECG: CPT

## 2019-09-27 ENCOUNTER — TREATMENT (OUTPATIENT)
Dept: CARDIAC REHAB | Facility: HOSPITAL | Age: 74
End: 2019-09-27

## 2019-09-27 DIAGNOSIS — I21.4 NSTEMI (NON-ST ELEVATED MYOCARDIAL INFARCTION) (HCC): Primary | ICD-10-CM

## 2019-09-27 PROCEDURE — 93798 PHYS/QHP OP CAR RHAB W/ECG: CPT

## 2019-09-30 ENCOUNTER — TREATMENT (OUTPATIENT)
Dept: CARDIAC REHAB | Facility: HOSPITAL | Age: 74
End: 2019-09-30

## 2019-09-30 DIAGNOSIS — I21.4 NSTEMI (NON-ST ELEVATED MYOCARDIAL INFARCTION) (HCC): Primary | ICD-10-CM

## 2019-09-30 PROCEDURE — 93798 PHYS/QHP OP CAR RHAB W/ECG: CPT

## 2019-10-02 ENCOUNTER — TREATMENT (OUTPATIENT)
Dept: CARDIAC REHAB | Facility: HOSPITAL | Age: 74
End: 2019-10-02

## 2019-10-02 DIAGNOSIS — I21.4 NSTEMI (NON-ST ELEVATED MYOCARDIAL INFARCTION) (HCC): Primary | ICD-10-CM

## 2019-10-02 PROCEDURE — 93798 PHYS/QHP OP CAR RHAB W/ECG: CPT

## 2019-10-07 ENCOUNTER — TREATMENT (OUTPATIENT)
Dept: CARDIAC REHAB | Facility: HOSPITAL | Age: 74
End: 2019-10-07

## 2019-10-07 DIAGNOSIS — I21.4 NSTEMI (NON-ST ELEVATED MYOCARDIAL INFARCTION) (HCC): Primary | ICD-10-CM

## 2019-10-07 PROCEDURE — 93798 PHYS/QHP OP CAR RHAB W/ECG: CPT

## 2019-10-09 ENCOUNTER — TREATMENT (OUTPATIENT)
Dept: CARDIAC REHAB | Facility: HOSPITAL | Age: 74
End: 2019-10-09

## 2019-10-09 DIAGNOSIS — I21.4 NSTEMI (NON-ST ELEVATED MYOCARDIAL INFARCTION) (HCC): Primary | ICD-10-CM

## 2019-10-09 PROCEDURE — 93798 PHYS/QHP OP CAR RHAB W/ECG: CPT

## 2019-10-11 ENCOUNTER — TREATMENT (OUTPATIENT)
Dept: CARDIAC REHAB | Facility: HOSPITAL | Age: 74
End: 2019-10-11

## 2019-10-11 DIAGNOSIS — I21.4 NSTEMI (NON-ST ELEVATED MYOCARDIAL INFARCTION) (HCC): Primary | ICD-10-CM

## 2019-10-11 PROCEDURE — 93798 PHYS/QHP OP CAR RHAB W/ECG: CPT

## 2019-10-14 ENCOUNTER — TREATMENT (OUTPATIENT)
Dept: CARDIAC REHAB | Facility: HOSPITAL | Age: 74
End: 2019-10-14

## 2019-10-14 DIAGNOSIS — I21.4 NSTEMI (NON-ST ELEVATED MYOCARDIAL INFARCTION) (HCC): Primary | ICD-10-CM

## 2019-10-14 PROCEDURE — 93798 PHYS/QHP OP CAR RHAB W/ECG: CPT

## 2019-10-16 ENCOUNTER — TREATMENT (OUTPATIENT)
Dept: CARDIAC REHAB | Facility: HOSPITAL | Age: 74
End: 2019-10-16

## 2019-10-16 DIAGNOSIS — I21.4 NSTEMI (NON-ST ELEVATED MYOCARDIAL INFARCTION) (HCC): Primary | ICD-10-CM

## 2019-10-16 PROCEDURE — 93798 PHYS/QHP OP CAR RHAB W/ECG: CPT

## 2019-10-18 ENCOUNTER — TREATMENT (OUTPATIENT)
Dept: CARDIAC REHAB | Facility: HOSPITAL | Age: 74
End: 2019-10-18

## 2019-10-18 DIAGNOSIS — I21.4 NSTEMI (NON-ST ELEVATED MYOCARDIAL INFARCTION) (HCC): Primary | ICD-10-CM

## 2019-10-18 PROCEDURE — 93798 PHYS/QHP OP CAR RHAB W/ECG: CPT

## 2019-10-18 NOTE — PROGRESS NOTES
Attended Phase II Cardiac Rehab. No medication or health history changes reported. See [facility] for details.

## 2019-10-21 ENCOUNTER — TREATMENT (OUTPATIENT)
Dept: CARDIAC REHAB | Facility: HOSPITAL | Age: 74
End: 2019-10-21

## 2019-10-21 DIAGNOSIS — I21.4 NSTEMI (NON-ST ELEVATED MYOCARDIAL INFARCTION) (HCC): Primary | ICD-10-CM

## 2019-10-21 PROCEDURE — 93798 PHYS/QHP OP CAR RHAB W/ECG: CPT

## 2019-10-25 ENCOUNTER — DOCUMENTATION (OUTPATIENT)
Dept: CARDIAC REHAB | Facility: HOSPITAL | Age: 74
End: 2019-10-25

## 2019-10-28 ENCOUNTER — TELEPHONE (OUTPATIENT)
Dept: CARDIAC REHAB | Facility: HOSPITAL | Age: 74
End: 2019-10-28

## 2019-10-28 NOTE — TELEPHONE ENCOUNTER
Patient called to cancel his exercise session for today, 10/28/19, and Wednesday, 10/30/19. Patient states that he has started a new job and will not return until 11/01/19.

## 2019-11-18 ENCOUNTER — APPOINTMENT (OUTPATIENT)
Dept: CARDIAC REHAB | Facility: HOSPITAL | Age: 74
End: 2019-11-18

## 2019-11-20 ENCOUNTER — APPOINTMENT (OUTPATIENT)
Dept: CARDIAC REHAB | Facility: HOSPITAL | Age: 74
End: 2019-11-20

## 2019-11-22 ENCOUNTER — APPOINTMENT (OUTPATIENT)
Dept: CARDIAC REHAB | Facility: HOSPITAL | Age: 74
End: 2019-11-22

## 2019-11-25 ENCOUNTER — APPOINTMENT (OUTPATIENT)
Dept: CARDIAC REHAB | Facility: HOSPITAL | Age: 74
End: 2019-11-25

## 2019-11-27 ENCOUNTER — APPOINTMENT (OUTPATIENT)
Dept: CARDIAC REHAB | Facility: HOSPITAL | Age: 74
End: 2019-11-27

## 2019-12-13 ENCOUNTER — HOSPITAL ENCOUNTER (OUTPATIENT)
Dept: GENERAL RADIOLOGY | Facility: HOSPITAL | Age: 74
Discharge: HOME | End: 2019-12-13
Admitting: INTERNAL MEDICINE

## 2019-12-13 ENCOUNTER — TRANSCRIBE ORDERS (OUTPATIENT)
Dept: ADMINISTRATIVE | Facility: HOSPITAL | Age: 74
End: 2019-12-13

## 2019-12-13 DIAGNOSIS — M54.2 CERVICALGIA: ICD-10-CM

## 2019-12-13 DIAGNOSIS — M54.2 CERVICALGIA: Primary | ICD-10-CM

## 2019-12-13 PROCEDURE — 72040 X-RAY EXAM NECK SPINE 2-3 VW: CPT

## 2020-01-16 ENCOUNTER — OFFICE VISIT (OUTPATIENT)
Dept: CARDIOLOGY | Facility: HOSPITAL | Age: 75
End: 2020-01-16

## 2020-01-16 VITALS
BODY MASS INDEX: 26.39 KG/M2 | HEART RATE: 76 BPM | RESPIRATION RATE: 18 BRPM | WEIGHT: 188.5 LBS | OXYGEN SATURATION: 96 % | HEIGHT: 71 IN | DIASTOLIC BLOOD PRESSURE: 90 MMHG | SYSTOLIC BLOOD PRESSURE: 132 MMHG | TEMPERATURE: 97.8 F

## 2020-01-16 DIAGNOSIS — I10 ESSENTIAL HYPERTENSION: ICD-10-CM

## 2020-01-16 DIAGNOSIS — E78.2 MIXED HYPERLIPIDEMIA: ICD-10-CM

## 2020-01-16 DIAGNOSIS — I25.10 CORONARY ARTERY DISEASE INVOLVING NATIVE CORONARY ARTERY OF NATIVE HEART WITHOUT ANGINA PECTORIS: Primary | ICD-10-CM

## 2020-01-16 PROCEDURE — 99214 OFFICE O/P EST MOD 30 MIN: CPT | Performed by: NURSE PRACTITIONER

## 2020-01-16 RX ORDER — ASPIRIN 81 MG/1
81 TABLET, CHEWABLE ORAL DAILY
COMMUNITY

## 2020-01-16 NOTE — PROGRESS NOTES
[facility]      Encounter Date:01/16/2020     Rodrigo Baum  [address] [place] [id number]  [unfilled]    1945    TORI Fletcher MD    Rodrigo Baum is a 74 y.o. male.      Subjective:     Chief Complaint:  Establish Care (medication help)       HPI   74-year-old male with coronary artery disease, hyperlipidemia and hypertension presents today to discuss medication issue.  Patient reports that he had a GI bug on 1/8/2020 and noted that his Imdur was intact in his vomit.  He reports he had taken his Imdur 26-hour prior to the vomiting spell.  He notes he then placed all of his medications and dishes with water and watch them dissolve.  He reports that the Imdur on 2 separate occasions did not dissolve in water even though the other medications did.  He reports he is feeling fine from a cardiac standpoint and denies chest pain, palpitations, dyspnea, presyncope, syncope, orthopnea or PND.  Blood pressures are well controlled in the 130s heart rate 60s to 70s.  He is concerned he is not getting his full dose of Imdur. He has lost 42 lbs since May. Has finished cardiac rehab.    Patient Active Problem List   Diagnosis   • BPH with urinary obstruction   • Hyperlipidemia LDL goal <70   • Hematuria   • Essential hypertension   • Hypothyroidism   • Exogenous obesity   • Arthritis   • GERD (gastroesophageal reflux disease)   • Foot pain   • Hip pain   • Trochanteric bursitis of both hips   • Coronary artery disease involving native coronary artery of native heart with angina pectoris (CMS/HCC)   • ETOH abuse   • Cirrhosis of liver (CMS/HCC)   • Primary osteoarthritis of both knees   • Angina pectoris (CMS/HCC)   • Chest pain       Past Medical History:   Diagnosis Date   • BPH (benign prostatic hyperplasia)    • CAD (coronary artery disease)    • Chronic venous stasis    • CKD (chronic kidney disease), stage III (CMS/HCC)    • Claustrophobia    • GERD  (gastroesophageal reflux disease)    • HLD (hyperlipidemia)    • HTN (hypertension)    • Hypothyroidism    • OA (osteoarthritis) of ankle/foot    • OA (osteoarthritis) of hip    • Osteoporosis    • RA (rheumatoid arthritis) (CMS/[facility])        Past Surgical History:   Procedure Laterality Date   • CARDIAC CATHETERIZATION N/A 1/24/2018    Procedure: Left Heart Cath;  Surgeon: Susannah Rasmussen MD;  Location:  [facility]ATH IN[facility]AS[facility];  Service:    • CARDIAC CATHETERIZATION N/A 7/1/2019    Procedure: Left Heart Cath;  Surgeon: Susannah Rasmussen MD;  Location:  [facility];  Service: Cardiovascular   • COLONOSCOPY     • CORONARY ANGIOPLASTY WITH STENT PLACEMENT      X2   • CORONARY ARTERY BYPASS GRAFT N/A 1/26/2018    Procedure: CORONARY ARTERY BYPASS X 2 WITH INTERNAL MAMMARY ARTERY GRAFT, ENDOSCOPIC VEIN HARVESTING UTILIZING THE LEFT GREATER SAPPHENOUS VEIN  CYSTO BY DR WATERS;  Surgeon: Casey Morales MD;  Location:  [facility] OR;  Service:    • CYSTOSCOPY TRANSURETHRAL RESECTION OF PROSTATE N/A 11/1/2016    Procedure: CYSTOSCOPY TRANSURETHRAL RESECTION OF PROSTATE GREENLIGHT;  Surgeon: Alverto Richards MD;  Location:  [facility] OR;  Service:    • TONSILLECTOMY     • UPPER GASTROINTESTINAL ENDOSCOPY         Family History   Problem Relation Age of Onset   • Diabetes Father    • Heart disease Father    • Hypertension Father    • Heart attack Father    • Osteoarthritis Father    • Stroke Mother    • Hypertension Mother    • Osteoarthritis Mother    • No Known Problems Maternal Grandmother    • No Known Problems Maternal Grandfather    • Stroke Paternal Grandmother    • Stroke Paternal Grandfather        Social History     Socioeconomic History   • Marital status:      Spouse name: N/A   • Number of children: 1   • Years of education: H.S.   • Highest education level: High school graduate   Occupational History   • Occupation:      Employer: RETIRED   Tobacco Use   • Smoking status: Former  Smoker     Packs/day: 2.00     Years: 20.00     Pack years: 40.00     Types: Cigarettes     Start date:      Last attempt to quit:      Years since quittin.0   • Smokeless tobacco: Never Used   Substance and Sexual Activity   • Alcohol use: Yes     Alcohol/week: 3.0 standard drinks     Types: 3 Cans of beer per week     Frequency: Monthly or less   • Drug use: No   • Sexual activity: Not Currently     Partners: Female   Social History Narrative    Caffeine: 1 servings daily       Allergies   Allergen Reactions   • Lipitor [Atorvastatin] Anxiety and Myalgia          • Lortab [Hydrocodone-Acetaminophen] Anxiety   • Azithromycin Nausea Only         Current Outpatient Medications:   •  amLODIPine (NORVASC) 5 MG tablet, Take 1 tablet by mouth Daily., Disp: 90 tablet, Rfl: 0  •  aspirin 81 MG chewable tablet, Chew 81 mg Daily., Disp: , Rfl:   •  glucosamine-chondroitin 500-400 MG capsule capsule, Take 2 capsules by mouth Daily., Disp: , Rfl:   •  isosorbide mononitrate (IMDUR) 30 MG 24 hr tablet, Take 1 tablet by mouth Daily., Disp: 30 tablet, Rfl: 5  •  levothyroxine (SYNTHROID, LEVOTHROID) 125 MCG tablet, Take 125 mcg by mouth Daily., Disp: , Rfl:   •  Zkvkljo-Jtopd-Eivh-PassF-LBalm (MELATONIN + L-THEANINE PO), Take 1 tablet by mouth Daily As Needed., Disp: , Rfl:   •  metoprolol succinate XL (TOPROL-XL) 25 MG 24 hr tablet, Take 1 tablet by mouth Daily., Disp: 90 tablet, Rfl: 0  •  Multiple Vitamins-Minerals (CENTRUM SILVER 50+MEN PO), Take  by mouth., Disp: , Rfl:   •  nitroglycerin (NITROSTAT) 0.4 MG SL tablet, Place 1 tablet under the tongue Every 5 (Five) Minutes As Needed for Chest Pain. Take no more than 3 doses in 15 minutes., Disp: 25 tablet, Rfl: 0  •  omeprazole (priLOSEC) 40 MG capsule, TAKE 1 CAPSULE BY MOUTH TWICE DAILY (Patient taking differently: TAKE 1 CAPSULE BY MOUTH ONCE DAILY), Disp: 180 capsule, Rfl: 0  •  predniSONE (DELTASONE) 5 MG tablet, Take 2.5 mg by mouth Daily., Disp: , Rfl:   •   rosuvastatin (CRESTOR) 40 MG tablet, Take 1 tablet by mouth Every Night., Disp: 90 tablet, Rfl: 2  •  tamsulosin (FLOMAX) 0.4 MG capsule 24 hr capsule, Take 1 capsule by mouth 2 (Two) Times a Day., Disp: , Rfl:   •  finasteride (PROSCAR) 5 MG tablet, Take 5 mg by mouth Daily., Disp: , Rfl:   •  ondansetron (ZOFRAN) 4 MG tablet, Take 4 mg by mouth Every 8 (Eight) Hours As Needed for Nausea or Vomiting., Disp: , Rfl:   No current facility-administered medications for this visit.     Facility-Administered Medications Ordered in Other Visits:   •  Chlorhexidine Gluconate Cloth 2 % pads 1 application, 1 application, Topical, Q12H PRN, Sd Mathew PA    The following portions of the patient's history were reviewed today and updated as appropriate: allergies, current medications, past family history, past medical history, past social history, past surgical history and problem list     Review of Systems   Constitution: Negative for chills, decreased appetite, diaphoresis, fever, malaise/fatigue, night sweats, weight gain and weight loss.   HENT: Negative for congestion, hearing loss, hoarse voice and nosebleeds.    Eyes: Positive for blurred vision. Negative for visual disturbance and visual halos.   Cardiovascular: Negative for chest pain, claudication, cyanosis, dyspnea on exertion, irregular heartbeat, leg swelling, near-syncope, orthopnea, palpitations, paroxysmal nocturnal dyspnea and syncope.   Respiratory: Negative for cough, hemoptysis, shortness of breath, sleep disturbances due to breathing, snoring, sputum production and wheezing.    Endocrine: Positive for cold intolerance.   Hematologic/Lymphatic: Negative for bleeding problem. Bruises/bleeds easily.   Skin: Negative for dry skin, itching and rash.   Musculoskeletal: Negative for arthritis, falls, joint pain, joint swelling and myalgias.   Gastrointestinal: Negative for bloating, abdominal pain, constipation, diarrhea, flatus, heartburn, hematemesis,  "hematochezia, melena, nausea and vomiting.   Genitourinary: Positive for frequency and nocturia. Negative for dysuria, hematuria and urgency.   Neurological: Negative for excessive daytime sleepiness, dizziness, headaches, light-headedness, loss of balance and weakness.   Psychiatric/Behavioral: Negative for depression. The patient has insomnia. The patient is not nervous/anxious.    Allergic/Immunologic:        Seasonal allergies        Objective:     Vitals:    01/16/20 1443 01/16/20 1444 01/16/20 1445   BP: 133/84 133/86 132/90   BP Location: Right arm Left arm Left arm   Patient Position: Sitting Sitting Standing   Cuff Size: Adult Adult Adult   Pulse: 73 76 76   Resp: 18     Temp: 97.8 °F (36.6 °C)     TempSrc: Temporal     SpO2: 98% 96% 96%   Weight: 85.5 kg (188 lb 8 oz)     Height: 180.3 cm (71\")       Body mass index is 26.29 kg/m².  Physical Exam   Constitutional: He is oriented to person, place, and time. He appears well-developed and well-nourished. He is active and cooperative. No distress.   HENT:   Head: Normocephalic and atraumatic.   Mouth/Throat: Oropharynx is clear and moist.   Eyes: Pupils are equal, round, and reactive to light. Conjunctivae and EOM are normal.   Neck: Normal range of motion. Neck supple. No JVD present. No tracheal deviation present. No thyromegaly present.   Cardiovascular: Normal rate, regular rhythm, normal heart sounds and intact distal pulses.   Pulmonary/Chest: Effort normal and breath sounds normal.   Abdominal: Soft. Bowel sounds are normal. He exhibits no distension. There is no tenderness.   Musculoskeletal: Normal range of motion.   Neurological: He is alert and oriented to person, place, and time.   Skin: Skin is warm, dry and intact.   Psychiatric: He has a normal mood and affect. His behavior is normal.   Nursing note and vitals reviewed.      Lab and Diagnostic Review:  Results for orders placed or performed during the hospital encounter of 08/18/19   Troponin "   Result Value Ref Range    Troponin T <0.010 0.000 - 0.030 ng/mL   Comprehensive Metabolic Panel   Result Value Ref Range    Glucose 139 (H) 65 - 99 mg/dL    BUN 20 8 - 23 mg/dL    Creatinine 1.23 0.76 - 1.27 mg/dL    Sodium 140 136 - 145 mmol/L    Potassium 3.8 3.5 - 5.2 mmol/L    Chloride 101 98 - 107 mmol/L    CO2 24.0 22.0 - 29.0 mmol/L    Calcium 9.1 8.6 - 10.5 mg/dL    Total Protein 6.7 6.0 - 8.5 g/dL    Albumin 4.30 3.50 - 5.20 g/dL    ALT (SGPT) 29 1 - 41 U/L    AST (SGOT) 44 (H) 1 - 40 U/L    Alkaline Phosphatase 85 39 - 117 U/L    Total Bilirubin 1.7 (H) 0.2 - 1.2 mg/dL    eGFR Non African Amer 58 (L) >60 mL/min/1.73    Globulin 2.4 gm/dL    A/G Ratio 1.8 g/dL    BUN/Creatinine Ratio 16.3 7.0 - 25.0    Anion Gap 15.0 5.0 - 15.0 mmol/L   Lipase   Result Value Ref Range    Lipase 61 (H) 13 - 60 U/L   BNP   Result Value Ref Range    proBNP 67.1 5.0 - 900.0 pg/mL   CBC Auto Differential   Result Value Ref Range    WBC 8.32 3.40 - 10.80 10*3/mm3    RBC 5.13 4.14 - 5.80 10*6/mm3    Hemoglobin 15.6 13.0 - 17.7 g/dL    Hematocrit 47.9 37.5 - 51.0 %    MCV 93.4 79.0 - 97.0 fL    MCH 30.4 26.6 - 33.0 pg    MCHC 32.6 31.5 - 35.7 g/dL    RDW 12.7 12.3 - 15.4 %    RDW-SD 43.4 37.0 - 54.0 fl    MPV 10.4 6.0 - 12.0 fL    Platelets 222 140 - 450 10*3/mm3    Neutrophil % 73.9 42.7 - 76.0 %    Lymphocyte % 15.6 (L) 19.6 - 45.3 %    Monocyte % 7.0 5.0 - 12.0 %    Eosinophil % 2.5 0.3 - 6.2 %    Basophil % 0.5 0.0 - 1.5 %    Immature Grans % 0.5 0.0 - 0.5 %    Neutrophils, Absolute 6.15 1.70 - 7.00 10*3/mm3    Lymphocytes, Absolute 1.30 0.70 - 3.10 10*3/mm3    Monocytes, Absolute 0.58 0.10 - 0.90 10*3/mm3    Eosinophils, Absolute 0.21 0.00 - 0.40 10*3/mm3    Basophils, Absolute 0.04 0.00 - 0.20 10*3/mm3    Immature Grans, Absolute 0.04 0.00 - 0.05 10*3/mm3    nRBC 0.0 0.0 - 0.2 /100 WBC   Protime-INR   Result Value Ref Range    Protime 13.4 11.2 - 14.3 Seconds    INR 1.07 0.85 - 1.16   aPTT   Result Value Ref Range    PTT  28.0 24.0 - 37.0 seconds   Heparin Anti-Xa   Result Value Ref Range    Heparin Anti-Xa (UFH) 0.10 (L) 0.30 - 0.70 IU/ml   Troponin   Result Value Ref Range    Troponin T <0.010 0.000 - 0.030 ng/mL   Heparin Anti-Xa   Result Value Ref Range    Heparin Anti-Xa (UFH) 0.29 (L) 0.30 - 0.70 IU/ml   Basic Metabolic Panel   Result Value Ref Range    Glucose 110 (H) 65 - 99 mg/dL    BUN 18 8 - 23 mg/dL    Creatinine 0.89 0.76 - 1.27 mg/dL    Sodium 139 136 - 145 mmol/L    Potassium 4.1 3.5 - 5.2 mmol/L    Chloride 107 98 - 107 mmol/L    CO2 22.0 22.0 - 29.0 mmol/L    Calcium 8.3 (L) 8.6 - 10.5 mg/dL    eGFR Non African Amer 84 >60 mL/min/1.73    BUN/Creatinine Ratio 20.2 7.0 - 25.0    Anion Gap 10.0 5.0 - 15.0 mmol/L   Troponin   Result Value Ref Range    Troponin T <0.010 0.000 - 0.030 ng/mL   Hemoglobin A1c   Result Value Ref Range    Hemoglobin A1C 5.70 (H) 4.80 - 5.60 %   Lipid Panel   Result Value Ref Range    Total Cholesterol 89 0 - 200 mg/dL    Triglycerides 124 0 - 150 mg/dL    HDL Cholesterol 48 40 - 60 mg/dL    LDL Cholesterol  16 0 - 100 mg/dL    VLDL Cholesterol 24.8 mg/dL    LDL/HDL Ratio 0.34          Assessment and Plan:   1. Coronary artery disease involving native coronary artery of native heart without angina pectoris  Without angina  Continue asa, toprol, crestor  Reassured patient that imdur is working due to absence of chest pain. Did explain to patient that dissolving pills in water was not comparable to gastric juices and the way that pills were absorbed in stomach.     2. Essential hypertension    Well controlled  HTN Education provided today including signs and symptoms, medication management, daily blood pressure monitoring. Patient encouraged to call the Heart and Valve center with any abnormal readings.   3. Mixed hyperlipidemia  Statin therapy          It has been a pleasure to participate in the care of this patient.  Patient was instructed to call the [facility] with any questions,  concerns, or worsening symptoms.    *Please note that portions of this note were completed with a voice recognition program. Efforts were made to edit the dictations, but occasionally words are mistranscribed.

## 2020-02-16 DIAGNOSIS — M17.0 PRIMARY OSTEOARTHRITIS OF BOTH KNEES: Primary | ICD-10-CM

## 2020-02-27 ENCOUNTER — CLINICAL SUPPORT (OUTPATIENT)
Dept: ORTHOPEDIC SURGERY | Facility: CLINIC | Age: 75
End: 2020-02-27

## 2020-02-27 DIAGNOSIS — M70.62 TROCHANTERIC BURSITIS OF BOTH HIPS: Primary | ICD-10-CM

## 2020-02-27 DIAGNOSIS — M70.61 TROCHANTERIC BURSITIS OF BOTH HIPS: Primary | ICD-10-CM

## 2020-02-27 DIAGNOSIS — M17.0 PRIMARY OSTEOARTHRITIS OF BOTH KNEES: ICD-10-CM

## 2020-02-27 PROCEDURE — 20610 DRAIN/INJ JOINT/BURSA W/O US: CPT | Performed by: PHYSICIAN ASSISTANT

## 2020-02-27 RX ADMIN — LIDOCAINE HYDROCHLORIDE 4 ML: 10 INJECTION, SOLUTION EPIDURAL; INFILTRATION; INTRACAUDAL; PERINEURAL at 13:17

## 2020-02-27 RX ADMIN — METHYLPREDNISOLONE ACETATE 40 MG: 40 INJECTION, SUSPENSION INTRA-ARTICULAR; INTRALESIONAL; INTRAMUSCULAR; SOFT TISSUE at 13:15

## 2020-02-27 RX ADMIN — METHYLPREDNISOLONE ACETATE 40 MG: 40 INJECTION, SUSPENSION INTRA-ARTICULAR; INTRALESIONAL; INTRAMUSCULAR; SOFT TISSUE at 13:17

## 2020-03-01 RX ORDER — LIDOCAINE HYDROCHLORIDE 10 MG/ML
4 INJECTION, SOLUTION EPIDURAL; INFILTRATION; INTRACAUDAL; PERINEURAL
Status: COMPLETED | OUTPATIENT
Start: 2020-02-27 | End: 2020-02-27

## 2020-03-01 RX ORDER — METHYLPREDNISOLONE ACETATE 40 MG/ML
40 INJECTION, SUSPENSION INTRA-ARTICULAR; INTRALESIONAL; INTRAMUSCULAR; SOFT TISSUE
Status: COMPLETED | OUTPATIENT
Start: 2020-02-27 | End: 2020-02-27

## 2020-03-05 ENCOUNTER — CLINICAL SUPPORT (OUTPATIENT)
Dept: ORTHOPEDIC SURGERY | Facility: CLINIC | Age: 75
End: 2020-03-05

## 2020-03-05 DIAGNOSIS — M17.0 PRIMARY OSTEOARTHRITIS OF BOTH KNEES: Primary | ICD-10-CM

## 2020-03-05 PROCEDURE — 20610 DRAIN/INJ JOINT/BURSA W/O US: CPT | Performed by: PHYSICIAN ASSISTANT

## 2020-03-05 NOTE — PROGRESS NOTES
Procedure   Large Joint Arthrocentesis: R knee  Date/Time: 3/5/2020 1:38 PM  Consent given by: patient  Site marked: site marked  Timeout: Immediately prior to procedure a time out was called to verify the correct patient, procedure, equipment, support staff and site/side marked as required   Procedure Details  Location: knee - R knee  Preparation: Patient was prepped and draped in the usual sterile fashion  Needle size: 22 G  Approach: anterolateral  Medications administered: 30 mg Hyaluronan 30 MG/2ML  Patient tolerance: patient tolerated the procedure well with no immediate complications    Large Joint Arthrocentesis: L knee  Date/Time: 3/5/2020 1:39 PM  Consent given by: patient  Site marked: site marked  Timeout: Immediately prior to procedure a time out was called to verify the correct patient, procedure, equipment, support staff and site/side marked as required   Supporting Documentation  Indications: pain   Procedure Details  Location: knee - L knee  Preparation: Patient was prepped and draped in the usual sterile fashion  Needle size: 22 G  Approach: anterolateral  Medications administered: 30 mg Hyaluronan 30 MG/2ML  Patient tolerance: patient tolerated the procedure well with no immediate complications

## 2020-03-05 NOTE — PROGRESS NOTES
Subjective     Injections (Bilateral Knee Orthovisc injections #2)      Rodrigo Baum is a 75 y.o. male.     History of Present Illness   Patient presents for the second ejection of Orthovisc in bilateral knees.  he is tolerated previous injections well.  Allergies   Allergen Reactions   • Lipitor [Atorvastatin] Anxiety and Myalgia          • Lortab [Hydrocodone-Acetaminophen] Anxiety   • Azithromycin Nausea Only     Current Outpatient Medications on File Prior to Visit   Medication Sig Dispense Refill   • amLODIPine (NORVASC) 5 MG tablet Take 1 tablet by mouth Daily. 90 tablet 0   • aspirin 81 MG chewable tablet Chew 81 mg Daily.     • finasteride (PROSCAR) 5 MG tablet Take 5 mg by mouth Daily.     • glucosamine-chondroitin 500-400 MG capsule capsule Take 2 capsules by mouth Daily.     • isosorbide mononitrate (IMDUR) 30 MG 24 hr tablet Take 1 tablet by mouth Daily. 30 tablet 5   • levothyroxine (SYNTHROID, LEVOTHROID) 125 MCG tablet Take 125 mcg by mouth Daily.     • Enhboct-Frsuj-Cyvm-PassF-LBalm (MELATONIN + L-THEANINE PO) Take 1 tablet by mouth Daily As Needed.     • metoprolol succinate XL (TOPROL-XL) 25 MG 24 hr tablet Take 1 tablet by mouth Daily. 90 tablet 0   • Multiple Vitamins-Minerals (CENTRUM SILVER 50+MEN PO) Take  by mouth.     • nitroglycerin (NITROSTAT) 0.4 MG SL tablet Place 1 tablet under the tongue Every 5 (Five) Minutes As Needed for Chest Pain. Take no more than 3 doses in 15 minutes. 25 tablet 0   • omeprazole (priLOSEC) 40 MG capsule TAKE 1 CAPSULE BY MOUTH TWICE DAILY (Patient taking differently: TAKE 1 CAPSULE BY MOUTH ONCE DAILY) 180 capsule 0   • ondansetron (ZOFRAN) 4 MG tablet Take 4 mg by mouth Every 8 (Eight) Hours As Needed for Nausea or Vomiting.     • predniSONE (DELTASONE) 5 MG tablet Take 2.5 mg by mouth Daily.     • rosuvastatin (CRESTOR) 40 MG tablet Take 1 tablet by mouth Every Night. 90 tablet 2   • tamsulosin (FLOMAX) 0.4 MG capsule 24 hr capsule Take 1 capsule by mouth 2  (Two) Times a Day.       Current Facility-Administered Medications on File Prior to Visit   Medication Dose Route Frequency Provider Last Rate Last Dose   • Chlorhexidine Gluconate Cloth 2 % pads 1 application  1 application Topical Q12H Sd Vaz PA         Social History     Socioeconomic History   • Marital status:      Spouse name: N/A   • Number of children: 1   • Years of education: H.S.   • Highest education level: High school graduate   Occupational History   • Occupation:      Employer: RETIRED   Tobacco Use   • Smoking status: Former Smoker     Packs/day: 2.00     Years: 20.00     Pack years: 40.00     Types: Cigarettes     Start date:      Last attempt to quit:      Years since quittin.1   • Smokeless tobacco: Never Used   Substance and Sexual Activity   • Alcohol use: Yes     Alcohol/week: 3.0 standard drinks     Types: 3 Cans of beer per week     Frequency: Monthly or less   • Drug use: No   • Sexual activity: Not Currently     Partners: Female   Social History Narrative    Caffeine: 1 servings daily     Past Surgical History:   Procedure Laterality Date   • CARDIAC CATHETERIZATION N/A 2018    Procedure: Left Heart Cath;  Surgeon: Susannah Rasmussen MD;  Location: [facility] CATH INVASIVE LOCATION;  Service:    • CARDIAC CATHETERIZATION N/A 2019    Procedure: Left Heart Cath;  Surgeon: Susannah Rasmussen MD;  Location:  [facility]ATH INVAS[facility];  Service: Cardiovascular   • COLONOSCOPY     • CORONARY ANGIOPLASTY WITH STENT PLACEMENT      X2   • CORONARY ARTERY BYPASS GRAFT N/A 2018    Procedure: CORONARY ARTERY BYPASS X 2 WITH INTERNAL MAMMARY ARTERY GRAFT, ENDOSCOPIC VEIN HARVESTING UTILIZING THE LEFT GREATER SAPPHENOUS VEIN  CYSTO BY DR WATERS;  Surgeon: Casey Morales MD;  Location: [facility] OR;  Service:    • CYSTOSCOPY TRANSURETHRAL RESECTION OF PROSTATE N/A 2016    Procedure: CYSTOSCOPY TRANSURETHRAL RESECTION OF PROSTATE GREENLIGHT;   Surgeon: Alverto Richards MD;  Location: [facility];  Service:    • TONSILLECTOMY     • UPPER GASTROINTESTINAL ENDOSCOPY       Family History   Problem Relation Age of Onset   • Diabetes Father    • Heart disease Father    • Hypertension Father    • Heart attack Father    • Osteoarthritis Father    • Stroke Mother    • Hypertension Mother    • Osteoarthritis Mother    • No Known Problems Maternal Grandmother    • No Known Problems Maternal Grandfather    • Stroke Paternal Grandmother    • Stroke Paternal Grandfather      Past Medical History:   Diagnosis Date   • BPH (benign prostatic hyperplasia)    • CAD (coronary artery disease)    • Chronic venous stasis    • CKD (chronic kidney disease), stage III (CMS/[facility])    • Claustrophobia    • GERD (gastroesophageal reflux disease)    • HLD (hyperlipidemia)    • HTN (hypertension)    • Hypothyroidism    • OA (osteoarthritis) of ankle/foot    • OA (osteoarthritis) of hip    • Osteoporosis    • RA (rheumatoid arthritis) (CMS/[facility])          Review of Systems   Constitutional: Negative.    HENT: Negative.    Eyes: Negative.    Respiratory: Negative.    Cardiovascular: Negative.    Gastrointestinal: Negative.    Endocrine: Negative.    Genitourinary: Negative.    Musculoskeletal: Positive for arthralgias.   Skin: Negative.    Allergic/Immunologic: Negative.    Neurological: Negative.    Hematological: Negative.    Psychiatric/Behavioral: Negative.        The following portions of the patient's history were reviewed and updated as appropriate: allergies, current medications, past family history, past medical history, past social history, past surgical history and problem list.    Ortho Exam      Medical Decision Making    Assessment and Plan/ Diagnosis/Treatment options:   Bilateral knee arthritis here for the second injection of Orthovisc.  Plan is to proceed with the second injection today.  He will return in 1 week for the final injection or sooner if needed.    Using sterile  technique, the left knee was sterilely prepped with Hibiclens.  Following time out, using a 22 gauge needle the left knee was aspirated and then injected with 2 ml Orthovisc. Approximately 0.5 mm of straw-colored fluid was obtained.  Patient tolerated the procedure well.  No complications.      Using sterile technique, the right knee was sterilely prepped with Hibiclens.  Following time out, using a 22 gauge needle the right knee was aspirated and then injected with 2 ml Orthovisc. Approximately 0.5 mm of straw-colored fluid was obtained.  Patient tolerated the procedure well.  No complications.

## 2020-03-12 ENCOUNTER — CLINICAL SUPPORT (OUTPATIENT)
Dept: ORTHOPEDIC SURGERY | Facility: CLINIC | Age: 75
End: 2020-03-12

## 2020-03-12 DIAGNOSIS — M17.0 PRIMARY OSTEOARTHRITIS OF BOTH KNEES: Primary | ICD-10-CM

## 2020-03-12 PROCEDURE — 20610 DRAIN/INJ JOINT/BURSA W/O US: CPT | Performed by: PHYSICIAN ASSISTANT

## 2020-03-12 NOTE — PROGRESS NOTES
Subjective     Follow-up (bilateral knees orthovisc #3)      Rodrigo Baum is a 75 y.o. male.     History of Present Illness   Patient presents for the third injection of Orthovisc in bilateral knees.  He is tolerated previous injections well.  Allergies   Allergen Reactions   • Lipitor [Atorvastatin] Anxiety and Myalgia          • Lortab [Hydrocodone-Acetaminophen] Anxiety   • Azithromycin Nausea Only     Current Outpatient Medications on File Prior to Visit   Medication Sig Dispense Refill   • amLODIPine (NORVASC) 5 MG tablet Take 1 tablet by mouth Daily. 90 tablet 0   • aspirin 81 MG chewable tablet Chew 81 mg Daily.     • finasteride (PROSCAR) 5 MG tablet Take 5 mg by mouth Daily.     • glucosamine-chondroitin 500-400 MG capsule capsule Take 2 capsules by mouth Daily.     • isosorbide mononitrate (IMDUR) 30 MG 24 hr tablet Take 1 tablet by mouth Daily. 30 tablet 5   • levothyroxine (SYNTHROID, LEVOTHROID) 125 MCG tablet Take 125 mcg by mouth Daily.     • Ltcpqlv-Pznin-Fzrd-PassF-LBalm (MELATONIN + L-THEANINE PO) Take 1 tablet by mouth Daily As Needed.     • metoprolol succinate XL (TOPROL-XL) 25 MG 24 hr tablet Take 1 tablet by mouth Daily. 90 tablet 0   • Multiple Vitamins-Minerals (CENTRUM SILVER 50+MEN PO) Take  by mouth.     • nitroglycerin (NITROSTAT) 0.4 MG SL tablet Place 1 tablet under the tongue Every 5 (Five) Minutes As Needed for Chest Pain. Take no more than 3 doses in 15 minutes. 25 tablet 0   • omeprazole (priLOSEC) 40 MG capsule TAKE 1 CAPSULE BY MOUTH TWICE DAILY (Patient taking differently: TAKE 1 CAPSULE BY MOUTH ONCE DAILY) 180 capsule 0   • ondansetron (ZOFRAN) 4 MG tablet Take 4 mg by mouth Every 8 (Eight) Hours As Needed for Nausea or Vomiting.     • predniSONE (DELTASONE) 5 MG tablet Take 2.5 mg by mouth Daily.     • rosuvastatin (CRESTOR) 40 MG tablet Take 1 tablet by mouth Every Night. 90 tablet 2   • tamsulosin (FLOMAX) 0.4 MG capsule 24 hr capsule Take 1 capsule by mouth 2 (Two)  Times a Day.       Current Facility-Administered Medications on File Prior to Visit   Medication Dose Route Frequency Provider Last Rate Last Dose   • Chlorhexidine Gluconate Cloth 2 % pads 1 application  1 application Topical Q12H Sd Vaz PA         Social History     Socioeconomic History   • Marital status:      Spouse name: N/A   • Number of children: 1   • Years of education: H.S.   • Highest education level: High school graduate   Occupational History   • Occupation:      Employer: RETIRED   Tobacco Use   • Smoking status: Former Smoker     Packs/day: 2.00     Years: 20.00     Pack years: 40.00     Types: Cigarettes     Start date:      Last attempt to quit:      Years since quittin.2   • Smokeless tobacco: Never Used   Substance and Sexual Activity   • Alcohol use: Yes     Alcohol/week: 3.0 standard drinks     Types: 3 Cans of beer per week     Frequency: Monthly or less   • Drug use: No   • Sexual activity: Not Currently     Partners: Female   Social History Narrative    Caffeine: 1 servings daily     Past Surgical History:   Procedure Laterality Date   • CARDIAC CATHETERIZATION N/A 2018    Procedure: Left Heart Cath;  Surgeon: Susannah Rasmussen MD;  Location:  [facility];  Service:    • CARDIAC CATHETERIZATION N/A 2019    Procedure: Left Heart Cath;  Surgeon: Susannah Rasmussen MD;  Location:  [facility];  Service: Cardiovascular   • COLONOSCOPY     • CORONARY ANGIOPLASTY WITH STENT PLACEMENT      X2   • CORONARY ARTERY BYPASS GRAFT N/A 2018    Procedure: CORONARY ARTERY BYPASS X 2 WITH INTERNAL MAMMARY ARTERY GRAFT, ENDOSCOPIC VEIN HARVESTING UTILIZING THE LEFT GREATER SAPPHENOUS VEIN  CYSTO BY DR WATERS;  Surgeon: Casey Morales MD;  Location: [facility];  Service:    • CYSTOSCOPY TRANSURETHRAL RESECTION OF PROSTATE N/A 2016    Procedure: CYSTOSCOPY TRANSURETHRAL RESECTION OF PROSTATE GREENLIGHT;  Surgeon:  Alverto Richards MD;  Location: [facility];  Service:    • TONSILLECTOMY     • UPPER GASTROINTESTINAL ENDOSCOPY       Family History   Problem Relation Age of Onset   • Diabetes Father    • Heart disease Father    • Hypertension Father    • Heart attack Father    • Osteoarthritis Father    • Stroke Mother    • Hypertension Mother    • Osteoarthritis Mother    • No Known Problems Maternal Grandmother    • No Known Problems Maternal Grandfather    • Stroke Paternal Grandmother    • Stroke Paternal Grandfather      Past Medical History:   Diagnosis Date   • BPH (benign prostatic hyperplasia)    • CAD (coronary artery disease)    • Chronic venous stasis    • CKD (chronic kidney disease), stage III (CMS/HCC)    • Claustrophobia    • GERD (gastroesophageal reflux disease)    • HLD (hyperlipidemia)    • HTN (hypertension)    • Hypothyroidism    • OA (osteoarthritis) of ankle/foot    • OA (osteoarthritis) of hip    • Osteoporosis    • RA (rheumatoid arthritis) (CMS/[facility])          Review of Systems    The following portions of the patient's history were reviewed and updated as appropriate: allergies, current medications, past family history, past medical history, past social history, past surgical history and problem list.    Ortho Exam      Medical Decision Making    Assessment and Plan/ Diagnosis/Treatment options:   Bilateral knee arthritis undergoing Orthovisc series.  Plan is to proceed with the third injection in both knees today.  I will see him back in 6 months or sooner if needed.    Using sterile technique, the left knee was sterilely prepped with Hibiclens.  Following time out, using a 22 gauge needle the left knee was aspirated and then injected with 2 ml Orthovisc. Approximately 0.5 mm of straw-colored fluid was obtained.  Patient tolerated the procedure well.  No complications.      Using sterile technique, the right knee was sterilely prepped with Hibiclens.  Following time out, using a 22 gauge needle the  right knee was aspirated and then injected with 2 ml Orthovisc. Approximately 0.5 mm of straw-colored fluid was obtained.  Patient tolerated the procedure well.  No complications.

## 2020-03-12 NOTE — PROGRESS NOTES
Procedure   Large Joint Arthrocentesis: R knee  Date/Time: 3/12/2020 1:14 PM  Consent given by: patient  Site marked: site marked  Timeout: Immediately prior to procedure a time out was called to verify the correct patient, procedure, equipment, support staff and site/side marked as required   Supporting Documentation  Indications: pain   Procedure Details  Location: knee - R knee  Preparation: Patient was prepped and draped in the usual sterile fashion  Needle size: 22 G  Approach: anterolateral  Medications administered: 30 mg Hyaluronan 30 MG/2ML  Patient tolerance: patient tolerated the procedure well with no immediate complications    Large Joint Arthrocentesis: L knee  Date/Time: 3/12/2020 1:14 PM  Consent given by: patient  Site marked: site marked  Timeout: Immediately prior to procedure a time out was called to verify the correct patient, procedure, equipment, support staff and site/side marked as required   Supporting Documentation  Indications: pain   Procedure Details  Location: knee - L knee  Preparation: Patient was prepped and draped in the usual sterile fashion  Needle size: 22 G  Approach: anterolateral  Medications administered: 30 mg Hyaluronan 30 MG/2ML  Patient tolerance: patient tolerated the procedure well with no immediate complications

## 2020-04-21 DIAGNOSIS — K21.9 GASTROESOPHAGEAL REFLUX DISEASE, ESOPHAGITIS PRESENCE NOT SPECIFIED: Primary | ICD-10-CM

## 2020-04-21 RX ORDER — OMEPRAZOLE 40 MG/1
40 CAPSULE, DELAYED RELEASE ORAL DAILY
Qty: 30 CAPSULE | Refills: 2 | Status: SHIPPED | OUTPATIENT
Start: 2020-04-21

## 2020-07-19 ENCOUNTER — HOSPITAL ENCOUNTER (EMERGENCY)
Facility: HOSPITAL | Age: 75
Discharge: LEFT BEFORE DISCHARGE | End: 2020-07-19
Attending: EMERGENCY MEDICINE | Admitting: EMERGENCY MEDICINE

## 2020-07-19 ENCOUNTER — APPOINTMENT (OUTPATIENT)
Dept: GENERAL RADIOLOGY | Facility: HOSPITAL | Age: 75
End: 2020-07-19

## 2020-07-19 VITALS
BODY MASS INDEX: 25.48 KG/M2 | TEMPERATURE: 98.8 F | HEART RATE: 59 BPM | OXYGEN SATURATION: 97 % | DIASTOLIC BLOOD PRESSURE: 84 MMHG | SYSTOLIC BLOOD PRESSURE: 128 MMHG | RESPIRATION RATE: 24 BRPM | HEIGHT: 71 IN | WEIGHT: 182 LBS

## 2020-07-19 DIAGNOSIS — I20.9 ANGINAL PAIN (HCC): ICD-10-CM

## 2020-07-19 DIAGNOSIS — R07.9 ACUTE CHEST PAIN: Primary | ICD-10-CM

## 2020-07-19 LAB
ALBUMIN SERPL-MCNC: 4.1 G/DL (ref 3.5–5.2)
ALBUMIN/GLOB SERPL: 1.9 G/DL
ALP SERPL-CCNC: 67 U/L (ref 39–117)
ALT SERPL W P-5'-P-CCNC: 26 U/L (ref 1–41)
ANION GAP SERPL CALCULATED.3IONS-SCNC: 12 MMOL/L (ref 5–15)
AST SERPL-CCNC: 27 U/L (ref 1–40)
BASOPHILS # BLD AUTO: 0.05 10*3/MM3 (ref 0–0.2)
BASOPHILS NFR BLD AUTO: 0.6 % (ref 0–1.5)
BILIRUB SERPL-MCNC: 2.1 MG/DL (ref 0–1.2)
BUN SERPL-MCNC: 19 MG/DL (ref 8–23)
BUN/CREAT SERPL: 14.1 (ref 7–25)
CALCIUM SPEC-SCNC: 9.2 MG/DL (ref 8.6–10.5)
CHLORIDE SERPL-SCNC: 105 MMOL/L (ref 98–107)
CO2 SERPL-SCNC: 22 MMOL/L (ref 22–29)
CREAT SERPL-MCNC: 1.35 MG/DL (ref 0.76–1.27)
DEPRECATED RDW RBC AUTO: 44.4 FL (ref 37–54)
EOSINOPHIL # BLD AUTO: 0.18 10*3/MM3 (ref 0–0.4)
EOSINOPHIL NFR BLD AUTO: 2.2 % (ref 0.3–6.2)
ERYTHROCYTE [DISTWIDTH] IN BLOOD BY AUTOMATED COUNT: 13.7 % (ref 12.3–15.4)
GFR SERPL CREATININE-BSD FRML MDRD: 52 ML/MIN/1.73
GLOBULIN UR ELPH-MCNC: 2.2 GM/DL
GLUCOSE SERPL-MCNC: 167 MG/DL (ref 65–99)
HCT VFR BLD AUTO: 45.9 % (ref 37.5–51)
HGB BLD-MCNC: 15.2 G/DL (ref 13–17.7)
HOLD SPECIMEN: NORMAL
HOLD SPECIMEN: NORMAL
IMM GRANULOCYTES # BLD AUTO: 0.02 10*3/MM3 (ref 0–0.05)
IMM GRANULOCYTES NFR BLD AUTO: 0.2 % (ref 0–0.5)
LIPASE SERPL-CCNC: 32 U/L (ref 13–60)
LYMPHOCYTES # BLD AUTO: 1.18 10*3/MM3 (ref 0.7–3.1)
LYMPHOCYTES NFR BLD AUTO: 14.1 % (ref 19.6–45.3)
MCH RBC QN AUTO: 29.6 PG (ref 26.6–33)
MCHC RBC AUTO-ENTMCNC: 33.1 G/DL (ref 31.5–35.7)
MCV RBC AUTO: 89.5 FL (ref 79–97)
MONOCYTES # BLD AUTO: 0.56 10*3/MM3 (ref 0.1–0.9)
MONOCYTES NFR BLD AUTO: 6.7 % (ref 5–12)
NEUTROPHILS NFR BLD AUTO: 6.38 10*3/MM3 (ref 1.7–7)
NEUTROPHILS NFR BLD AUTO: 76.2 % (ref 42.7–76)
NRBC BLD AUTO-RTO: 0 /100 WBC (ref 0–0.2)
NT-PROBNP SERPL-MCNC: 106.7 PG/ML (ref 0–900)
PLATELET # BLD AUTO: 192 10*3/MM3 (ref 140–450)
PMV BLD AUTO: 10.2 FL (ref 6–12)
POTASSIUM SERPL-SCNC: 3.6 MMOL/L (ref 3.5–5.2)
PROT SERPL-MCNC: 6.3 G/DL (ref 6–8.5)
RBC # BLD AUTO: 5.13 10*6/MM3 (ref 4.14–5.8)
SODIUM SERPL-SCNC: 139 MMOL/L (ref 136–145)
TROPONIN T SERPL-MCNC: <0.01 NG/ML (ref 0–0.03)
TROPONIN T SERPL-MCNC: <0.01 NG/ML (ref 0–0.03)
WBC # BLD AUTO: 8.37 10*3/MM3 (ref 3.4–10.8)
WHOLE BLOOD HOLD SPECIMEN: NORMAL
WHOLE BLOOD HOLD SPECIMEN: NORMAL

## 2020-07-19 PROCEDURE — 93005 ELECTROCARDIOGRAM TRACING: CPT | Performed by: NURSE PRACTITIONER

## 2020-07-19 PROCEDURE — 80053 COMPREHEN METABOLIC PANEL: CPT

## 2020-07-19 PROCEDURE — 85025 COMPLETE CBC W/AUTO DIFF WBC: CPT

## 2020-07-19 PROCEDURE — 71045 X-RAY EXAM CHEST 1 VIEW: CPT

## 2020-07-19 PROCEDURE — 93005 ELECTROCARDIOGRAM TRACING: CPT | Performed by: EMERGENCY MEDICINE

## 2020-07-19 PROCEDURE — 84484 ASSAY OF TROPONIN QUANT: CPT | Performed by: NURSE PRACTITIONER

## 2020-07-19 PROCEDURE — 99283 EMERGENCY DEPT VISIT LOW MDM: CPT

## 2020-07-19 PROCEDURE — 83880 ASSAY OF NATRIURETIC PEPTIDE: CPT

## 2020-07-19 PROCEDURE — 84484 ASSAY OF TROPONIN QUANT: CPT

## 2020-07-19 PROCEDURE — 93005 ELECTROCARDIOGRAM TRACING: CPT

## 2020-07-19 PROCEDURE — 83690 ASSAY OF LIPASE: CPT

## 2020-07-19 RX ORDER — SODIUM CHLORIDE 0.9 % (FLUSH) 0.9 %
10 SYRINGE (ML) INJECTION AS NEEDED
Status: DISCONTINUED | OUTPATIENT
Start: 2020-07-19 | End: 2020-07-19 | Stop reason: HOSPADM

## 2020-07-19 RX ORDER — NITROGLYCERIN 0.4 MG/1
0.4 TABLET SUBLINGUAL
Qty: 100 TABLET | Refills: 0 | Status: SHIPPED | OUTPATIENT
Start: 2020-07-19

## 2020-07-19 NOTE — ED PROVIDER NOTES
Subjective   Patient presents to the ER for chest pain and pressure shortly after getting home from getting groceries with radiation down his left arm with nausea and diaphoresis.  He does have a history of coronary artery disease with stent and CABG.  At this point he is symptom-free.  He is already had 325 of aspirin and 3 nitroglycerin which he says did help.  He follows [facility] cardiology.      Chest Pain   Pain location:  Substernal area  Pain quality: pressure    Pain radiates to:  L arm  Pain severity:  Moderate  Duration:  2 hours  Timing:  Constant  Progression:  Resolved  Chronicity:  New  Context: at rest    Relieved by:  Nitroglycerin and rest  Worsened by:  Exertion  Associated symptoms: no abdominal pain, no cough, no diaphoresis, no fever, no nausea, no shortness of breath and no vomiting    Risk factors: coronary artery disease and hypertension        Review of Systems   Constitutional: Negative for chills, diaphoresis and fever.   HENT: Negative for congestion and sore throat.    Respiratory: Negative for cough, choking, chest tightness, shortness of breath and wheezing.    Cardiovascular: Positive for chest pain. Negative for leg swelling.   Gastrointestinal: Negative for abdominal distention, abdominal pain, anal bleeding, blood in stool, constipation, diarrhea, nausea and vomiting.   Genitourinary: Negative for difficulty urinating, dysuria, flank pain, frequency, hematuria and urgency.   All other systems reviewed and are negative.      Past Medical History:   Diagnosis Date   • BPH (benign prostatic hyperplasia)    • CAD (coronary artery disease)    • Chronic venous stasis    • CKD (chronic kidney disease), stage III (CMS/[facility])    • Claustrophobia    • GERD (gastroesophageal reflux disease)    • HLD (hyperlipidemia)    • HTN (hypertension)    • Hypothyroidism    • OA (osteoarthritis) of ankle/foot    • OA (osteoarthritis) of hip    • Osteoporosis    • RA (rheumatoid arthritis) (CMS/[facility])         Allergies   Allergen Reactions   • Lipitor [Atorvastatin] Anxiety and Myalgia          • Lortab [Hydrocodone-Acetaminophen] Anxiety   • Azithromycin Nausea Only       Past Surgical History:   Procedure Laterality Date   • CARDIAC CATHETERIZATION N/A 1/24/2018    Procedure: Left Heart Cath;  Surgeon: Susannah Rasmussen MD;  Location:  [facility];  Service:    • CARDIAC CATHETERIZATION N/A 7/1/2019    Procedure: Left Heart Cath;  Surgeon: Susannah Rasmussen MD;  Location:  [facility];  Service: Cardiovascular   • COLONOSCOPY     • CORONARY ANGIOPLASTY WITH STENT PLACEMENT      X2   • CORONARY ARTERY BYPASS GRAFT N/A 1/26/2018    Procedure: CORONARY ARTERY BYPASS X 2 WITH INTERNAL MAMMARY ARTERY GRAFT, ENDOSCOPIC VEIN HARVESTING UTILIZING THE LEFT GREATER SAPPHENOUS VEIN  CYSTO BY DR WATERS;  Surgeon: Casey Morales MD;  Location:  [facility];  Service:    • CYSTOSCOPY TRANSURETHRAL RESECTION OF PROSTATE N/A 11/1/2016    Procedure: CYSTOSCOPY TRANSURETHRAL RESECTION OF PROSTATE GREENLIGHT;  Surgeon: Alverto Richards MD;  Location:  [facility];  Service:    • TONSILLECTOMY     • UPPER GASTROINTESTINAL ENDOSCOPY         Family History   Problem Relation Age of Onset   • Diabetes Father    • Heart disease Father    • Hypertension Father    • Heart attack Father    • Osteoarthritis Father    • Stroke Mother    • Hypertension Mother    • Osteoarthritis Mother    • No Known Problems Maternal Grandmother    • No Known Problems Maternal Grandfather    • Stroke Paternal Grandmother    • Stroke Paternal Grandfather        Social History     Socioeconomic History   • Marital status:      Spouse name: N/A   • Number of children: 1   • Years of education: H.S.   • Highest education level: High school graduate   Occupational History   • Occupation:      Employer: RETIRED   Tobacco Use   • Smoking status: Former Smoker     Packs/day: 2.00     Years: 20.00     Pack years: 40.00      Types: Cigarettes     Start date:      Last attempt to quit:      Years since quittin.5   • Smokeless tobacco: Never Used   Substance and Sexual Activity   • Alcohol use: Yes     Alcohol/week: 3.0 standard drinks     Types: 3 Cans of beer per week     Frequency: Monthly or less   • Drug use: No   • Sexual activity: Not Currently     Partners: Female   Social History Narrative    Caffeine: 1 servings daily           Objective   Physical Exam   Constitutional: He is oriented to person, place, and time. He appears well-developed and well-nourished.   HENT:   Head: Normocephalic and atraumatic.   Right Ear: External ear normal.   Left Ear: External ear normal.   Nose: Nose normal.   Mouth/Throat: Oropharynx is clear and moist.   Eyes: Pupils are equal, round, and reactive to light. Conjunctivae and EOM are normal.   Neck: Normal range of motion. Neck supple.   Cardiovascular: Normal rate, regular rhythm, normal heart sounds and intact distal pulses.   Pulmonary/Chest: Effort normal and breath sounds normal.   Abdominal: Soft. Bowel sounds are normal.   Musculoskeletal: Normal range of motion.   Neurological: He is alert and oriented to person, place, and time.   Skin: Skin is warm and dry.   Psychiatric: He has a normal mood and affect. His behavior is normal. Judgment normal.       Procedures           ED Course  ED Course as of 1923   Sun  Case discussed with Dr. Boyle.  Concerning for coronary artery disease given his clinical symptoms.  We will get a second set.  I would go ahead admit this patient however at this point he is refusing admission spite my recommendation and would be against my medical advice but is agreeable to a second set.      [JM]    The patient will be going AGAINST MEDICAL ADVICE.  He is aware the risks associated with leaving.  He has a very strong story for coronary artery disease despite having a negative troponin x2.  Also make a follow-up to the  chest pain clinic and he is to call his cardiologist tomorrow for follow-up.  He is welcome to return to emergency department anytime.  As of right now he has been pain-free for the last 4 hours and he really wants to go home.  He refuses admission.  Once again he is associated with the risks going home.  He is very pleasant and polite.  Is a intelligent responsible person nonetheless he should still come into the hospital but he refuses.  He is thankful for treatment given in the emergency department.  He has a new bottle of nitroglycerin    [JM]      ED Course User Index  [JM] Zane Marquez APRN           XR Chest 1 View   Preliminary Result   Stable chest with no acute cardiopulmonary disease.       DICTATED:   07/19/2020   EDITED/ls :   07/19/2020                                                 [facility]    Final diagnoses:   Acute chest pain   Anginal pain (CMS/HCC)            Zane Marquez APRN  07/19/20 1923

## 2020-07-20 NOTE — PROGRESS NOTES
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     PROBLEM LIST:   1. CAD:  1. 11/15/1993: PTCA and repeat PTCA 1 week following of proximal  LAD, per Dr. Lovelace with normal circumflex and RCA noted at that time.   2. April 2008: Overlapping 3.0 x 24 mm. And 3.0 x 16.0 mm Taxus to LAD and 2.5 x 8.0 PTCA to proximal first OM, inability to pass stent.  EF greater than 60.  3. 5/16 : Stress echocardiogram, which was within normal limits. EF greater than 60%. Normal hemodynamic response with exercise.   4. 1/18 CABG X2 periop hematuria/syed issues  5. 7/1/2019 LHC per [facility], patent LIMA to LAD, SVG to diagonal, spasm of the LIMA as well as apical LAD noted which improved with nitro, normal EF  6. 7/19/2020 [facility] ED visit CP, troponin normal x2, admission was recommended however he left AMA  2. Dyslipidemia:  1. November 2009:  Total cholesterol 163, triglycerides 169, HDL 55, LDL 81.  2. 02/30/2012:  Total cholesterol 242, HDL 51, triglycerides 192, , off statin therapy.  3. Nicotine addiction cessation 15 years prior.  4. HTN, presumed essential.  5. 1/17 AAA screen wnl  6. Hypothyroidism on replacement therapy.   7. Exogenous obesity.  8. Arthritis data deficient, on steroid therapy greater than 5 years.   9. GERD.   10. Easy bruising.   11. 2015 Right jaw swelling, evaluated for potential mandibular infection per Dr. Smith.   12. Hip and foot pain, followed per Dr Espinoza  13. Prostatism- 2017 3 rounds abx      Chief Complaint   Patient presents with   • Coronary artery disease involving native coronary artery of        Allergies  Allergies   Allergen Reactions   • Lipitor [Atorvastatin] Anxiety and Myalgia          • Lortab  [Hydrocodone-Acetaminophen] Anxiety   • Azithromycin Nausea Only       Current Medications    Current Outpatient Medications:   •  amLODIPine (NORVASC) 5 MG tablet, Take 1 tablet by mouth Daily., Disp: 90 tablet, Rfl: 0  •  aspirin 81 MG chewable tablet, Chew 81 mg Daily., Disp: , Rfl:   •  Cholecalciferol (VITAMIN D3) 50 MCG (2000 UT) tablet, Take 2,000 Units by mouth Daily., Disp: , Rfl:   •  finasteride (PROSCAR) 5 MG tablet, Take 5 mg by mouth Daily., Disp: , Rfl:   •  glucosamine-chondroitin 500-400 MG capsule capsule, Take 2 capsules by mouth Daily., Disp: , Rfl:   •  isosorbide mononitrate (IMDUR) 30 MG 24 hr tablet, Take 1 tablet by mouth Daily., Disp: 30 tablet, Rfl: 5  •  levothyroxine (SYNTHROID, LEVOTHROID) 125 MCG tablet, Take 125 mcg by mouth Daily., Disp: , Rfl:   •  Enqcwra-Arvdy-Pzni-PassF-LBalm (MELATONIN + L-THEANINE PO), Take 1 tablet by mouth Daily As Needed., Disp: , Rfl:   •  metoprolol succinate XL (TOPROL-XL) 25 MG 24 hr tablet, Take 1 tablet by mouth Daily., Disp: 90 tablet, Rfl: 0  •  Multiple Vitamins-Minerals (CENTRUM SILVER 50+MEN PO), Take  by mouth., Disp: , Rfl:   •  nitroglycerin (NITROSTAT) 0.4 MG SL tablet, Place 1 tablet under the tongue Every 5 (Five) Minutes As Needed for Chest Pain. Take no more than 3 doses in 15 minutes., Disp: 100 tablet, Rfl: 0  •  omeprazole (priLOSEC) 40 MG capsule, Take 1 capsule by mouth Daily. Indications: Gastroesophageal Reflux Disease, Disp: 30 capsule, Rfl: 2  •  ondansetron (ZOFRAN) 4 MG tablet, Take 4 mg by mouth Every 8 (Eight) Hours As Needed for Nausea or Vomiting., Disp: , Rfl:   •  predniSONE (DELTASONE) 5 MG tablet, Take 2.5 mg by mouth Daily., Disp: , Rfl:   •  rosuvastatin (CRESTOR) 40 MG tablet, Take 1 tablet by mouth Every Night., Disp: 90 tablet, Rfl: 2  •  tamsulosin (FLOMAX) 0.4 MG capsule 24 hr capsule, Take 1 capsule by mouth 2 (Two) Times a Day., Disp: , Rfl:   No current facility-administered medications for this visit.  "    Facility-Administered Medications Ordered in Other Visits:   •  Chlorhexidine Gluconate Cloth 2 % pads 1 application, 1 application, Topical, Q12H YESIN, Sd Mathew PA      History of Present Illness   Rodrigo Baum is a 75 y.o. year old male here for follow up.    Continues to be perplexed regarding recurrent anginal equivalent.  Has been emergency department with negative isoenzymes on 2 occasions.  He is largely inactive and mortified regarding the COVID pandemic.  He has taken nitroglycerin on occasional basis and is taking his medication as prescribed.  He is lost maximum approximately 40 pounds since being diagnosed with diabetes with dietary restriction      OBJECTIVE:  Vitals:    07/22/20 0954   BP: 122/66   BP Location: Right arm   Patient Position: Sitting   Pulse: 59   SpO2: 98%   Weight: 87.5 kg (193 lb)   Height: 188 cm (74\")     Physical Exam   Constitutional: He appears well-developed and well-nourished.   Neck: Normal range of motion. Neck supple. No hepatojugular reflux and no JVD present. Carotid bruit is not present. No tracheal deviation present. No thyromegaly present.   Cardiovascular: Normal rate, regular rhythm, S1 normal, S2 normal, intact distal pulses and normal pulses. PMI is not displaced. Exam reveals no gallop, no distant heart sounds, no friction rub, no midsystolic click and no opening snap.   No murmur heard.  Pulses:       Radial pulses are 2+ on the right side, and 2+ on the left side.        Dorsalis pedis pulses are 2+ on the right side, and 2+ on the left side.        Posterior tibial pulses are 2+ on the right side, and 2+ on the left side.   Pulmonary/Chest: Effort normal and breath sounds normal. He has no wheezes. He has no rales.   Median sternotomy incision well-healed   Abdominal: Soft. Bowel sounds are normal. He exhibits no mass. There is no tenderness. There is no guarding.       Diagnostic Data:  Procedures      ASSESSMENT:   Diagnosis Plan   1. Angina " pectoris (CMS/[facility])     2. Essential hypertension     3. Mixed hyperlipidemia     4. Type 2 diabetes mellitus with hyperglycemia, without long-term current use of insulin (CMS/[facility])         PLAN:  Chronic angina will escalate therapy with Ranexa    Hypertension controlled tamsulosin metoprolol amlodipine    Dyslipidemia on statin therapy    Diabetes on oral agents with concomitant prednisone TORI Freeman MD, thank you for referring Mr. Baum for evaluation.  I have forwarded my electronically generated recommendations to you for review.  Please do not hesitate to call with any questions.      Tal Wagner MD, FACC

## 2020-07-22 ENCOUNTER — OFFICE VISIT (OUTPATIENT)
Dept: CARDIOLOGY | Facility: CLINIC | Age: 75
End: 2020-07-22

## 2020-07-22 VITALS
HEIGHT: 74 IN | OXYGEN SATURATION: 98 % | SYSTOLIC BLOOD PRESSURE: 122 MMHG | HEART RATE: 59 BPM | WEIGHT: 193 LBS | DIASTOLIC BLOOD PRESSURE: 66 MMHG | BODY MASS INDEX: 24.77 KG/M2

## 2020-07-22 DIAGNOSIS — E11.65 TYPE 2 DIABETES MELLITUS WITH HYPERGLYCEMIA, WITHOUT LONG-TERM CURRENT USE OF INSULIN (HCC): ICD-10-CM

## 2020-07-22 DIAGNOSIS — E78.2 MIXED HYPERLIPIDEMIA: ICD-10-CM

## 2020-07-22 DIAGNOSIS — I20.9 ANGINA PECTORIS (HCC): Primary | ICD-10-CM

## 2020-07-22 DIAGNOSIS — I10 ESSENTIAL HYPERTENSION: ICD-10-CM

## 2020-07-22 PROCEDURE — 99214 OFFICE O/P EST MOD 30 MIN: CPT | Performed by: INTERNAL MEDICINE

## 2020-07-22 RX ORDER — RANOLAZINE 500 MG/1
500 TABLET, EXTENDED RELEASE ORAL 2 TIMES DAILY
Qty: 120 TABLET | Refills: 3 | Status: SHIPPED | OUTPATIENT
Start: 2020-07-22 | End: 2020-07-23

## 2020-07-22 RX ORDER — CHOLECALCIFEROL (VITAMIN D3) 125 MCG
2000 CAPSULE ORAL DAILY
COMMUNITY

## 2020-07-22 RX ORDER — RANOLAZINE 500 MG/1
500 TABLET, EXTENDED RELEASE ORAL 2 TIMES DAILY
Qty: 120 TABLET | Refills: 3 | Status: SHIPPED | OUTPATIENT
Start: 2020-07-22 | End: 2020-07-22

## 2020-07-23 ENCOUNTER — TELEPHONE (OUTPATIENT)
Dept: CARDIOLOGY | Facility: CLINIC | Age: 75
End: 2020-07-23

## 2020-07-23 NOTE — TELEPHONE ENCOUNTER
"Pt called after being seen yesterday in office and states he does not want to take the ranexa due to cost and the fact that he has only had one \"spasm\" episode. Pt states he will monitor the situation and call the office back if the spasm episodes continue to happen.   "

## 2020-07-28 ENCOUNTER — TELEPHONE (OUTPATIENT)
Dept: CARDIOLOGY | Facility: HOSPITAL | Age: 75
End: 2020-07-28

## 2020-07-28 NOTE — TELEPHONE ENCOUNTER
Patient he had a chest xray done one 7/19/2020 and it stated the his heart was enlarged and he would like to know if this is a problem.  He did not see this report til after his visit with Dr. Wagner.

## 2020-07-30 ENCOUNTER — OFFICE VISIT (OUTPATIENT)
Dept: GASTROENTEROLOGY | Facility: CLINIC | Age: 75
End: 2020-07-30

## 2020-07-30 VITALS
TEMPERATURE: 97.3 F | HEIGHT: 71 IN | WEIGHT: 192.05 LBS | DIASTOLIC BLOOD PRESSURE: 72 MMHG | BODY MASS INDEX: 26.89 KG/M2 | SYSTOLIC BLOOD PRESSURE: 122 MMHG | OXYGEN SATURATION: 98 % | HEART RATE: 67 BPM

## 2020-07-30 DIAGNOSIS — K21.00 GASTROESOPHAGEAL REFLUX DISEASE WITH ESOPHAGITIS: Primary | ICD-10-CM

## 2020-07-30 PROCEDURE — 99214 OFFICE O/P EST MOD 30 MIN: CPT | Performed by: INTERNAL MEDICINE

## 2020-07-30 NOTE — PROGRESS NOTES
PCP: TORI Fletcher MD    Chief Complaint   Patient presents with   • 6 month f/u       History of Present Illness:   Rodrigo Baum is a 75 y.o. male who presents to GI clinic as a follow up for gerd. Has been seen in ED once for chest pain. He left ama. He reports controlled heartburn on ppi therapy. No dysphagia. Denies abdominal pain. No melena.     Past Medical History:   Diagnosis Date   • BPH (benign prostatic hyperplasia)    • CAD (coronary artery disease)    • Chronic venous stasis    • CKD (chronic kidney disease), stage III (CMS/HCC)    • Claustrophobia    • GERD (gastroesophageal reflux disease)    • HLD (hyperlipidemia)    • HTN (hypertension)    • Hypothyroidism    • OA (osteoarthritis) of ankle/foot    • OA (osteoarthritis) of hip    • Osteoporosis    • RA (rheumatoid arthritis) (CMS/[facility])        Past Surgical History:   Procedure Laterality Date   • CARDIAC CATHETERIZATION N/A 1/24/2018    Procedure: Left Heart Cath;  Surgeon: Susannah Rasmussen MD;  Location:  [facility];  Service:    • CARDIAC CATHETERIZATION N/A 7/1/2019    Procedure: Left Heart Cath;  Surgeon: Susannah Rasmussen MD;  Location:  [facility];  Service: Cardiovascular   • COLONOSCOPY     • CORONARY ANGIOPLASTY WITH STENT PLACEMENT      X2   • CORONARY ARTERY BYPASS GRAFT N/A 1/26/2018    Procedure: CORONARY ARTERY BYPASS X 2 WITH INTERNAL MAMMARY ARTERY GRAFT, ENDOSCOPIC VEIN HARVESTING UTILIZING THE LEFT GREATER SAPPHENOUS VEIN  CYSTO BY DR WATERS;  Surgeon: Casey Morales MD;  Location:  [facility];  Service:    • CYSTOSCOPY TRANSURETHRAL RESECTION OF PROSTATE N/A 11/1/2016    Procedure: CYSTOSCOPY TRANSURETHRAL RESECTION OF PROSTATE GREENLIGHT;  Surgeon: Alverto Richards MD;  Location:  [facility];  Service:    • TONSILLECTOMY     • UPPER GASTROINTESTINAL ENDOSCOPY           Current Outpatient Medications:   •  amLODIPine (NORVASC) 5 MG tablet, Take 1 tablet by mouth Daily., Disp: 90 tablet, Rfl: 0  •   aspirin 81 MG chewable tablet, Chew 81 mg Daily., Disp: , Rfl:   •  Cholecalciferol (VITAMIN D3) 50 MCG (2000 UT) tablet, Take 2,000 Units by mouth Daily., Disp: , Rfl:   •  finasteride (PROSCAR) 5 MG tablet, Take 5 mg by mouth Daily., Disp: , Rfl:   •  glucosamine-chondroitin 500-400 MG capsule capsule, Take 2 capsules by mouth Daily., Disp: , Rfl:   •  isosorbide mononitrate (IMDUR) 30 MG 24 hr tablet, Take 1 tablet by mouth Daily., Disp: 30 tablet, Rfl: 5  •  levothyroxine (SYNTHROID, LEVOTHROID) 125 MCG tablet, Take 125 mcg by mouth Daily., Disp: , Rfl:   •  Bkammud-Lebsu-Pxpk-PassF-LBalm (MELATONIN + L-THEANINE PO), Take 1 tablet by mouth Daily As Needed., Disp: , Rfl:   •  metoprolol succinate XL (TOPROL-XL) 25 MG 24 hr tablet, Take 1 tablet by mouth Daily., Disp: 90 tablet, Rfl: 0  •  Multiple Vitamins-Minerals (CENTRUM SILVER 50+MEN PO), Take  by mouth., Disp: , Rfl:   •  nitroglycerin (NITROSTAT) 0.4 MG SL tablet, Place 1 tablet under the tongue Every 5 (Five) Minutes As Needed for Chest Pain. Take no more than 3 doses in 15 minutes., Disp: 100 tablet, Rfl: 0  •  omeprazole (priLOSEC) 40 MG capsule, Take 1 capsule by mouth Daily. Indications: Gastroesophageal Reflux Disease, Disp: 30 capsule, Rfl: 2  •  ondansetron (ZOFRAN) 4 MG tablet, Take 4 mg by mouth Every 8 (Eight) Hours As Needed for Nausea or Vomiting., Disp: , Rfl:   •  predniSONE (DELTASONE) 5 MG tablet, Take 2.5 mg by mouth Daily., Disp: , Rfl:   •  rosuvastatin (CRESTOR) 40 MG tablet, Take 1 tablet by mouth Every Night., Disp: 90 tablet, Rfl: 2  •  tamsulosin (FLOMAX) 0.4 MG capsule 24 hr capsule, Take 1 capsule by mouth 2 (Two) Times a Day., Disp: , Rfl:   No current facility-administered medications for this visit.     Facility-Administered Medications Ordered in Other Visits:   •  Chlorhexidine Gluconate Cloth 2 % pads 1 application, 1 application, Topical, Q12H PRN, Sd Mathew PA    Allergies   Allergen Reactions   • Lipitor  [Atorvastatin] Anxiety and Myalgia          • Lortab [Hydrocodone-Acetaminophen] Anxiety   • Azithromycin Nausea Only       Family History   Problem Relation Age of Onset   • Diabetes Father    • Heart disease Father    • Hypertension Father    • Heart attack Father    • Osteoarthritis Father    • Stroke Mother    • Hypertension Mother    • Osteoarthritis Mother    • No Known Problems Maternal Grandmother    • No Known Problems Maternal Grandfather    • Stroke Paternal Grandmother    • Stroke Paternal Grandfather        Social History     Socioeconomic History   • Marital status:      Spouse name: N/A   • Number of children: 1   • Years of education: H.S.   • Highest education level: High school graduate   Occupational History   • Occupation:      Employer: RETIRED   Tobacco Use   • Smoking status: Former Smoker     Packs/day: 2.00     Years: 20.00     Pack years: 40.00     Types: Cigarettes     Start date:      Last attempt to quit:      Years since quittin.5   • Smokeless tobacco: Never Used   Substance and Sexual Activity   • Alcohol use: Yes     Alcohol/week: 3.0 standard drinks     Types: 3 Cans of beer per week     Frequency: Monthly or less   • Drug use: No   • Sexual activity: Not Currently     Partners: Female   Social History Narrative    Caffeine: 1 servings daily       Review of Systems   All other systems reviewed and are negative.        There were no vitals filed for this visit.    Physical Exam  General: well developed, well nourished  A+O x 3 NAD  HEENT: NCAT, pupils equal appearing, sclera appear white  NECK: full ROM  Respiratory: symmetric chest rise, normal effort, normal work of breathing, no overt rales  Abomen: non-distended, obese, no rebound  Skin: normal color, no jaundice  Neuro: no tremor, no facial droop  Psych: normal mood and affect      Assessment/Plan  1.) Chest pain,  ? DESCAD with h/o substernal pain thought to be due to angina  Continue following  with cardiology. He will continue taking ppi and I will give him a written rx for levsin which may help with esophageal spasm. However, I would not recommend to avoid ed visit if he is having chest pain.     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

## 2020-08-06 ENCOUNTER — OFFICE VISIT (OUTPATIENT)
Dept: ORTHOPEDIC SURGERY | Facility: CLINIC | Age: 75
End: 2020-08-06

## 2020-08-06 VITALS — OXYGEN SATURATION: 98 % | WEIGHT: 192.02 LBS | HEART RATE: 72 BPM | BODY MASS INDEX: 26.88 KG/M2 | HEIGHT: 71 IN

## 2020-08-06 DIAGNOSIS — M70.61 TROCHANTERIC BURSITIS OF BOTH HIPS: Primary | ICD-10-CM

## 2020-08-06 DIAGNOSIS — M70.62 TROCHANTERIC BURSITIS OF BOTH HIPS: Primary | ICD-10-CM

## 2020-08-06 PROCEDURE — 20610 DRAIN/INJ JOINT/BURSA W/O US: CPT | Performed by: PHYSICIAN ASSISTANT

## 2020-08-06 RX ADMIN — LIDOCAINE HYDROCHLORIDE 4 ML: 10 INJECTION, SOLUTION EPIDURAL; INFILTRATION; INTRACAUDAL; PERINEURAL at 16:06

## 2020-08-06 RX ADMIN — TRIAMCINOLONE ACETONIDE 40 MG: 40 INJECTION, SUSPENSION INTRA-ARTICULAR; INTRAMUSCULAR at 16:06

## 2020-08-06 NOTE — PROGRESS NOTES
[facility]    Subjective     Patient: Rodrigo Baum  : 1945    Primary Care Provider: TORI Fletcher MD    Requesting Provider: As above    Follow-up of the Right Hip (Trochanteric bursitis of both hips-last injection given 20) and Follow-up of the Left Hip (Trochanteric bursitis of both hips-last injection given 20)      History    Chief Complaint: Bilateral lateral hip pain.    History of Present Illness: Patient returns for his bilateral troch bursitis.  He was last treated with injection in 2020.  He reports the pain has returned for about one month.  It has worsened since he has come off of the prednisone.      Current Outpatient Medications on File Prior to Visit   Medication Sig Dispense Refill   • amLODIPine (NORVASC) 5 MG tablet Take 1 tablet by mouth Daily. 90 tablet 0   • aspirin 81 MG chewable tablet Chew 81 mg Daily.     • Cholecalciferol (VITAMIN D3) 50 MCG (2000 UT) tablet Take 2,000 Units by mouth Daily.     • finasteride (PROSCAR) 5 MG tablet Take 5 mg by mouth Daily.     • glucosamine-chondroitin 500-400 MG capsule capsule Take 2 capsules by mouth Daily.     • isosorbide mononitrate (IMDUR) 30 MG 24 hr tablet Take 1 tablet by mouth Daily. 30 tablet 5   • levothyroxine (SYNTHROID, LEVOTHROID) 125 MCG tablet Take 125 mcg by mouth Daily.     • Ffygybw-Emjxn-Crjc-PassF-LBalm (MELATONIN + L-THEANINE PO) Take 1 tablet by mouth Daily As Needed.     • metoprolol succinate XL (TOPROL-XL) 25 MG 24 hr tablet Take 1 tablet by mouth Daily. 90 tablet 0   • Multiple Vitamins-Minerals (CENTRUM SILVER 50+MEN PO) Take  by mouth.     • nitroglycerin (NITROSTAT) 0.4 MG SL tablet Place 1 tablet under the tongue Every 5 (Five) Minutes As Needed for Chest Pain. Take no more than 3 doses in 15 minutes. 100 tablet 0   • omeprazole (priLOSEC) 40 MG capsule Take 1 capsule by mouth Daily. Indications: Gastroesophageal Reflux Disease 30 capsule 2   • ondansetron  (ZOFRAN) 4 MG tablet Take 4 mg by mouth Every 8 (Eight) Hours As Needed for Nausea or Vomiting.     • predniSONE (DELTASONE) 5 MG tablet Take 2.5 mg by mouth Daily.     • rosuvastatin (CRESTOR) 40 MG tablet Take 1 tablet by mouth Every Night. 90 tablet 2   • tamsulosin (FLOMAX) 0.4 MG capsule 24 hr capsule Take 1 capsule by mouth 2 (Two) Times a Day.       Current Facility-Administered Medications on File Prior to Visit   Medication Dose Route Frequency Provider Last Rate Last Dose   • Chlorhexidine Gluconate Cloth 2 % pads 1 application  1 application Topical Q12H PRN Sd Mathew PA          Allergies   Allergen Reactions   • Lipitor [Atorvastatin] Anxiety and Myalgia          • Lortab [Hydrocodone-Acetaminophen] Anxiety   • Azithromycin Nausea Only      Past Medical History:   Diagnosis Date   • BPH (benign prostatic hyperplasia)    • CAD (coronary artery disease)    • Chronic venous stasis    • CKD (chronic kidney disease), stage III (CMS/[facility])    • Claustrophobia    • GERD (gastroesophageal reflux disease)    • HLD (hyperlipidemia)    • HTN (hypertension)    • Hypothyroidism    • OA (osteoarthritis) of ankle/foot    • OA (osteoarthritis) of hip    • Osteoporosis    • RA (rheumatoid arthritis) (CMS/[facility])      Past Surgical History:   Procedure Laterality Date   • CARDIAC CATHETERIZATION N/A 1/24/2018    Procedure: Left Heart Cath;  Surgeon: Susannah Rasmussen MD;  Location:  [facility];  Service:    • CARDIAC CATHETERIZATION N/A 7/1/2019    Procedure: Left Heart Cath;  Surgeon: Susannah Rasmussen MD;  Location:  [facility];  Service: Cardiovascular   • COLONOSCOPY     • CORONARY ANGIOPLASTY WITH STENT PLACEMENT      X2   • CORONARY ARTERY BYPASS GRAFT N/A 1/26/2018    Procedure: CORONARY ARTERY BYPASS X 2 WITH INTERNAL MAMMARY ARTERY GRAFT, ENDOSCOPIC VEIN HARVESTING UTILIZING THE LEFT GREATER SAPPHENOUS VEIN  CYSTO BY DR WATERS;  Surgeon: Casey Morales MD;  Location:  [facility]  OR;  Service:    • CYSTOSCOPY TRANSURETHRAL RESECTION OF PROSTATE N/A 2016    Procedure: CYSTOSCOPY TRANSURETHRAL RESECTION OF PROSTATE GREENLIGHT;  Surgeon: Alverto Richards MD;  Location: [facility] OR;  Service:    • TONSILLECTOMY     • UPPER GASTROINTESTINAL ENDOSCOPY       Family History   Problem Relation Age of Onset   • Diabetes Father    • Heart disease Father    • Hypertension Father    • Heart attack Father    • Osteoarthritis Father    • Stroke Mother    • Hypertension Mother    • Osteoarthritis Mother    • No Known Problems Maternal Grandmother    • No Known Problems Maternal Grandfather    • Stroke Paternal Grandmother    • Stroke Paternal Grandfather       Social History     Socioeconomic History   • Marital status:      Spouse name: N/A   • Number of children: 1   • Years of education: H.S.   • Highest education level: High school graduate   Occupational History   • Occupation:      Employer: RETIRED   Tobacco Use   • Smoking status: Former Smoker     Packs/day: 2.00     Years: 20.00     Pack years: 40.00     Types: Cigarettes     Start date:      Last attempt to quit:      Years since quittin.6   • Smokeless tobacco: Never Used   Substance and Sexual Activity   • Alcohol use: Yes     Alcohol/week: 3.0 standard drinks     Types: 3 Cans of beer per week     Frequency: Monthly or less   • Drug use: No   • Sexual activity: Not Currently     Partners: Female   Social History Narrative    Caffeine: 1 servings daily        Review of Systems   Constitutional: Negative.    HENT: Negative.    Eyes: Negative.    Respiratory: Negative.    Cardiovascular: Negative.    Gastrointestinal: Negative.    Endocrine: Negative.    Genitourinary: Negative.    Musculoskeletal: Positive for arthralgias.   Skin: Negative.    Allergic/Immunologic: Negative.    Neurological: Negative.    Hematological: Negative.    Psychiatric/Behavioral: Negative.        The following portions of the  "patient's history were reviewed and updated as appropriate: allergies, current medications, past family history, past medical history, past social history, past surgical history and problem list.      Objective      Physical Exam  Pulse 72   Ht 180.3 cm (70.98\")   Wt 87.1 kg (192 lb 0.3 oz)   SpO2 98%   BMI 26.79 kg/m²     Body mass index is 26.79 kg/m².    Patient is well developed, well nourished and in no acute distress.  Alert and oriented x 3.    Ortho Exam  Bilateral hip exam:  Tender over the bilateral greater trochanter with normal ROM of the hips with good strength.  NVI.    Medical Decision Making    Data Review:   none    Assessment:  1. Trochanteric bursitis of both hips        Plan:  Bilateral troch bursitis.  Plan today is repeat steroid injection into bilateral hips.  RTC prn.    Using sterile technique, the right hip was sterilely prepped with Hibiclens.  Using a 22 gauge needle, the right trochanteric bursa was injected with 40mg Kenolog, 4 cc lidocaine.  Patient tolerated the procedure well. No complications.      Using sterile technique, the left hip was sterilely prepped with Hibiclens.  Using a 22 gauge needle, the left trochanteric bursa was injected with 40mg Kenalog, 4 cc lidocaine .  Patient tolerated the procedure well. No complications.          Krystina Li PA-C  08/07/20  14:48    "

## 2020-08-06 NOTE — PROGRESS NOTES
Procedure   Large Joint Arthrocentesis: R greater trochanteric bursa  Date/Time: 8/6/2020 4:06 PM  Consent given by: patient  Site marked: site marked  Timeout: Immediately prior to procedure a time out was called to verify the correct patient, procedure, equipment, support staff and site/side marked as required   Supporting Documentation  Indications: pain   Procedure Details  Location: hip - R greater trochanteric bursa  Preparation: Patient was prepped and draped in the usual sterile fashion  Needle size: 22 G  Approach: anterolateral  Medications administered: 40 mg triamcinolone acetonide 40 MG/ML; 4 mL lidocaine PF 1% 1 %  Patient tolerance: patient tolerated the procedure well with no immediate complications    Large Joint Arthrocentesis: L greater trochanteric bursa  Date/Time: 8/6/2020 4:06 PM  Consent given by: patient  Site marked: site marked  Timeout: Immediately prior to procedure a time out was called to verify the correct patient, procedure, equipment, support staff and site/side marked as required   Supporting Documentation  Indications: pain   Procedure Details  Location: hip - L greater trochanteric bursa  Preparation: Patient was prepped and draped in the usual sterile fashion  Needle size: 22 G  Approach: anterolateral  Medications administered: 40 mg triamcinolone acetonide 40 MG/ML; 4 mL lidocaine PF 1% 1 %  Patient tolerance: patient tolerated the procedure well with no immediate complications

## 2020-08-07 RX ORDER — LIDOCAINE HYDROCHLORIDE 10 MG/ML
4 INJECTION, SOLUTION EPIDURAL; INFILTRATION; INTRACAUDAL; PERINEURAL
Status: COMPLETED | OUTPATIENT
Start: 2020-08-06 | End: 2020-08-06

## 2020-08-07 RX ORDER — TRIAMCINOLONE ACETONIDE 40 MG/ML
40 INJECTION, SUSPENSION INTRA-ARTICULAR; INTRAMUSCULAR
Status: COMPLETED | OUTPATIENT
Start: 2020-08-06 | End: 2020-08-06

## 2020-08-18 DIAGNOSIS — M17.0 PRIMARY OSTEOARTHRITIS OF BOTH KNEES: Primary | ICD-10-CM

## 2020-09-09 ENCOUNTER — OFFICE VISIT (OUTPATIENT)
Dept: ORTHOPEDIC SURGERY | Facility: CLINIC | Age: 75
End: 2020-09-09

## 2020-09-09 VITALS — WEIGHT: 192.02 LBS | HEART RATE: 83 BPM | OXYGEN SATURATION: 95 % | HEIGHT: 71 IN | BODY MASS INDEX: 26.88 KG/M2

## 2020-09-09 DIAGNOSIS — M17.0 PRIMARY OSTEOARTHRITIS OF BOTH KNEES: Primary | ICD-10-CM

## 2020-09-09 DIAGNOSIS — M11.20 CHONDROCALCINOSIS: ICD-10-CM

## 2020-09-09 PROCEDURE — 99213 OFFICE O/P EST LOW 20 MIN: CPT | Performed by: PHYSICIAN ASSISTANT

## 2020-09-09 NOTE — PROGRESS NOTES
[facility] [place] [facility] Note    Subjective     Patient: Rodrigo Baum  : 1945    Primary Care Provider: TORI Fletcher MD    Requesting Provider: As above    Follow-up (6 month recheck - Primary osteoarthritis of both knees )      History    Chief Complaint: bilateral  Knee pain    History of Present Illness: Patient returns today for his bilateral knee pain.  He is well known to me with bilateral knee arthritis.  He has done well in the past with intermittent Visco supplementation with improved pain.  He complains of medial functional pain with no night pain.  He is unable to take anti-inflammatories.  He has done physical therapy in the past.  He is currently weaning off oral prednisone which she has been on for many years.  He has had steroid injection in the distant past which did not help his as much as viscosupplementation.  He would like repeat Visco    Current Outpatient Medications on File Prior to Visit   Medication Sig Dispense Refill   • amLODIPine (NORVASC) 5 MG tablet Take 1 tablet by mouth Daily. 90 tablet 0   • aspirin 81 MG chewable tablet Chew 81 mg Daily.     • Cholecalciferol (VITAMIN D3) 50 MCG (2000 UT) tablet Take 2,000 Units by mouth Daily.     • finasteride (PROSCAR) 5 MG tablet Take 5 mg by mouth Daily.     • glucosamine-chondroitin 500-400 MG capsule capsule Take 2 capsules by mouth Daily.     • isosorbide mononitrate (IMDUR) 30 MG 24 hr tablet Take 1 tablet by mouth Daily. 30 tablet 5   • levothyroxine (SYNTHROID, LEVOTHROID) 125 MCG tablet Take 125 mcg by mouth Daily.     • Rxpxiti-Pqlbk-Dslk-PassF-LBalm (MELATONIN + L-THEANINE PO) Take 1 tablet by mouth Daily As Needed.     • metoprolol succinate XL (TOPROL-XL) 25 MG 24 hr tablet Take 1 tablet by mouth Daily. 90 tablet 0   • Multiple Vitamins-Minerals (CENTRUM SILVER 50+MEN PO) Take  by mouth.     • nitroglycerin (NITROSTAT) 0.4 MG SL tablet Place 1 tablet under the tongue Every 5 (Five) Minutes As Needed for  Chest Pain. Take no more than 3 doses in 15 minutes. 100 tablet 0   • omeprazole (priLOSEC) 40 MG capsule Take 1 capsule by mouth Daily. Indications: Gastroesophageal Reflux Disease 30 capsule 2   • ondansetron (ZOFRAN) 4 MG tablet Take 4 mg by mouth Every 8 (Eight) Hours As Needed for Nausea or Vomiting.     • predniSONE (DELTASONE) 5 MG tablet Take 2.5 mg by mouth Daily.     • rosuvastatin (CRESTOR) 40 MG tablet Take 1 tablet by mouth Every Night. 90 tablet 2   • tamsulosin (FLOMAX) 0.4 MG capsule 24 hr capsule Take 1 capsule by mouth 2 (Two) Times a Day.       Current Facility-Administered Medications on File Prior to Visit   Medication Dose Route Frequency Provider Last Rate Last Dose   • Chlorhexidine Gluconate Cloth 2 % pads 1 application  1 application Topical Q12H PRN Sd Mathew PA          Allergies   Allergen Reactions   • Lipitor [Atorvastatin] Anxiety and Myalgia          • Lortab [Hydrocodone-Acetaminophen] Anxiety   • Azithromycin Nausea Only      Past Medical History:   Diagnosis Date   • BPH (benign prostatic hyperplasia)    • CAD (coronary artery disease)    • Chronic venous stasis    • CKD (chronic kidney disease), stage III (CMS/[facility])    • Claustrophobia    • GERD (gastroesophageal reflux disease)    • HLD (hyperlipidemia)    • HTN (hypertension)    • Hypothyroidism    • OA (osteoarthritis) of ankle/foot    • OA (osteoarthritis) of hip    • Osteoporosis    • RA (rheumatoid arthritis) (CMS/[facility])      Past Surgical History:   Procedure Laterality Date   • CARDIAC CATHETERIZATION N/A 1/24/2018    Procedure: Left Heart Cath;  Surgeon: Susannah Rasmussen MD;  Location:  [facility];  Service:    • CARDIAC CATHETERIZATION N/A 7/1/2019    Procedure: Left Heart Cath;  Surgeon: Susannah Rasmussen MD;  Location:  [facility];  Service: Cardiovascular   • COLONOSCOPY     • CORONARY ANGIOPLASTY WITH STENT PLACEMENT      X2   • CORONARY ARTERY BYPASS GRAFT N/A 1/26/2018     Procedure: CORONARY ARTERY BYPASS X 2 WITH INTERNAL MAMMARY ARTERY GRAFT, ENDOSCOPIC VEIN HARVESTING UTILIZING THE LEFT GREATER SAPPHENOUS VEIN  CYSTO BY DR WATERS;  Surgeon: Casey Morales MD;  Location:  [facility];  Service:    • CYSTOSCOPY TRANSURETHRAL RESECTION OF PROSTATE N/A 2016    Procedure: CYSTOSCOPY TRANSURETHRAL RESECTION OF PROSTATE GREENLIGHT;  Surgeon: Alverto Richards MD;  Location:  [facility];  Service:    • TONSILLECTOMY     • UPPER GASTROINTESTINAL ENDOSCOPY       Family History   Problem Relation Age of Onset   • Diabetes Father    • Heart disease Father    • Hypertension Father    • Heart attack Father    • Osteoarthritis Father    • Stroke Mother    • Hypertension Mother    • Osteoarthritis Mother    • No Known Problems Maternal Grandmother    • No Known Problems Maternal Grandfather    • Stroke Paternal Grandmother    • Stroke Paternal Grandfather       Social History     Socioeconomic History   • Marital status:      Spouse name: N/A   • Number of children: 1   • Years of education: H.S.   • Highest education level: High school graduate   Occupational History   • Occupation:      Employer: RETIRED   Tobacco Use   • Smoking status: Former Smoker     Packs/day: 2.00     Years: 20.00     Pack years: 40.00     Types: Cigarettes     Start date:      Last attempt to quit:      Years since quittin.7   • Smokeless tobacco: Never Used   Substance and Sexual Activity   • Alcohol use: Yes     Alcohol/week: 3.0 standard drinks     Types: 3 Cans of beer per week     Frequency: Monthly or less   • Drug use: No   • Sexual activity: Not Currently     Partners: Female   Social History Narrative    Caffeine: 1 servings daily        Review of Systems   Constitutional: Negative.    HENT: Negative.    Eyes: Negative.    Respiratory: Negative.    Cardiovascular: Negative.    Gastrointestinal: Negative.    Endocrine: Negative.    Genitourinary: Negative.   "  Musculoskeletal: Positive for arthralgias.   Skin: Negative.    Allergic/Immunologic: Negative.    Neurological: Negative.    Hematological: Negative.    Psychiatric/Behavioral: Negative.        The following portions of the patient's history were reviewed and updated as appropriate: allergies, current medications, past family history, past medical history, past social history, past surgical history and problem list.      Objective      Physical Exam  Pulse 83   Ht 180.3 cm (70.98\")   Wt 87.1 kg (192 lb 0.3 oz)   SpO2 95%   BMI 26.79 kg/m²     Body mass index is 26.79 kg/m².    Patient is well developed, well nourished and in no acute distress.  Alert and oriented x 3.    Ortho Exam    Right Knee Exam  ----------  ALIGNMENT: Right: neutral----------  RANGE OF MOTION:  Right: 0-120  LIGAMENTOUS STABILITY:   Right: stable to valgus and varus stress----------  STRENGTH:  KNEE FLEXION Right 5/5  KNEE EXTENSION Right 5/5 ----------  PAIN WITH PALPATION: Right medial joint line  PAIN WITH KNEE ROM: Right no  PATELLAR CREPITUS: Right yes   ----------    Left Knee Exam  ----------  ALIGNMENT: Left: neutral----------  RANGE OF MOTION:  Left: 0-120  LIGAMENTOUS STABILITY:   Left: stable to valgus and varus stress----------  STRENGTH:  KNEE FLEXION left 5/5  KNEE EXTENSION left 5/5 ----------  PAIN WITH PALPATION: Left medial joint line  PAIN WITH KNEE ROM: Left no  PATELLAR CREPITUS: Left yes         Medical Decision Making    Data Review:   ordered and reviewed x-rays today    Assessment:  1. Primary osteoarthritis of both knees    2. Chondrocalcinosis        Plan:  Bilateral knee arthritis.  X-rays today of the right knee show Arthritic changes, with calcification within the medial and lateral menisci, with advanced patellofemoral degeneration, with bone-on-bone contact with the lateral facet on the lateral femoral condyle.  No acute bony abnormalities, no unusual bony features.  X-rays of the left knee show mild " degenerative changes with no acute bony findings.  Patient has done well in the past with intermittent Visco supplementation with excellent relief for up to 6 months or more.  He is currently weaning off prednisone.  He is unable to take anti-inflammatories.  He has done prior physical therapy in the past.  Recommendation today is repeat Visco supplementation.  I will put in the order and he will return to begin series once approved.      Krystina Li PA-C  09/09/20  11:49

## 2020-09-28 ENCOUNTER — TELEPHONE (OUTPATIENT)
Dept: GASTROENTEROLOGY | Facility: CLINIC | Age: 75
End: 2020-09-28

## 2020-09-28 NOTE — TELEPHONE ENCOUNTER
Patient is due to have a surveillance EGD in January 2021.  He states he's been having some throbbing in his esophagus and indigestion.  Is it ok to get him scheduled earlier?  He's thinking the last half of November.  Please advise.

## 2020-10-13 ENCOUNTER — CLINICAL SUPPORT (OUTPATIENT)
Dept: ORTHOPEDIC SURGERY | Facility: CLINIC | Age: 75
End: 2020-10-13

## 2020-10-13 DIAGNOSIS — M17.0 PRIMARY OSTEOARTHRITIS OF BOTH KNEES: Primary | ICD-10-CM

## 2020-10-13 PROCEDURE — 20610 DRAIN/INJ JOINT/BURSA W/O US: CPT | Performed by: PHYSICIAN ASSISTANT

## 2020-10-13 RX ORDER — HYALURONATE SODIUM 10 MG/ML
20 SYRINGE (ML) INTRAARTICULAR
Status: COMPLETED | OUTPATIENT
Start: 2020-10-13 | End: 2020-10-13

## 2020-10-13 RX ADMIN — Medication 20 MG: at 14:58

## 2020-10-13 RX ADMIN — Medication 20 MG: at 14:57

## 2020-10-13 NOTE — PROGRESS NOTES
Procedure   Large Joint Arthrocentesis: R knee  Date/Time: 10/13/2020 2:57 PM  Consent given by: patient  Site marked: site marked  Timeout: Immediately prior to procedure a time out was called to verify the correct patient, procedure, equipment, support staff and site/side marked as required   Supporting Documentation  Indications: pain   Procedure Details  Location: knee - R knee  Preparation: Patient was prepped and draped in the usual sterile fashion  Needle size: 22 G  Approach: anterolateral  Medications administered: 20 mg Sodium Hyaluronate 20 MG/2ML  Patient tolerance: patient tolerated the procedure well with no immediate complications    Large Joint Arthrocentesis: L knee  Date/Time: 10/13/2020 2:58 PM  Consent given by: patient  Site marked: site marked  Timeout: Immediately prior to procedure a time out was called to verify the correct patient, procedure, equipment, support staff and site/side marked as required   Supporting Documentation  Indications: pain   Procedure Details  Location: knee - L knee  Preparation: Patient was prepped and draped in the usual sterile fashion  Needle size: 22 G  Approach: anterolateral  Medications administered: 20 mg Sodium Hyaluronate 20 MG/2ML  Patient tolerance: patient tolerated the procedure well with no immediate complications

## 2020-10-13 NOTE — PROGRESS NOTES
Subjective     Follow-up (5 week recheck - Bilateral #1 Euflexxa knee injections - Primary osteoarthritis of both knees)      Rodrigo Baum is a 75 y.o. male.     Bilateral knee pain.  Patient presents for the first injection of Euflexxa in bilateral knees.  Has had multiple series in the past.    Allergies   Allergen Reactions   • Lipitor [Atorvastatin] Anxiety and Myalgia          • Lortab [Hydrocodone-Acetaminophen] Anxiety   • Azithromycin Nausea Only     Current Outpatient Medications on File Prior to Visit   Medication Sig Dispense Refill   • amLODIPine (NORVASC) 5 MG tablet Take 1 tablet by mouth Daily. 90 tablet 0   • aspirin 81 MG chewable tablet Chew 81 mg Daily.     • Cholecalciferol (VITAMIN D3) 50 MCG (2000 UT) tablet Take 2,000 Units by mouth Daily.     • finasteride (PROSCAR) 5 MG tablet Take 5 mg by mouth Daily.     • glucosamine-chondroitin 500-400 MG capsule capsule Take 2 capsules by mouth Daily.     • isosorbide mononitrate (IMDUR) 30 MG 24 hr tablet Take 1 tablet by mouth Daily. 30 tablet 5   • levothyroxine (SYNTHROID, LEVOTHROID) 125 MCG tablet Take 125 mcg by mouth Daily.     • Haofjyt-Fuhby-Iulg-PassF-LBalm (MELATONIN + L-THEANINE PO) Take 1 tablet by mouth Daily As Needed.     • metoprolol succinate XL (TOPROL-XL) 25 MG 24 hr tablet Take 1 tablet by mouth Daily. 90 tablet 0   • Multiple Vitamins-Minerals (CENTRUM SILVER 50+MEN PO) Take  by mouth.     • nitroglycerin (NITROSTAT) 0.4 MG SL tablet Place 1 tablet under the tongue Every 5 (Five) Minutes As Needed for Chest Pain. Take no more than 3 doses in 15 minutes. 100 tablet 0   • omeprazole (priLOSEC) 40 MG capsule Take 1 capsule by mouth Daily. Indications: Gastroesophageal Reflux Disease 30 capsule 2   • ondansetron (ZOFRAN) 4 MG tablet Take 4 mg by mouth Every 8 (Eight) Hours As Needed for Nausea or Vomiting.     • predniSONE (DELTASONE) 5 MG tablet Take 2.5 mg by mouth Daily.     • rosuvastatin (CRESTOR) 40 MG tablet Take 1 tablet by  mouth Every Night. 90 tablet 2   • tamsulosin (FLOMAX) 0.4 MG capsule 24 hr capsule Take 1 capsule by mouth 2 (Two) Times a Day.       Current Facility-Administered Medications on File Prior to Visit   Medication Dose Route Frequency Provider Last Rate Last Dose   • Chlorhexidine Gluconate Cloth 2 % pads 1 application  1 application Topical Q12H PRN dS Mathew PA         Social History     Socioeconomic History   • Marital status:      Spouse name: N/A   • Number of children: 1   • Years of education: H.S.   • Highest education level: High school graduate   Occupational History   • Occupation:      Employer: RETIRED   Tobacco Use   • Smoking status: Former Smoker     Packs/day: 2.00     Years: 20.00     Pack years: 40.00     Types: Cigarettes     Start date:      Quit date:      Years since quittin.8   • Smokeless tobacco: Never Used   Substance and Sexual Activity   • Alcohol use: Yes     Alcohol/week: 3.0 standard drinks     Types: 3 Cans of beer per week     Frequency: Monthly or less   • Drug use: No   • Sexual activity: Not Currently     Partners: Female   Social History Narrative    Caffeine: 1 servings daily     Past Surgical History:   Procedure Laterality Date   • CARDIAC CATHETERIZATION N/A 2018    Procedure: Left Heart Cath;  Surgeon: Susannah Rasmussen MD;  Location:  [facility];  Service:    • CARDIAC CATHETERIZATION N/A 2019    Procedure: Left Heart Cath;  Surgeon: Susannah Rasmussen MD;  Location:  [facility];  Service: Cardiovascular   • COLONOSCOPY     • CORONARY ANGIOPLASTY WITH STENT PLACEMENT      X2   • CORONARY ARTERY BYPASS GRAFT N/A 2018    Procedure: CORONARY ARTERY BYPASS X 2 WITH INTERNAL MAMMARY ARTERY GRAFT, ENDOSCOPIC VEIN HARVESTING UTILIZING THE LEFT GREATER SAPPHENOUS VEIN  CYSTO BY DR WATERS;  Surgeon: Casey Morales MD;  Location:  [facility];  Service:    • CYSTOSCOPY TRANSURETHRAL RESECTION OF  PROSTATE N/A 11/1/2016    Procedure: CYSTOSCOPY TRANSURETHRAL RESECTION OF PROSTATE GREENEBONY;  Surgeon: Alverto Richards MD;  Location: [facility];  Service:    • TONSILLECTOMY     • UPPER GASTROINTESTINAL ENDOSCOPY       Family History   Problem Relation Age of Onset   • Diabetes Father    • Heart disease Father    • Hypertension Father    • Heart attack Father    • Osteoarthritis Father    • Stroke Mother    • Hypertension Mother    • Osteoarthritis Mother    • No Known Problems Maternal Grandmother    • No Known Problems Maternal Grandfather    • Stroke Paternal Grandmother    • Stroke Paternal Grandfather      Past Medical History:   Diagnosis Date   • BPH (benign prostatic hyperplasia)    • CAD (coronary artery disease)    • Chronic venous stasis    • CKD (chronic kidney disease), stage III    • Claustrophobia    • GERD (gastroesophageal reflux disease)    • HLD (hyperlipidemia)    • HTN (hypertension)    • Hypothyroidism    • OA (osteoarthritis) of ankle/foot    • OA (osteoarthritis) of hip    • Osteoporosis    • RA (rheumatoid arthritis) (CMS/[facility])          Review of Systems   Constitutional: Negative.    HENT: Negative.    Eyes: Negative.    Respiratory: Negative.    Cardiovascular: Negative.    Gastrointestinal: Negative.    Endocrine: Negative.    Genitourinary: Negative.    Musculoskeletal: Positive for arthralgias.   Skin: Negative.    Allergic/Immunologic: Negative.    Neurological: Negative.    Hematological: Negative.    Psychiatric/Behavioral: Negative.        The following portions of the patient's history were reviewed and updated as appropriate: allergies, current medications, past family history, past medical history, past social history, past surgical history and problem list.    Ortho Exam      Medical Decision Making    Assessment and Plan/ Diagnosis/Treatment options:   Bilateral knee arthritis.  We will proceed with the first injection today.  Return to clinic in 1 week for the second  injection or sooner if needed.    Using sterile technique, the left knee was sterilely prepped with Hibiclens.  Following time out, using a 22 gauge needle the left knee was aspirated and then injected with 2 ml Euflexxa. Approximately 0.5 mm of straw-colored fluid was obtained.  Patient tolerated the procedure well.  No complications.      Using sterile technique, the right knee was sterilely prepped with Hibiclens.  Following time out using a 22 gauge needle, the right knee was aspirated and then injected with 2 ml Euflexxa.  Approximately 0.5 cc of straw-colored fluid was obtained.  Patient tolerated the procedure well. No complications

## 2020-10-20 ENCOUNTER — CLINICAL SUPPORT (OUTPATIENT)
Dept: ORTHOPEDIC SURGERY | Facility: CLINIC | Age: 75
End: 2020-10-20

## 2020-10-20 DIAGNOSIS — M17.0 PRIMARY OSTEOARTHRITIS OF BOTH KNEES: Primary | ICD-10-CM

## 2020-10-20 PROCEDURE — 20610 DRAIN/INJ JOINT/BURSA W/O US: CPT | Performed by: PHYSICIAN ASSISTANT

## 2020-10-20 RX ORDER — HYALURONATE SODIUM 10 MG/ML
20 SYRINGE (ML) INTRAARTICULAR
Status: COMPLETED | OUTPATIENT
Start: 2020-10-20 | End: 2020-10-20

## 2020-10-20 RX ADMIN — Medication 20 MG: at 14:47

## 2020-10-20 RX ADMIN — Medication 20 MG: at 14:46

## 2020-10-20 NOTE — PROGRESS NOTES
Subjective     Injections (Bilateral knee Euflexxa injection #2)      Rodrigo Baum is a 75 y.o. male.     History of Present Illness   Patient presents for the second injection of Euflexxa in bilateral knees.  He has tolerated previous injections well.    Allergies   Allergen Reactions   • Lipitor [Atorvastatin] Anxiety and Myalgia          • Lortab [Hydrocodone-Acetaminophen] Anxiety   • Azithromycin Nausea Only     Current Outpatient Medications on File Prior to Visit   Medication Sig Dispense Refill   • amLODIPine (NORVASC) 5 MG tablet Take 1 tablet by mouth Daily. 90 tablet 0   • aspirin 81 MG chewable tablet Chew 81 mg Daily.     • Cholecalciferol (VITAMIN D3) 50 MCG (2000 UT) tablet Take 2,000 Units by mouth Daily.     • finasteride (PROSCAR) 5 MG tablet Take 5 mg by mouth Daily.     • glucosamine-chondroitin 500-400 MG capsule capsule Take 2 capsules by mouth Daily.     • isosorbide mononitrate (IMDUR) 30 MG 24 hr tablet Take 1 tablet by mouth Daily. 30 tablet 5   • levothyroxine (SYNTHROID, LEVOTHROID) 125 MCG tablet Take 125 mcg by mouth Daily.     • Ssvpnir-Limwx-Pnac-PassF-LBalm (MELATONIN + L-THEANINE PO) Take 1 tablet by mouth Daily As Needed.     • metoprolol succinate XL (TOPROL-XL) 25 MG 24 hr tablet Take 1 tablet by mouth Daily. 90 tablet 0   • Multiple Vitamins-Minerals (CENTRUM SILVER 50+MEN PO) Take  by mouth.     • nitroglycerin (NITROSTAT) 0.4 MG SL tablet Place 1 tablet under the tongue Every 5 (Five) Minutes As Needed for Chest Pain. Take no more than 3 doses in 15 minutes. 100 tablet 0   • omeprazole (priLOSEC) 40 MG capsule Take 1 capsule by mouth Daily. Indications: Gastroesophageal Reflux Disease 30 capsule 2   • ondansetron (ZOFRAN) 4 MG tablet Take 4 mg by mouth Every 8 (Eight) Hours As Needed for Nausea or Vomiting.     • predniSONE (DELTASONE) 5 MG tablet Take 2.5 mg by mouth Daily.     • rosuvastatin (CRESTOR) 40 MG tablet Take 1 tablet by mouth Every Night. 90 tablet 2   •  tamsulosin (FLOMAX) 0.4 MG capsule 24 hr capsule Take 1 capsule by mouth 2 (Two) Times a Day.       Current Facility-Administered Medications on File Prior to Visit   Medication Dose Route Frequency Provider Last Rate Last Dose   • Chlorhexidine Gluconate Cloth 2 % pads 1 application  1 application Topical Q12H PRN Sd Mathew PA         Social History     Socioeconomic History   • Marital status:      Spouse name: N/A   • Number of children: 1   • Years of education: H.S.   • Highest education level: High school graduate   Occupational History   • Occupation:      Employer: RETIRED   Tobacco Use   • Smoking status: Former Smoker     Packs/day: 2.00     Years: 20.00     Pack years: 40.00     Types: Cigarettes     Start date:      Quit date:      Years since quittin.8   • Smokeless tobacco: Never Used   Substance and Sexual Activity   • Alcohol use: Yes     Alcohol/week: 3.0 standard drinks     Types: 3 Cans of beer per week     Frequency: Monthly or less   • Drug use: No   • Sexual activity: Not Currently     Partners: Female   Social History Narrative    Caffeine: 1 servings daily     Past Surgical History:   Procedure Laterality Date   • CARDIAC CATHETERIZATION N/A 2018    Procedure: Left Heart Cath;  Surgeon: Susannah Rasmussen MD;  Location:  [facility];  Service:    • CARDIAC CATHETERIZATION N/A 2019    Procedure: Left Heart Cath;  Surgeon: Susannah Rasmussen MD;  Location:  [facility];  Service: Cardiovascular   • COLONOSCOPY     • CORONARY ANGIOPLASTY WITH STENT PLACEMENT      X2   • CORONARY ARTERY BYPASS GRAFT N/A 2018    Procedure: CORONARY ARTERY BYPASS X 2 WITH INTERNAL MAMMARY ARTERY GRAFT, ENDOSCOPIC VEIN HARVESTING UTILIZING THE LEFT GREATER SAPPHENOUS VEIN  CYSTO BY DR WATERS;  Surgeon: Casey Morales MD;  Location:  [facility];  Service:    • CYSTOSCOPY TRANSURETHRAL RESECTION OF PROSTATE N/A 2016    Procedure:  CYSTOSCOPY TRANSURETHRAL RESECTION OF PROSTATE GREENLIGHT;  Surgeon: Alverto Richards MD;  Location: [facility];  Service:    • TONSILLECTOMY     • UPPER GASTROINTESTINAL ENDOSCOPY       Family History   Problem Relation Age of Onset   • Diabetes Father    • Heart disease Father    • Hypertension Father    • Heart attack Father    • Osteoarthritis Father    • Stroke Mother    • Hypertension Mother    • Osteoarthritis Mother    • No Known Problems Maternal Grandmother    • No Known Problems Maternal Grandfather    • Stroke Paternal Grandmother    • Stroke Paternal Grandfather      Past Medical History:   Diagnosis Date   • BPH (benign prostatic hyperplasia)    • CAD (coronary artery disease)    • Chronic venous stasis    • CKD (chronic kidney disease), stage III    • Claustrophobia    • GERD (gastroesophageal reflux disease)    • HLD (hyperlipidemia)    • HTN (hypertension)    • Hypothyroidism    • OA (osteoarthritis) of ankle/foot    • OA (osteoarthritis) of hip    • Osteoporosis    • RA (rheumatoid arthritis) (CMS/[facility])          Review of Systems   Constitutional: Negative.    HENT: Negative.    Eyes: Negative.    Respiratory: Negative.    Cardiovascular: Negative.    Gastrointestinal: Negative.    Endocrine: Negative.    Genitourinary: Negative.    Musculoskeletal: Positive for arthralgias.   Skin: Negative.    Allergic/Immunologic: Negative.    Neurological: Negative.    Hematological: Negative.    Psychiatric/Behavioral: Negative.        The following portions of the patient's history were reviewed and updated as appropriate: allergies, current medications, past family history, past medical history, past social history, past surgical history and problem list.    Ortho Exam      Medical Decision Making    Assessment and Plan/ Diagnosis/Treatment options:   Bilateral knee arthritis.  Proceed with second injection of Euflexxa in both knees return to clinic in 1 week for the final injection or sooner if  needed.    Using sterile technique, the right knee was sterilely prepped with Hibiclens.  Following time out using a 22 gauge needle, the right knee was aspirated and then injected with 2 ml Euflexxa.  Approximately 0.5 cc of straw-colored fluid was obtained.  Patient tolerated the procedure well. No complications    Using sterile technique, the left knee was sterilely prepped with Hibiclens.  Following time out, using a 22 gauge needle the left knee was aspirated and then injected with 2 ml Euflexxa. Approximately 0.5 mm of straw-colored fluid was obtained.  Patient tolerated the procedure well.  No complications.

## 2020-10-20 NOTE — PROGRESS NOTES
Procedure   Large Joint Arthrocentesis: R knee  Date/Time: 10/20/2020 2:46 PM  Consent given by: patient  Site marked: site marked  Timeout: Immediately prior to procedure a time out was called to verify the correct patient, procedure, equipment, support staff and site/side marked as required   Supporting Documentation  Indications: pain   Procedure Details  Location: knee - R knee  Preparation: Patient was prepped and draped in the usual sterile fashion  Needle size: 22 G  Approach: anterolateral  Medications administered: 20 mg Sodium Hyaluronate 20 MG/2ML  Patient tolerance: patient tolerated the procedure well with no immediate complications    Large Joint Arthrocentesis: L knee  Date/Time: 10/20/2020 2:47 PM  Consent given by: patient  Site marked: site marked  Timeout: Immediately prior to procedure a time out was called to verify the correct patient, procedure, equipment, support staff and site/side marked as required   Supporting Documentation  Indications: pain   Procedure Details  Location: knee - L knee  Preparation: Patient was prepped and draped in the usual sterile fashion  Needle size: 22 G  Approach: anterolateral  Medications administered: 20 mg Sodium Hyaluronate 20 MG/2ML  Patient tolerance: patient tolerated the procedure well with no immediate complications

## 2020-10-27 ENCOUNTER — CLINICAL SUPPORT (OUTPATIENT)
Dept: ORTHOPEDIC SURGERY | Facility: CLINIC | Age: 75
End: 2020-10-27

## 2020-10-27 DIAGNOSIS — M17.0 PRIMARY OSTEOARTHRITIS OF BOTH KNEES: Primary | ICD-10-CM

## 2020-10-27 PROCEDURE — 20610 DRAIN/INJ JOINT/BURSA W/O US: CPT | Performed by: PHYSICIAN ASSISTANT

## 2020-10-27 RX ADMIN — Medication 20 MG: at 14:44

## 2020-10-27 NOTE — PROGRESS NOTES
Procedure   Large Joint Arthrocentesis: R knee  Date/Time: 10/27/2020 2:44 PM  Consent given by: patient  Site marked: site marked  Timeout: Immediately prior to procedure a time out was called to verify the correct patient, procedure, equipment, support staff and site/side marked as required   Supporting Documentation  Indications: pain   Procedure Details  Location: knee - R knee  Preparation: Patient was prepped and draped in the usual sterile fashion  Needle size: 22 G  Approach: anterolateral  Medications administered: 20 mg Sodium Hyaluronate 20 MG/2ML  Patient tolerance: patient tolerated the procedure well with no immediate complications    Large Joint Arthrocentesis: L knee  Date/Time: 10/27/2020 2:44 PM  Consent given by: patient  Site marked: site marked  Timeout: Immediately prior to procedure a time out was called to verify the correct patient, procedure, equipment, support staff and site/side marked as required   Supporting Documentation  Indications: pain   Procedure Details  Location: knee - L knee  Preparation: Patient was prepped and draped in the usual sterile fashion  Needle size: 22 G  Approach: anterolateral  Medications administered: 20 mg Sodium Hyaluronate 20 MG/2ML  Patient tolerance: patient tolerated the procedure well with no immediate complications

## 2020-10-30 RX ORDER — HYALURONATE SODIUM 10 MG/ML
20 SYRINGE (ML) INTRAARTICULAR
Status: COMPLETED | OUTPATIENT
Start: 2020-10-27 | End: 2020-10-27

## 2020-11-06 ENCOUNTER — APPOINTMENT (OUTPATIENT)
Dept: PREADMISSION TESTING | Facility: HOSPITAL | Age: 75
End: 2020-11-06

## 2020-11-06 PROCEDURE — U0004 COV-19 TEST NON-CDC HGH THRU: HCPCS

## 2020-11-06 PROCEDURE — C9803 HOPD COVID-19 SPEC COLLECT: HCPCS

## 2020-11-07 LAB — SARS-COV-2 RNA RESP QL NAA+PROBE: NOT DETECTED

## 2020-11-09 ENCOUNTER — OUTSIDE FACILITY SERVICE (OUTPATIENT)
Dept: GASTROENTEROLOGY | Facility: CLINIC | Age: 75
End: 2020-11-09

## 2020-11-09 PROCEDURE — 88305 TISSUE EXAM BY PATHOLOGIST: CPT | Performed by: INTERNAL MEDICINE

## 2020-11-09 PROCEDURE — 43239 EGD BIOPSY SINGLE/MULTIPLE: CPT | Performed by: INTERNAL MEDICINE

## 2020-11-10 ENCOUNTER — LAB REQUISITION (OUTPATIENT)
Dept: LAB | Facility: HOSPITAL | Age: 75
End: 2020-11-10

## 2020-11-10 DIAGNOSIS — K21.9 GASTRO-ESOPHAGEAL REFLUX DISEASE WITHOUT ESOPHAGITIS: ICD-10-CM

## 2020-12-01 ENCOUNTER — APPOINTMENT (OUTPATIENT)
Dept: PREADMISSION TESTING | Facility: HOSPITAL | Age: 75
End: 2020-12-01

## 2021-03-31 ENCOUNTER — OFFICE VISIT (OUTPATIENT)
Dept: ORTHOPEDIC SURGERY | Facility: CLINIC | Age: 76
End: 2021-03-31

## 2021-03-31 VITALS
HEIGHT: 71 IN | WEIGHT: 190 LBS | DIASTOLIC BLOOD PRESSURE: 80 MMHG | BODY MASS INDEX: 26.6 KG/M2 | SYSTOLIC BLOOD PRESSURE: 125 MMHG | HEART RATE: 66 BPM

## 2021-03-31 DIAGNOSIS — M17.0 PRIMARY OSTEOARTHRITIS OF BOTH KNEES: ICD-10-CM

## 2021-03-31 DIAGNOSIS — M70.62 TROCHANTERIC BURSITIS OF BOTH HIPS: Primary | ICD-10-CM

## 2021-03-31 DIAGNOSIS — M16.0 BILATERAL HIP JOINT ARTHRITIS: ICD-10-CM

## 2021-03-31 DIAGNOSIS — M70.61 TROCHANTERIC BURSITIS OF BOTH HIPS: Primary | ICD-10-CM

## 2021-03-31 PROCEDURE — 20610 DRAIN/INJ JOINT/BURSA W/O US: CPT | Performed by: PHYSICIAN ASSISTANT

## 2021-03-31 PROCEDURE — 99214 OFFICE O/P EST MOD 30 MIN: CPT | Performed by: PHYSICIAN ASSISTANT

## 2021-03-31 RX ADMIN — LIDOCAINE HYDROCHLORIDE 4 ML: 10 INJECTION, SOLUTION EPIDURAL; INFILTRATION; INTRACAUDAL; PERINEURAL at 10:39

## 2021-03-31 RX ADMIN — LIDOCAINE HYDROCHLORIDE 4 ML: 10 INJECTION, SOLUTION EPIDURAL; INFILTRATION; INTRACAUDAL; PERINEURAL at 10:38

## 2021-03-31 RX ADMIN — TRIAMCINOLONE ACETONIDE 40 MG: 40 INJECTION, SUSPENSION INTRA-ARTICULAR; INTRAMUSCULAR at 10:38

## 2021-03-31 RX ADMIN — TRIAMCINOLONE ACETONIDE 40 MG: 40 INJECTION, SUSPENSION INTRA-ARTICULAR; INTRAMUSCULAR at 10:39

## 2021-04-02 RX ORDER — TRIAMCINOLONE ACETONIDE 40 MG/ML
40 INJECTION, SUSPENSION INTRA-ARTICULAR; INTRAMUSCULAR
Status: COMPLETED | OUTPATIENT
Start: 2021-03-31 | End: 2021-03-31

## 2021-04-02 RX ORDER — LIDOCAINE HYDROCHLORIDE 10 MG/ML
4 INJECTION, SOLUTION EPIDURAL; INFILTRATION; INTRACAUDAL; PERINEURAL
Status: COMPLETED | OUTPATIENT
Start: 2021-03-31 | End: 2021-03-31

## 2021-04-15 ENCOUNTER — OFFICE VISIT (OUTPATIENT)
Dept: CARDIOLOGY | Facility: CLINIC | Age: 76
End: 2021-04-15

## 2021-04-15 VITALS
SYSTOLIC BLOOD PRESSURE: 130 MMHG | BODY MASS INDEX: 25.06 KG/M2 | HEART RATE: 55 BPM | DIASTOLIC BLOOD PRESSURE: 74 MMHG | HEIGHT: 71 IN | WEIGHT: 179 LBS | OXYGEN SATURATION: 97 %

## 2021-04-15 DIAGNOSIS — I25.10 CORONARY ARTERY DISEASE INVOLVING NATIVE CORONARY ARTERY OF NATIVE HEART WITHOUT ANGINA PECTORIS: Primary | ICD-10-CM

## 2021-04-15 DIAGNOSIS — E11.65 TYPE 2 DIABETES MELLITUS WITH HYPERGLYCEMIA, WITHOUT LONG-TERM CURRENT USE OF INSULIN (HCC): ICD-10-CM

## 2021-04-15 DIAGNOSIS — I10 ESSENTIAL HYPERTENSION: ICD-10-CM

## 2021-04-15 DIAGNOSIS — E78.2 MIXED HYPERLIPIDEMIA: ICD-10-CM

## 2021-04-15 PROCEDURE — 99214 OFFICE O/P EST MOD 30 MIN: CPT | Performed by: INTERNAL MEDICINE

## 2021-04-15 NOTE — PROGRESS NOTES
[facility]  Office Progress Note  Rodrigo Baum  1945  [address]  [facility] [address]       Visit Date: 04/15/21    PCP: TORI Fletcher MD  [id number]  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     PROBLEM LIST:   1. CAD:  1. 11/15/1993: PTCA and repeat PTCA 1 week following of proximal  LAD, per Dr. Lovelace with normal circumflex and RCA noted at that time.   2. April 2008: Overlapping 3.0 x 24 mm. And 3.0 x 16.0 mm Taxus to LAD and 2.5 x 8.0 PTCA to proximal first OM, inability to pass stent.  EF greater than 60.  3. 5/16 : Stress echocardiogram, which was within normal limits. EF greater than 60%. Normal hemodynamic response with exercise.   4. 1/18 CABG X2 periop hematuria/syed issues  5. 7/1/2019 LHC per [facility], patent LIMA to LAD, SVG to diagonal, spasm of the LIMA as well as apical LAD noted which improved with nitro, normal EF  6. 7/19/2020 [facility] ED visit CP, troponin normal x2, admission was recommended however he left AMA  2. Dyslipidemia:  1. November 2009:  Total cholesterol 163, triglycerides 169, HDL 55, LDL 81.  2. 02/30/2012:  Total cholesterol 242, HDL 51, triglycerides 192, , off statin therapy.  3. Nicotine addiction cessation 15 years prior.  4. HTN, presumed essential.  5. 1/17 AAA screen wnl  6. Hypothyroidism on replacement therapy.   7. Exogenous obesity.  8. Arthritis data deficient, on steroid therapy greater than 5 years.   9. GERD.   10. Easy bruising.   11. 2015 Right jaw swelling, evaluated for potential mandibular infection per Dr. Smith.   12. Hip and foot pain, followed per Dr Espinoza  13. Prostatism- 2017 3 rounds abx      Chief Complaint   Patient presents with   • Coronary Artery Disease       Allergies  Allergies   Allergen Reactions   • Lipitor [Atorvastatin] Anxiety and Myalgia          • Lortab [Hydrocodone-Acetaminophen] Anxiety   •  Azithromycin Nausea Only       Current Medications    Current Outpatient Medications:   •  amLODIPine (NORVASC) 5 MG tablet, Take 1 tablet by mouth Daily., Disp: 90 tablet, Rfl: 0  •  aspirin 81 MG chewable tablet, Chew 81 mg Daily., Disp: , Rfl:   •  Cholecalciferol (VITAMIN D3) 50 MCG (2000 UT) tablet, Take 2,000 Units by mouth Daily., Disp: , Rfl:   •  finasteride (PROSCAR) 5 MG tablet, Take 5 mg by mouth Daily., Disp: , Rfl:   •  glucosamine-chondroitin 500-400 MG capsule capsule, Take 2 capsules by mouth Daily., Disp: , Rfl:   •  isosorbide mononitrate (IMDUR) 30 MG 24 hr tablet, Take 1 tablet by mouth Daily., Disp: 30 tablet, Rfl: 5  •  levothyroxine (SYNTHROID, LEVOTHROID) 125 MCG tablet, Take 125 mcg by mouth Daily., Disp: , Rfl:   •  Ygxtjki-Nuxbz-Jjsh-PassF-LBalm (MELATONIN + L-THEANINE PO), Take 1 tablet by mouth Daily As Needed., Disp: , Rfl:   •  metoprolol succinate XL (TOPROL-XL) 25 MG 24 hr tablet, Take 1 tablet by mouth Daily., Disp: 90 tablet, Rfl: 0  •  Multiple Vitamins-Minerals (CENTRUM SILVER 50+MEN PO), Take  by mouth., Disp: , Rfl:   •  nitroglycerin (NITROSTAT) 0.4 MG SL tablet, Place 1 tablet under the tongue Every 5 (Five) Minutes As Needed for Chest Pain. Take no more than 3 doses in 15 minutes., Disp: 100 tablet, Rfl: 0  •  omeprazole (priLOSEC) 40 MG capsule, Take 1 capsule by mouth Daily. Indications: Gastroesophageal Reflux Disease, Disp: 30 capsule, Rfl: 2  •  ondansetron (ZOFRAN) 4 MG tablet, Take 4 mg by mouth Every 8 (Eight) Hours As Needed for Nausea or Vomiting., Disp: , Rfl:   •  predniSONE (DELTASONE) 1 MG tablet, Take 1 mg by mouth Daily., Disp: , Rfl:   •  rosuvastatin (CRESTOR) 40 MG tablet, Take 1 tablet by mouth Every Night., Disp: 90 tablet, Rfl: 2  •  tamsulosin (FLOMAX) 0.4 MG capsule 24 hr capsule, Take 1 capsule by mouth 2 (Two) Times a Day., Disp: , Rfl:   No current facility-administered medications for this visit.    Facility-Administered Medications Ordered in  "Other Visits:   •  Chlorhexidine Gluconate Cloth 2 % pads 1 application, 1 application, Topical, Q12H YESIN, Sd Mathew PA      History of Present Illness   Rodrigo Baum is a 76 y.o. year old male here for follow up.  He continues to lose weight with diet and exercise.  He is now a transport assistant at the [employer]  He has had no recurrence of chest pain is tolerant of his medication          OBJECTIVE:  Vitals:    04/15/21 0901   BP: 130/74   BP Location: Left arm   Patient Position: Sitting   Pulse: 55   SpO2: 97%   Weight: 81.2 kg (179 lb)   Height: 180.3 cm (71\")     Physical Exam  Constitutional:       Appearance: He is well-developed.   Neck:      Thyroid: No thyromegaly.      Vascular: No carotid bruit, hepatojugular reflux or JVD.      Trachea: No tracheal deviation.   Cardiovascular:      Rate and Rhythm: Normal rate and regular rhythm.      Chest Wall: PMI is not displaced.      Pulses: Normal pulses and intact distal pulses. No midsystolic click and no opening snap.           Radial pulses are 2+ on the right side and 2+ on the left side.        Dorsalis pedis pulses are 2+ on the right side and 2+ on the left side.        Posterior tibial pulses are 2+ on the right side and 2+ on the left side.      Heart sounds: S1 normal and S2 normal. Heart sounds not distant. No murmur heard.   No friction rub. No gallop.    Pulmonary:      Effort: Pulmonary effort is normal.      Breath sounds: Normal breath sounds. No wheezing or rales.   Abdominal:      General: Bowel sounds are normal.      Palpations: Abdomen is soft. There is no mass.      Tenderness: There is no abdominal tenderness. There is no guarding.   Musculoskeletal:      Cervical back: Normal range of motion and neck supple.         Diagnostic Data:  Procedures      ASSESSMENT:   Diagnosis Plan   1. Coronary artery disease involving native coronary artery of native heart without angina pectoris     2. Essential hypertension     3. " Mixed hyperlipidemia     4. Type 2 diabetes mellitus with hyperglycemia, without long-term current use of insulin (CMS/[facility])         PLAN:  Chronic stable angina post prior revascularization    Hypertension controlled tamsulosin metoprolol amlodipine    Dyslipidemia on statin therapy    Diabetes on oral agents with concomitant prednisone TORI Freeman MD, thank you for referring Mr. Baum for evaluation.  I have forwarded my electronically generated recommendations to you for review.  Please do not hesitate to call with any questions.      Tal Wagner MD, FACC

## 2021-04-28 ENCOUNTER — CLINICAL SUPPORT (OUTPATIENT)
Dept: ORTHOPEDIC SURGERY | Facility: CLINIC | Age: 76
End: 2021-04-28

## 2021-04-28 DIAGNOSIS — M17.0 PRIMARY OSTEOARTHRITIS OF BOTH KNEES: Primary | ICD-10-CM

## 2021-04-28 PROCEDURE — 20610 DRAIN/INJ JOINT/BURSA W/O US: CPT | Performed by: PHYSICIAN ASSISTANT

## 2021-04-28 NOTE — PROGRESS NOTES
Subjective     Injections (Bilateral Knee Orthovisc #1)      Rodrigo Baum is a 76 y.o. male.     History of Present Illness   Patient presents for the first injection of visco in bilateral knees.  Multiple series in the past      Allergies   Allergen Reactions   • Lipitor [Atorvastatin] Anxiety and Myalgia          • Lortab [Hydrocodone-Acetaminophen] Anxiety   • Azithromycin Nausea Only     Current Outpatient Medications on File Prior to Visit   Medication Sig Dispense Refill   • amLODIPine (NORVASC) 5 MG tablet Take 1 tablet by mouth Daily. 90 tablet 0   • aspirin 81 MG chewable tablet Chew 81 mg Daily.     • Cholecalciferol (VITAMIN D3) 50 MCG (2000 UT) tablet Take 2,000 Units by mouth Daily.     • finasteride (PROSCAR) 5 MG tablet Take 5 mg by mouth Daily.     • glucosamine-chondroitin 500-400 MG capsule capsule Take 2 capsules by mouth Daily.     • isosorbide mononitrate (IMDUR) 30 MG 24 hr tablet Take 1 tablet by mouth Daily. 30 tablet 5   • levothyroxine (SYNTHROID, LEVOTHROID) 125 MCG tablet Take 125 mcg by mouth Daily.     • Zpxvfrk-Kkrbu-Uzqx-PassF-LBalm (MELATONIN + L-THEANINE PO) Take 1 tablet by mouth Daily As Needed.     • metoprolol succinate XL (TOPROL-XL) 25 MG 24 hr tablet Take 1 tablet by mouth Daily. 90 tablet 0   • Multiple Vitamins-Minerals (CENTRUM SILVER 50+MEN PO) Take  by mouth.     • nitroglycerin (NITROSTAT) 0.4 MG SL tablet Place 1 tablet under the tongue Every 5 (Five) Minutes As Needed for Chest Pain. Take no more than 3 doses in 15 minutes. 100 tablet 0   • omeprazole (priLOSEC) 40 MG capsule Take 1 capsule by mouth Daily. Indications: Gastroesophageal Reflux Disease 30 capsule 2   • ondansetron (ZOFRAN) 4 MG tablet Take 4 mg by mouth Every 8 (Eight) Hours As Needed for Nausea or Vomiting.     • predniSONE (DELTASONE) 1 MG tablet Take 1 mg by mouth Daily.     • rosuvastatin (CRESTOR) 40 MG tablet Take 1 tablet by mouth Every Night. 90 tablet 2   • tamsulosin (FLOMAX) 0.4 MG  capsule 24 hr capsule Take 1 capsule by mouth 2 (Two) Times a Day.       Current Facility-Administered Medications on File Prior to Visit   Medication Dose Route Frequency Provider Last Rate Last Admin   • Chlorhexidine Gluconate Cloth 2 % pads 1 application  1 application Topical Q12H PRN Sd Mathew PA         Social History     Socioeconomic History   • Marital status:      Spouse name: N/A   • Number of children: 1   • Years of education: H.S.   • Highest education level: High school graduate   Tobacco Use   • Smoking status: Former Smoker     Packs/day: 2.00     Years: 20.00     Pack years: 40.00     Types: Cigarettes     Start date:      Quit date:      Years since quittin.3   • Smokeless tobacco: Never Used   Substance and Sexual Activity   • Alcohol use: Yes     Alcohol/week: 3.0 standard drinks     Types: 3 Cans of beer per week   • Drug use: No   • Sexual activity: Not Currently     Partners: Female     Past Surgical History:   Procedure Laterality Date   • CARDIAC CATHETERIZATION N/A 2018    Procedure: Left Heart Cath;  Surgeon: Susannah Rasmussen MD;  Location:  [facility];  Service:    • CARDIAC CATHETERIZATION N/A 2019    Procedure: Left Heart Cath;  Surgeon: Susannah Rasmussen MD;  Location:  [facility];  Service: Cardiovascular   • COLONOSCOPY     • CORONARY ANGIOPLASTY WITH STENT PLACEMENT      X2   • CORONARY ARTERY BYPASS GRAFT N/A 2018    Procedure: CORONARY ARTERY BYPASS X 2 WITH INTERNAL MAMMARY ARTERY GRAFT, ENDOSCOPIC VEIN HARVESTING UTILIZING THE LEFT GREATER SAPPHENOUS VEIN  CYSTO BY DR WATERS;  Surgeon: Casey Morales MD;  Location:  [facility];  Service:    • CYSTOSCOPY TRANSURETHRAL RESECTION OF PROSTATE N/A 2016    Procedure: CYSTOSCOPY TRANSURETHRAL RESECTION OF PROSTATE GREENLIGHT;  Surgeon: Alverto Richards MD;  Location:  [facility];  Service:    • TONSILLECTOMY     • UPPER GASTROINTESTINAL ENDOSCOPY        Family History   Problem Relation Age of Onset   • Diabetes Father    • Heart disease Father    • Hypertension Father    • Heart attack Father    • Osteoarthritis Father    • Stroke Mother    • Hypertension Mother    • Osteoarthritis Mother    • No Known Problems Maternal Grandmother    • No Known Problems Maternal Grandfather    • Stroke Paternal Grandmother    • Stroke Paternal Grandfather      Past Medical History:   Diagnosis Date   • BPH (benign prostatic hyperplasia)    • CAD (coronary artery disease)    • Chronic venous stasis    • CKD (chronic kidney disease), stage III (CMS/[facility])    • Claustrophobia    • GERD (gastroesophageal reflux disease)    • HLD (hyperlipidemia)    • HTN (hypertension)    • Hypothyroidism    • OA (osteoarthritis) of ankle/foot    • OA (osteoarthritis) of hip    • Osteoporosis    • RA (rheumatoid arthritis) (CMS/[facility])          Review of Systems   Constitutional: Negative.    HENT: Negative.    Eyes: Negative.    Respiratory: Negative.    Cardiovascular: Negative.    Gastrointestinal: Negative.    Endocrine: Negative.    Genitourinary: Negative.    Musculoskeletal: Positive for arthralgias.   Skin: Negative.    Allergic/Immunologic: Negative.    Neurological: Negative.    Hematological: Negative.    Psychiatric/Behavioral: Negative.        The following portions of the patient's history were reviewed and updated as appropriate: allergies, current medications, past family history, past medical history, past social history, past surgical history and problem list.    Ortho Exam      Medical Decision Making    Assessment and Plan/ Diagnosis/Treatment options:   Bilateral knee arthritis.  Begin series in both knees today.  RTC  1 week or sooner if needed.    Using sterile technique, the left knee was sterilely prepped with Hibiclens.  Following time out, using a 22 gauge needle the left knee was aspirated and then injected with 2 ml Orthovisc. Approximately 0.5 mm of straw-colored fluid  was obtained.  Patient tolerated the procedure well.  No complications.      Using sterile technique, the right knee was sterilely prepped with Hibiclens.  Following time out, using a 22 gauge needle the right knee was aspirated and then injected with 2 ml Orthovisc. Approximately 0.5 mm of straw-colored fluid was obtained.  Patient tolerated the procedure well.  No complications.

## 2021-04-28 NOTE — PROGRESS NOTES
Addended by: BHUMIKA ALVARADO on: 9/9/2020 11:02 AM     Modules accepted: Orders     Procedure   Large Joint Arthrocentesis: R knee  Date/Time: 4/28/2021 10:59 AM  Consent given by: patient  Site marked: site marked  Timeout: Immediately prior to procedure a time out was called to verify the correct patient, procedure, equipment, support staff and site/side marked as required   Supporting Documentation  Indications: pain   Procedure Details  Location: knee - R knee  Preparation: Patient was prepped and draped in the usual sterile fashion  Needle size: 23 G  Approach: anterolateral  Medications administered: 30 mg Hyaluronan 30 MG/2ML  Patient tolerance: patient tolerated the procedure well with no immediate complications    Large Joint Arthrocentesis: L knee  Date/Time: 4/28/2021 10:59 AM  Consent given by: patient  Site marked: site marked  Timeout: Immediately prior to procedure a time out was called to verify the correct patient, procedure, equipment, support staff and site/side marked as required   Supporting Documentation  Indications: pain   Procedure Details  Location: knee - L knee  Preparation: Patient was prepped and draped in the usual sterile fashion  Needle size: 23 G  Approach: anterolateral  Medications administered: 30 mg Hyaluronan 30 MG/2ML  Patient tolerance: patient tolerated the procedure well with no immediate complications

## 2021-05-05 ENCOUNTER — CLINICAL SUPPORT (OUTPATIENT)
Dept: ORTHOPEDIC SURGERY | Facility: CLINIC | Age: 76
End: 2021-05-05

## 2021-05-05 DIAGNOSIS — M17.0 PRIMARY OSTEOARTHRITIS OF BOTH KNEES: Primary | ICD-10-CM

## 2021-05-05 PROCEDURE — 20610 DRAIN/INJ JOINT/BURSA W/O US: CPT | Performed by: PHYSICIAN ASSISTANT

## 2021-05-12 ENCOUNTER — CLINICAL SUPPORT (OUTPATIENT)
Dept: ORTHOPEDIC SURGERY | Facility: CLINIC | Age: 76
End: 2021-05-12

## 2021-05-12 DIAGNOSIS — M17.0 PRIMARY OSTEOARTHRITIS OF BOTH KNEES: Primary | ICD-10-CM

## 2021-05-12 PROCEDURE — 20610 DRAIN/INJ JOINT/BURSA W/O US: CPT | Performed by: PHYSICIAN ASSISTANT

## 2021-08-18 ENCOUNTER — OFFICE VISIT (OUTPATIENT)
Dept: ORTHOPEDIC SURGERY | Facility: CLINIC | Age: 76
End: 2021-08-18

## 2021-08-18 VITALS
SYSTOLIC BLOOD PRESSURE: 132 MMHG | BODY MASS INDEX: 23.66 KG/M2 | WEIGHT: 169 LBS | HEART RATE: 71 BPM | HEIGHT: 71 IN | DIASTOLIC BLOOD PRESSURE: 80 MMHG

## 2021-08-18 DIAGNOSIS — M70.62 TROCHANTERIC BURSITIS OF BOTH HIPS: Primary | ICD-10-CM

## 2021-08-18 DIAGNOSIS — M70.61 TROCHANTERIC BURSITIS OF BOTH HIPS: Primary | ICD-10-CM

## 2021-08-18 PROCEDURE — 20610 DRAIN/INJ JOINT/BURSA W/O US: CPT | Performed by: PHYSICIAN ASSISTANT

## 2021-08-18 RX ORDER — LIDOCAINE HYDROCHLORIDE 10 MG/ML
4 INJECTION, SOLUTION EPIDURAL; INFILTRATION; INTRACAUDAL; PERINEURAL
Status: COMPLETED | OUTPATIENT
Start: 2021-08-18 | End: 2021-08-18

## 2021-08-18 RX ORDER — TRIAMCINOLONE ACETONIDE 40 MG/ML
40 INJECTION, SUSPENSION INTRA-ARTICULAR; INTRAMUSCULAR
Status: COMPLETED | OUTPATIENT
Start: 2021-08-18 | End: 2021-08-18

## 2021-08-18 RX ADMIN — TRIAMCINOLONE ACETONIDE 40 MG: 40 INJECTION, SUSPENSION INTRA-ARTICULAR; INTRAMUSCULAR at 10:56

## 2021-08-18 RX ADMIN — LIDOCAINE HYDROCHLORIDE 4 ML: 10 INJECTION, SOLUTION EPIDURAL; INFILTRATION; INTRACAUDAL; PERINEURAL at 10:56

## 2021-08-18 RX ADMIN — LIDOCAINE HYDROCHLORIDE 4 ML: 10 INJECTION, SOLUTION EPIDURAL; INFILTRATION; INTRACAUDAL; PERINEURAL at 10:57

## 2021-08-18 RX ADMIN — TRIAMCINOLONE ACETONIDE 40 MG: 40 INJECTION, SUSPENSION INTRA-ARTICULAR; INTRAMUSCULAR at 10:57

## 2021-08-18 NOTE — PROGRESS NOTES
Procedure   Large Joint Arthrocentesis: R greater trochanteric bursa  Date/Time: 8/18/2021 10:56 AM  Consent given by: patient  Site marked: site marked  Timeout: Immediately prior to procedure a time out was called to verify the correct patient, procedure, equipment, support staff and site/side marked as required   Supporting Documentation  Indications: pain   Procedure Details  Location: hip - R greater trochanteric bursa  Preparation: Patient was prepped and draped in the usual sterile fashion  Needle size: 23 G  Approach: lateral  Medications administered: 4 mL lidocaine PF 1% 1 %; 40 mg triamcinolone acetonide 40 MG/ML  Patient tolerance: patient tolerated the procedure well with no immediate complications    Large Joint Arthrocentesis: L greater trochanteric bursa  Date/Time: 8/18/2021 10:57 AM  Consent given by: patient  Site marked: site marked  Timeout: Immediately prior to procedure a time out was called to verify the correct patient, procedure, equipment, support staff and site/side marked as required   Supporting Documentation  Indications: pain   Procedure Details  Location: hip - L greater trochanteric bursa  Preparation: Patient was prepped and draped in the usual sterile fashion  Needle size: 23 G  Approach: lateral  Medications administered: 4 mL lidocaine PF 1% 1 %; 40 mg triamcinolone acetonide 40 MG/ML  Patient tolerance: patient tolerated the procedure well with no immediate complications

## 2021-08-18 NOTE — PROGRESS NOTES
"        [facility] Note        Subjective     CC: Follow-up (4.5 months- Trochanteric bursitis of both hips )      TRISHA Baum is a 76 y.o. male.  Patient presents today for his bilateral trochanteric bursitis.  He has been seen in the past with intermittent injection.  He gets good relief from the injections for about 5 months.  No groin pain no new symptoms.    Overall, patient's symptoms are worsening    ROS:    Constiutional:Pt denies fever, chills, nausea, or vomiting.  MSK:as above        Objective      Past Medical History  Past Medical History:   Diagnosis Date   • BPH (benign prostatic hyperplasia)    • CAD (coronary artery disease)    • Chronic venous stasis    • CKD (chronic kidney disease), stage III (CMS/[facility])    • Claustrophobia    • GERD (gastroesophageal reflux disease)    • HLD (hyperlipidemia)    • HTN (hypertension)    • Hypothyroidism    • OA (osteoarthritis) of ankle/foot    • OA (osteoarthritis) of hip    • Osteoporosis    • RA (rheumatoid arthritis) (CMS/[facility])          Physical Exam  /80   Pulse 71   Ht 180.3 cm (70.98\")   Wt 76.7 kg (169 lb)   BMI 23.58 kg/m²     Body mass index is 23.58 kg/m².    Patient is well nourished and well developed.        Ortho Exam  Bilateral hip exam: Palpation of the greater trochanters bilaterally.  No pain with hip motion.  Neurovascular intact distally.  Normal gait.  Imaging/Labs/EMG Reviewed:  Imaging Results (Last 24 Hours)     ** No results found for the last 24 hours. **            Assessment    Assessment:  1. Trochanteric bursitis of both hips        Plan:  1. Recommend over the counter anti-inflammatories for pain and/or swelling  2. Bilateral trochanteric bursitis.  Plan today is repeat steroid injection of bilateral true bursa.  I will see him back as needed.    I discussed with the patient the potential benefits of performing a therapeutic injection of the left trochanteric bursa as well as potential risks " including but not limited to infection, swelling, pain, bleeding, bruising, nerve/vessel damage, skin color changes, transient elevation in blood glucose levels, and fat atrophy. After informed consent and verifying correct patient, procedure site, and type of procedure, the area was prepped with Hibiclens, ethyl chloride was used to numb the skin. Using a 22 gauge needle, 4cc of 1% lidocaine and  40mg/ml of Kenalog were injected into the left troch bursa. The patient tolerated the procedure well. There were no complications.     I discussed with the patient the potential benefits of performing a therapeutic injection of the right trochanteric bursa as well as potential risks including but not limited to infection, swelling, pain, bleeding, bruising, nerve/vessel damage, skin color changes, transient elevation in blood glucose levels, and fat atrophy. After informed consent and verifying correct patient, procedure site, and type of procedure, the area was prepped with Hibiclens, ethyl chloride was used to numb the skin.  Using a 22-gauge needle, 4cc of 1% lidocaine and  40mg/ml of Kenalog were injected into the right trochanteric bursa.  The patient tolerated the procedure well. There were no complications.             Krystina Li PA-C  08/18/21  14:18 TOMEKAT      Dragon disclaimer:  Much of this encounter note is an electronic transcription/translation of spoken language to printed text. The electronic translation of spoken language may permit erroneous, or at times, nonsensical words or phrases to be inadvertently transcribed; Although I have reviewed the note for such errors, some may still exist.

## 2021-09-29 NOTE — PROGRESS NOTES
Health Maintenance Due   Topic Date Due   • Shingles Vaccine (1 of 2) Never done   • DTaP/Tdap/Td Vaccine (2 - Td or Tdap) 06/05/2017   • COVID-19 Vaccine (3 - Moderna risk 3-dose series) 05/14/2021   • Influenza Vaccine (1) 09/01/2021   • Diabetes Foot Exam  12/23/2021   • Medicare Wellness Visit  12/23/2021       Patient is due for topics as listed above but is not proceeding with Immunization(s) COVID-19, Dtap/Tdap/Td and Shingles at this time. Orders placed for Immunization(s) Influenza.         Subjective     Follow-up (bilateral knees euflexxa #3)      Rodrigo Baum is a 75 y.o. male.     History of Present Illness       Patient presents today for the third injection of Euflexxa in bilateral knees.  He is tolerated previous injections well.    Allergies   Allergen Reactions   • Lipitor [Atorvastatin] Anxiety and Myalgia          • Lortab [Hydrocodone-Acetaminophen] Anxiety   • Azithromycin Nausea Only     Current Outpatient Medications on File Prior to Visit   Medication Sig Dispense Refill   • amLODIPine (NORVASC) 5 MG tablet Take 1 tablet by mouth Daily. 90 tablet 0   • aspirin 81 MG chewable tablet Chew 81 mg Daily.     • Cholecalciferol (VITAMIN D3) 50 MCG (2000 UT) tablet Take 2,000 Units by mouth Daily.     • finasteride (PROSCAR) 5 MG tablet Take 5 mg by mouth Daily.     • glucosamine-chondroitin 500-400 MG capsule capsule Take 2 capsules by mouth Daily.     • isosorbide mononitrate (IMDUR) 30 MG 24 hr tablet Take 1 tablet by mouth Daily. 30 tablet 5   • levothyroxine (SYNTHROID, LEVOTHROID) 125 MCG tablet Take 125 mcg by mouth Daily.     • Esbixnp-Japga-Hrwm-PassF-LBalm (MELATONIN + L-THEANINE PO) Take 1 tablet by mouth Daily As Needed.     • metoprolol succinate XL (TOPROL-XL) 25 MG 24 hr tablet Take 1 tablet by mouth Daily. 90 tablet 0   • Multiple Vitamins-Minerals (CENTRUM SILVER 50+MEN PO) Take  by mouth.     • nitroglycerin (NITROSTAT) 0.4 MG SL tablet Place 1 tablet under the tongue Every 5 (Five) Minutes As Needed for Chest Pain. Take no more than 3 doses in 15 minutes. 100 tablet 0   • omeprazole (priLOSEC) 40 MG capsule Take 1 capsule by mouth Daily. Indications: Gastroesophageal Reflux Disease 30 capsule 2   • ondansetron (ZOFRAN) 4 MG tablet Take 4 mg by mouth Every 8 (Eight) Hours As Needed for Nausea or Vomiting.     • predniSONE (DELTASONE) 5 MG tablet Take 2.5 mg by mouth Daily.     • rosuvastatin (CRESTOR) 40 MG tablet Take 1 tablet by mouth Every Night. 90 tablet 2   •  tamsulosin (FLOMAX) 0.4 MG capsule 24 hr capsule Take 1 capsule by mouth 2 (Two) Times a Day.       Current Facility-Administered Medications on File Prior to Visit   Medication Dose Route Frequency Provider Last Rate Last Dose   • Chlorhexidine Gluconate Cloth 2 % pads 1 application  1 application Topical Q12H PRN Sd Mathew PA         Social History     Socioeconomic History   • Marital status:      Spouse name: N/A   • Number of children: 1   • Years of education: H.S.   • Highest education level: High school graduate   Occupational History   • Occupation:      Employer: RETIRED   Tobacco Use   • Smoking status: Former Smoker     Packs/day: 2.00     Years: 20.00     Pack years: 40.00     Types: Cigarettes     Start date:      Quit date:      Years since quittin.8   • Smokeless tobacco: Never Used   Substance and Sexual Activity   • Alcohol use: Yes     Alcohol/week: 3.0 standard drinks     Types: 3 Cans of beer per week     Frequency: Monthly or less   • Drug use: No   • Sexual activity: Not Currently     Partners: Female   Social History Narrative    Caffeine: 1 servings daily     Past Surgical History:   Procedure Laterality Date   • CARDIAC CATHETERIZATION N/A 2018    Procedure: Left Heart Cath;  Surgeon: Susannah Rasmussen MD;  Location:  [facility];  Service:    • CARDIAC CATHETERIZATION N/A 2019    Procedure: Left Heart Cath;  Surgeon: Susannah Rasmussen MD;  Location:  [facility];  Service: Cardiovascular   • COLONOSCOPY     • CORONARY ANGIOPLASTY WITH STENT PLACEMENT      X2   • CORONARY ARTERY BYPASS GRAFT N/A 2018    Procedure: CORONARY ARTERY BYPASS X 2 WITH INTERNAL MAMMARY ARTERY GRAFT, ENDOSCOPIC VEIN HARVESTING UTILIZING THE LEFT GREATER SAPPHENOUS VEIN  CYSTO BY DR WATERS;  Surgeon: Casey Morales MD;  Location:  [facility];  Service:    • CYSTOSCOPY TRANSURETHRAL RESECTION OF PROSTATE N/A 2016    Procedure:  CYSTOSCOPY TRANSURETHRAL RESECTION OF PROSTATE GREENLIGHT;  Surgeon: Alverto Richards MD;  Location: [facility];  Service:    • TONSILLECTOMY     • UPPER GASTROINTESTINAL ENDOSCOPY       Family History   Problem Relation Age of Onset   • Diabetes Father    • Heart disease Father    • Hypertension Father    • Heart attack Father    • Osteoarthritis Father    • Stroke Mother    • Hypertension Mother    • Osteoarthritis Mother    • No Known Problems Maternal Grandmother    • No Known Problems Maternal Grandfather    • Stroke Paternal Grandmother    • Stroke Paternal Grandfather      Past Medical History:   Diagnosis Date   • BPH (benign prostatic hyperplasia)    • CAD (coronary artery disease)    • Chronic venous stasis    • CKD (chronic kidney disease), stage III    • Claustrophobia    • GERD (gastroesophageal reflux disease)    • HLD (hyperlipidemia)    • HTN (hypertension)    • Hypothyroidism    • OA (osteoarthritis) of ankle/foot    • OA (osteoarthritis) of hip    • Osteoporosis    • RA (rheumatoid arthritis) (CMS/[facility])          Review of Systems    The following portions of the patient's history were reviewed and updated as appropriate: allergies, current medications, past family history, past medical history, past social history, past surgical history and problem list.    Ortho Exam      Medical Decision Making    Assessment and Plan/ Diagnosis/Treatment options:   Bilateral knee arthritis.  Proceed with final injection today.  He will return in 6 months or sooner if needed.

## 2021-10-01 ENCOUNTER — TELEPHONE (OUTPATIENT)
Dept: GASTROENTEROLOGY | Facility: CLINIC | Age: 76
End: 2021-10-01

## 2021-10-01 NOTE — TELEPHONE ENCOUNTER
Patient called in to schedule a follow-up appt with you.  When informed it was going to be December, he stated he was hoping just to go ahead and do his colonoscopy & EGD instead.  His last colonoscopy was 10/27/17 and didn't list a recall date.  His last EGD for Saenz's was 11/9/2020.  When should he repeat the procedures or do you want to see him in office first?  Please advise.

## 2021-10-04 NOTE — TELEPHONE ENCOUNTER
Patient scheduled for 11/4/21 for colon cancer screening & EGD for Saenz's Esophagus surveillance.

## 2021-10-18 DIAGNOSIS — Z12.11 ENCOUNTER FOR SCREENING COLONOSCOPY: Primary | ICD-10-CM

## 2021-10-19 DIAGNOSIS — Z01.818 PRE-OPERATIVE CLEARANCE: Primary | ICD-10-CM

## 2021-11-02 ENCOUNTER — APPOINTMENT (OUTPATIENT)
Dept: PREADMISSION TESTING | Facility: HOSPITAL | Age: 76
End: 2021-11-02

## 2021-11-02 LAB — SARS-COV-2 RNA PNL SPEC NAA+PROBE: NOT DETECTED

## 2021-11-02 PROCEDURE — U0004 COV-19 TEST NON-CDC HGH THRU: HCPCS | Performed by: INTERNAL MEDICINE

## 2021-11-04 ENCOUNTER — OUTSIDE FACILITY SERVICE (OUTPATIENT)
Dept: GASTROENTEROLOGY | Facility: CLINIC | Age: 76
End: 2021-11-04

## 2021-11-04 PROCEDURE — 43239 EGD BIOPSY SINGLE/MULTIPLE: CPT | Performed by: INTERNAL MEDICINE

## 2021-11-04 PROCEDURE — 45380 COLONOSCOPY AND BIOPSY: CPT | Performed by: INTERNAL MEDICINE

## 2021-11-04 PROCEDURE — 88305 TISSUE EXAM BY PATHOLOGIST: CPT | Performed by: INTERNAL MEDICINE

## 2021-11-04 PROCEDURE — 45385 COLONOSCOPY W/LESION REMOVAL: CPT | Performed by: INTERNAL MEDICINE

## 2021-11-05 ENCOUNTER — LAB REQUISITION (OUTPATIENT)
Dept: LAB | Facility: HOSPITAL | Age: 76
End: 2021-11-05

## 2021-11-05 DIAGNOSIS — K64.8 OTHER HEMORRHOIDS: ICD-10-CM

## 2021-11-05 DIAGNOSIS — K22.89 OTHER SPECIFIED DISEASE OF ESOPHAGUS: ICD-10-CM

## 2021-11-05 DIAGNOSIS — K22.70 BARRETT'S ESOPHAGUS WITHOUT DYSPLASIA: ICD-10-CM

## 2021-11-05 DIAGNOSIS — K63.5 POLYP OF COLON: ICD-10-CM

## 2021-11-05 DIAGNOSIS — K44.9 DIAPHRAGMATIC HERNIA WITHOUT OBSTRUCTION OR GANGRENE: ICD-10-CM

## 2021-11-05 DIAGNOSIS — Z12.11 ENCOUNTER FOR SCREENING FOR MALIGNANT NEOPLASM OF COLON: ICD-10-CM

## 2021-11-05 DIAGNOSIS — K57.30 DIVERTICULOSIS OF LARGE INTESTINE WITHOUT PERFORATION OR ABSCESS WITHOUT BLEEDING: ICD-10-CM

## 2021-11-09 NOTE — PROGRESS NOTES
Called patient and advise. He states you all discussed about his EGD being repeated every year not 3 years. Please advise?

## 2021-11-12 ENCOUNTER — TELEPHONE (OUTPATIENT)
Dept: ORTHOPEDIC SURGERY | Facility: CLINIC | Age: 76
End: 2021-11-12

## 2021-11-12 NOTE — TELEPHONE ENCOUNTER
Caller: PANCHO LOVE    Relationship to patient: SELF    Best call back number: [phone]    Chief complaint: BILATERAL HIP PAIN    Type of visit: KENALOG INJECTION    Requested date: WEEK OF 12/20    If rescheduling, when is the original appointment: N/A    Additional notes: N/A

## 2021-11-17 ENCOUNTER — TELEPHONE (OUTPATIENT)
Dept: ORTHOPEDIC SURGERY | Facility: CLINIC | Age: 76
End: 2021-11-17

## 2021-11-17 DIAGNOSIS — M17.0 PRIMARY OSTEOARTHRITIS OF BOTH KNEES: Primary | ICD-10-CM

## 2021-11-17 NOTE — TELEPHONE ENCOUNTER
Called pt to make him aware that we are unable to do his injections today.  That we are waiting on approval for injections through ins.      Asked pt to reschedule out 3 weeks and pt is wondering if he can get hip and knee injections done at the same time on 12/23.      Pt requesting injections on 12/9,12/16 and 12/23.     Pt requesting call back to let him know. [phone]

## 2021-11-19 NOTE — TELEPHONE ENCOUNTER
HUB UNSUCCESSFULLY ATTEMPTED TO WARM TRANSFER PATIENT.     PATIENT IS ANXIOUS TO GET HIS APPOINTMENTS SCHEDULED. PLEASE CALL HIM AND FOLLOW UP [phone].

## 2021-12-09 ENCOUNTER — OFFICE VISIT (OUTPATIENT)
Dept: ORTHOPEDIC SURGERY | Facility: CLINIC | Age: 76
End: 2021-12-09

## 2021-12-09 DIAGNOSIS — M17.0 PRIMARY OSTEOARTHRITIS OF BOTH KNEES: Primary | ICD-10-CM

## 2021-12-09 PROCEDURE — 20610 DRAIN/INJ JOINT/BURSA W/O US: CPT | Performed by: PHYSICIAN ASSISTANT

## 2021-12-09 NOTE — PROGRESS NOTES
Procedure   Large Joint Arthrocentesis: R knee  Date/Time: 12/9/2021 2:55 PM  Consent given by: patient  Site marked: site marked  Timeout: Immediately prior to procedure a time out was called to verify the correct patient, procedure, equipment, support staff and site/side marked as required   Supporting Documentation  Indications: pain   Procedure Details  Location: knee - R knee  Preparation: Patient was prepped and draped in the usual sterile fashion  Needle size: 22 G  Approach: anterolateral  Medications administered: 30 mg Hyaluronan 30 MG/2ML  Patient tolerance: patient tolerated the procedure well with no immediate complications    Large Joint Arthrocentesis: L knee  Date/Time: 12/9/2021 2:56 PM  Consent given by: patient  Site marked: site marked  Timeout: Immediately prior to procedure a time out was called to verify the correct patient, procedure, equipment, support staff and site/side marked as required   Supporting Documentation  Indications: pain   Procedure Details  Location: knee - L knee  Preparation: Patient was prepped and draped in the usual sterile fashion  Needle size: 22 G  Approach: anterolateral  Medications administered: 30 mg Hyaluronan 30 MG/2ML  Patient tolerance: patient tolerated the procedure well with no immediate complications

## 2021-12-13 NOTE — PROGRESS NOTES
Patient presents for 1st injection of ORthovisc in bilateral knees.    Using sterile technique, the left knee was sterilely prepped with Hibiclens.  Following time out, using a 22 gauge needle the left knee was aspirated and then injected with 2 ml Orthovisc. Approximately 0.5 mm of straw-colored fluid was obtained.  Patient tolerated the procedure well.  No complications.      Using sterile technique, the right knee was sterilely prepped with Hibiclens.  Following time out, using a 22 gauge needle the right knee was aspirated and then injected with 2 ml Orthovisc. Approximately 0.5 mm of straw-colored fluid was obtained.  Patient tolerated the procedure well.  No complications.      rtc 1 week

## 2021-12-16 ENCOUNTER — CLINICAL SUPPORT (OUTPATIENT)
Dept: ORTHOPEDIC SURGERY | Facility: CLINIC | Age: 76
End: 2021-12-16

## 2021-12-16 DIAGNOSIS — M17.0 PRIMARY OSTEOARTHRITIS OF BOTH KNEES: Primary | ICD-10-CM

## 2021-12-16 PROCEDURE — 20610 DRAIN/INJ JOINT/BURSA W/O US: CPT | Performed by: PHYSICIAN ASSISTANT

## 2021-12-16 NOTE — PROGRESS NOTES
Procedure   Large Joint Arthrocentesis: R knee  Date/Time: 12/16/2021 12:56 PM  Consent given by: patient  Site marked: site marked  Timeout: Immediately prior to procedure a time out was called to verify the correct patient, procedure, equipment, support staff and site/side marked as required   Supporting Documentation  Indications: pain   Procedure Details  Location: knee - R knee  Preparation: Patient was prepped and draped in the usual sterile fashion  Needle size: 22 G  Approach: anterolateral  Medications administered: 30 mg Hyaluronan 30 MG/2ML  Patient tolerance: patient tolerated the procedure well with no immediate complications    Large Joint Arthrocentesis: L knee  Date/Time: 12/16/2021 12:56 PM  Consent given by: patient  Site marked: site marked  Timeout: Immediately prior to procedure a time out was called to verify the correct patient, procedure, equipment, support staff and site/side marked as required   Supporting Documentation  Indications: pain   Procedure Details  Location: knee - L knee  Preparation: Patient was prepped and draped in the usual sterile fashion  Needle size: 22 G  Approach: anterolateral  Medications administered: 30 mg Hyaluronan 30 MG/2ML  Patient tolerance: patient tolerated the procedure well with no immediate complications

## 2021-12-16 NOTE — PROGRESS NOTES
Patient presents for second injection of Orthovisc in bilateral knees.    Using sterile technique, the left knee was sterilely prepped with Hibiclens.  Following time out, using a 22 gauge needle the left knee was aspirated and then injected with 2 ml Orthovisc. Approximately 0.5 mm of straw-colored fluid was obtained.  Patient tolerated the procedure well.  No complications.        Using sterile technique, the right knee was sterilely prepped with Hibiclens.  Following time out, using a 22 gauge needle the right knee was aspirated and then injected with 2 ml Orthovisc. Approximately 0.5 mm of straw-colored fluid was obtained.  Patient tolerated the procedure well.  No complications.        Return to clinic 1 week

## 2021-12-23 ENCOUNTER — CLINICAL SUPPORT (OUTPATIENT)
Dept: ORTHOPEDIC SURGERY | Facility: CLINIC | Age: 76
End: 2021-12-23

## 2021-12-23 DIAGNOSIS — M17.0 PRIMARY OSTEOARTHRITIS OF BOTH KNEES: Primary | ICD-10-CM

## 2021-12-23 PROCEDURE — 20610 DRAIN/INJ JOINT/BURSA W/O US: CPT | Performed by: PHYSICIAN ASSISTANT

## 2021-12-23 NOTE — PROGRESS NOTES
Patient presents for the final injection of Orthovisc in bilateral knees    Using sterile technique, the left knee was sterilely prepped with Hibiclens.  Following time out, using a 22 gauge needle the left knee was aspirated and then injected with 2 ml Orthovisc. Approximately 0.5 mm of straw-colored fluid was obtained.  Patient tolerated the procedure well.  No complications.      Using sterile technique, the right knee was sterilely prepped with Hibiclens.  Following time out, using a 22 gauge needle the right knee was aspirated and then injected with 2 ml Orthovisc. Approximately 0.5 mm of straw-colored fluid was obtained.  Patient tolerated the procedure well.  No complications.      rtc 2/22 for bilateral hips

## 2021-12-23 NOTE — PROGRESS NOTES
Procedure   Large Joint Arthrocentesis: R knee  Date/Time: 12/23/2021 12:58 PM  Consent given by: patient  Site marked: site marked  Timeout: Immediately prior to procedure a time out was called to verify the correct patient, procedure, equipment, support staff and site/side marked as required   Supporting Documentation  Indications: pain   Procedure Details  Location: knee - R knee  Preparation: Patient was prepped and draped in the usual sterile fashion  Needle size: 22 G  Approach: anterolateral  Medications administered: 30 mg Hyaluronan 30 MG/2ML  Patient tolerance: patient tolerated the procedure well with no immediate complications    Large Joint Arthrocentesis: L knee  Date/Time: 12/23/2021 12:58 PM  Consent given by: patient  Site marked: site marked  Timeout: Immediately prior to procedure a time out was called to verify the correct patient, procedure, equipment, support staff and site/side marked as required   Supporting Documentation  Indications: pain   Procedure Details  Location: knee - L knee  Preparation: Patient was prepped and draped in the usual sterile fashion  Needle size: 22 G  Approach: anterolateral  Medications administered: 30 mg Hyaluronan 30 MG/2ML  Patient tolerance: patient tolerated the procedure well with no immediate complications

## 2022-01-19 NOTE — PROGRESS NOTES
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    PROBLEM LIST:   1. CAD:  1. 11/15/1993: PTCA and repeat PTCA 1 week following of proximal  LAD, per Dr. Lovelace with normal circumflex and RCA noted at that time.   2. April 2008: Overlapping 3.0 x 24 mm. And 3.0 x 16.0 mm Taxus to LAD and 2.5 x 8.0 PTCA to proximal first OM, inability to pass stent.  EF greater than 60.  3. 5/16 : Stress echocardiogram, which was within normal limits. EF greater than 60%. Normal hemodynamic response with exercise.   4. 1/18 CABG X2 Dr Morales periop hematuria/syed issues  5. 7/1/2019 LHC per AA, patent LIMA to LAD, SVG to diagonal, spasm of the LIMA as well as apical LAD noted which improved with nitro, normal EF  6. 7/19/2020 [facility] ED visit CP, troponin normal x2, admission was recommended however he left AMA  2. Dyslipidemia:  1. November 2009:  Total cholesterol 163, triglycerides 169, HDL 55, LDL 81.  2. 02/30/2012:  Total cholesterol 242, HDL 51, triglycerides 192, , off statin therapy.  3. 9/19 89/124/48/16  3. Nicotine addiction cessation 15 years prior.  4. HTN, presumed essential.  5. 1/17 AAA screen wnl  6. Hypothyroidism on replacement therapy.   7. Exogenous obesity.  8. Arthritis data deficient, on steroid therapy greater than 5 years.   9. GERD.   10. 2015 Right jaw swelling, evaluated for potential mandibular infection per Dr. Smith.   11. Hip and foot pain, followed per Dr Espinoza  12. Prostatism- 2017 3 rounds abx      CC:   Chief Complaint   Patient presents with   • Coronary Artery Disease       Allergies  Allergies   Allergen Reactions   • Lipitor [Atorvastatin] Anxiety and Myalgia          • Lortab [Hydrocodone-Acetaminophen]  Anxiety   • Azithromycin Nausea Only       Current Medications    Current Outpatient Medications:   •  amLODIPine (NORVASC) 5 MG tablet, Take 1 tablet by mouth Daily., Disp: 90 tablet, Rfl: 0  •  aspirin 81 MG chewable tablet, Chew 81 mg Daily., Disp: , Rfl:   •  Cholecalciferol (VITAMIN D3) 50 MCG (2000 UT) tablet, Take 2,000 Units by mouth Daily., Disp: , Rfl:   •  finasteride (PROSCAR) 5 MG tablet, Take 5 mg by mouth Daily., Disp: , Rfl:   •  glucosamine-chondroitin 500-400 MG capsule capsule, Take 2 capsules by mouth Daily., Disp: , Rfl:   •  isosorbide mononitrate (IMDUR) 30 MG 24 hr tablet, Take 1 tablet by mouth Daily., Disp: 30 tablet, Rfl: 5  •  levothyroxine (SYNTHROID, LEVOTHROID) 125 MCG tablet, Take 125 mcg by mouth Daily., Disp: , Rfl:   •  Aepznjq-Ordvk-Pjfr-PassF-LBalm (MELATONIN + L-THEANINE PO), Take 1 tablet by mouth Daily As Needed., Disp: , Rfl:   •  metoprolol succinate XL (TOPROL-XL) 25 MG 24 hr tablet, Take 1 tablet by mouth Daily. (Patient taking differently: Take 25 mg by mouth Daily. 0.5 tablet daily), Disp: 90 tablet, Rfl: 0  •  Multiple Vitamins-Minerals (CENTRUM SILVER 50+MEN PO), Take 1 tablet by mouth Daily., Disp: , Rfl:   •  nitroglycerin (NITROSTAT) 0.4 MG SL tablet, Place 1 tablet under the tongue Every 5 (Five) Minutes As Needed for Chest Pain. Take no more than 3 doses in 15 minutes., Disp: 100 tablet, Rfl: 0  •  omeprazole (priLOSEC) 40 MG capsule, Take 1 capsule by mouth Daily. Indications: Gastroesophageal Reflux Disease, Disp: 30 capsule, Rfl: 2  •  predniSONE (DELTASONE) 1 MG tablet, Take 1 mg by mouth Daily., Disp: , Rfl:   •  rosuvastatin (CRESTOR) 40 MG tablet, Take 1 tablet by mouth Every Night., Disp: 90 tablet, Rfl: 2  •  tamsulosin (FLOMAX) 0.4 MG capsule 24 hr capsule, Take 1 capsule by mouth 2 (Two) Times a Day., Disp: , Rfl:   •  Zinc 25 MG tablet, Take 1 tablet by mouth Daily., Disp: , Rfl:   No current facility-administered medications for this  "visit.    Facility-Administered Medications Ordered in Other Visits:   •  Chlorhexidine Gluconate Cloth 2 % pads 1 application, 1 application, Topical, Q12H PRN, Sd Mathew PA      History of Present Illness   Rodrigo Baum is a 76 y.o. year old male here for follow up.    Pt denies any chest pain, dyspnea, dyspnea on exertion, orthopnea, PND, palpitations, lower extremity edema, or claudication.  Has been able lose weight with calorie counting over 65 pounds in last 2 years.  He generally feels well      OBJECTIVE:  Vitals:    01/20/22 1259   BP: 122/64   BP Location: Left arm   Patient Position: Sitting   Pulse: 61   SpO2: 97%   Weight: 71.2 kg (157 lb)   Height: 180.3 cm (71\")     Body mass index is 21.9 kg/m².    Constitutional:       Appearance: Healthy appearance. Not in distress.   Neck:      Vascular: No JVR. JVD normal.   Pulmonary:      Effort: Pulmonary effort is normal.      Breath sounds: Normal breath sounds. No wheezing. No rhonchi. No rales.   Chest:      Chest wall: Not tender to palpatation.   Cardiovascular:      PMI at left midclavicular line. Normal rate. Regular rhythm. Normal S1. Normal S2.      Murmurs: There is no murmur.      No gallop. No click. No rub.   Pulses:     Intact distal pulses.   Edema:     Peripheral edema absent.   Abdominal:      General: Bowel sounds are normal.      Palpations: Abdomen is soft.      Tenderness: There is no abdominal tenderness.   Musculoskeletal: Normal range of motion.         General: No tenderness. Skin:     General: Skin is warm and dry.   Neurological:      General: No focal deficit present.      Mental Status: Alert and oriented to person, place and time.         Diagnostic Data:    ECG 12 Lead    Date/Time: 1/20/2022 4:32 PM  Performed by: Tal Wagner MD  Authorized by: Tal Wagner MD   Previous ECG: no previous ECG available  BPM: 63    Clinical impression: non-specific ECG              ASSESSMENT:   Diagnosis Plan   1. " Coronary artery disease involving native coronary artery of native heart without angina pectoris     2. Primary hypertension     3. Mixed hyperlipidemia         PLAN:  CAD post remote surgical vascularization no anginal equivalent continued medical management    Hypertension controlled current Flomax Imdur metoprolol amlodipine.  I discussed with him his systolic pressures less than 102 decrease amlodipine to 2.5.  If systolic stays above 130 he can take an additional 2.5 amlodipine for a total of 7.5 mg    Mixed dyslipidemia on statin therapy          Tal Wagner MD, FACC

## 2022-01-20 ENCOUNTER — OFFICE VISIT (OUTPATIENT)
Dept: CARDIOLOGY | Facility: CLINIC | Age: 77
End: 2022-01-20

## 2022-01-20 VITALS
WEIGHT: 157 LBS | OXYGEN SATURATION: 97 % | DIASTOLIC BLOOD PRESSURE: 64 MMHG | HEART RATE: 61 BPM | BODY MASS INDEX: 21.98 KG/M2 | SYSTOLIC BLOOD PRESSURE: 122 MMHG | HEIGHT: 71 IN

## 2022-01-20 DIAGNOSIS — I25.10 CORONARY ARTERY DISEASE INVOLVING NATIVE CORONARY ARTERY OF NATIVE HEART WITHOUT ANGINA PECTORIS: Primary | ICD-10-CM

## 2022-01-20 DIAGNOSIS — I10 PRIMARY HYPERTENSION: ICD-10-CM

## 2022-01-20 DIAGNOSIS — E78.2 MIXED HYPERLIPIDEMIA: ICD-10-CM

## 2022-01-20 PROCEDURE — 93000 ELECTROCARDIOGRAM COMPLETE: CPT | Performed by: INTERNAL MEDICINE

## 2022-01-20 PROCEDURE — 99214 OFFICE O/P EST MOD 30 MIN: CPT | Performed by: INTERNAL MEDICINE

## 2022-01-20 RX ORDER — ZINC 25 MG
1 TABLET ORAL DAILY
COMMUNITY
End: 2022-12-15

## 2022-02-24 ENCOUNTER — OFFICE VISIT (OUTPATIENT)
Dept: ORTHOPEDIC SURGERY | Facility: CLINIC | Age: 77
End: 2022-02-24

## 2022-02-24 VITALS
BODY MASS INDEX: 22.68 KG/M2 | HEIGHT: 71 IN | DIASTOLIC BLOOD PRESSURE: 76 MMHG | SYSTOLIC BLOOD PRESSURE: 120 MMHG | WEIGHT: 162 LBS

## 2022-02-24 DIAGNOSIS — M70.62 TROCHANTERIC BURSITIS OF BOTH HIPS: Primary | ICD-10-CM

## 2022-02-24 DIAGNOSIS — M70.61 TROCHANTERIC BURSITIS OF BOTH HIPS: Primary | ICD-10-CM

## 2022-02-24 PROCEDURE — 20610 DRAIN/INJ JOINT/BURSA W/O US: CPT | Performed by: PHYSICIAN ASSISTANT

## 2022-02-24 RX ORDER — LANOLIN ALCOHOL/MO/W.PET/CERES
1000 CREAM (GRAM) TOPICAL DAILY
COMMUNITY

## 2022-02-24 RX ADMIN — LIDOCAINE HYDROCHLORIDE 4 ML: 10 INJECTION, SOLUTION EPIDURAL; INFILTRATION; INTRACAUDAL; PERINEURAL at 15:22

## 2022-02-24 RX ADMIN — LIDOCAINE HYDROCHLORIDE 4 ML: 10 INJECTION, SOLUTION EPIDURAL; INFILTRATION; INTRACAUDAL; PERINEURAL at 15:21

## 2022-02-24 RX ADMIN — TRIAMCINOLONE ACETONIDE 40 MG: 40 INJECTION, SUSPENSION INTRA-ARTICULAR; INTRAMUSCULAR at 15:22

## 2022-02-24 RX ADMIN — TRIAMCINOLONE ACETONIDE 40 MG: 40 INJECTION, SUSPENSION INTRA-ARTICULAR; INTRAMUSCULAR at 15:21

## 2022-02-24 NOTE — PROGRESS NOTES
Procedure   Large Joint Arthrocentesis: R greater trochanteric bursa  Date/Time: 2/24/2022 3:21 PM  Consent given by: patient  Site marked: site marked  Timeout: Immediately prior to procedure a time out was called to verify the correct patient, procedure, equipment, support staff and site/side marked as required   Supporting Documentation  Indications: pain   Procedure Details  Location: hip - R greater trochanteric bursa  Preparation: Patient was prepped and draped in the usual sterile fashion  Needle size: 22 G  Approach: lateral  Medications administered: 4 mL lidocaine PF 1% 1 %; 40 mg triamcinolone acetonide 40 MG/ML  Patient tolerance: patient tolerated the procedure well with no immediate complications    Large Joint Arthrocentesis: L greater trochanteric bursa  Date/Time: 2/24/2022 3:22 PM  Consent given by: patient  Site marked: site marked  Timeout: Immediately prior to procedure a time out was called to verify the correct patient, procedure, equipment, support staff and site/side marked as required   Supporting Documentation  Indications: pain   Procedure Details  Location: hip - L greater trochanteric bursa  Preparation: Patient was prepped and draped in the usual sterile fashion  Needle size: 22 G  Approach: lateral  Medications administered: 4 mL lidocaine PF 1% 1 %; 40 mg triamcinolone acetonide 40 MG/ML  Patient tolerance: patient tolerated the procedure well with no immediate complications

## 2022-02-24 NOTE — PROGRESS NOTES
"        [facility] Note        Subjective     CC: Follow-up (6 month f/u; Trochanteric bursitis of both hips (cortisone injections 8/18/21))      TRISHA Baum is a 77 y.o. male.  Patient returns today for his bilateral trochanteric bursitis.  He has had trochanteric bursitis that has been injected intermittently for years.  He has done physical therapy.  He is here for repeat injection    Overall, patient's symptoms are worsening    ROS:    Constiutional:Pt denies fever, chills, nausea, or vomiting.  MSK:as above        Objective      Past Medical History  Past Medical History:   Diagnosis Date   • BPH (benign prostatic hyperplasia)    • CAD (coronary artery disease)    • Chronic venous stasis    • CKD (chronic kidney disease), stage III ([facility])    • Claustrophobia    • GERD (gastroesophageal reflux disease)    • HLD (hyperlipidemia)    • HTN (hypertension)    • Hypothyroidism    • OA (osteoarthritis) of ankle/foot    • OA (osteoarthritis) of hip    • Osteoporosis    • RA (rheumatoid arthritis) ([facility])          Physical Exam  /76   Ht 180.3 cm (70.98\")   Wt 73.5 kg (162 lb)   BMI 22.60 kg/m²     Body mass index is 22.6 kg/m².    Patient is well nourished and well developed.        Ortho Exam  Bilateral hip exam: Tender over the greater trochanter.  No pain with range of motion of the hip.  No groin pain.  Neurovascular intact distally.    Imaging/Labs/EMG Reviewed:  Imaging Results (Last 24 Hours)     ** No results found for the last 24 hours. **            Assessment    Assessment:  1. Trochanteric bursitis of both hips        Plan:  1. Recommend over the counter anti-inflammatories for pain and/or swelling  2. Bilateral trochanteric bursitis.  Plan today is repeat cortisone junction into both trochanteric bursa.  I will see him back as needed.    I discussed with the patient the potential benefits of performing a therapeutic injection of the left trochanteric bursa as well as " potential risks including but not limited to infection, swelling, pain, bleeding, bruising, nerve/vessel damage, skin color changes, transient elevation in blood glucose levels, and fat atrophy. After informed consent and verifying correct patient, procedure site, and type of procedure, the area was prepped with Hibiclens, ethyl chloride was used to numb the skin. Using a 22 gauge needle, 4cc of 1% lidocaine and  40mg/ml of Kenalog were injected into the left troch bursa. The patient tolerated the procedure well. There were no complications.     I discussed with the patient the potential benefits of performing a therapeutic injection of the right trochanteric bursa as well as potential risks including but not limited to infection, swelling, pain, bleeding, bruising, nerve/vessel damage, skin color changes, transient elevation in blood glucose levels, and fat atrophy. After informed consent and verifying correct patient, procedure site, and type of procedure, the area was prepped with Hibiclens, ethyl chloride was used to numb the skin.  Using a 22-gauge needle, 4cc of 1% lidocaine and  40mg/ml of Kenalog were injected into the right trochanteric bursa.  The patient tolerated the procedure well. There were no complications.             Krystina Li PA-C  02/28/22  14:41 EST

## 2022-02-28 RX ORDER — TRIAMCINOLONE ACETONIDE 40 MG/ML
40 INJECTION, SUSPENSION INTRA-ARTICULAR; INTRAMUSCULAR
Status: COMPLETED | OUTPATIENT
Start: 2022-02-24 | End: 2022-02-24

## 2022-02-28 RX ORDER — LIDOCAINE HYDROCHLORIDE 10 MG/ML
4 INJECTION, SOLUTION EPIDURAL; INFILTRATION; INTRACAUDAL; PERINEURAL
Status: COMPLETED | OUTPATIENT
Start: 2022-02-24 | End: 2022-02-24

## 2022-04-28 ENCOUNTER — OFFICE VISIT (OUTPATIENT)
Dept: GASTROENTEROLOGY | Facility: CLINIC | Age: 77
End: 2022-04-28

## 2022-04-28 VITALS
TEMPERATURE: 97.7 F | SYSTOLIC BLOOD PRESSURE: 132 MMHG | HEIGHT: 71 IN | OXYGEN SATURATION: 99 % | DIASTOLIC BLOOD PRESSURE: 64 MMHG | BODY MASS INDEX: 22.86 KG/M2 | HEART RATE: 58 BPM | WEIGHT: 163.3 LBS

## 2022-04-28 DIAGNOSIS — K21.00 GASTROESOPHAGEAL REFLUX DISEASE WITH ESOPHAGITIS WITHOUT HEMORRHAGE: Primary | ICD-10-CM

## 2022-04-28 PROCEDURE — 99214 OFFICE O/P EST MOD 30 MIN: CPT | Performed by: INTERNAL MEDICINE

## 2022-04-28 NOTE — PROGRESS NOTES
PCP: Holli Fletcher MD    Chief Complaint   Patient presents with   • Follow-up     Follow up on GERD       History of Present Illness:   Rodrigo Baum is a 77 y.o. male who presents to GI clinic as a follow up for gerd. Feels well. Taking prilosec. Denies dysphagia. No gib loss or fever.    Past Medical History:   Diagnosis Date   • BPH (benign prostatic hyperplasia)    • CAD (coronary artery disease)    • Chronic venous stasis    • CKD (chronic kidney disease), stage III ([facility])    • Claustrophobia    • GERD (gastroesophageal reflux disease)    • HLD (hyperlipidemia)    • HTN (hypertension)    • Hypothyroidism    • OA (osteoarthritis) of ankle/foot    • OA (osteoarthritis) of hip    • Osteoporosis    • RA (rheumatoid arthritis) ([facility])        Past Surgical History:   Procedure Laterality Date   • CARDIAC CATHETERIZATION N/A 1/24/2018    Procedure: Left Heart Cath;  Surgeon: Susannah Rasmussen MD;  Location:  [facility];  Service:    • CARDIAC CATHETERIZATION N/A 7/1/2019    Procedure: Left Heart Cath;  Surgeon: Susannah Rasmussen MD;  Location:  [facility];  Service: Cardiovascular   • COLONOSCOPY     • CORONARY ANGIOPLASTY WITH STENT PLACEMENT      X2   • CORONARY ARTERY BYPASS GRAFT N/A 1/26/2018    Procedure: CORONARY ARTERY BYPASS X 2 WITH INTERNAL MAMMARY ARTERY GRAFT, ENDOSCOPIC VEIN HARVESTING UTILIZING THE LEFT GREATER SAPPHENOUS VEIN  CYSTO BY DR WATERS;  Surgeon: Casey Morales MD;  Location:  [facility];  Service:    • CYSTOSCOPY TRANSURETHRAL RESECTION OF PROSTATE N/A 11/1/2016    Procedure: CYSTOSCOPY TRANSURETHRAL RESECTION OF PROSTATE GREENLIGHT;  Surgeon: Alverto Richards MD;  Location:  [facility];  Service:    • TONSILLECTOMY     • UPPER GASTROINTESTINAL ENDOSCOPY           Current Outpatient Medications:   •  amLODIPine (NORVASC) 5 MG tablet, Take 1 tablet by mouth Daily., Disp: 90 tablet, Rfl: 0  •  aspirin 81 MG chewable tablet, Chew 81 mg Daily., Disp: , Rfl:    •  Cholecalciferol (VITAMIN D3) 50 MCG (2000 UT) tablet, Take 2,000 Units by mouth Daily., Disp: , Rfl:   •  finasteride (PROSCAR) 5 MG tablet, Take 5 mg by mouth Daily., Disp: , Rfl:   •  glucosamine-chondroitin 500-400 MG capsule capsule, Take 2 capsules by mouth Daily., Disp: , Rfl:   •  isosorbide mononitrate (IMDUR) 30 MG 24 hr tablet, Take 1 tablet by mouth Daily., Disp: 30 tablet, Rfl: 5  •  levothyroxine (SYNTHROID, LEVOTHROID) 125 MCG tablet, Take 125 mcg by mouth Daily., Disp: , Rfl:   •  Bbggqhh-Wxwrm-Ikae-PassF-LBalm (MELATONIN + L-THEANINE PO), Take 1 tablet by mouth Daily As Needed., Disp: , Rfl:   •  metoprolol succinate XL (TOPROL-XL) 25 MG 24 hr tablet, Take 1 tablet by mouth Daily. (Patient taking differently: Take 25 mg by mouth Daily. 0.5 tablet daily), Disp: 90 tablet, Rfl: 0  •  Multiple Vitamins-Minerals (CENTRUM SILVER 50+MEN PO), Take 1 tablet by mouth Daily., Disp: , Rfl:   •  nitroglycerin (NITROSTAT) 0.4 MG SL tablet, Place 1 tablet under the tongue Every 5 (Five) Minutes As Needed for Chest Pain. Take no more than 3 doses in 15 minutes., Disp: 100 tablet, Rfl: 0  •  omeprazole (priLOSEC) 40 MG capsule, Take 1 capsule by mouth Daily. Indications: Gastroesophageal Reflux Disease, Disp: 30 capsule, Rfl: 2  •  predniSONE (DELTASONE) 1 MG tablet, Take 1 mg by mouth Daily., Disp: , Rfl:   •  rosuvastatin (CRESTOR) 40 MG tablet, Take 1 tablet by mouth Every Night., Disp: 90 tablet, Rfl: 2  •  tamsulosin (FLOMAX) 0.4 MG capsule 24 hr capsule, Take 1 capsule by mouth 2 (Two) Times a Day., Disp: , Rfl:   •  vitamin B-12 (CYANOCOBALAMIN) 1000 MCG tablet, Take 1,000 mcg by mouth Daily., Disp: , Rfl:   •  Zinc 25 MG tablet, Take 1 tablet by mouth Daily., Disp: , Rfl:   No current facility-administered medications for this visit.    Facility-Administered Medications Ordered in Other Visits:   •  Chlorhexidine Gluconate Cloth 2 % pads 1 application, 1 application, Topical, Q12H PRN, Sd Mathew  A, PA    Allergies   Allergen Reactions   • Lipitor [Atorvastatin] Anxiety and Myalgia          • Lortab [Hydrocodone-Acetaminophen] Anxiety   • Azithromycin Nausea Only       Family History   Problem Relation Age of Onset   • Stroke Mother    • Hypertension Mother    • Osteoarthritis Mother    • Esophageal cancer Father    • Diabetes Father    • Heart disease Father    • Hypertension Father    • Heart attack Father    • Osteoarthritis Father    • Colon polyps Maternal Uncle    • Colon cancer Maternal Uncle    • No Known Problems Maternal Grandmother    • No Known Problems Maternal Grandfather    • Stroke Paternal Grandmother    • Stroke Paternal Grandfather        Social History     Socioeconomic History   • Marital status:      Spouse name: N/A   • Number of children: 1   • Years of education: H.S.   • Highest education level: High school graduate   Tobacco Use   • Smoking status: Former Smoker     Packs/day: 2.00     Years: 20.00     Pack years: 40.00     Types: Cigarettes     Start date:      Quit date:      Years since quittin.3   • Smokeless tobacco: Never Used   Vaping Use   • Vaping Use: Never used   Substance and Sexual Activity   • Alcohol use: Yes     Alcohol/week: 3.0 standard drinks     Types: 3 Cans of beer per week     Comment: occas   • Drug use: No   • Sexual activity: Not Currently     Partners: Female       Review of Systems  A complete 12 point ros was asked and is negative except for that mentioned above.  In particular:  No fever  No rash  No increased arthralgias  No worsening edema  No cough  No dyspnea  No chest pain      Vitals:    22 1509   BP: 132/64   Pulse: 58   Temp: 97.7 °F (36.5 °C)   SpO2: 99%       Physical Exam  General: well developed, well nourished  A+O x 3 NAD  HEENT: NCAT, pupils equal appearing, sclera appear white  NECK: full ROM  Respiratory: symmetric chest rise, normal effort, normal work of breathing, no overt rales  Abdomen:  non-distended  Skin: normal color, no jaundice  Neuro: no tremor, no facial droop  Psych: normal mood and affect      Assessment/Plan  1.) history of esophageal spasm, recurrent chest pain  Continue following with cardiology. He will continue taking ppi and optimize any anxiety.     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 Repeat 1-3 year per patient preference    5.) HCM  Repeat colonoscopy 2024    RTC in 1 year        Troy Diaz MD  4/28/2022

## 2022-05-17 DIAGNOSIS — M17.0 PRIMARY OSTEOARTHRITIS OF BOTH KNEES: Primary | ICD-10-CM

## 2022-06-10 ENCOUNTER — TELEPHONE (OUTPATIENT)
Dept: ORTHOPEDIC SURGERY | Facility: CLINIC | Age: 77
End: 2022-06-10

## 2022-07-07 ENCOUNTER — CLINICAL SUPPORT (OUTPATIENT)
Dept: ORTHOPEDIC SURGERY | Facility: CLINIC | Age: 77
End: 2022-07-07

## 2022-07-07 DIAGNOSIS — M17.0 PRIMARY OSTEOARTHRITIS OF BOTH KNEES: Primary | ICD-10-CM

## 2022-07-07 PROCEDURE — 20610 DRAIN/INJ JOINT/BURSA W/O US: CPT | Performed by: PHYSICIAN ASSISTANT

## 2022-07-07 NOTE — PROGRESS NOTES
Patient presents for the 1st injection of Orthovisc in bilateral knees.    Using sterile technique, the left knee was sterilely prepped with Hibiclens.  Following time out, using a 22 gauge needle the left knee was aspirated and then injected with 2 ml Orthovisc. Approximately 0.5 mm of straw-colored fluid was obtained.  Patient tolerated the procedure well.  No complications.      Using sterile technique, the right knee was sterilely prepped with Hibiclens.  Following time out, using a 22 gauge needle the right knee was aspirated and then injected with 2 ml Orthovisc. Approximately 0.5 mm of straw-colored fluid was obtained.  Patient tolerated the procedure well.  No complications.      rtc 1 week    Will do troch injection at visit too

## 2022-07-07 NOTE — PROGRESS NOTES
Procedure   Large Joint Arthrocentesis: L knee  Date/Time: 7/7/2022 12:46 PM  Consent given by: patient  Site marked: site marked  Timeout: Immediately prior to procedure a time out was called to verify the correct patient, procedure, equipment, support staff and site/side marked as required   Supporting Documentation  Indications: pain   Procedure Details  Location: knee - L knee  Preparation: Patient was prepped and draped in the usual sterile fashion  Needle size: 22 G  Approach: superior  Medications administered: 30 mg Hyaluronan 30 MG/2ML  Aspirate: clear (to verify intraarticular placement of the needle)  Patient tolerance: patient tolerated the procedure well with no immediate complications    Large Joint Arthrocentesis: R knee  Date/Time: 7/7/2022 12:47 PM  Consent given by: patient  Site marked: site marked  Timeout: Immediately prior to procedure a time out was called to verify the correct patient, procedure, equipment, support staff and site/side marked as required   Supporting Documentation  Indications: pain   Procedure Details  Location: knee - R knee  Preparation: Patient was prepped and draped in the usual sterile fashion  Needle size: 20 G  Approach: superior  Medications administered: 30 mg Hyaluronan 30 MG/2ML  Aspirate: clear (to verify intraarticular placement of the needle)  Patient tolerance: patient tolerated the procedure well with no immediate complications

## 2022-07-14 ENCOUNTER — CLINICAL SUPPORT (OUTPATIENT)
Dept: ORTHOPEDIC SURGERY | Facility: CLINIC | Age: 77
End: 2022-07-14

## 2022-07-14 DIAGNOSIS — M70.62 TROCHANTERIC BURSITIS OF BOTH HIPS: ICD-10-CM

## 2022-07-14 DIAGNOSIS — M17.0 PRIMARY OSTEOARTHRITIS OF BOTH KNEES: Primary | ICD-10-CM

## 2022-07-14 DIAGNOSIS — M70.61 TROCHANTERIC BURSITIS OF BOTH HIPS: ICD-10-CM

## 2022-07-14 PROCEDURE — 20610 DRAIN/INJ JOINT/BURSA W/O US: CPT | Performed by: PHYSICIAN ASSISTANT

## 2022-07-14 RX ADMIN — LIDOCAINE HYDROCHLORIDE 4 ML: 10 INJECTION, SOLUTION EPIDURAL; INFILTRATION; INTRACAUDAL; PERINEURAL at 12:57

## 2022-07-14 RX ADMIN — TRIAMCINOLONE ACETONIDE 40 MG: 40 INJECTION, SUSPENSION INTRA-ARTICULAR; INTRAMUSCULAR at 12:57

## 2022-07-14 NOTE — PROGRESS NOTES
[facility]        Subjective     CC: Follow-up (Bilateral knees orthovisc #2 injections/5 month recheck- Trochanteric bursitis of both hips)      HPI    Rodrigo Baum is a 77 y.o. male.  Patient presents for the second injection of Orthovisc in bilateral knees.  He also would like repeat troches bursa injections        ROS:    Constiutional:Pt denies fever, chills, nausea, or vomiting.  MSK:as above        Objective      Past Medical History  Past Medical History:   Diagnosis Date   • BPH (benign prostatic hyperplasia)    • CAD (coronary artery disease)    • Chronic venous stasis    • CKD (chronic kidney disease), stage III ([facility])    • Claustrophobia    • GERD (gastroesophageal reflux disease)    • HLD (hyperlipidemia)    • HTN (hypertension)    • Hypothyroidism    • OA (osteoarthritis) of ankle/foot    • OA (osteoarthritis) of hip    • Osteoporosis    • RA (rheumatoid arthritis) ([facility])          Physical Exam  There were no vitals taken for this visit.    There is no height or weight on file to calculate BMI.    Patient is well nourished and well developed.        Ortho Exam  Bilateral hip exam: Tender over the greater trochanter bilaterally.  No pain with hip motion.  Neurovascular intact distally    Imaging/Labs/EMG Reviewed:  Imaging Results (Last 24 Hours)     ** No results found for the last 24 hours. **            Assessment    Assessment:  1. Primary osteoarthritis of both knees    2. Trochanteric bursitis of both hips        Plan:  Recommend over the counter anti-inflammatories for pain and/or swelling  Bilateral knee arthritis.  Plan today is to proceed with second injection of Orthovisc in both knees.  I will see him back in 1 week for the final injection  Bilateral trochanteric bursitis.  Plan today is cortisone injection to bilateral troches bursa.    Using sterile technique, the left knee was sterilely prepped with Hibiclens.  Following time out, using a 22 gauge  needle the left knee was aspirated and then injected with 2 ml Orthovisc. Approximately 0.5 mm of straw-colored fluid was obtained.  Patient tolerated the procedure well.  No complications.      Using sterile technique, the right knee was sterilely prepped with Hibiclens.  Following time out, using a 22 gauge needle the right knee was aspirated and then injected with 2 ml Orthovisc. Approximately 0.5 mm of straw-colored fluid was obtained.  Patient tolerated the procedure well.  No complications.      I discussed with the patient the potential benefits of performing a therapeutic injection of the left trochanteric bursa as well as potential risks including but not limited to infection, swelling, pain, bleeding, bruising, nerve/vessel damage, skin color changes, transient elevation in blood glucose levels, and fat atrophy. After informed consent and verifying correct patient, procedure site, and type of procedure, the area was prepped with Hibiclens, ethyl chloride was used to numb the skin. Using a 22 gauge needle, 4cc of 1% lidocaine and  40mg/ml of Kenalog were injected into the left troch bursa. The patient tolerated the procedure well. There were no complications.     I discussed with the patient the potential benefits of performing a therapeutic injection of the right trochanteric bursa as well as potential risks including but not limited to infection, swelling, pain, bleeding, bruising, nerve/vessel damage, skin color changes, transient elevation in blood glucose levels, and fat atrophy. After informed consent and verifying correct patient, procedure site, and type of procedure, the area was prepped with Hibiclens, ethyl chloride was used to numb the skin.  Using a 22-gauge needle, 4cc of 1% lidocaine and  40mg/ml of Kenalog were injected into the right trochanteric bursa.  The patient tolerated the procedure well. There were no complications.             Krystina Li PA-C  07/19/22  11:04 EDT

## 2022-07-14 NOTE — PROGRESS NOTES
Procedure   - Large Joint Arthrocentesis: bilateral knee on 7/14/2022 12:50 PM  Indications: pain  Details: 22 G needle, anterolateral approach  Medications (Right): 30 mg Hyaluronan 30 MG/2ML  Medications (Left): 30 mg Hyaluronan 30 MG/2ML  Outcome: tolerated well, no immediate complications  Procedure, treatment alternatives, risks and benefits explained, specific risks discussed. Consent was given by the patient. Immediately prior to procedure a time out was called to verify the correct patient, procedure, equipment, support staff and site/side marked as required. Patient was prepped and draped in the usual sterile fashion.

## 2022-07-14 NOTE — PROGRESS NOTES
Procedure   - Large Joint Arthrocentesis: bilateral greater trochanteric bursa on 7/14/2022 12:57 PM  Indications: pain  Details: 22 G needle, lateral approach  Medications (Right): 4 mL lidocaine PF 1% 1 %; 40 mg triamcinolone acetonide 40 MG/ML  Medications (Left): 4 mL lidocaine PF 1% 1 %; 40 mg triamcinolone acetonide 40 MG/ML  Outcome: tolerated well, no immediate complications  Procedure, treatment alternatives, risks and benefits explained, specific risks discussed. Consent was given by the patient. Immediately prior to procedure a time out was called to verify the correct patient, procedure, equipment, support staff and site/side marked as required. Patient was prepped and draped in the usual sterile fashion.

## 2022-07-19 RX ORDER — LIDOCAINE HYDROCHLORIDE 10 MG/ML
4 INJECTION, SOLUTION EPIDURAL; INFILTRATION; INTRACAUDAL; PERINEURAL
Status: COMPLETED | OUTPATIENT
Start: 2022-07-14 | End: 2022-07-14

## 2022-07-19 RX ORDER — TRIAMCINOLONE ACETONIDE 40 MG/ML
40 INJECTION, SUSPENSION INTRA-ARTICULAR; INTRAMUSCULAR
Status: COMPLETED | OUTPATIENT
Start: 2022-07-14 | End: 2022-07-14

## 2022-07-21 ENCOUNTER — CLINICAL SUPPORT (OUTPATIENT)
Dept: ORTHOPEDIC SURGERY | Facility: CLINIC | Age: 77
End: 2022-07-21

## 2022-07-21 DIAGNOSIS — M17.0 PRIMARY OSTEOARTHRITIS OF BOTH KNEES: Primary | ICD-10-CM

## 2022-07-21 PROCEDURE — 20610 DRAIN/INJ JOINT/BURSA W/O US: CPT | Performed by: PHYSICIAN ASSISTANT

## 2022-07-21 NOTE — PROGRESS NOTES
Procedure   - Large Joint Arthrocentesis: bilateral knee on 7/21/2022 12:45 PM  Indications: pain  Details: 22 G needle, anterolateral approach  Medications (Right): 30 mg Hyaluronan 30 MG/2ML  Medications (Left): 30 mg Hyaluronan 30 MG/2ML  Outcome: tolerated well, no immediate complications  Procedure, treatment alternatives, risks and benefits explained, specific risks discussed. Consent was given by the patient. Immediately prior to procedure a time out was called to verify the correct patient, procedure, equipment, support staff and site/side marked as required. Patient was prepped and draped in the usual sterile fashion.

## 2022-07-21 NOTE — PROGRESS NOTES
Patient presents for the final injection of Orthovisc in bilateral knees,    Using sterile technique, the left knee was sterilely prepped with Hibiclens.  Following time out, using a 22 gauge needle the left knee was aspirated and then injected with 2 ml Orthovisc. Approximately 0.5 mm of straw-colored fluid was obtained.  Patient tolerated the procedure well.  No complications.      Using sterile technique, the right knee was sterilely prepped with Hibiclens.  Following time out, using a 22 gauge needle the right knee was aspirated and then injected with 2 ml Orthovisc. Approximately 0.5 mm of straw-colored fluid was obtained.  Patient tolerated the procedure well.  No complications.      rtc 6 months.

## 2022-12-01 ENCOUNTER — OFFICE VISIT (OUTPATIENT)
Dept: ORTHOPEDIC SURGERY | Facility: CLINIC | Age: 77
End: 2022-12-01

## 2022-12-01 VITALS
SYSTOLIC BLOOD PRESSURE: 130 MMHG | WEIGHT: 165 LBS | BODY MASS INDEX: 23.1 KG/M2 | HEIGHT: 71 IN | DIASTOLIC BLOOD PRESSURE: 76 MMHG

## 2022-12-01 DIAGNOSIS — M25.552 BILATERAL HIP PAIN: Primary | ICD-10-CM

## 2022-12-01 DIAGNOSIS — M70.61 TROCHANTERIC BURSITIS OF BOTH HIPS: ICD-10-CM

## 2022-12-01 DIAGNOSIS — M25.551 BILATERAL HIP PAIN: Primary | ICD-10-CM

## 2022-12-01 DIAGNOSIS — M17.0 PRIMARY OSTEOARTHRITIS OF BOTH KNEES: ICD-10-CM

## 2022-12-01 DIAGNOSIS — M70.62 TROCHANTERIC BURSITIS OF BOTH HIPS: ICD-10-CM

## 2022-12-01 PROCEDURE — 99214 OFFICE O/P EST MOD 30 MIN: CPT | Performed by: PHYSICIAN ASSISTANT

## 2022-12-01 PROCEDURE — 20610 DRAIN/INJ JOINT/BURSA W/O US: CPT | Performed by: PHYSICIAN ASSISTANT

## 2022-12-01 RX ORDER — LEVOTHYROXINE SODIUM 0.1 MG/1
TABLET ORAL
COMMUNITY
Start: 2022-10-10

## 2022-12-01 RX ADMIN — TRIAMCINOLONE ACETONIDE 40 MG: 40 INJECTION, SUSPENSION INTRA-ARTICULAR; INTRAMUSCULAR at 14:34

## 2022-12-01 RX ADMIN — LIDOCAINE HYDROCHLORIDE 4 ML: 10 INJECTION, SOLUTION EPIDURAL; INFILTRATION; INTRACAUDAL; PERINEURAL at 14:34

## 2022-12-01 NOTE — PROGRESS NOTES
"        [facility] [place] [facility] Note        Subjective     CC: Follow-up (10 month follow up: trochanteric bursitis of both hips)      TRISHA Baum is a 77 y.o. male.  Patient returns today for his bilateral trochanteric bursitis.  He is well-known to me and has been getting intermittent injection.  He does strengthening exercises, cardiac rehab and PT in the past.  No groin pain    Overall, patient's symptoms are worsening    ROS:    Constiutional:Pt denies fever, chills, nausea, or vomiting.  MSK:as above        Objective      Past Medical History  Past Medical History:   Diagnosis Date   • BPH (benign prostatic hyperplasia)    • CAD (coronary artery disease)    • Chronic venous stasis    • CKD (chronic kidney disease), stage III ([facility])    • Claustrophobia    • GERD (gastroesophageal reflux disease)    • HLD (hyperlipidemia)    • HTN (hypertension)    • Hypothyroidism    • OA (osteoarthritis) of ankle/foot    • OA (osteoarthritis) of hip    • Osteoporosis    • RA (rheumatoid arthritis) ([facility])          Physical Exam  /76   Ht 180.3 cm (70.98\")   Wt 74.8 kg (165 lb)   BMI 23.02 kg/m²     Body mass index is 23.02 kg/m².    Patient is well nourished and well developed.        Ortho Exam  Bilateral hip exam: Tender over the trochanteric bursa.  Preserved hip range of motion with no reproducible pain.  Neurovascular intact distally    Imaging/Labs/EMG Reviewed:  Imaging Results (Last 24 Hours)     Procedure Component Value Units Date/Time    XR Hips Bilateral With or Without Pelvis 3-4 View [[id number]] Resulted: 12/01/22 1419     Updated: 12/01/22 1428            Assessment    Assessment:  1. Bilateral hip pain    2. Trochanteric bursitis of both hips    3. Primary osteoarthritis of both knees        Plan:  1. Recommend over the counter anti-inflammatories for pain and/or swelling  2. Bilateral trochanteric bursitis.  I reviewed today's x-rays clinical findings past and current treatment the " patient.  Recommendation today is repeat injection to bilateral trochanteric bursa.  I will see him back to begin a series of Visco once eligible.  Sooner if needed.  I will place an order for Visco.    I discussed with the patient the potential benefits of performing a therapeutic injection of the left trochanteric bursa as well as potential risks including but not limited to infection, swelling, pain, bleeding, bruising, nerve/vessel damage, skin color changes, transient elevation in blood glucose levels, and fat atrophy. After informed consent and verifying correct patient, procedure site, and type of procedure, the area was prepped with Hibiclens, ethyl chloride was used to numb the skin. Using a 22 gauge needle, 4cc of 1% lidocaine and  40mg/ml of Kenalog were injected into the left troch bursa. The patient tolerated the procedure well. There were no complications.     I discussed with the patient the potential benefits of performing a therapeutic injection of the right trochanteric bursa as well as potential risks including but not limited to infection, swelling, pain, bleeding, bruising, nerve/vessel damage, skin color changes, transient elevation in blood glucose levels, and fat atrophy. After informed consent and verifying correct patient, procedure site, and type of procedure, the area was prepped with Hibiclens, ethyl chloride was used to numb the skin.  Using a 22-gauge needle, 4cc of 1% lidocaine and  40mg/ml of Kenalog were injected into the right trochanteric bursa.  The patient tolerated the procedure well. There were no complications.             Krystina Li PA-C  12/02/22  11:16 EST

## 2022-12-01 NOTE — PROGRESS NOTES
Procedure   - Large Joint Arthrocentesis: bilateral greater trochanteric bursa on 12/1/2022 2:34 PM  Indications: pain  Details: (23g) needle, anterolateral approach  Medications (Right): 40 mg triamcinolone acetonide 40 MG/ML; 4 mL lidocaine PF 1% 1 %  Medications (Left): 40 mg triamcinolone acetonide 40 MG/ML; 4 mL lidocaine PF 1% 1 %  Outcome: tolerated well, no immediate complications  Procedure, treatment alternatives, risks and benefits explained, specific risks discussed. Consent was given by the patient. Immediately prior to procedure a time out was called to verify the correct patient, procedure, equipment, support staff and site/side marked as required. Patient was prepped and draped in the usual sterile fashion.

## 2022-12-06 ENCOUNTER — TELEPHONE (OUTPATIENT)
Dept: ORTHOPEDIC SURGERY | Facility: CLINIC | Age: 77
End: 2022-12-06

## 2022-12-06 NOTE — TELEPHONE ENCOUNTER
Called patient to see if he wanted to go ahead and schedule Orthovisc injections. Eligibility starts 01/13/23. NA, JUDITH.

## 2022-12-15 ENCOUNTER — OFFICE VISIT (OUTPATIENT)
Dept: CARDIOLOGY | Facility: CLINIC | Age: 77
End: 2022-12-15

## 2022-12-15 VITALS
HEART RATE: 63 BPM | OXYGEN SATURATION: 96 % | WEIGHT: 168 LBS | BODY MASS INDEX: 23.52 KG/M2 | SYSTOLIC BLOOD PRESSURE: 108 MMHG | HEIGHT: 71 IN | DIASTOLIC BLOOD PRESSURE: 82 MMHG

## 2022-12-15 DIAGNOSIS — I25.10 CORONARY ARTERY DISEASE INVOLVING NATIVE CORONARY ARTERY OF NATIVE HEART WITHOUT ANGINA PECTORIS: Primary | ICD-10-CM

## 2022-12-15 DIAGNOSIS — E78.2 MIXED HYPERLIPIDEMIA: ICD-10-CM

## 2022-12-15 DIAGNOSIS — I10 PRIMARY HYPERTENSION: ICD-10-CM

## 2022-12-15 PROCEDURE — 99214 OFFICE O/P EST MOD 30 MIN: CPT | Performed by: INTERNAL MEDICINE

## 2022-12-15 NOTE — PROGRESS NOTES
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    PROBLEM LIST:   1. CAD:  1. 11/15/1993: PTCA and repeat PTCA 1 week following of proximal  LAD, per Dr. Lovelace with normal circumflex and RCA noted at that time.   2. April 2008: Overlapping 3.0 x 24 mm. And 3.0 x 16.0 mm Taxus to LAD and 2.5 x 8.0 PTCA to proximal first OM, inability to pass stent.  EF greater than 60.  3. 5/16 : Stress echocardiogram, which was within normal limits. EF greater than 60%. Normal hemodynamic response with exercise.   4. 1/18 CABG X2 Dr Morales periop hematuria/syed issues  5. 7/1/2019 LHC per [facility], patent LIMA to LAD, SVG to diagonal, spasm of the LIMA as well as apical LAD noted which improved with nitro, normal EF  6. 7/19/2020 [facility] ED visit CP, troponin normal x2, admission was recommended however he left AMA  2. Dyslipidemia:  1. November 2009:  Total cholesterol 163, triglycerides 169, HDL 55, LDL 81.  2. 02/30/2012:  Total cholesterol 242, HDL 51, triglycerides 192, , off statin therapy.  3. 9/19 89/124/48/16  3. Nicotine addiction cessation 15 years prior.  4. HTN, presumed essential.  5. 1/17 AAA screen wnl  6. Hypothyroidism on replacement therapy.   7. Exogenous obesity.  8. Arthritis data deficient, on steroid therapy greater than 5 years.   9. GERD.   10. 2015 Right jaw swelling, evaluated for potential mandibular infection per Dr. Smith.   11. Hip and foot pain, followed per Dr Espinoza  12. Prostatism- 2017 3 rounds abx      CC:   Chief Complaint   Patient presents with   • Coronary artery disease involving native coronary artery of       Allergies  Allergies   Allergen Reactions   • Hydrocodone-Acetaminophen Anxiety   • Lipitor  [Atorvastatin] Anxiety and Myalgia          • Lortab [Hydrocodone-Acetaminophen] Anxiety   • Azithromycin Nausea Only       Current Medications    Current Outpatient Medications:   •  amLODIPine (NORVASC) 5 MG tablet, Take 1 tablet by mouth Daily., Disp: 90 tablet, Rfl: 0  •  aspirin 81 MG chewable tablet, Chew 81 mg Daily., Disp: , Rfl:   •  Cholecalciferol (VITAMIN D3) 50 MCG (2000 UT) tablet, Take 2,000 Units by mouth Daily., Disp: , Rfl:   •  finasteride (PROSCAR) 5 MG tablet, Take 5 mg by mouth Daily., Disp: , Rfl:   •  FOLIC ACID PO, Take 800 mcg by mouth Daily., Disp: , Rfl:   •  glucosamine-chondroitin 500-400 MG capsule capsule, Take 2 capsules by mouth Daily., Disp: , Rfl:   •  isosorbide mononitrate (IMDUR) 30 MG 24 hr tablet, Take 1 tablet by mouth Daily., Disp: 30 tablet, Rfl: 5  •  levothyroxine (SYNTHROID, LEVOTHROID) 100 MCG tablet, , Disp: , Rfl:   •  Gzvofii-Hgdpz-Ykrz-PassF-LBalm (MELATONIN + L-THEANINE PO), Take 1 tablet by mouth Daily As Needed., Disp: , Rfl:   •  metoprolol succinate XL (TOPROL-XL) 25 MG 24 hr tablet, Take 1 tablet by mouth Daily. (Patient taking differently: Take 25 mg by mouth Daily. 0.5 tablet daily), Disp: 90 tablet, Rfl: 0  •  Multiple Vitamins-Minerals (CENTRUM SILVER 50+MEN PO), Take 1 tablet by mouth Daily., Disp: , Rfl:   •  nitroglycerin (NITROSTAT) 0.4 MG SL tablet, Place 1 tablet under the tongue Every 5 (Five) Minutes As Needed for Chest Pain. Take no more than 3 doses in 15 minutes., Disp: 100 tablet, Rfl: 0  •  omeprazole (priLOSEC) 40 MG capsule, Take 1 capsule by mouth Daily. Indications: Gastroesophageal Reflux Disease, Disp: 30 capsule, Rfl: 2  •  predniSONE (DELTASONE) 1 MG tablet, Take 1 mg by mouth Daily., Disp: , Rfl:   •  rosuvastatin (CRESTOR) 40 MG tablet, Take 1 tablet by mouth Every Night., Disp: 90 tablet, Rfl: 2  •  tamsulosin (FLOMAX) 0.4 MG capsule 24 hr capsule, Take 1 capsule by mouth 2 (Two) Times a Day., Disp: , Rfl:   •  vitamin B-12  "(CYANOCOBALAMIN) 1000 MCG tablet, Take 1,000 mcg by mouth Daily., Disp: , Rfl:   No current facility-administered medications for this visit.    Facility-Administered Medications Ordered in Other Visits:   •  Chlorhexidine Gluconate Cloth 2 % pads 1 application, 1 application, Topical, Q12H YESIN, Sd Mathew PA      History of Present Illness   Rodrigo Baum is a 77 y.o. year old male here for follow up.  Continues walking 10 to 15 miles weekly without issue.  No new crescendo pattern of symptom        OBJECTIVE:  Vitals:    12/15/22 1306   BP: 108/82   BP Location: Right arm   Patient Position: Sitting   Pulse: 63   SpO2: 96%   Weight: 76.2 kg (168 lb)   Height: 180.3 cm (71\")     Body mass index is 23.43 kg/m².    Constitutional:       Appearance: Healthy appearance. Not in distress.   Neck:      Vascular: No JVR. JVD normal.   Pulmonary:      Effort: Pulmonary effort is normal.      Breath sounds: Normal breath sounds. No wheezing. No rhonchi. No rales.   Chest:      Chest wall: Not tender to palpatation.   Cardiovascular:      PMI at left midclavicular line. Normal rate. Regular rhythm. Normal S1. Normal S2.      Murmurs: There is no murmur.      No gallop. No click. No rub.   Pulses:     Intact distal pulses.   Edema:     Peripheral edema absent.   Abdominal:      General: Bowel sounds are normal.      Palpations: Abdomen is soft.      Tenderness: There is no abdominal tenderness.   Musculoskeletal: Normal range of motion.         General: No tenderness. Skin:     General: Skin is warm and dry.   Neurological:      General: No focal deficit present.      Mental Status: Alert and oriented to person, place and time.         Diagnostic Data:  Procedures      ASSESSMENT:   Diagnosis Plan   1. Coronary artery disease involving native coronary artery of native heart without angina pectoris        2. Primary hypertension        3. Mixed hyperlipidemia            PLAN:  CAD post remote surgical " vascularization no anginal equivalent continued medical management    Hypertension controlled current Flomax Imdur metoprolol amlodipine.     Mixed dyslipidemia controlled on statin therapy          Tal Wagner MD, FACC

## 2022-12-22 RX ORDER — TRIAMCINOLONE ACETONIDE 40 MG/ML
40 INJECTION, SUSPENSION INTRA-ARTICULAR; INTRAMUSCULAR
Status: COMPLETED | OUTPATIENT
Start: 2022-12-01 | End: 2022-12-01

## 2022-12-22 RX ORDER — LIDOCAINE HYDROCHLORIDE 10 MG/ML
4 INJECTION, SOLUTION EPIDURAL; INFILTRATION; INTRACAUDAL; PERINEURAL
Status: COMPLETED | OUTPATIENT
Start: 2022-12-01 | End: 2022-12-01

## 2023-01-03 DIAGNOSIS — M17.0 PRIMARY OSTEOARTHRITIS OF BOTH KNEES: Primary | ICD-10-CM

## 2023-01-26 ENCOUNTER — CLINICAL SUPPORT (OUTPATIENT)
Dept: ORTHOPEDIC SURGERY | Facility: CLINIC | Age: 78
End: 2023-01-26
Payer: MEDICARE

## 2023-01-26 DIAGNOSIS — M17.0 PRIMARY OSTEOARTHRITIS OF BOTH KNEES: Primary | ICD-10-CM

## 2023-01-26 PROCEDURE — 20610 DRAIN/INJ JOINT/BURSA W/O US: CPT | Performed by: PHYSICIAN ASSISTANT

## 2023-02-02 ENCOUNTER — CLINICAL SUPPORT (OUTPATIENT)
Dept: ORTHOPEDIC SURGERY | Facility: CLINIC | Age: 78
End: 2023-02-02
Payer: MEDICARE

## 2023-02-02 DIAGNOSIS — M17.0 PRIMARY OSTEOARTHRITIS OF BOTH KNEES: Primary | ICD-10-CM

## 2023-02-02 PROCEDURE — 20610 DRAIN/INJ JOINT/BURSA W/O US: CPT | Performed by: PHYSICIAN ASSISTANT

## 2023-02-02 NOTE — PROGRESS NOTES
Procedure   - Large Joint Arthrocentesis: bilateral knee on 2/2/2023 12:32 PM  Indications: pain  Details: (23g) needle, anterolateral approach  Medications (Right): 30 mg Hyaluronan 30 MG/2ML  Medications (Left): 30 mg Hyaluronan 30 MG/2ML  Outcome: tolerated well, no immediate complications  Procedure, treatment alternatives, risks and benefits explained, specific risks discussed. Consent was given by the patient. Immediately prior to procedure a time out was called to verify the correct patient, procedure, equipment, support staff and site/side marked as required. Patient was prepped and draped in the usual sterile fashion.

## 2023-02-02 NOTE — PROGRESS NOTES
Patient presents for the 2nd injection of Orthovisc in bilateral knees    Using sterile technique, the left knee was sterilely prepped with Hibiclens.  Following time out, using a 22 gauge needle the left knee was aspirated and then injected with 2 ml Orthovisc. Approximately 0.5 mm of straw-colored fluid was obtained.  Patient tolerated the procedure well.  No complications.      Using sterile technique, the right knee was sterilely prepped with Hibiclens.  Following time out, using a 22 gauge needle the right knee was aspirated and then injected with 2 ml Orthovisc. Approximately 0.5 mm of straw-colored fluid was obtained.  Patient tolerated the procedure well.  No complications.      rtc 1 week

## 2023-02-09 ENCOUNTER — CLINICAL SUPPORT (OUTPATIENT)
Dept: ORTHOPEDIC SURGERY | Facility: CLINIC | Age: 78
End: 2023-02-09
Payer: MEDICARE

## 2023-02-09 DIAGNOSIS — M70.62 TROCHANTERIC BURSITIS OF BOTH HIPS: ICD-10-CM

## 2023-02-09 DIAGNOSIS — M17.0 PRIMARY OSTEOARTHRITIS OF BOTH KNEES: Primary | ICD-10-CM

## 2023-02-09 DIAGNOSIS — M70.61 TROCHANTERIC BURSITIS OF BOTH HIPS: ICD-10-CM

## 2023-02-09 PROCEDURE — 20610 DRAIN/INJ JOINT/BURSA W/O US: CPT | Performed by: PHYSICIAN ASSISTANT

## 2023-02-09 RX ORDER — TRIAMCINOLONE ACETONIDE 40 MG/ML
40 INJECTION, SUSPENSION INTRA-ARTICULAR; INTRAMUSCULAR
Status: COMPLETED | OUTPATIENT
Start: 2023-02-09 | End: 2023-02-09

## 2023-02-09 RX ORDER — LIDOCAINE HYDROCHLORIDE 10 MG/ML
4 INJECTION, SOLUTION EPIDURAL; INFILTRATION; INTRACAUDAL; PERINEURAL
Status: COMPLETED | OUTPATIENT
Start: 2023-02-09 | End: 2023-02-09

## 2023-02-09 RX ADMIN — TRIAMCINOLONE ACETONIDE 40 MG: 40 INJECTION, SUSPENSION INTRA-ARTICULAR; INTRAMUSCULAR at 12:31

## 2023-02-09 RX ADMIN — LIDOCAINE HYDROCHLORIDE 4 ML: 10 INJECTION, SOLUTION EPIDURAL; INFILTRATION; INTRACAUDAL; PERINEURAL at 12:31

## 2023-02-09 NOTE — PROGRESS NOTES
Procedure   - Large Joint Arthrocentesis: bilateral greater trochanteric bursa on 2/9/2023 12:31 PM  Indications: pain  Details: (23g  ) needle, lateral approach  Medications (Right): 4 mL lidocaine PF 1% 1 %; 40 mg triamcinolone acetonide 40 MG/ML  Medications (Left): 4 mL lidocaine PF 1% 1 %; 40 mg triamcinolone acetonide 40 MG/ML  Outcome: tolerated well, no immediate complications  Procedure, treatment alternatives, risks and benefits explained, specific risks discussed. Consent was given by the patient. Immediately prior to procedure a time out was called to verify the correct patient, procedure, equipment, support staff and site/side marked as required. Patient was prepped and draped in the usual sterile fashion.

## 2023-02-09 NOTE — PROGRESS NOTES
Patient presents for the final injection of Orthovisc in bilateral knees and repeat bilateral troch injections.  He has had intermittent troch bursa injections and done well.    Using sterile technique, the left knee was sterilely prepped with Hibiclens.  Following time out, using a 22 gauge needle the left knee was aspirated and then injected with 2 ml Orthovisc. Approximately 0.5 mm of straw-colored fluid was obtained.  Patient tolerated the procedure well.  No complications.      Using sterile technique, the right knee was sterilely prepped with Hibiclens.  Following time out, using a 22 gauge needle the right knee was aspirated and then injected with 2 ml Orthovisc. Approximately 0.5 mm of straw-colored fluid was obtained.  Patient tolerated the procedure well.  No complications.      I discussed with the patient the potential benefits of performing a therapeutic injection of the left trochanteric bursa as well as potential risks including but not limited to infection, swelling, pain, bleeding, bruising, nerve/vessel damage, skin color changes, transient elevation in blood glucose levels, and fat atrophy. After informed consent and verifying correct patient, procedure site, and type of procedure, the area was prepped with Hibiclens, ethyl chloride was used to numb the skin. Using a 22 gauge needle, 4cc of 1% lidocaine and  40mg/ml of Kenalog were injected into the left troch bursa. The patient tolerated the procedure well. There were no complications.     I discussed with the patient the potential benefits of performing a therapeutic injection of the right trochanteric bursa as well as potential risks including but not limited to infection, swelling, pain, bleeding, bruising, nerve/vessel damage, skin color changes, transient elevation in blood glucose levels, and fat atrophy. After informed consent and verifying correct patient, procedure site, and type of procedure, the area was prepped with Hibiclens, ethyl  chloride was used to numb the skin.  Using a 22-gauge needle, 4cc of 1% lidocaine and  40mg/ml of Kenalog were injected into the right trochanteric bursa.  The patient tolerated the procedure well. There were no complications.       rtc prn.

## 2023-02-09 NOTE — PROGRESS NOTES
Procedure   - Large Joint Arthrocentesis: bilateral knee on 2/9/2023 12:26 PM  Indications: pain  Details: (23g  ) needle, anterolateral approach  Medications (Right): 30 mg Hyaluronan 30 MG/2ML  Medications (Left): 30 mg Hyaluronan 30 MG/2ML  Outcome: tolerated well, no immediate complications  Procedure, treatment alternatives, risks and benefits explained, specific risks discussed. Consent was given by the patient. Immediately prior to procedure a time out was called to verify the correct patient, procedure, equipment, support staff and site/side marked as required. Patient was prepped and draped in the usual sterile fashion.

## 2023-02-25 ENCOUNTER — HOSPITAL ENCOUNTER (EMERGENCY)
Facility: HOSPITAL | Age: 78
Discharge: HOME | End: 2023-02-25
Attending: EMERGENCY MEDICINE | Admitting: EMERGENCY MEDICINE
Payer: COMMERCIAL

## 2023-02-25 ENCOUNTER — APPOINTMENT (OUTPATIENT)
Dept: CT IMAGING | Facility: HOSPITAL | Age: 78
End: 2023-02-25
Payer: COMMERCIAL

## 2023-02-25 VITALS
HEART RATE: 64 BPM | RESPIRATION RATE: 16 BRPM | DIASTOLIC BLOOD PRESSURE: 83 MMHG | OXYGEN SATURATION: 97 % | BODY MASS INDEX: 22.12 KG/M2 | SYSTOLIC BLOOD PRESSURE: 130 MMHG | TEMPERATURE: 97.7 F | WEIGHT: 158 LBS | HEIGHT: 71 IN

## 2023-02-25 DIAGNOSIS — S09.90XA INJURY OF HEAD, INITIAL ENCOUNTER: ICD-10-CM
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
## 2023-02-25 PROCEDURE — 70450 CT HEAD/BRAIN W/O DYE: CPT

## 2023-02-25 PROCEDURE — 99283 EMERGENCY DEPT VISIT LOW MDM: CPT

## 2023-05-11 ENCOUNTER — OFFICE VISIT (OUTPATIENT)
Dept: ORTHOPEDIC SURGERY | Facility: CLINIC | Age: 78
End: 2023-05-11
Payer: MEDICARE

## 2023-05-11 VITALS
WEIGHT: 171 LBS | BODY MASS INDEX: 23.94 KG/M2 | SYSTOLIC BLOOD PRESSURE: 140 MMHG | HEIGHT: 71 IN | DIASTOLIC BLOOD PRESSURE: 70 MMHG

## 2023-05-11 DIAGNOSIS — M70.61 TROCHANTERIC BURSITIS OF BOTH HIPS: Primary | ICD-10-CM

## 2023-05-11 DIAGNOSIS — M70.62 TROCHANTERIC BURSITIS OF BOTH HIPS: Primary | ICD-10-CM

## 2023-05-11 RX ADMIN — LIDOCAINE HYDROCHLORIDE 4 ML: 10 INJECTION, SOLUTION EPIDURAL; INFILTRATION; INTRACAUDAL; PERINEURAL at 11:53

## 2023-05-11 RX ADMIN — TRIAMCINOLONE ACETONIDE 40 MG: 40 INJECTION, SUSPENSION INTRA-ARTICULAR; INTRAMUSCULAR at 11:53

## 2023-05-11 NOTE — PROGRESS NOTES
Procedure   - Large Joint Arthrocentesis: bilateral greater trochanteric bursa on 5/11/2023 11:53 AM  Indications: pain  Details: 21 G needle, lateral approach  Medications (Right): 4 mL lidocaine PF 1% 1 %; 40 mg triamcinolone acetonide 40 MG/ML  Medications (Left): 4 mL lidocaine PF 1% 1 %; 40 mg triamcinolone acetonide 40 MG/ML  Outcome: tolerated well, no immediate complications  Procedure, treatment alternatives, risks and benefits explained, specific risks discussed. Consent was given by the patient. Immediately prior to procedure a time out was called to verify the correct patient, procedure, equipment, support staff and site/side marked as required. Patient was prepped and draped in the usual sterile fashion.

## 2023-05-11 NOTE — PROGRESS NOTES
[facility] Follow Up       Office Follow Up Visit       Patient Name: Rodrigo Baum    Chief Complaint:   Chief Complaint   Patient presents with   • Follow-up     5 month recheck - trochanteric bursitis of both hips       Referring Physician: No ref. provider found    History of Present Illness:   Patient returns to clinic today for recheck of bilateral hip pain.  He has seen Krystina in the past for trochanteric bursitis injections.  Last injections on 2/9/2023.  He says pain has returned.  Ongoing for the last 10 years.  He rates pain a 4/10.  Worsens with walking, climbing stairs, sleeping, lying on the hip.      Subjective   Subjective      Review of Systems   Constitutional: Negative.  Negative for chills, fatigue and fever.   HENT: Negative.  Negative for congestion and dental problem.    Eyes: Negative.  Negative for blurred vision.   Respiratory: Negative.  Negative for shortness of breath.    Cardiovascular: Negative.  Negative for leg swelling.   Gastrointestinal: Negative.  Negative for abdominal pain.   Endocrine: Negative.  Negative for polyuria.   Genitourinary: Negative.  Negative for difficulty urinating.   Musculoskeletal: Positive for arthralgias.   Skin: Negative.    Allergic/Immunologic: Negative.    Neurological: Negative.    Hematological: Negative.  Negative for adenopathy.   Psychiatric/Behavioral: Negative.  Negative for behavioral problems.        Past Medical History:   Past Medical History:   Diagnosis Date   • BPH (benign prostatic hyperplasia)    • CAD (coronary artery disease)    • Chronic venous stasis    • CKD (chronic kidney disease), stage III    • Claustrophobia    • Diabetes mellitus    • GERD (gastroesophageal reflux disease)    • HLD (hyperlipidemia)    • HTN (hypertension)    • Hypothyroidism    • OA (osteoarthritis) of ankle/foot    • OA (osteoarthritis) of hip    • Osteoporosis    • RA  (rheumatoid arthritis)        Past Surgical History:   Past Surgical History:   Procedure Laterality Date   • CARDIAC CATHETERIZATION N/A 1/24/2018    Procedure: Left Heart Cath;  Surgeon: Susannah Rasmussen MD;  Location:  [facility];  Service:    • CARDIAC CATHETERIZATION N/A 7/1/2019    Procedure: Left Heart Cath;  Surgeon: Susannah Rasmussen MD;  Location:  [facility];  Service: Cardiovascular   • COLONOSCOPY     • CORONARY ANGIOPLASTY WITH STENT PLACEMENT      X2   • CORONARY ARTERY BYPASS GRAFT N/A 1/26/2018    Procedure: CORONARY ARTERY BYPASS X 2 WITH INTERNAL MAMMARY ARTERY GRAFT, ENDOSCOPIC VEIN HARVESTING UTILIZING THE LEFT GREATER SAPPHENOUS VEIN  CYSTO BY DR WATERS;  Surgeon: Casey Morales MD;  Location:  [facility];  Service:    • CYSTOSCOPY TRANSURETHRAL RESECTION OF PROSTATE N/A 11/1/2016    Procedure: CYSTOSCOPY TRANSURETHRAL RESECTION OF PROSTATE GREENLIGHT;  Surgeon: Alverto Richards MD;  Location:  [facility];  Service:    • TONSILLECTOMY     • UPPER GASTROINTESTINAL ENDOSCOPY         Family History:   Family History   Problem Relation Age of Onset   • Stroke Mother    • Hypertension Mother    • Osteoarthritis Mother    • Esophageal cancer Father    • Diabetes Father    • Heart disease Father    • Hypertension Father    • Heart attack Father    • Osteoarthritis Father    • Colon polyps Maternal Uncle    • Colon cancer Maternal Uncle    • No Known Problems Maternal Grandmother    • No Known Problems Maternal Grandfather    • Stroke Paternal Grandmother    • Stroke Paternal Grandfather        Social History:   Social History     Socioeconomic History   • Marital status:      Spouse name: N/A   • Number of children: 1   • Years of education: H.S.   • Highest education level: High school graduate   Tobacco Use   • Smoking status: Former     Packs/day: 1.00     Years: 15.00     Pack years: 15.00     Types: Cigarettes     Start date: 1/1/1974     Quit date: 1/1/1994      Years since quittin.3   • Smokeless tobacco: Never   Vaping Use   • Vaping Use: Never used   Substance and Sexual Activity   • Alcohol use: Not Currently     Comment: occas   • Drug use: Never   • Sexual activity: Not Currently     Partners: Female       Medications:   Current Outpatient Medications:   •  amLODIPine (NORVASC) 5 MG tablet, Take 1 tablet by mouth Daily., Disp: 90 tablet, Rfl: 0  •  aspirin 81 MG chewable tablet, Chew 1 tablet Daily., Disp: , Rfl:   •  Cholecalciferol (VITAMIN D3) 50 MCG (2000 UT) tablet, Take 1 tablet by mouth Daily., Disp: , Rfl:   •  finasteride (PROSCAR) 5 MG tablet, Take 1 tablet by mouth Daily., Disp: , Rfl:   •  FOLIC ACID PO, Take 800 mcg by mouth Daily., Disp: , Rfl:   •  glucosamine-chondroitin 500-400 MG capsule capsule, Take 2 capsules by mouth Daily., Disp: , Rfl:   •  isosorbide mononitrate (IMDUR) 30 MG 24 hr tablet, Take 1 tablet by mouth Daily., Disp: 30 tablet, Rfl: 5  •  levothyroxine (SYNTHROID, LEVOTHROID) 100 MCG tablet, , Disp: , Rfl:   •  Cfgtzig-Dccbb-Vtpl-PassF-LBalm (MELATONIN + L-THEANINE PO), Take 1 tablet by mouth Daily As Needed., Disp: , Rfl:   •  metoprolol succinate XL (TOPROL-XL) 25 MG 24 hr tablet, Take 1 tablet by mouth Daily. (Patient taking differently: Take 1 tablet by mouth Daily. 0.5 tablet daily), Disp: 90 tablet, Rfl: 0  •  Multiple Vitamins-Minerals (CENTRUM SILVER 50+MEN PO), Take 1 tablet by mouth Daily., Disp: , Rfl:   •  nitroglycerin (NITROSTAT) 0.4 MG SL tablet, Place 1 tablet under the tongue Every 5 (Five) Minutes As Needed for Chest Pain. Take no more than 3 doses in 15 minutes., Disp: 100 tablet, Rfl: 0  •  omeprazole (priLOSEC) 40 MG capsule, Take 1 capsule by mouth Daily. Indications: Gastroesophageal Reflux Disease, Disp: 30 capsule, Rfl: 2  •  predniSONE (DELTASONE) 1 MG tablet, Take 1 tablet by mouth Daily., Disp: , Rfl:   •  rosuvastatin (CRESTOR) 40 MG tablet, Take 1 tablet by mouth Every Night., Disp: 90 tablet,  "Rfl: 2  •  tamsulosin (FLOMAX) 0.4 MG capsule 24 hr capsule, Take 1 capsule by mouth 2 (Two) Times a Day., Disp: , Rfl:   •  vitamin B-12 (CYANOCOBALAMIN) 1000 MCG tablet, Take 1 tablet by mouth Daily., Disp: , Rfl:   No current facility-administered medications for this visit.    Facility-Administered Medications Ordered in Other Visits:   •  Chlorhexidine Gluconate Cloth 2 % pads 1 application, 1 application, Topical, Q12H PRN, Sd Mathew PA    Allergies:   Allergies   Allergen Reactions   • Hydrocodone-Acetaminophen Anxiety   • Lipitor [Atorvastatin] Anxiety and Myalgia          • Lortab [Hydrocodone-Acetaminophen] Anxiety   • Azithromycin Nausea Only       The following portions of the patient's history were reviewed and updated as appropriate: allergies, current medications, past family history, past medical history, past social history, past surgical history and problem list.        Objective    Objective      Vital Signs:   Vitals:    05/11/23 1147   BP: 140/70   Weight: 77.6 kg (171 lb)   Height: 180.3 cm (71\")       Ortho Exam:  General: no acute distress, comfortable  Vitals reviewed in chart    Musculoskeletal Exam:    SIDE: Bilateral hips    Tenderness: Tender to palpation trochanteric bursa    Range of motion measurements (degrees): 0-100  Painful arc of motion: No  No evidence of septic joint      Results Review:  Imaging Results (Last 24 Hours)     ** No results found for the last 24 hours. **              Assessment / Plan      Assessment/Plan:   Problem List Items Addressed This Visit        Musculoskeletal and Injuries    Trochanteric bursitis of both hips - Primary    Relevant Orders    - Large Joint Arthrocentesis: bilateral greater trochanteric bursa       We will proceed with bilateral trochanteric bursitis cortisone injections today.    I discussed with the patient the potential benefits of performing a therapeutic injection of the bilateral trochanteric bursa as well as potential " risks including but not limited to infection, swelling, pain, bleeding, bruising, nerve/vessel damage, skin color changes, transient elevation in blood glucose levels, and fat atrophy. After informed consent and verifying correct patient, procedure site, and type of procedure, the area was prepped with Hibiclens, ethyl chloride was used to numb the skin. 4 mL lidocaine PF 1% and 40 mg triamcinolone acetonide 40 MG/ML was injected into each site. The patient tolerated the procedure well. There were no complications.       Follow Up: 4 months    05/15/23  15:54 EDT

## 2023-05-15 RX ORDER — TRIAMCINOLONE ACETONIDE 40 MG/ML
40 INJECTION, SUSPENSION INTRA-ARTICULAR; INTRAMUSCULAR
Status: COMPLETED | OUTPATIENT
Start: 2023-05-11 | End: 2023-05-11

## 2023-05-15 RX ORDER — LIDOCAINE HYDROCHLORIDE 10 MG/ML
4 INJECTION, SOLUTION EPIDURAL; INFILTRATION; INTRACAUDAL; PERINEURAL
Status: COMPLETED | OUTPATIENT
Start: 2023-05-11 | End: 2023-05-11

## 2023-07-20 ENCOUNTER — TREATMENT (OUTPATIENT)
Dept: PHYSICAL THERAPY | Facility: CLINIC | Age: 78
End: 2023-07-20
Payer: MEDICARE

## 2023-07-20 DIAGNOSIS — M70.62 TROCHANTERIC BURSITIS OF BOTH HIPS: Primary | ICD-10-CM

## 2023-07-20 DIAGNOSIS — M70.61 TROCHANTERIC BURSITIS OF BOTH HIPS: Primary | ICD-10-CM
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
## 2023-07-24 DIAGNOSIS — M17.0 PRIMARY OSTEOARTHRITIS OF BOTH KNEES: Primary | ICD-10-CM

## 2023-07-27 ENCOUNTER — TREATMENT (OUTPATIENT)
Dept: PHYSICAL THERAPY | Facility: CLINIC | Age: 78
End: 2023-07-27
Payer: MEDICARE

## 2023-07-27 DIAGNOSIS — M70.62 TROCHANTERIC BURSITIS OF BOTH HIPS: Primary | ICD-10-CM

## 2023-07-27 DIAGNOSIS — M70.61 TROCHANTERIC BURSITIS OF BOTH HIPS: Primary | ICD-10-CM

## 2023-07-27 NOTE — PROGRESS NOTES
Physical Therapy Daily Treatment Note  [address], [address]  [place] [address]    Patient: Rodrigo Baum   : 1945  Referring practitioner: Ines Alexander PA-C  Date of Initial Visit: Type: THERAPY  Noted: 2023  Today's Date: 2023  Patient seen for 2 sessions       Visit Diagnoses:    ICD-10-CM ICD-9-CM   1. Trochanteric bursitis of both hips  M70.61 726.5    M70.62        Visit #: 2    Subjective   Hips already are feeling some better    Objective   See Exercise, Manual, and Modality Logs for complete treatment       [employer] Exercises:  Access Code: [email or web address]  URL: [email or web address]  Date: 2023  Prepared by: Jose Morley    Exercises  - Supine Hip Adduction Isometric with Ball  - 2 x daily - 7 x weekly - 3 sets - 10 reps  - Supine Bridge with Resistance Band  - 2 x daily - 7 x weekly - 3 sets - 10 reps  - Hooklying Clamshell with Resistance  - 2 x daily - 7 x weekly - 3 sets - 10 reps  - Supine March  - 2 x daily - 7 x weekly - 3 sets - 10 reps  - Hooklying Single Knee to Chest Stretch  - 2 x daily - 7 x weekly - 3 sets - 10 reps  - Side Stepping with Resistance at Thighs and Counter Support  - 2 x daily - 7 x weekly - 3 sets - 10 reps  - Supine Knees to Chest with Swiss Ball  - 2 x daily - 7 x weekly - 3 sets - 10 reps  - Bridge with Heels on Swiss Ball  - 2 x daily - 7 x weekly - 3 sets - 10 reps  - Hamstring Set with Swiss Ball  - 2 x daily - 7 x weekly - 3 sets - 10 reps    Assessment/Plan  Patient performed well with exercises today; issued blue tband for hep; fatigues with exercises    Treatment considerations for next visit:  Advance as tolerated ; consider 1 x per week to avoid back/back visits; patient only off work on     Timed:   Manual Therapy:         mins  29555;     Therapeutic Exercise:    15     mins  67009;     Neuromuscular Ollie:    15    mins  47805;    Therapeutic Activity:     15     mins  85571;     Gait Training:           mins  15019;      Ultrasound:          mins  05891;    Ionto                                   mins   73492  Self Care                            mins   24110  Canalith Repos         mins   92286    Un-Timed:  Electrical Stimulation:         mins  42051 ( );  Dry Needling          mins self-pay  Traction          mins 34753      Timed Treatment:   45   mins   Total Treatment:     45   mins    MICAH Morley, PT  [place] License: [id number]

## 2023-08-03 ENCOUNTER — TREATMENT (OUTPATIENT)
Dept: PHYSICAL THERAPY | Facility: CLINIC | Age: 78
End: 2023-08-03
Payer: MEDICARE

## 2023-08-03 DIAGNOSIS — M70.61 TROCHANTERIC BURSITIS OF BOTH HIPS: Primary | ICD-10-CM

## 2023-08-03 DIAGNOSIS — M70.62 TROCHANTERIC BURSITIS OF BOTH HIPS: Primary | ICD-10-CM
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
## 2023-08-04 ENCOUNTER — APPOINTMENT (OUTPATIENT)
Dept: GENERAL RADIOLOGY | Facility: HOSPITAL | Age: 78
End: 2023-08-04
Payer: MEDICARE

## 2023-08-04 ENCOUNTER — HOSPITAL ENCOUNTER (EMERGENCY)
Facility: HOSPITAL | Age: 78
Discharge: HOME | End: 2023-08-04
Attending: EMERGENCY MEDICINE
Payer: MEDICARE

## 2023-08-04 VITALS
DIASTOLIC BLOOD PRESSURE: 84 MMHG | RESPIRATION RATE: 18 BRPM | HEART RATE: 67 BPM | HEIGHT: 71 IN | WEIGHT: 162 LBS | SYSTOLIC BLOOD PRESSURE: 147 MMHG | BODY MASS INDEX: 22.68 KG/M2 | OXYGEN SATURATION: 99 % | TEMPERATURE: 97.7 F

## 2023-08-04 DIAGNOSIS — R07.9 NONSPECIFIC CHEST PAIN: Primary | ICD-10-CM

## 2023-08-04 DIAGNOSIS — Z86.79 HISTORY OF CORONARY ARTERY DISEASE: ICD-10-CM

## 2023-08-04 LAB
ALBUMIN SERPL-MCNC: 3.4 G/DL (ref 3.5–5.2)
ALBUMIN/GLOB SERPL: 1.3 G/DL
ALP SERPL-CCNC: 64 U/L (ref 39–117)
ALT SERPL W P-5'-P-CCNC: 17 U/L (ref 1–41)
ANION GAP SERPL CALCULATED.3IONS-SCNC: 11 MMOL/L (ref 5–15)
AST SERPL-CCNC: 25 U/L (ref 1–40)
BASOPHILS # BLD AUTO: 0.06 10*3/MM3 (ref 0–0.2)
BASOPHILS NFR BLD AUTO: 1.2 % (ref 0–1.5)
BILIRUB SERPL-MCNC: 2 MG/DL (ref 0–1.2)
BUN SERPL-MCNC: 21 MG/DL (ref 8–23)
BUN/CREAT SERPL: 20 (ref 7–25)
CALCIUM SPEC-SCNC: 8.7 MG/DL (ref 8.6–10.5)
CHLORIDE SERPL-SCNC: 109 MMOL/L (ref 98–107)
CO2 SERPL-SCNC: 22 MMOL/L (ref 22–29)
CREAT SERPL-MCNC: 1.05 MG/DL (ref 0.76–1.27)
DEPRECATED RDW RBC AUTO: 46.5 FL (ref 37–54)
EGFRCR SERPLBLD CKD-EPI 2021: 73.6 ML/MIN/1.73
EOSINOPHIL # BLD AUTO: 0.53 10*3/MM3 (ref 0–0.4)
EOSINOPHIL NFR BLD AUTO: 10.7 % (ref 0.3–6.2)
ERYTHROCYTE [DISTWIDTH] IN BLOOD BY AUTOMATED COUNT: 13 % (ref 12.3–15.4)
GEN 5 2HR TROPONIN T REFLEX: 12 NG/L
GLOBULIN UR ELPH-MCNC: 2.6 GM/DL
GLUCOSE SERPL-MCNC: 129 MG/DL (ref 65–99)
HCT VFR BLD AUTO: 43.2 % (ref 37.5–51)
HGB BLD-MCNC: 14.3 G/DL (ref 13–17.7)
HOLD SPECIMEN: NORMAL
IMM GRANULOCYTES # BLD AUTO: 0 10*3/MM3 (ref 0–0.05)
IMM GRANULOCYTES NFR BLD AUTO: 0 % (ref 0–0.5)
LIPASE SERPL-CCNC: 29 U/L (ref 13–60)
LYMPHOCYTES # BLD AUTO: 0.94 10*3/MM3 (ref 0.7–3.1)
LYMPHOCYTES NFR BLD AUTO: 18.9 % (ref 19.6–45.3)
MCH RBC QN AUTO: 31.8 PG (ref 26.6–33)
MCHC RBC AUTO-ENTMCNC: 33.1 G/DL (ref 31.5–35.7)
MCV RBC AUTO: 96.2 FL (ref 79–97)
MONOCYTES # BLD AUTO: 0.33 10*3/MM3 (ref 0.1–0.9)
MONOCYTES NFR BLD AUTO: 6.6 % (ref 5–12)
NEUTROPHILS NFR BLD AUTO: 3.11 10*3/MM3 (ref 1.7–7)
NEUTROPHILS NFR BLD AUTO: 62.6 % (ref 42.7–76)
NRBC BLD AUTO-RTO: 0 /100 WBC (ref 0–0.2)
NT-PROBNP SERPL-MCNC: 142.5 PG/ML (ref 0–1800)
PLATELET # BLD AUTO: 182 10*3/MM3 (ref 140–450)
PMV BLD AUTO: 9.6 FL (ref 6–12)
POTASSIUM SERPL-SCNC: 4.3 MMOL/L (ref 3.5–5.2)
PROT SERPL-MCNC: 6 G/DL (ref 6–8.5)
QT INTERVAL: 442 MS
QT INTERVAL: 456 MS
QTC INTERVAL: 437 MS
QTC INTERVAL: 459 MS
RBC # BLD AUTO: 4.49 10*6/MM3 (ref 4.14–5.8)
SODIUM SERPL-SCNC: 142 MMOL/L (ref 136–145)
TROPONIN T DELTA: -1 NG/L
TROPONIN T SERPL HS-MCNC: 13 NG/L
WBC NRBC COR # BLD: 4.97 10*3/MM3 (ref 3.4–10.8)
WHOLE BLOOD HOLD COAG: NORMAL
WHOLE BLOOD HOLD SPECIMEN: NORMAL

## 2023-08-04 PROCEDURE — 36415 COLL VENOUS BLD VENIPUNCTURE: CPT

## 2023-08-04 PROCEDURE — 85025 COMPLETE CBC W/AUTO DIFF WBC: CPT | Performed by: EMERGENCY MEDICINE

## 2023-08-04 PROCEDURE — 83690 ASSAY OF LIPASE: CPT | Performed by: EMERGENCY MEDICINE

## 2023-08-04 PROCEDURE — 84484 ASSAY OF TROPONIN QUANT: CPT | Performed by: EMERGENCY MEDICINE

## 2023-08-04 PROCEDURE — 93005 ELECTROCARDIOGRAM TRACING: CPT

## 2023-08-04 PROCEDURE — 80053 COMPREHEN METABOLIC PANEL: CPT | Performed by: EMERGENCY MEDICINE

## 2023-08-04 PROCEDURE — 83880 ASSAY OF NATRIURETIC PEPTIDE: CPT | Performed by: EMERGENCY MEDICINE

## 2023-08-04 PROCEDURE — 93005 ELECTROCARDIOGRAM TRACING: CPT | Performed by: EMERGENCY MEDICINE

## 2023-08-04 PROCEDURE — 99284 EMERGENCY DEPT VISIT MOD MDM: CPT

## 2023-08-04 PROCEDURE — 71045 X-RAY EXAM CHEST 1 VIEW: CPT

## 2023-08-04 RX ORDER — ASPIRIN 81 MG/1
324 TABLET, CHEWABLE ORAL ONCE
Status: DISCONTINUED | OUTPATIENT
Start: 2023-08-04 | End: 2023-08-04 | Stop reason: HOSPADM

## 2023-08-04 RX ORDER — SODIUM CHLORIDE 0.9 % (FLUSH) 0.9 %
10 SYRINGE (ML) INJECTION AS NEEDED
Status: DISCONTINUED | OUTPATIENT
Start: 2023-08-04 | End: 2023-08-04 | Stop reason: HOSPADM

## 2023-08-04 RX ADMIN — SODIUM CHLORIDE 1000 ML: 9 INJECTION, SOLUTION INTRAVENOUS at 16:47

## 2023-08-04 NOTE — ED PROVIDER NOTES
" EMERGENCY DEPARTMENT ENCOUNTER    Pt Name: Rodrigo Baum  MRN: [id number]  Pt :   1945  Room Number:    Date of encounter:  2023  PCP: Holli Fletcher MD  ED Provider: Jose Ordonez MD    Historian: Patient and son        HPI:  Chief Complaint: Chest discomfort        Context: Rodrigo Baum is a 78 y.o. male who presents to the ED c/o chest discomfort which started this afternoon.  The patient had been outside doing some light work in the heat.  He developed what he described as a \"mild indigestion\" rating it 3 out of 10 on the pain scale.  He felt that this might be indicative of acute coronary syndrome and therefore took nitroglycerin x3.  He took a total of aspirin 324 mg prior to arrival in the emergency department.  Paramedics arrived and transported him to our facility.  They administered a 500 cc lactated ringer bolus.  He is completely asymptomatic on arrival and does not wish to be admitted to our facility for further care and observation.  The patient does report coronary artery stenting.  He follows with Dr. Wagner of cardiology.          PAST MEDICAL HISTORY  Past Medical History:   Diagnosis Date    BPH (benign prostatic hyperplasia)     CAD (coronary artery disease)     Chronic venous stasis     CKD (chronic kidney disease), stage III     Claustrophobia     Diabetes mellitus     GERD (gastroesophageal reflux disease)     HLD (hyperlipidemia)     HTN (hypertension)     Hypothyroidism     OA (osteoarthritis) of ankle/foot     OA (osteoarthritis) of hip     Osteoporosis     RA (rheumatoid arthritis)          PAST SURGICAL HISTORY  Past Surgical History:   Procedure Laterality Date    CARDIAC CATHETERIZATION N/A 2018    Procedure: Left Heart Cath;  Surgeon: Susannah Rasmussen MD;  Location:  [facility];  Service:     CARDIAC CATHETERIZATION N/A 2019    Procedure: Left Heart Cath;  Surgeon: Susannah Rasmussen MD;  Location:  [facility];  " Service: Cardiovascular    COLONOSCOPY      CORONARY ANGIOPLASTY WITH STENT PLACEMENT      X2    CORONARY ARTERY BYPASS GRAFT N/A 2018    Procedure: CORONARY ARTERY BYPASS X 2 WITH INTERNAL MAMMARY ARTERY GRAFT, ENDOSCOPIC VEIN HARVESTING UTILIZING THE LEFT GREATER SAPPHENOUS VEIN  CYSTO BY DR WATERS;  Surgeon: Casey Morales MD;  Location:  [facility];  Service:     CYSTOSCOPY TRANSURETHRAL RESECTION OF PROSTATE N/A 2016    Procedure: CYSTOSCOPY TRANSURETHRAL RESECTION OF PROSTATE GREENLIGHT;  Surgeon: Alverto Richards MD;  Location:  [facility];  Service:     TONSILLECTOMY      UPPER GASTROINTESTINAL ENDOSCOPY           FAMILY HISTORY  Family History   Problem Relation Age of Onset    Stroke Mother     Hypertension Mother     Osteoarthritis Mother     Esophageal cancer Father     Diabetes Father     Heart disease Father     Hypertension Father     Heart attack Father     Osteoarthritis Father     Colon polyps Maternal Uncle     Colon cancer Maternal Uncle     No Known Problems Maternal Grandmother     No Known Problems Maternal Grandfather     Stroke Paternal Grandmother     Stroke Paternal Grandfather          SOCIAL HISTORY  Social History     Socioeconomic History    Marital status:      Spouse name: N/A    Number of children: 1    Years of education: H.S.    Highest education level: High school graduate   Tobacco Use    Smoking status: Former     Packs/day: 1.00     Years: 15.00     Pack years: 15.00     Types: Cigarettes     Start date: 1974     Quit date: 1994     Years since quittin.6    Smokeless tobacco: Never   Vaping Use    Vaping Use: Never used   Substance and Sexual Activity    Alcohol use: Not Currently     Comment: occas    Drug use: Never    Sexual activity: Not Currently     Partners: Female         ALLERGIES  Hydrocodone-acetaminophen, Lipitor [atorvastatin], Lortab [hydrocodone-acetaminophen], and Azithromycin        REVIEW OF SYSTEMS  Review of Systems        All systems reviewed and negative except for those discussed in HPI.       PHYSICAL EXAM    I have reviewed the triage vital signs and nursing notes.    ED Triage Vitals [08/04/23 1527]   Temp Heart Rate Resp BP SpO2   97.7 øF (36.5 øC) 62 18 99/57 97 %      Temp src Heart Rate Source Patient Position BP Location FiO2 (%)   Oral Monitor Lying Right arm --       Physical Exam  GENERAL:   Appears in no acute distress.  He is present with his son and her emergency department.  HENT: Nares patent.  Slightly dry mucous membranes  EYES: No scleral icterus.  CV: Regular rhythm, regular rate.  No murmurs gallops rubs  RESPIRATORY: Normal effort.  No audible wheezes, rales or rhonchi.  Clear to auscultation  ABDOMEN: Soft, nontender to deep palpation  MUSCULOSKELETAL: No deformities.  No reproduction of chest discomfort with palpation.  NEURO: Alert, moves all extremities, follows commands.  SKIN: Warm, dry, no rash visualized.      LAB RESULTS  Recent Results (from the past 24 hour(s))   ECG 12 Lead ED Triage Standing Order; Chest Pain    Collection Time: 08/04/23  3:37 PM   Result Value Ref Range    QT Interval 442 ms    QTC Interval 437 ms   High Sensitivity Troponin T    Collection Time: 08/04/23  3:41 PM    Specimen: Blood   Result Value Ref Range    HS Troponin T 13 <15 ng/L   Comprehensive Metabolic Panel    Collection Time: 08/04/23  3:41 PM    Specimen: Blood   Result Value Ref Range    Glucose 129 (H) 65 - 99 mg/dL    BUN 21 8 - 23 mg/dL    Creatinine 1.05 0.76 - 1.27 mg/dL    Sodium 142 136 - 145 mmol/L    Potassium 4.3 3.5 - 5.2 mmol/L    Chloride 109 (H) 98 - 107 mmol/L    CO2 22.0 22.0 - 29.0 mmol/L    Calcium 8.7 8.6 - 10.5 mg/dL    Total Protein 6.0 6.0 - 8.5 g/dL    Albumin 3.4 (L) 3.5 - 5.2 g/dL    ALT (SGPT) 17 1 - 41 U/L    AST (SGOT) 25 1 - 40 U/L    Alkaline Phosphatase 64 39 - 117 U/L    Total Bilirubin 2.0 (H) 0.0 - 1.2 mg/dL    Globulin 2.6 gm/dL    A/G Ratio 1.3 g/dL    BUN/Creatinine Ratio  20.0 7.0 - 25.0    Anion Gap 11.0 5.0 - 15.0 mmol/L    eGFR 73.6 >60.0 mL/min/1.73   Lipase    Collection Time: 08/04/23  3:41 PM    Specimen: Blood   Result Value Ref Range    Lipase 29 13 - 60 U/L   BNP    Collection Time: 08/04/23  3:41 PM    Specimen: Blood   Result Value Ref Range    proBNP 142.5 0.0 - 1,800.0 pg/mL   Green Top (Gel)    Collection Time: 08/04/23  3:41 PM   Result Value Ref Range    Extra Tube Hold for add-ons.    Lavender Top    Collection Time: 08/04/23  3:41 PM   Result Value Ref Range    Extra Tube hold for add-on    Gold Top - SST    Collection Time: 08/04/23  3:41 PM   Result Value Ref Range    Extra Tube Hold for add-ons.    Gray Top    Collection Time: 08/04/23  3:41 PM   Result Value Ref Range    Extra Tube Hold for add-ons.    Light Blue Top    Collection Time: 08/04/23  3:41 PM   Result Value Ref Range    Extra Tube Hold for add-ons.    CBC Auto Differential    Collection Time: 08/04/23  3:41 PM    Specimen: Blood   Result Value Ref Range    WBC 4.97 3.40 - 10.80 10*3/mm3    RBC 4.49 4.14 - 5.80 10*6/mm3    Hemoglobin 14.3 13.0 - 17.7 g/dL    Hematocrit 43.2 37.5 - 51.0 %    MCV 96.2 79.0 - 97.0 fL    MCH 31.8 26.6 - 33.0 pg    MCHC 33.1 31.5 - 35.7 g/dL    RDW 13.0 12.3 - 15.4 %    RDW-SD 46.5 37.0 - 54.0 fl    MPV 9.6 6.0 - 12.0 fL    Platelets 182 140 - 450 10*3/mm3    Neutrophil % 62.6 42.7 - 76.0 %    Lymphocyte % 18.9 (L) 19.6 - 45.3 %    Monocyte % 6.6 5.0 - 12.0 %    Eosinophil % 10.7 (H) 0.3 - 6.2 %    Basophil % 1.2 0.0 - 1.5 %    Immature Grans % 0.0 0.0 - 0.5 %    Neutrophils, Absolute 3.11 1.70 - 7.00 10*3/mm3    Lymphocytes, Absolute 0.94 0.70 - 3.10 10*3/mm3    Monocytes, Absolute 0.33 0.10 - 0.90 10*3/mm3    Eosinophils, Absolute 0.53 (H) 0.00 - 0.40 10*3/mm3    Basophils, Absolute 0.06 0.00 - 0.20 10*3/mm3    Immature Grans, Absolute 0.00 0.00 - 0.05 10*3/mm3    nRBC 0.0 0.0 - 0.2 /100 WBC   ECG 12 Lead ED Triage Standing Order; Chest Pain    Collection Time: 08/04/23   6:00 PM   Result Value Ref Range    QT Interval 456 ms    QTC Interval 459 ms   High Sensitivity Troponin T 2Hr    Collection Time: 08/04/23  6:07 PM    Specimen: Blood   Result Value Ref Range    HS Troponin T 12 <15 ng/L    Troponin T Delta -1 >=-4 - <+4 ng/L       If labs were ordered, I independently reviewed the results and considered them in treating the patient.        RADIOLOGY  XR Chest 1 View    Result Date: 8/4/2023  XR CHEST 1 VW Date of Exam: 8/4/2023 3:31 PM EDT Indication: Chest Pain Triage Protocol Comparison: Chest radiograph dated 7/19/2020 Findings: The cardiomediastinal silhouette is within normal limits. Pulmonary vascularity appears normal. There are postsurgical changes of prior CABG. There is no acute airspace consolidation or pleural effusion. There is no evidence of pneumothorax. There are degenerative changes of the thoracic spine.     Impression: 1. No acute cardiopulmonary abnormality. There are postsurgical changes of prior CABG. Electronically Signed: Jim Cage  8/4/2023 4:00 PM EDT  Workstation ID: [id number]     I ordered and independently reviewed the above noted radiographic studies.      I viewed images of chest x-ray which showed no active disease per my independent interpretation.    See radiologist's dictation for official interpretation.        PROCEDURES    Procedures    ECG 12 Lead ED Triage Standing Order; Chest Pain   Preliminary Result   Test Reason : ED Triage Standing Order~   Blood Pressure :   */*   mmHG   Vent. Rate :  61 BPM     Atrial Rate :  61 BPM      P-R Int : 132 ms          QRS Dur :  92 ms       QT Int : 456 ms       P-R-T Axes :  60  14  32 degrees      QTc Int : 459 ms      Normal sinus rhythm   Cannot rule out Anterior infarct (cited on or before 19-JUL-2020)   Abnormal ECG   When compared with ECG of 04-AUG-2023 15:37,   No significant change was found      Referred By: HAROON           Confirmed By:       ECG 12 Lead ED Triage Standing Order;  Chest Pain   Final Result   Test Reason : ED Triage Standing Order~   Blood Pressure :   */*   mmHG   Vent. Rate :  59 BPM     Atrial Rate :  59 BPM      P-R Int : 120 ms          QRS Dur :  92 ms       QT Int : 442 ms       P-R-T Axes :  57  27  37 degrees      QTc Int : 437 ms      Sinus bradycardia   Cannot rule out Anterior infarct , age undetermined   Abnormal ECG   When compared with ECG of 19-JUL-2020 18:15,   No significant change was found   Confirmed by ALLEN PATIÑO MD ([id number]) on 8/4/2023 4:13:43 PM      Referred By: EDMD           Confirmed By: ALLEN PATIÑO MD          MEDICATIONS GIVEN IN ER    Medications   sodium chloride 0.9 % flush 10 mL (has no administration in time range)   aspirin chewable tablet 324 mg (324 mg Oral Not Given 8/4/23 1531)   sodium chloride 0.9 % bolus 1,000 mL (0 mL Intravenous Stopped 8/4/23 1914)         MEDICAL DECISION MAKING, PROGRESS, and CONSULTS    All labs have been independently reviewed by me.  All radiology studies have been reviewed by me and the radiologist dictating the report.  All EKG's have been independently viewed and interpreted by me/my attending physician.      Discussion below represents my analysis of pertinent findings related to patient's condition, differential diagnosis, treatment plan and final disposition.      Differential diagnosis:    Acute coronary syndrome versus reflux versus dehydration versus chest wall strain, etc.      Additional sources:    - Discussed/ obtained information from independent historians: Patient's son provided some information.  EMS provided significant information at bedside.    - External (non-ED) record review: I reviewed multiple records to include  7/1/2019 heart cath    Conclusion    FINAL IMPRESSION:  Essentially single-vessel coronary artery disease with chronic total occlusion of the proximal LAD served by patent grafts.  Nonobstructive disease of the dominant RCA and the nondominant left second was coronary  artery.  Patent 2/2 grafts (LIMA to the LAD and vein graft to the diagonal).  Spasm of the left internal mammary artery as well as apical LAD was noted which improved with nitroglycerin.  Normal left ventricular systolic function, estimated EF 65%.    - Chronic or social conditions impacting care: Former heavy smoker    - Shared decision making: Patient is agreeable to letting us draw a second set of cardiac enzymes however is not agreeable to being admitted to the hospital for further care and observation.      Orders placed during this visit:  Orders Placed This Encounter   Procedures    XR Chest 1 View    [place] Draw    High Sensitivity Troponin T    Comprehensive Metabolic Panel    Lipase    BNP    CBC Auto Differential    High Sensitivity Troponin T 2Hr    Ambulatory Referral to [facility] [place] [facility]    NPO Diet NPO Type: Strict NPO    Undress & Gown    Continuous Pulse Oximetry    Oxygen Therapy- Nasal Cannula; Titrate 1-6 LPM Per SpO2; 90 - 95%    ECG 12 Lead ED Triage Standing Order; Chest Pain    ECG 12 Lead ED Triage Standing Order; Chest Pain    Insert Peripheral IV    CBC & Differential    Green Top (Gel)    Lavender Top    Gold Top - SST    Gray Top    Light Blue Top         Additional orders considered but not ordered:  CTA chest.    ED Course:    Consultants: Patient's cardiologist Dr. Wagner.    ED Course as of 08/04/23 1953   Fri Aug 04, 2023   1634 HEART Pathway for Early Discharge in Acute Chest Pain - MDCalc  Calculated on Aug 04 2023 4:34 PM  5 points -> HEART Pathway Score  High risk -> 12-65% 30-day MACE Admit to hospital or observation. Further testing indicated. [MS]   1640 I spoke with Dr. Wagner after evaluating the patient.  He does not believe that a heart catheterization is indicated at this time.  The patient is unwilling to be admitted overnight for observation and further testing.  He states that if he is going to receive a heart catheterization that he would stay  but otherwise he will not stay.  He has the right to refuse admission which I have recommended to him.  His blood pressures are on the borderline side but he is also just had 3 sublingual nitroglycerin and tells me that he feels quite dehydrated from being out in the warm weather.  I ordered a liter of normal saline to add to the lactated Ringer's 500 mL bolus that he received by paramedics. [MS]      ED Course User Index  [MS] Jose Ordonez MD              Shared Decision Making:  After my consideration of clinical presentation and any laboratory/radiology studies obtained, I discussed the findings with the patient/patient representative who is in agreement with the treatment plan and the final disposition.   Risks and benefits of discharge and/or observation/admission were discussed.       AS OF 19:53 EDT VITALS:    BP - 98/60  HR - 67  TEMP - 97.7 øF (36.5 øC) (Oral)  O2 SATS - 96%                  DIAGNOSIS  Final diagnoses:   Nonspecific chest pain   History of coronary artery disease         DISPOSITION  DISCHARGE    Patient discharged in stable condition.    Reviewed implications of results, diagnosis, meds, responsibility to follow up, warning signs and symptoms of possible worsening, potential complications and reasons to return to ER.    Patient/Family voiced understanding of above instructions.    Discussed plan for discharge, as there is no emergent indication for admission.  Pt/family is agreeable and understands need for follow up and possible repeat testing.  Pt/family is aware that discharge does not mean that nothing is wrong but that it indicates no emergency is currently present that requires admission and they must continue care with follow-up as given below or with a physician of their choice.     FOLLOW-UP  [facility] CARDIOLOGY  [address]  [address]  [facility] [address] [phone]        Holli Fletcher MD  [address]  [address]  [facility]  [address]  [phone]          [facility] EMERGENCY DEPARTMENT  [address]  [facility] [address] [phone]    IF YOU HAVE ANY CONCERN OF WORSENING CONDITION    Tal Wagner MD  [address]  [address]  [place] [id number]  [phone]               Medication List        Changed      metoprolol succinate XL 25 MG 24 hr tablet  Commonly known as: TOPROL-XL  Take 1 tablet by mouth Daily.  What changed: additional instructions                  Please note that portions of this document were completed with voice recognition software.        Jose Ordonez MD  08/04/23 5228

## 2023-08-04 NOTE — DISCHARGE INSTRUCTIONS
I have referred you to the heart valve clinic.  If they do not call you by Monday afternoon please call them.    Utilize nitroglycerin for discomfort as you have been doing.  Return if you have any concern or worsening condition.    Please review the medications you are supposed to be taking according to prior physician recommendations. I have not changed your home medications during this visit. If your discharge instructions indicate that I have changed your home medications, this is not the case, and you should disregard. If you have any questions about the medication you should be taking at home, please call your physician.

## 2023-08-10 ENCOUNTER — CLINICAL SUPPORT (OUTPATIENT)
Dept: ORTHOPEDIC SURGERY | Facility: CLINIC | Age: 78
End: 2023-08-10
Payer: MEDICARE

## 2023-08-10 ENCOUNTER — TREATMENT (OUTPATIENT)
Dept: PHYSICAL THERAPY | Facility: CLINIC | Age: 78
End: 2023-08-10
Payer: MEDICARE

## 2023-08-10 DIAGNOSIS — M17.0 PRIMARY OSTEOARTHRITIS OF BOTH KNEES: Primary | ICD-10-CM

## 2023-08-10 DIAGNOSIS — M70.62 TROCHANTERIC BURSITIS OF BOTH HIPS: Primary | ICD-10-CM

## 2023-08-10 DIAGNOSIS — M70.61 TROCHANTERIC BURSITIS OF BOTH HIPS: Primary | ICD-10-CM

## 2023-08-10 NOTE — PROGRESS NOTES
Procedure   - Large Joint Arthrocentesis: bilateral knee on 8/10/2023 11:18 AM  Indications: pain  Details: 21 G needle, anterolateral approach  Medications (Right): 30 mg Hyaluronan 30 MG/2ML  Medications (Left): 30 mg Hyaluronan 30 MG/2ML  Outcome: tolerated well, no immediate complications  Procedure, treatment alternatives, risks and benefits explained, specific risks discussed. Consent was given by the patient. Immediately prior to procedure a time out was called to verify the correct patient, procedure, equipment, support staff and site/side marked as required. Patient was prepped and draped in the usual sterile fashion.        Here for 1/3 Orthovisc injection into bilateral knees.    Procedure Note:  I discussed with the patient the potential benefits of performing a therapeutic injection of the bilateral knees as well as potential risks including but not limited to infection, swelling, pain, bleeding, bruising, nerve/vessel damage, pseudoseptic reaction, and worsening joint pain. After informed consent and verifying correct patient, procedure site, and type of procedure, the area was prepped with alcohol, ethyl chloride was used to numb the skin. Via the anterolateral approach, the viscosupplementation syringe contents were injected into each knee. The patient tolerated the procedure well. There were no complications. A sterile dressing was placed over the injection site.    Return in 1 week.

## 2023-08-10 NOTE — PROGRESS NOTES
Physical Therapy Daily Treatment Note  [address] [facility], [address]  [place] [address]    Patient: Rodrigo Baum   : 1945  Referring practitioner: Ines Alexander PA-C  Date of Initial Visit: Type: THERAPY  Noted: 2023  Today's Date: 8/10/2023  Patient seen for 4 sessions       Visit Diagnoses:    ICD-10-CM ICD-9-CM   1. Trochanteric bursitis of both hips  M70.61 726.5    M70.62        Visit #: 4    Subjective   Back feels better, still a little sore; had to go to ER last Friday for chest pains and dehydration    Objective   See Exercise, Manual, and Modality Logs for complete treatment     Medbridge Exercises:  Pain with rotation of back  Added: LTR , SKC, HS stx with belt      Medbridge Exercises:  Access Code: [email or web address]  URL: [email or web address]  Date: 2023  Prepared by: Jose Morley     Exercises  - Supine Hip Adduction Isometric with Ball  - 2 x daily - 7 x weekly - 3 sets - 10 reps  - Supine Bridge with Resistance Band  - 2 x daily - 7 x weekly - 3 sets - 10 reps  - Hooklying Clamshell with Resistance  - 2 x daily - 7 x weekly - 3 sets - 10 reps  - Supine March  - 2 x daily - 7 x weekly - 3 sets - 10 reps  - Hooklying Single Knee to Chest Stretch  - 2 x daily - 7 x weekly - 3 sets - 10 reps  - Side Stepping with Resistance at Thighs and Counter Support  - 2 x daily - 7 x weekly - 3 sets - 10 reps  - Supine Knees to Chest with Swiss Ball  - 2 x daily - 7 x weekly - 3 sets - 10 reps  - Bridge with Heels on Swiss Ball  - 2 x daily - 7 x weekly - 3 sets - 10 reps  - Hamstring Set with Swiss Ball  - 2 x daily - 7 x weekly - 3 sets - 10 reps       Assessment/Plan  Patient making good progress with hip mobility and strength; continue PT towards stated goals     Treatment considerations for next visit:  Advance as tolerated     Timed:   Manual Therapy:         mins  44382;     Therapeutic Exercise:    25     mins  96010;     Neuromuscular Ollie:    20    mins  39514;    Therapeutic  Activity:     15     mins  53828;     Gait Training:           mins  52697;     Ultrasound:          mins  50610;    Ionto                                   mins   06030  Self Care                            mins   54182  Canalith Repos         mins   05661    Un-Timed:  Electrical Stimulation:         mins  54238 ( );  Dry Needling          mins self-pay  Traction          mins 38081      Timed Treatment:   60   mins   Total Treatment:     60   mins    MICAH Morley, PT  [place] License: [id number]

## 2023-08-17 ENCOUNTER — CLINICAL SUPPORT (OUTPATIENT)
Dept: ORTHOPEDIC SURGERY | Facility: CLINIC | Age: 78
End: 2023-08-17
Payer: MEDICARE

## 2023-08-17 ENCOUNTER — TREATMENT (OUTPATIENT)
Dept: PHYSICAL THERAPY | Facility: CLINIC | Age: 78
End: 2023-08-17
Payer: MEDICARE

## 2023-08-17 DIAGNOSIS — M70.62 TROCHANTERIC BURSITIS OF BOTH HIPS: Primary | ICD-10-CM

## 2023-08-17 DIAGNOSIS — M70.61 TROCHANTERIC BURSITIS OF BOTH HIPS: Primary | ICD-10-CM

## 2023-08-17 DIAGNOSIS — M17.0 PRIMARY OSTEOARTHRITIS OF BOTH KNEES: Primary | ICD-10-CM

## 2023-08-17 NOTE — PROGRESS NOTES
Procedure   - Large Joint Arthrocentesis: bilateral knee on 8/17/2023 10:52 AM  Indications: pain  Details: 21 G needle, anterolateral approach  Medications (Right): 30 mg Hyaluronan 30 MG/2ML  Medications (Left): 30 mg Hyaluronan 30 MG/2ML  Outcome: tolerated well, no immediate complications  Procedure, treatment alternatives, risks and benefits explained, specific risks discussed. Consent was given by the patient. Immediately prior to procedure a time out was called to verify the correct patient, procedure, equipment, support staff and site/side marked as required. Patient was prepped and draped in the usual sterile fashion.        Here for 2/3 Orthovisc injection into bilateral knees.    Procedure Note:  I discussed with the patient the potential benefits of performing a therapeutic injection of the bilateral knees as well as potential risks including but not limited to infection, swelling, pain, bleeding, bruising, nerve/vessel damage, pseudoseptic reaction, and worsening joint pain. After informed consent and verifying correct patient, procedure site, and type of procedure, the area was prepped with alcohol, ethyl chloride was used to numb the skin. Via the anterolateral approach, the viscosupplementation syringe contents were injected into each knee. The patient tolerated the procedure well. There were no complications. A sterile dressing was placed over the injection site.    Return in 1 week.

## 2023-08-17 NOTE — PROGRESS NOTES
Physical Therapy Daily Treatment Note  [address] [facility], [address]  [place] [address]    Patient: Rodrigo Baum   : 1945  Referring practitioner: Ines Alexander PA-C  Date of Initial Visit: Type: THERAPY  Noted: 2023  Today's Date: 2023  Patient seen for 5 sessions       Visit Diagnoses:    ICD-10-CM ICD-9-CM   1. Trochanteric bursitis of both hips  M70.61 726.5    M70.62        Visit #: 5    Subjective   Low back is feeling much better, hips still sore but getting better    Objective   See Exercise, Manual, and Modality Logs for complete treatment    Improved hip and lumbar mobility      Medbridge Exercises:  Added: LTR , SKC, HS stx with belt      Medbridge Exercises:  Access Code: [email or web address]  URL: [email or web address]  Date: 2023  Prepared by: Jose Morley     Exercises  - Supine Hip Adduction Isometric with Ball  - 2 x daily - 7 x weekly - 3 sets - 10 reps  - Supine Bridge with Resistance Band  - 2 x daily - 7 x weekly - 3 sets - 10 reps  - Hooklying Clamshell with Resistance  - 2 x daily - 7 x weekly - 3 sets - 10 reps  - Supine March  - 2 x daily - 7 x weekly - 3 sets - 10 reps  - Hooklying Single Knee to Chest Stretch  - 2 x daily - 7 x weekly - 3 sets - 10 reps  - Side Stepping with Resistance at Thighs and Counter Support  - 2 x daily - 7 x weekly - 3 sets - 10 reps  - Supine Knees to Chest with Swiss Ball  - 2 x daily - 7 x weekly - 3 sets - 10 reps  - Bridge with Heels on Swiss Ball  - 2 x daily - 7 x weekly - 3 sets - 10 reps  - Hamstring Set with Swiss Ball  - 2 x daily - 7 x weekly - 3 sets - 10 reps          Assessment/Plan  Patient needs continued skilled Physical Therapy services for decreasing pain and improving mobility, strength, balance and endurance in order to return to work and activities of daily living without pain or dysfunction    Treatment considerations for next visit:  Advance mobility/ strength as tolerated bilateral hips     Timed:   Manual  Therapy:         mins  07907;     Therapeutic Exercise:    15     mins  79037;     Neuromuscular Ollie:    15    mins  51499;    Therapeutic Activity:     15     mins  22180;     Gait Training:           mins  69961;     Ultrasound:          mins  92056;    Ionto                                   mins   25826  Self Care                            mins   43630  Canalith Repos         mins   54483    Un-Timed:  Electrical Stimulation:    15     mins  58007 ( );  Dry Needling          mins self-pay  Traction          mins 02742      Timed Treatment:   45   mins   Total Treatment:     60   mins    MICAH Morley, PT  [place] License: [id number]

## 2023-08-22 ENCOUNTER — OFFICE VISIT (OUTPATIENT)
Dept: CARDIOLOGY | Facility: HOSPITAL | Age: 78
End: 2023-08-22
Payer: MEDICARE

## 2023-08-22 VITALS
BODY MASS INDEX: 24.22 KG/M2 | TEMPERATURE: 98 F | HEIGHT: 71 IN | HEART RATE: 62 BPM | RESPIRATION RATE: 16 BRPM | SYSTOLIC BLOOD PRESSURE: 127 MMHG | DIASTOLIC BLOOD PRESSURE: 69 MMHG | WEIGHT: 173 LBS | OXYGEN SATURATION: 97 %

## 2023-08-22 DIAGNOSIS — E78.2 MIXED HYPERLIPIDEMIA: ICD-10-CM

## 2023-08-22 DIAGNOSIS — I10 PRIMARY HYPERTENSION: ICD-10-CM

## 2023-08-22 DIAGNOSIS — I25.10 CORONARY ARTERY DISEASE INVOLVING NATIVE CORONARY ARTERY OF NATIVE HEART WITHOUT ANGINA PECTORIS: Primary | ICD-10-CM

## 2023-08-22 NOTE — PROGRESS NOTES
Chief Complaint  Follow-up (Chest pain )    Subjective    History of Present Illness {CC  Problem List  Visit  Diagnosis   Encounters  Notes  Medications  Labs  Result Review Imaging  Media :23}   78 year old male presents to the office today at the request of [facility] ED for ongoing evaluation of his chest pain.   He presented to [facility] ED on 8/4/23 with complaints of chest pain rating it 3/10. He reports that he has had no further chest pain and has completed exertional activities. Patient believes that he was dehydrated on day of presentation to ED. Presents with home bp and hr log which shows bp 104//60. Patient reports that his norvasc was recently cut down from 5 to 2.5mg by Dr Fletcher    CAD:  11/15/1993: PTCA and repeat PTCA 1 week following of proximal  LAD, per Dr. Lovelace with normal circumflex and RCA noted at that time.   April 2008: Overlapping 3.0 x 24 mm. And 3.0 x 16.0 mm Taxus to LAD and 2.5 x 8.0 PTCA to proximal first OM, inability to pass stent.  EF greater than 60.  5/16 : Stress echocardiogram, which was within normal limits. EF greater than 60%. Normal hemodynamic response with exercise.   1/18 CABG X2 Dr Morales periop hematuria/syed issues  7/1/2019 LHC per [facility], patent LIMA to LAD, SVG to diagonal, spasm of the LIMA as well as apical LAD noted which improved with nitro, normal EF  7/19/2020 [facility] ED visit CP, troponin normal x2, admission was recommended however he left AMA  Dyslipidemia:  November 2009:  Total cholesterol 163, triglycerides 169, HDL 55, LDL 81.  02/30/2012:  Total cholesterol 242, HDL 51, triglycerides 192, , off statin therapy.  9/19 89/124/48/16  Nicotine addiction cessation 15 years prior.  HTN, presumed essential.  1/17 AAA screen wnl  Hypothyroidism on replacement therapy.   Exogenous obesity.  Arthritis data deficient, on steroid therapy greater than 5 years.   GERD.   2015 Right jaw swelling, evaluated for potential mandibular infection per Dr. Smith.   Hip  "and foot pain, followed per Dr Espinoza  Prostatism- 2017 3 rounds abx  Objective     Vital Signs:   Vitals:    08/22/23 1021 08/22/23 1023 08/22/23 1024   BP: 115/68 122/73 127/69   BP Location: Right arm Left arm Left arm   Patient Position: Sitting Standing Sitting   Cuff Size: Adult Adult Adult   Pulse: 66 73 62   Resp:   16   Temp:   98 øF (36.7 øC)   TempSrc:   Temporal   SpO2: 94% 96% 97%   Weight:   78.5 kg (173 lb)   Height:   180.3 cm (71\")     Body mass index is 24.13 kg/mý.  Physical Exam  Vitals and nursing note reviewed.   Constitutional:       Appearance: Normal appearance.   HENT:      Head: Normocephalic.   Eyes:      Pupils: Pupils are equal, round, and reactive to light.   Cardiovascular:      Rate and Rhythm: Normal rate and regular rhythm.      Pulses: Normal pulses.      Heart sounds: Normal heart sounds. No murmur heard.  Pulmonary:      Effort: Pulmonary effort is normal.      Breath sounds: Normal breath sounds.   Abdominal:      General: Bowel sounds are normal.      Palpations: Abdomen is soft.   Musculoskeletal:         General: Normal range of motion.      Cervical back: Normal range of motion.      Right lower leg: No edema.      Left lower leg: No edema.   Skin:     General: Skin is warm and dry.      Capillary Refill: Capillary refill takes less than 2 seconds.   Neurological:      Mental Status: He is alert and oriented to person, place, and time.   Psychiatric:         Mood and Affect: Mood normal.         Thought Content: Thought content normal.            Result Review  Data Reviewed:{ Labs  Result Review  Imaging  Med Tab  Media :23}   ECG 12 Lead ED Triage Standing Order; Chest Pain (08/04/2023 15:37)  ECG 12 Lead ED Triage Standing Order; Chest Pain (08/04/2023 18:00)    High Sensitivity Troponin T 2Hr (08/04/2023 18:07)  BNP (08/04/2023 15:41)  Lipase (08/04/2023 15:41)  Comprehensive Metabolic Panel (08/04/2023 15:41)  CBC & Differential (08/04/2023 15:41)  High " Sensitivity Troponin T (08/04/2023 15:41)         Assessment and Plan {CC Problem List  Visit Diagnosis  ROS  Review (Popup)  Health Maintenance  Quality  BestPractice  Medications  SmartSets  SnapShot Encounters  Media :23}   1. Coronary artery disease involving native coronary artery of native heart without angina pectoris  chest pain was atypical  Normal troponins and no EKG changes  After discussion with patient, will defer cardiac testing at this time  Will schedule urgent cardiac testing if chest pain returns   2. Primary hypertension  Stable on norvasc, toprol    3. Mixed hyperlipidemia  Stable on crestor         Follow Up {Instructions Charge Capture  Follow-up Communications :23}   Return if symptoms worsen or fail to improve.    Patient was given instructions and counseling regarding his condition or for health maintenance advice. Please see specific information pulled into the AVS if appropriate.  Patient was instructed to call the [facility] with any questions, concerns, or worsening symptoms.

## 2023-08-24 ENCOUNTER — TREATMENT (OUTPATIENT)
Dept: PHYSICAL THERAPY | Facility: CLINIC | Age: 78
End: 2023-08-24
Payer: MEDICARE

## 2023-08-24 ENCOUNTER — CLINICAL SUPPORT (OUTPATIENT)
Dept: ORTHOPEDIC SURGERY | Facility: CLINIC | Age: 78
End: 2023-08-24
Payer: MEDICARE

## 2023-08-24 DIAGNOSIS — M70.61 TROCHANTERIC BURSITIS OF BOTH HIPS: Primary | ICD-10-CM

## 2023-08-24 DIAGNOSIS — M70.62 TROCHANTERIC BURSITIS OF BOTH HIPS: Primary | ICD-10-CM

## 2023-08-24 DIAGNOSIS — M17.0 PRIMARY OSTEOARTHRITIS OF BOTH KNEES: Primary | ICD-10-CM

## 2023-08-24 NOTE — PROGRESS NOTES
Procedure   - Large Joint Arthrocentesis: bilateral knee on 8/24/2023 11:12 AM  Indications: pain  Details: 21 G needle, anterolateral approach  Medications (Right): 30 mg Hyaluronan 30 MG/2ML  Medications (Left): 30 mg Hyaluronan 30 MG/2ML  Outcome: tolerated well, no immediate complications  Procedure, treatment alternatives, risks and benefits explained, specific risks discussed. Consent was given by the patient. Immediately prior to procedure a time out was called to verify the correct patient, procedure, equipment, support staff and site/side marked as required. Patient was prepped and draped in the usual sterile fashion.        Procedure Note:  I discussed with the patient the potential benefits of performing a therapeutic injection of the bilateral knees as well as potential risks including but not limited to infection, swelling, pain, bleeding, bruising, nerve/vessel damage, pseudoseptic reaction, and worsening joint pain. After informed consent and verifying correct patient, procedure site, and type of procedure, the area was prepped with alcohol, ethyl chloride was used to numb the skin. Via the anterolateral approach, the viscosupplementation syringe contents were injected into each knee. The patient tolerated the procedure well. There were no complications. A sterile dressing was placed over the injection site.    He will plan to return in 6 months for repeat series.

## 2023-08-24 NOTE — PROGRESS NOTES
Physical Therapy Daily Treatment Note  [address] [facility], [address]  [place] [address]    Patient: Rodrigo Baum   : 1945  Referring practitioner: Ines Alexander PA-C  Date of Initial Visit: Type: THERAPY  Noted: 2023  Today's Date: 2023  Patient seen for 6 sessions       Visit Diagnoses:    ICD-10-CM ICD-9-CM   1. Trochanteric bursitis of both hips  M70.61 726.5    M70.62        Visit #: 6    Subjective   Back is feeling back to normal, hips still sore but improving; estim helped pain last time    Objective   See Exercise, Manual, and Modality Logs for complete treatment     Medbridge Exercises: LTR , SKC, HS stx with belt      Medbridge Exercises:  Access Code: [email or web address]  URL: [email or web address]  Date: 2023  Prepared by: Jose Morley     Exercises  - Supine Hip Adduction Isometric with Ball  - 2 x daily - 7 x weekly - 3 sets - 10 reps  - Supine Bridge with Resistance Band  - 2 x daily - 7 x weekly - 3 sets - 10 reps  - Hooklying Clamshell with Resistance  - 2 x daily - 7 x weekly - 3 sets - 10 reps  - Supine March  - 2 x daily - 7 x weekly - 3 sets - 10 reps  - Hooklying Single Knee to Chest Stretch  - 2 x daily - 7 x weekly - 3 sets - 10 reps  - Side Stepping with Resistance at Thighs and Counter Support  - 2 x daily - 7 x weekly - 3 sets - 10 reps  - Supine Knees to Chest with Swiss Ball  - 2 x daily - 7 x weekly - 3 sets - 10 reps  - Bridge with Heels on Swiss Ball  - 2 x daily - 7 x weekly - 3 sets - 10 reps  - Hamstring Set with Swiss Ball  - 2 x daily - 7 x weekly - 3 sets - 10 reps          Assessment/Plan  Patient needs continued skilled Physical Therapy services for decreasing pain and improving mobility, strength, balance and endurance in order to return to work and activities of daily living without pain or dysfunction    Treatment considerations for next visit:  Advance as tolerated     Timed:   Manual Therapy:         mins  87241;     Therapeutic Exercise:     15     mins  46214;     Neuromuscular Ollie:    15    mins  33758;    Therapeutic Activity:     10     mins  19235;     Gait Training:           mins  20464;     Ultrasound:          mins  13373;    Ionto                                   mins   07355  Self Care                            mins   50483  Canalith Repos         mins   84810    Un-Timed:  Electrical Stimulation:         mins  87699 ( );  Dry Needling          mins self-pay  Traction          mins 03453      Timed Treatment:   40   mins   Total Treatment:     40   mins    MICAH Morley, PT  [place] License: [id number]

## 2023-09-14 ENCOUNTER — OFFICE VISIT (OUTPATIENT)
Dept: ORTHOPEDIC SURGERY | Facility: CLINIC | Age: 78
End: 2023-09-14
Payer: MEDICARE

## 2023-09-14 VITALS
SYSTOLIC BLOOD PRESSURE: 114 MMHG | BODY MASS INDEX: 24.22 KG/M2 | HEIGHT: 71 IN | DIASTOLIC BLOOD PRESSURE: 84 MMHG | WEIGHT: 173 LBS

## 2023-09-14 DIAGNOSIS — M70.61 TROCHANTERIC BURSITIS OF BOTH HIPS: Primary | ICD-10-CM

## 2023-09-14 DIAGNOSIS — M70.62 TROCHANTERIC BURSITIS OF BOTH HIPS: Primary | ICD-10-CM

## 2023-09-14 PROCEDURE — 20610 DRAIN/INJ JOINT/BURSA W/O US: CPT

## 2023-09-14 PROCEDURE — 3079F DIAST BP 80-89 MM HG: CPT

## 2023-09-14 PROCEDURE — 3074F SYST BP LT 130 MM HG: CPT

## 2023-09-14 RX ADMIN — TRIAMCINOLONE ACETONIDE 40 MG: 40 INJECTION, SUSPENSION INTRA-ARTICULAR; INTRAMUSCULAR at 11:40

## 2023-09-14 RX ADMIN — LIDOCAINE HYDROCHLORIDE 4 ML: 10 INJECTION, SOLUTION EPIDURAL; INFILTRATION; INTRACAUDAL; PERINEURAL at 11:40

## 2023-09-14 NOTE — PROGRESS NOTES
[facility] Office Follow Up       Office Follow Up Visit       Patient Name: Rodrigo Baum    Chief Complaint:   Chief Complaint   Patient presents with    Follow-up     2 month f/u - Trochanteric bursitis of both hips       Referring Physician: No ref. provider found    History of Present Illness:   Rodrigo Baum returns to clinic today for bilateral hip pain.  Ongoing for the last several years.  He has received corticosteroid injections into greater trochanteric bursa in the past.  Last injections on 5/11/2023.  He reports relief of symptoms with injections.  Pain has gradually returned.  He would like repeat injections today.  He starts physical therapy for the hips tomorrow.      Subjective     Review of Systems   Constitutional: Negative.  Negative for chills, fatigue and fever.   HENT: Negative.  Negative for congestion and dental problem.    Eyes: Negative.  Negative for blurred vision.   Respiratory: Negative.  Negative for shortness of breath.    Cardiovascular: Negative.  Negative for leg swelling.   Gastrointestinal: Negative.  Negative for abdominal pain.   Endocrine: Negative.  Negative for polyuria.   Genitourinary: Negative.  Negative for difficulty urinating.   Musculoskeletal:  Positive for arthralgias.   Skin: Negative.    Allergic/Immunologic: Negative.    Neurological: Negative.    Hematological: Negative.  Negative for adenopathy.   Psychiatric/Behavioral: Negative.  Negative for behavioral problems.       I have reviewed and updated the following portions of the patient's history and review of systems: allergies, current medications, past family history, past medical history, past social history, past surgical history and problem list.    Medications:   Current Outpatient Medications:     amLODIPine (NORVASC) 5 MG tablet, Take 1 tablet by mouth Daily. (Patient taking differently: Take 0.5 tablets by mouth  Daily.), Disp: 90 tablet, Rfl: 0    aspirin 81 MG chewable tablet, Chew 1 tablet Daily., Disp: , Rfl:     Cholecalciferol (VITAMIN D3) 50 MCG (2000 UT) tablet, Take 1 tablet by mouth Daily., Disp: , Rfl:     finasteride (PROSCAR) 5 MG tablet, Take 1 tablet by mouth Daily., Disp: , Rfl:     FOLIC ACID PO, Take 800 mcg by mouth Daily., Disp: , Rfl:     glucosamine-chondroitin 500-400 MG capsule capsule, Take 2 capsules by mouth Daily., Disp: , Rfl:     isosorbide mononitrate (IMDUR) 30 MG 24 hr tablet, Take 1 tablet by mouth Daily., Disp: 30 tablet, Rfl: 5    levothyroxine (SYNTHROID, LEVOTHROID) 112 MCG tablet, Take 1 tablet by mouth Daily., Disp: , Rfl:     Dvndqzu-Usyjd-Adux-PassF-LBalm (MELATONIN + L-THEANINE PO), Take 1 tablet by mouth Daily As Needed., Disp: , Rfl:     metoprolol succinate XL (TOPROL-XL) 25 MG 24 hr tablet, Take 1 tablet by mouth Daily., Disp: 90 tablet, Rfl: 0    Multiple Vitamins-Minerals (CENTRUM SILVER 50+MEN PO), Take 1 tablet by mouth Daily., Disp: , Rfl:     nitroglycerin (NITROSTAT) 0.4 MG SL tablet, Place 1 tablet under the tongue Every 5 (Five) Minutes As Needed for Chest Pain. Take no more than 3 doses in 15 minutes., Disp: 100 tablet, Rfl: 0    omeprazole (priLOSEC) 40 MG capsule, Take 1 capsule by mouth Daily. Indications: Gastroesophageal Reflux Disease, Disp: 30 capsule, Rfl: 2    predniSONE (DELTASONE) 1 MG tablet, Take 2 tablets by mouth Daily., Disp: , Rfl:     rosuvastatin (CRESTOR) 40 MG tablet, Take 1 tablet by mouth Every Night., Disp: 90 tablet, Rfl: 2    tamsulosin (FLOMAX) 0.4 MG capsule 24 hr capsule, Take 1 capsule by mouth 2 (Two) Times a Day., Disp: , Rfl:     vitamin B-12 (CYANOCOBALAMIN) 1000 MCG tablet, Take 1 tablet by mouth Daily., Disp: , Rfl:   No current facility-administered medications for this visit.    Facility-Administered Medications Ordered in Other Visits:     Chlorhexidine Gluconate Cloth 2 % pads 1 application, 1 application , Topical, Q12H PRN,  "Sd Mathew PA    Allergies:   Allergies   Allergen Reactions    Hydrocodone-Acetaminophen Anxiety    Lipitor [Atorvastatin] Anxiety and Myalgia           Lortab [Hydrocodone-Acetaminophen] Anxiety    Azithromycin Nausea Only         Objective      Vital Signs:   Vitals:    09/14/23 1137   BP: 114/84   Weight: 78.5 kg (173 lb)   Height: 180.3 cm (70.98\")       Ortho Exam:  General: no acute distress, comfortable  Vitals reviewed in chart    Musculoskeletal Exam:    SIDE: Bilateral hips    Tenderness: Greater trochanteric bursa    Range of motion measurements (degrees): 0-90  Painful arc of motion: No  No pain with logroll  No evidence of septic joint      Results Review:  XR Hip With or Without Pelvis 2 - 3 View Left    Result Date: 7/6/2023  1.  No osseous abnormality. Electronically signed by:  Saravanna Vallejo M.D.  7/6/2023 4:08 AM Mountain Time        Assessment / Plan      Assessment:   Diagnoses and all orders for this visit:    1. Trochanteric bursitis of both hips (Primary)    Other orders  -     - Large Joint Arthrocentesis: bilateral greater trochanteric bursa        Plan:  Repeat corticosteroid injection into bilateral greater trochanteric bursa today.    I discussed with the patient the potential benefits of performing a therapeutic injection of the bilateral greater trochanteric bursa as well as potential risks including but not limited to infection, swelling, pain, bleeding, bruising, nerve/vessel damage, skin color changes, transient elevation in blood glucose levels, and fat atrophy. After informed consent and verifying correct patient, procedure site, and type of procedure, the area was prepped with Hibiclens, ethyl chloride was used to numb the skin. 4cc of 1% lidocaine and  40mg/ml of Kenalog were injected into each site. The patient tolerated the procedure well. There were no complications.       Quality Metrics:   BMI:   BMI is within normal parameters. No other follow-up for BMI " required.       Tobacco:   Rodrigo Baum  reports that he quit smoking about 29 years ago. His smoking use included cigarettes. He started smoking about 49 years ago. He has a 15.00 pack-year smoking history. He has never used smokeless tobacco..  He understands risks associated with smoking.       Follow Up:   4 months        Ines Alexander PA-C  [facility]    Dictated using Dragon Speech Recognition.

## 2023-09-14 NOTE — PROGRESS NOTES
Procedure   - Large Joint Arthrocentesis: bilateral greater trochanteric bursa on 9/14/2023 11:40 AM  Indications: pain  Details: (22 G spinal ) needle, lateral approach  Medications (Right): 4 mL lidocaine PF 1% 1 %; 40 mg triamcinolone acetonide 40 MG/ML  Medications (Left): 4 mL lidocaine PF 1% 1 %; 40 mg triamcinolone acetonide 40 MG/ML  Outcome: tolerated well, no immediate complications  Procedure, treatment alternatives, risks and benefits explained, specific risks discussed. Immediately prior to procedure a time out was called to verify the correct patient, procedure, equipment, support staff and site/side marked as required. Patient was prepped and draped in the usual sterile fashion.

## 2023-09-15 ENCOUNTER — TREATMENT (OUTPATIENT)
Dept: PHYSICAL THERAPY | Facility: CLINIC | Age: 78
End: 2023-09-15
Payer: MEDICARE

## 2023-09-15 DIAGNOSIS — M70.62 TROCHANTERIC BURSITIS OF BOTH HIPS: Primary | ICD-10-CM

## 2023-09-15 DIAGNOSIS — M70.61 TROCHANTERIC BURSITIS OF BOTH HIPS: Primary | ICD-10-CM

## 2023-09-15 NOTE — PROGRESS NOTES
Physical Therapy Daily Treatment Note  [address] [facility], [address]  [place] [address]    Patient: Rodrigo Baum   : 1945  Referring practitioner: Ines Alexander PA-C  Date of Initial Visit: Type: THERAPY  Noted: 2023  Today's Date: 9/15/2023  Patient seen for 7 sessions       Visit Diagnoses:    ICD-10-CM ICD-9-CM   1. Trochanteric bursitis of both hips  M70.61 726.5    M70.62        Visit #: 7    Subjective   Got hips injected yesterday; have been sick the last couple of weeks, doing better now ; right hip feels great, left still hurts to lie on     Objective   See Exercise, Manual, and Modality Logs for complete treatment    Added: standing MIP, 1 leg balance, shallow BW squats, standing hip abd, light stepping up      Sarta Exercises:  Access Code: [email or web address]  URL: [email or web address]  Date: 2023  Prepared by: Jose Morley     Exercises  - Supine Hip Adduction Isometric with Ball  - 2 x daily - 7 x weekly - 3 sets - 10 reps  - Supine Bridge with Resistance Band  - 2 x daily - 7 x weekly - 3 sets - 10 reps  - Hooklying Clamshell with Resistance  - 2 x daily - 7 x weekly - 3 sets - 10 reps  - Supine March  - 2 x daily - 7 x weekly - 3 sets - 10 reps  - Hooklying Single Knee to Chest Stretch  - 2 x daily - 7 x weekly - 3 sets - 10 reps  - Side Stepping with Resistance at Thighs and Counter Support  - 2 x daily - 7 x weekly - 3 sets - 10 reps  - Supine Knees to Chest with Swiss Ball  - 2 x daily - 7 x weekly - 3 sets - 10 reps  - Bridge with Heels on Swiss Ball  - 2 x daily - 7 x weekly - 3 sets - 10 reps  - Hamstring Set with Swiss Ball  - 2 x daily - 7 x weekly - 3 sets - 10 reps    Assessment/Plan  Patient needs continued skilled Physical Therapy services for decreasing pain and improving mobility, strength, balance and endurance in order to return to work and activities of daily living without pain or dysfunction; limited somewhat by painful right knee from previous injury      Treatment considerations for next visit:  Advance as tolerated     Timed:   Manual Therapy:         mins  96006;     Therapeutic Exercise:    25     mins  13228;     Neuromuscular Ollie:    15    mins  99558;    Therapeutic Activity:     15     mins  02753;     Gait Training:           mins  03243;     Ultrasound:          mins  84461;    Ionto                                   mins   59971  Self Care                            mins   15312  Canalith Repos         mins   20351    Un-Timed:  Electrical Stimulation:         mins  56072 ( );  Dry Needling          mins self-pay  Traction          mins 43416      Timed Treatment:   55   mins   Total Treatment:     55   mins    MICAH Morley, PT  [place] License: [id number]

## 2023-09-18 RX ORDER — TRIAMCINOLONE ACETONIDE 40 MG/ML
40 INJECTION, SUSPENSION INTRA-ARTICULAR; INTRAMUSCULAR
Status: COMPLETED | OUTPATIENT
Start: 2023-09-14 | End: 2023-09-14

## 2023-09-18 RX ORDER — LIDOCAINE HYDROCHLORIDE 10 MG/ML
4 INJECTION, SOLUTION EPIDURAL; INFILTRATION; INTRACAUDAL; PERINEURAL
Status: COMPLETED | OUTPATIENT
Start: 2023-09-14 | End: 2023-09-14

## 2023-09-21 ENCOUNTER — TREATMENT (OUTPATIENT)
Dept: PHYSICAL THERAPY | Facility: CLINIC | Age: 78
End: 2023-09-21
Payer: MEDICARE

## 2023-09-21 DIAGNOSIS — M70.62 TROCHANTERIC BURSITIS OF BOTH HIPS: Primary | ICD-10-CM

## 2023-09-21 DIAGNOSIS — M70.61 TROCHANTERIC BURSITIS OF BOTH HIPS: Primary | ICD-10-CM

## 2023-09-21 NOTE — PROGRESS NOTES
Physical Therapy Daily Treatment Note  [address] [facility], [address]  [place] [address]    Patient: Rodrigo Baum   : 1945  Referring practitioner: Ines Alexander PA-C  Date of Initial Visit: Type: THERAPY  Noted: 2023  Today's Date: 2023  Patient seen for 8 sessions       Visit Diagnoses:    ICD-10-CM ICD-9-CM   1. Trochanteric bursitis of both hips  M70.61 726.5    M70.62        Visit #: 8    Subjective   No new complaints; hips feel better after doing the exercises    Objective   See Exercise, Manual, and Modality Logs for complete treatment    Discussed, per patient request, 3 exercises good for early am stretching while still in bed (SKC, DKC, LTR)     ICONIC Exercises:  Access Code: [email or web address]  URL: [email or web address]  Date: 2023  Prepared by: Jose Morley     Exercises  - Supine Hip Adduction Isometric with Ball  - 2 x daily - 7 x weekly - 3 sets - 10 reps  - Supine Bridge with Resistance Band  - 2 x daily - 7 x weekly - 3 sets - 10 reps  - Hooklying Clamshell with Resistance  - 2 x daily - 7 x weekly - 3 sets - 10 reps  - Supine March  - 2 x daily - 7 x weekly - 3 sets - 10 reps  - Hooklying Single Knee to Chest Stretch  - 2 x daily - 7 x weekly - 3 sets - 10 reps  - Side Stepping with Resistance at Thighs and Counter Support  - 2 x daily - 7 x weekly - 3 sets - 10 reps  - Supine Knees to Chest with Swiss Ball  - 2 x daily - 7 x weekly - 3 sets - 10 reps  - Bridge with Heels on Swiss Ball  - 2 x daily - 7 x weekly - 3 sets - 10 reps  - Hamstring Set with Swiss Ball  - 2 x daily - 7 x weekly - 3 sets - 10 reps       Assessment/Plan  Patient is progressing well with PT; would benefit from continued PT to advance functional mobility and strength; limited by chronic right knee pain    Treatment considerations for next visit:  Advance as tolerated ; awaiting insurance     Timed:   Manual Therapy:         mins  88235;     Therapeutic Exercise:    30     mins  53974;      Neuromuscular Ollie:    15    mins  37339;    Therapeutic Activity:     15     mins  02582;     Gait Training:           mins  94652;     Ultrasound:          mins  86900;    Ionto                                   mins   79929  Self Care                            mins   39425  Canalith Repos         mins   76927    Un-Timed:  Electrical Stimulation:         mins  00144 ( );  Dry Needling          mins self-pay  Traction          mins 58836      Timed Treatment:   60   mins   Total Treatment:     60   mins    MICAH Morley, PT  [place] License: [id number]

## 2023-11-03 ENCOUNTER — TELEPHONE (OUTPATIENT)
Dept: CARDIOLOGY | Facility: CLINIC | Age: 78
End: 2023-11-03

## 2023-11-22 ENCOUNTER — TELEPHONE (OUTPATIENT)
Dept: CARDIOLOGY | Facility: CLINIC | Age: 78
End: 2023-11-22

## 2023-11-22 ENCOUNTER — TELEPHONE (OUTPATIENT)
Dept: GASTROENTEROLOGY | Facility: CLINIC | Age: 78
End: 2023-11-22
Payer: MEDICARE

## 2023-11-22 NOTE — TELEPHONE ENCOUNTER
Caller: PANCHO LOVE    Relationship to patient: SELF    Best call back number: [phone]    Chief complaint: NEEDS TO SCHEDULE EGD    Type of visit: EGD    Requested date: WANTING BY THE END OF THE YEAR OF POSSIBLE    If rescheduling, when is the original appointment:     Additional notes:

## 2023-11-22 NOTE — TELEPHONE ENCOUNTER
Hub staff attempted to follow warm transfer process and was unsuccessful     Caller: PANCHO LOVE    Relationship to patient: SELF    Best call back number: [phone]    Patient is needing: PT WANTS CALL FROM SCHEDULING TEAM

## 2023-11-27 ENCOUNTER — TELEPHONE (OUTPATIENT)
Dept: GASTROENTEROLOGY | Facility: CLINIC | Age: 78
End: 2023-11-27
Payer: MEDICARE

## 2023-11-29 ENCOUNTER — OUTSIDE FACILITY SERVICE (OUTPATIENT)
Dept: GASTROENTEROLOGY | Facility: CLINIC | Age: 78
End: 2023-11-29
Payer: MEDICARE

## 2023-11-29 PROCEDURE — 43239 EGD BIOPSY SINGLE/MULTIPLE: CPT | Performed by: INTERNAL MEDICINE

## 2023-11-29 PROCEDURE — 88305 TISSUE EXAM BY PATHOLOGIST: CPT

## 2023-11-30 ENCOUNTER — LAB REQUISITION (OUTPATIENT)
Dept: LAB | Facility: HOSPITAL | Age: 78
End: 2023-11-30
Payer: MEDICARE

## 2023-11-30 DIAGNOSIS — K44.9 DIAPHRAGMATIC HERNIA WITHOUT OBSTRUCTION OR GANGRENE: ICD-10-CM

## 2023-11-30 DIAGNOSIS — K22.70 BARRETT'S ESOPHAGUS WITHOUT DYSPLASIA: ICD-10-CM

## 2023-11-30 DIAGNOSIS — K31.89 OTHER DISEASES OF STOMACH AND DUODENUM: ICD-10-CM

## 2023-12-01 LAB — REF LAB TEST METHOD: NORMAL

## 2023-12-28 ENCOUNTER — TELEPHONE (OUTPATIENT)
Dept: ORTHOPEDIC SURGERY | Facility: CLINIC | Age: 78
End: 2023-12-28

## 2023-12-28 NOTE — TELEPHONE ENCOUNTER
Provider: DION AMANDA    Caller: PANCHO LOVE    Relationship to Patient: SELF    Phone Number: [phone]    Reason for Call: PATIENT CALLED STATING A AUTHORIZATION REQUEST NEEDS TO BE SUBMITTED FOR BILATERAL KNEE INJECTIONS. HIS NEW INSURANCE EFFECTIVE 01/01/24    [employer] ADV-[employer]  MEMBER ID: [id number]

## 2024-01-09 DIAGNOSIS — M17.0 PRIMARY OSTEOARTHRITIS OF BOTH KNEES: Primary | ICD-10-CM

## 2024-01-18 ENCOUNTER — OFFICE VISIT (OUTPATIENT)
Dept: CARDIOLOGY | Facility: CLINIC | Age: 79
End: 2024-01-18
Payer: MEDICARE

## 2024-01-18 VITALS
HEART RATE: 60 BPM | HEIGHT: 71 IN | WEIGHT: 170 LBS | OXYGEN SATURATION: 98 % | SYSTOLIC BLOOD PRESSURE: 136 MMHG | DIASTOLIC BLOOD PRESSURE: 60 MMHG | BODY MASS INDEX: 23.8 KG/M2

## 2024-01-18 DIAGNOSIS — E78.2 MIXED HYPERLIPIDEMIA: ICD-10-CM

## 2024-01-18 DIAGNOSIS — I10 PRIMARY HYPERTENSION: ICD-10-CM

## 2024-01-18 DIAGNOSIS — I25.10 CORONARY ARTERY DISEASE INVOLVING NATIVE CORONARY ARTERY OF NATIVE HEART WITHOUT ANGINA PECTORIS: Primary | ICD-10-CM

## 2024-01-18 PROCEDURE — 3078F DIAST BP <80 MM HG: CPT | Performed by: INTERNAL MEDICINE

## 2024-01-18 PROCEDURE — 99214 OFFICE O/P EST MOD 30 MIN: CPT | Performed by: INTERNAL MEDICINE

## 2024-01-18 PROCEDURE — 3075F SYST BP GE 130 - 139MM HG: CPT | Performed by: INTERNAL MEDICINE

## 2024-01-18 NOTE — PROGRESS NOTES
[facility] Cardiology  Office Progress Note  Rodrigo Baum  1945  [address]  [facility] [address]       Visit Date: 01/18/24    PCP: Holli Fletcher MD  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    PROBLEM LIST:   CAD:  11/15/1993: PTCA and repeat PTCA 1 week following of proximal  LAD, per Dr. Lovelace with normal circumflex and RCA noted at that time.   April 2008: Overlapping 3.0 x 24 mm. And 3.0 x 16.0 mm Taxus to LAD and 2.5 x 8.0 PTCA to proximal first OM, inability to pass stent.  EF greater than 60.  5/16 : Stress echocardiogram, which was within normal limits. EF greater than 60%. Normal hemodynamic response with exercise.   1/18 CABG X2 Dr Morales periop hematuria/syed issues  7/1/2019 LHC per AA, patent LIMA to LAD, SVG to diagonal, spasm of the LIMA as well as apical LAD noted which improved with nitro, normal EF  7/19/2020 [facility] ED visit CP, troponin normal x2, admission was recommended however he left AMA  Dyslipidemia:  November 2009:  Total cholesterol 163, triglycerides 169, HDL 55, LDL 81.  02/30/2012:  Total cholesterol 242, HDL 51, triglycerides 192, , off statin therapy.  9/19 89/124/48/16  Nicotine addiction cessation 15 years prior.  HTN, presumed essential.  1/17 AAA screen wnl  Claustrophobia prohibitive for most stress testing  Hypothyroidism on replacement therapy.   Arthritis data deficient, on steroid therapy greater than 5 years.   GERD.   2015 Right jaw swelling, evaluated for potential mandibular infection per Dr. Smith.   Hip and foot pain, followed per Dr Espinoza  Prostatism- 2017 3 rounds abx      CC:   Chief Complaint   Patient presents with    Follow-up     1 year        Allergies  Allergies   Allergen Reactions    Hydrocodone-Acetaminophen Anxiety    Lipitor [Atorvastatin] Anxiety and Myalgia           Lortab [Hydrocodone-Acetaminophen]  Anxiety    Azithromycin Nausea Only       Current Medications    Current Outpatient Medications:     amLODIPine (NORVASC) 5 MG tablet, Take 1 tablet by mouth Daily. (Patient taking differently: Take 0.5 tablets by mouth Daily.), Disp: 90 tablet, Rfl: 0    aspirin 81 MG chewable tablet, Chew 1 tablet Daily., Disp: , Rfl:     Cholecalciferol (VITAMIN D3) 50 MCG (2000 UT) tablet, Take 1 tablet by mouth Daily., Disp: , Rfl:     finasteride (PROSCAR) 5 MG tablet, Take 1 tablet by mouth Daily., Disp: , Rfl:     FOLIC ACID PO, Take 800 mcg by mouth Daily., Disp: , Rfl:     glucosamine-chondroitin 500-400 MG capsule capsule, Take 2 capsules by mouth Daily., Disp: , Rfl:     isosorbide mononitrate (IMDUR) 30 MG 24 hr tablet, Take 1 tablet by mouth Daily., Disp: 30 tablet, Rfl: 5    levothyroxine (SYNTHROID, LEVOTHROID) 112 MCG tablet, Take 1 tablet by mouth Daily., Disp: , Rfl:     Lymdvhc-Amtvh-Mvua-PassF-LBalm (MELATONIN + L-THEANINE PO), Take 1 tablet by mouth Daily As Needed., Disp: , Rfl:     metoprolol succinate XL (TOPROL-XL) 25 MG 24 hr tablet, Take 1 tablet by mouth Daily., Disp: 90 tablet, Rfl: 0    Multiple Vitamins-Minerals (CENTRUM SILVER 50+MEN PO), Take 1 tablet by mouth Daily., Disp: , Rfl:     nitroglycerin (NITROSTAT) 0.4 MG SL tablet, Place 1 tablet under the tongue Every 5 (Five) Minutes As Needed for Chest Pain. Take no more than 3 doses in 15 minutes., Disp: 100 tablet, Rfl: 0    omeprazole (priLOSEC) 40 MG capsule, Take 1 capsule by mouth Daily. Indications: Gastroesophageal Reflux Disease, Disp: 30 capsule, Rfl: 2    predniSONE (DELTASONE) 1 MG tablet, Take 2 tablets by mouth Daily., Disp: , Rfl:     rosuvastatin (CRESTOR) 40 MG tablet, Take 1 tablet by mouth Every Night., Disp: 90 tablet, Rfl: 2    tamsulosin (FLOMAX) 0.4 MG capsule 24 hr capsule, Take 1 capsule by mouth 2 (Two) Times a Day., Disp: , Rfl:     vitamin B-12 (CYANOCOBALAMIN) 1000 MCG tablet, Take 1 tablet by mouth Daily., Disp: , Rfl:  "  No current facility-administered medications for this visit.    Facility-Administered Medications Ordered in Other Visits:     Chlorhexidine Gluconate Cloth 2 % pads 1 application, 1 application , Topical, Q12H YESIN, Sd Mathew PA      History of Present Illness   Rodrigo Baum is a 78 y.o. year old male here for follow up.  No new cardiac issues.  States he is feeling well.  He is concerned that he has an upcoming cataract procedure and he has significant claustrophobia  He brings a portion of his blood pressure diary with him with systolics of 107-160 with average of approximately 120s and no diastolic hypertension    OBJECTIVE:  Vitals:    01/18/24 1134   BP: 136/60   BP Location: Right arm   Patient Position: Sitting   Pulse: 60   SpO2: 98%   Weight: 77.1 kg (170 lb)   Height: 180.3 cm (71\")       Body mass index is 23.71 kg/m².    Constitutional:       Appearance: Healthy appearance. Not in distress.   Neck:      Vascular: No JVR. JVD normal.   Pulmonary:      Effort: Pulmonary effort is normal.      Breath sounds: Normal breath sounds. No wheezing. No rhonchi. No rales.   Chest:      Chest wall: Not tender to palpatation.   Cardiovascular:      PMI at left midclavicular line. Normal rate. Regular rhythm. Normal S1. Normal S2.       Murmurs: There is no murmur.      No gallop.  No click. No rub.   Pulses:     Intact distal pulses.   Edema:     Peripheral edema absent.   Abdominal:      General: Bowel sounds are normal.      Palpations: Abdomen is soft.      Tenderness: There is no abdominal tenderness.   Musculoskeletal: Normal range of motion.         General: No tenderness. Skin:     General: Skin is warm and dry.   Neurological:      General: No focal deficit present.      Mental Status: Alert and oriented to person, place and time.         Diagnostic Data:  Procedures      ASSESSMENT:   Diagnosis Plan   1. Coronary artery disease involving native coronary artery of native heart without angina " pectoris        2. Primary hypertension        3. Mixed hyperlipidemia              PLAN:  CAD post remote surgical revascularization no anginal equivalent continued medical management.  If has anginal equivalent potentially a chair camera in [place] may be an opportunity that he would tolerate from a claustrophobia standpoint    Hypertension controlled current Flomax Imdur metoprolol amlodipine.     Mixed dyslipidemia controlled on statin therapy          Tal Wagner MD, FACC

## 2024-01-25 ENCOUNTER — OFFICE VISIT (OUTPATIENT)
Dept: ORTHOPEDIC SURGERY | Facility: CLINIC | Age: 79
End: 2024-01-25
Payer: MEDICARE

## 2024-01-25 VITALS
SYSTOLIC BLOOD PRESSURE: 112 MMHG | DIASTOLIC BLOOD PRESSURE: 68 MMHG | BODY MASS INDEX: 23.8 KG/M2 | HEIGHT: 71 IN | WEIGHT: 170 LBS

## 2024-01-25 DIAGNOSIS — M70.62 TROCHANTERIC BURSITIS OF BOTH HIPS: Primary | ICD-10-CM

## 2024-01-25 DIAGNOSIS — M70.61 TROCHANTERIC BURSITIS OF BOTH HIPS: Primary | ICD-10-CM

## 2024-01-25 RX ORDER — LIDOCAINE HYDROCHLORIDE 10 MG/ML
4 INJECTION, SOLUTION EPIDURAL; INFILTRATION; INTRACAUDAL; PERINEURAL
Status: COMPLETED | OUTPATIENT
Start: 2024-01-25 | End: 2024-01-25

## 2024-01-25 RX ORDER — TRIAMCINOLONE ACETONIDE 40 MG/ML
1 INJECTION, SUSPENSION INTRA-ARTICULAR; INTRAMUSCULAR
Status: COMPLETED | OUTPATIENT
Start: 2024-01-25 | End: 2024-01-25

## 2024-01-25 RX ADMIN — TRIAMCINOLONE ACETONIDE 1 ML: 40 INJECTION, SUSPENSION INTRA-ARTICULAR; INTRAMUSCULAR at 11:25

## 2024-01-25 RX ADMIN — LIDOCAINE HYDROCHLORIDE 4 ML: 10 INJECTION, SOLUTION EPIDURAL; INFILTRATION; INTRACAUDAL; PERINEURAL at 11:25

## 2024-01-25 NOTE — PROGRESS NOTES
Procedure   - Large Joint Arthrocentesis: bilateral greater trochanteric bursa on 1/25/2024 11:25 AM  Indications: pain  Details: 21 G needle, lateral approach  Medications (Right): 4 mL lidocaine PF 1% 1 %; 1 mL triamcinolone acetonide 40 MG/ML  Medications (Left): 1 mL triamcinolone acetonide 40 MG/ML; 4 mL lidocaine PF 1% 1 %  Outcome: tolerated well, no immediate complications  Procedure, treatment alternatives, risks and benefits explained, specific risks discussed. Consent was given by the patient. Immediately prior to procedure a time out was called to verify the correct patient, procedure, equipment, support staff and site/side marked as required. Patient was prepped and draped in the usual sterile fashion.

## 2024-01-25 NOTE — PROGRESS NOTES
[facility] [place] Office Follow Up       Office Follow Up Visit       Patient Name: Rodrigo Baum    Chief Complaint:   Chief Complaint   Patient presents with    Follow-up     4 month f/u; Trochanteric bursitis of both hips       Referring Physician: No ref. provider found    History of Present Illness:   Rodrigo Baum returns to clinic today for bilateral hip pain.  Ongoing for the last several years.  Last visit on 9/14/2023.  Corticosteroid injection into bilateral greater troch bursa at that time. Improved symptoms greatly. Lasted 4 months    Subjective     Review of Systems   Constitutional:  Negative for chills, fever, unexpected weight gain and unexpected weight loss.   HENT:  Negative for congestion, postnasal drip and rhinorrhea.    Eyes:  Negative for blurred vision.   Respiratory:  Negative for shortness of breath.    Cardiovascular:  Negative for leg swelling.   Gastrointestinal:  Negative for abdominal pain, nausea and vomiting.   Genitourinary:  Negative for difficulty urinating.   Musculoskeletal:  Positive for arthralgias. Negative for gait problem, joint swelling and myalgias.   Skin:  Negative for skin lesions and wound.   Neurological:  Negative for dizziness, weakness, light-headedness and numbness.   Hematological:  Does not bruise/bleed easily.   Psychiatric/Behavioral:  Negative for depressed mood.         I have reviewed and updated the following portions of the patient's history and review of systems: allergies, current medications, past family history, past medical history, past social history, past surgical history and problem list.    Medications:   Current Outpatient Medications:     amLODIPine (NORVASC) 5 MG tablet, Take 1 tablet by mouth Daily. (Patient taking differently: Take 0.5 tablets by mouth Daily.), Disp: 90 tablet, Rfl: 0    aspirin 81 MG chewable tablet, Chew 1 tablet Daily., Disp: , Rfl:      Cholecalciferol (VITAMIN D3) 50 MCG (2000 UT) tablet, Take 1 tablet by mouth Daily., Disp: , Rfl:     finasteride (PROSCAR) 5 MG tablet, Take 1 tablet by mouth Daily., Disp: , Rfl:     FOLIC ACID PO, Take 800 mcg by mouth Daily., Disp: , Rfl:     glucosamine-chondroitin 500-400 MG capsule capsule, Take 2 capsules by mouth Daily., Disp: , Rfl:     isosorbide mononitrate (IMDUR) 30 MG 24 hr tablet, Take 1 tablet by mouth Daily., Disp: 30 tablet, Rfl: 5    levothyroxine (SYNTHROID, LEVOTHROID) 112 MCG tablet, Take 1 tablet by mouth Daily., Disp: , Rfl:     [place]-Seqz-PassF-LBalm (MELATONIN + L-THEANINE PO), Take 1 tablet by mouth Daily As Needed., Disp: , Rfl:     metoprolol succinate XL (TOPROL-XL) 25 MG 24 hr tablet, Take 1 tablet by mouth Daily., Disp: 90 tablet, Rfl: 0    Multiple Vitamins-Minerals (CENTRUM SILVER 50+MEN PO), Take 1 tablet by mouth Daily., Disp: , Rfl:     nitroglycerin (NITROSTAT) 0.4 MG SL tablet, Place 1 tablet under the tongue Every 5 (Five) Minutes As Needed for Chest Pain. Take no more than 3 doses in 15 minutes., Disp: 100 tablet, Rfl: 0    omeprazole (priLOSEC) 40 MG capsule, Take 1 capsule by mouth Daily. Indications: Gastroesophageal Reflux Disease, Disp: 30 capsule, Rfl: 2    predniSONE (DELTASONE) 1 MG tablet, Take 2 tablets by mouth Daily., Disp: , Rfl:     rosuvastatin (CRESTOR) 40 MG tablet, Take 1 tablet by mouth Every Night., Disp: 90 tablet, Rfl: 2    tamsulosin (FLOMAX) 0.4 MG capsule 24 hr capsule, Take 1 capsule by mouth 2 (Two) Times a Day., Disp: , Rfl:     vitamin B-12 (CYANOCOBALAMIN) 1000 MCG tablet, Take 1 tablet by mouth Daily., Disp: , Rfl:   No current facility-administered medications for this visit.    Facility-Administered Medications Ordered in Other Visits:     Chlorhexidine Gluconate Cloth 2 % pads 1 application, 1 application , Topical, Q12H PRN, Sd Mathew PA    Allergies:   Allergies   Allergen Reactions    Hydrocodone-Acetaminophen Anxiety     "Lipitor [Atorvastatin] Anxiety and Myalgia           Lortab [Hydrocodone-Acetaminophen] Anxiety    Azithromycin Nausea Only         Objective      Vital Signs:   Vitals:    01/25/24 1123   BP: 112/68   Weight: 77.1 kg (170 lb)   Height: 180.3 cm (71\")       Ortho Exam:  General: no acute distress, comfortable  Vitals reviewed in chart    Musculoskeletal Exam:    SIDE: Bilateral hips    Tenderness: Greater trochanteric bursa    Range of motion measurements (degrees): 0-90  Painful arc of motion: No  No skin changes  No evidence of septic joint      Results Review:  None    Assessment / Plan      Assessment:   Diagnoses and all orders for this visit:    1. Trochanteric bursitis of both hips (Primary)    Other orders  -     - Large Joint Arthrocentesis: bilateral greater trochanteric bursa        Quality Metrics:   BMI:   BMI is within normal parameters. No other follow-up for BMI required.       Tobacco:   Rodrigo Baum  reports that he quit smoking about 30 years ago. His smoking use included cigarettes. He started smoking about 50 years ago. He has a 15.00 pack-year smoking history. He has never used smokeless tobacco..        Plan:  Recommend repeat corticosteroid injection into bilateral greater trochanteric bursa today.    I discussed with the patient the potential benefits of performing a therapeutic injection of the bilateral greater trochanteric bursa as well as potential risks including but not limited to infection, swelling, pain, bleeding, bruising, nerve/vessel damage, skin color changes, transient elevation in blood glucose levels, and fat atrophy. After informed consent and verifying correct patient, procedure site, and type of procedure, the area was prepped with Hibiclens, ethyl chloride was used to numb the skin. Via the lateral approach, 4cc of 1% lidocaine and  40mg/ml of Kenalog were injected into each bursa. The patient tolerated the procedure well. There were no complications.         Follow " Up:   4 months    Ines Hsieh PA-C  [facility] [place] [facility]    Dictated using Dragon Speech Recognition.

## 2024-02-09 ENCOUNTER — PREP FOR SURGERY (OUTPATIENT)
Dept: OTHER | Facility: HOSPITAL | Age: 79
End: 2024-02-09
Payer: MEDICARE

## 2024-02-09 DIAGNOSIS — H25.11 NUCLEAR SCLEROTIC CATARACT OF RIGHT EYE: Primary | ICD-10-CM

## 2024-02-09 RX ORDER — SODIUM CHLORIDE 0.9 % (FLUSH) 0.9 %
10 SYRINGE (ML) INJECTION AS NEEDED
OUTPATIENT
Start: 2024-02-09

## 2024-02-09 RX ORDER — PREDNISOLONE ACETATE 10 MG/ML
1 SUSPENSION/ DROPS OPHTHALMIC SEE ADMIN INSTRUCTIONS
OUTPATIENT
Start: 2024-02-09

## 2024-02-09 RX ORDER — SODIUM CHLORIDE 0.9 % (FLUSH) 0.9 %
10 SYRINGE (ML) INJECTION EVERY 12 HOURS SCHEDULED
OUTPATIENT
Start: 2024-02-09

## 2024-02-09 RX ORDER — SODIUM CHLORIDE 9 MG/ML
40 INJECTION, SOLUTION INTRAVENOUS AS NEEDED
OUTPATIENT
Start: 2024-02-09

## 2024-02-09 RX ORDER — CYCLOPENT/TROPIC/PHEN/KETR/WAT 1%-1%-2.5%
1 DROPS (EA) OPHTHALMIC (EYE)
OUTPATIENT
Start: 2024-02-09 | End: 2024-02-09

## 2024-02-09 RX ORDER — TETRACAINE HYDROCHLORIDE 5 MG/ML
1 SOLUTION OPHTHALMIC SEE ADMIN INSTRUCTIONS
OUTPATIENT
Start: 2024-02-09

## 2024-02-29 ENCOUNTER — CLINICAL SUPPORT (OUTPATIENT)
Dept: ORTHOPEDIC SURGERY | Facility: CLINIC | Age: 79
End: 2024-02-29
Payer: MEDICARE

## 2024-02-29 DIAGNOSIS — M70.61 TROCHANTERIC BURSITIS OF BOTH HIPS: Primary | ICD-10-CM

## 2024-02-29 DIAGNOSIS — M17.0 PRIMARY OSTEOARTHRITIS OF BOTH KNEES: Primary | ICD-10-CM

## 2024-02-29 DIAGNOSIS — M70.62 TROCHANTERIC BURSITIS OF BOTH HIPS: Primary | ICD-10-CM

## 2024-02-29 RX ORDER — ATROPINE SULFATE 10 MG/ML
SOLUTION/ DROPS OPHTHALMIC
COMMUNITY
Start: 2024-01-25

## 2024-02-29 RX ORDER — BACITRACIN ZINC AND POLYMYXIN B SULFATE 500; 10000 [USP'U]/G; [USP'U]/G
OINTMENT OPHTHALMIC
COMMUNITY
Start: 2024-01-25

## 2024-02-29 NOTE — PROGRESS NOTES
Procedure   - Large Joint Arthrocentesis: bilateral knee on 2/29/2024 11:04 AM  Indications: pain  Details: 21 G needle, anterolateral approach  Medications (Right): 30 mg Hyaluronan 30 MG/2ML  Medications (Left): 30 mg Hyaluronan 30 MG/2ML  Outcome: tolerated well, no immediate complications  Procedure, treatment alternatives, risks and benefits explained, specific risks discussed. Consent was given by the patient. Immediately prior to procedure a time out was called to verify the correct patient, procedure, equipment, support staff and site/side marked as required. Patient was prepped and draped in the usual sterile fashion.        Here for 1/3 Orthovisc injection into bilateral knees.    Procedure Note:  I discussed with the patient the potential benefits of performing a therapeutic injection of the bilateral knee as well as potential risks including but not limited to infection, swelling, pain, bleeding, bruising, nerve/vessel damage, pseudoseptic reaction, and worsening joint pain. After informed consent and verifying correct patient, procedure site, and type of procedure, the area was prepped with alcohol, ethyl chloride was used to numb the skin. Via the anterolateral approach, the viscosupplementation syringe contents were injected into each knee. The patient tolerated the procedure well. There were no complications. A sterile dressing was placed over the injection site.    Return in 1 week.

## 2024-03-07 ENCOUNTER — TREATMENT (OUTPATIENT)
Dept: PHYSICAL THERAPY | Facility: CLINIC | Age: 79
End: 2024-03-07
Payer: MEDICARE

## 2024-03-07 ENCOUNTER — CLINICAL SUPPORT (OUTPATIENT)
Dept: ORTHOPEDIC SURGERY | Facility: CLINIC | Age: 79
End: 2024-03-07
Payer: MEDICARE

## 2024-03-07 DIAGNOSIS — M70.61 TROCHANTERIC BURSITIS OF BOTH HIPS: Primary | ICD-10-CM

## 2024-03-07 DIAGNOSIS — M17.0 PRIMARY OSTEOARTHRITIS OF BOTH KNEES: Primary | ICD-10-CM

## 2024-03-07 DIAGNOSIS — M70.62 TROCHANTERIC BURSITIS OF BOTH HIPS: Primary | ICD-10-CM

## 2024-03-07 PROCEDURE — 97110 THERAPEUTIC EXERCISES: CPT | Performed by: PHYSICAL THERAPIST

## 2024-03-07 PROCEDURE — 97162 PT EVAL MOD COMPLEX 30 MIN: CPT | Performed by: PHYSICAL THERAPIST

## 2024-03-07 PROCEDURE — 97530 THERAPEUTIC ACTIVITIES: CPT | Performed by: PHYSICAL THERAPIST

## 2024-03-07 NOTE — PROGRESS NOTES
Procedure   - Large Joint Arthrocentesis: bilateral knee on 3/7/2024 10:54 AM  Indications: pain  Details: 21 G needle, anterolateral approach  Medications (Right): 30 mg Hyaluronan 30 MG/2ML  Medications (Left): 30 mg Hyaluronan 30 MG/2ML  Outcome: tolerated well, no immediate complications  Procedure, treatment alternatives, risks and benefits explained, specific risks discussed. Consent was given by the patient. Immediately prior to procedure a time out was called to verify the correct patient, procedure, equipment, support staff and site/side marked as required. Patient was prepped and draped in the usual sterile fashion.        Here for 2/3 Orthovisc injection into bilateral knees.    Procedure Note:  I discussed with the patient the potential benefits of performing a therapeutic injection of the bilateral knee as well as potential risks including but not limited to infection, swelling, pain, bleeding, bruising, nerve/vessel damage, pseudoseptic reaction, and worsening joint pain. After informed consent and verifying correct patient, procedure site, and type of procedure, the area was prepped with alcohol, ethyl chloride was used to numb the skin. Via the anterolateral approach, the viscosupplementation syringe contents were injected into each knee. The patient tolerated the procedure well. There were no complications. A sterile dressing was placed over the injection site.    Return in 1 week.

## 2024-03-07 NOTE — PROGRESS NOTES
Physical Therapy Initial Evaluation and Plan of Care  [address] [facility], [address]  [place] [address]    Patient: Rodrigo Baum   : 1945  Diagnosis/ICD-10 Code:  Trochanteric bursitis of both hips [M70.61, M70.62]  Referring practitioner: Ines Hsieh PA-C  Date of Initial Visit: 3/7/2024  Today's Date: 3/7/2024  Patient seen for 1 session         Visit Diagnoses:    ICD-10-CM ICD-9-CM   1. 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[employer], works 3 eight hour shifts per week; he used to only work 4 hours at a time    Walking up to 5 miles a day at times, two days in a row-feels like this is too much for his legs to handle    Getting shots in his knees which seem to help    Feels like the left achilles pain that he is having has caused the left hip bursitis pain to worsen    Had both hips injected ~ 6 weeks ago  Two months ago started working back to back 8 hour shifts   Had seen podiatry in the past and had left ankle injected - has not seen them recently     Chief complaint is left ankle pain, starts to hurt first thing in the am; left hip is very painful at this time, right hip not too bad    Has neuropathy, uses a salve on bottom of feet         Patient Occupation: Retired, drives cars for [employer] Quality of life: fair    Pain  Current pain ratin  At worst pain ratin  Quality: dull ache, discomfort and tight  Relieving factors: change in position and rest  Aggravating factors: movement, ambulation, prolonged positioning, sleeping, squatting, standing, stairs and lifting  Progression: worsening    Patient Goals  Patient goals for therapy: improved balance, increased strength, independence with ADLs/IADLs, return to sport/leisure activities,  "return to work and decreased pain             Objective          Palpation   Left   Tenderness of the gluteus medius and TFL.     Tenderness     Left Hip   Tenderness in the greater trochanter.     Right Hip   Tenderness in the greater trochanter.     Additional Tenderness Details  Left greater than right    Lumbar Screen  Lumbar range of motion within normal limits with the following exceptions:Within functional limits    Neurological Testing     Sensation     Hip   Left Hip   Intact: light touch    Right Hip   Intact: light touch    Reflexes   Left   Patellar (L4): normal (2+)  Achilles (S1): trace (1+)    Right   Patellar (L4): normal (2+)  Achilles (S1): trace (1+)    Active Range of Motion   Left Hip   Normal active range of motion    Right Hip   Normal active range of motion    Strength/Myotome Testing     Left Hip   Planes of Motion   Flexion: 4  Extension: 3+  Abduction: 2+  External rotation: 4-  Internal rotation: 4    Right Hip   Planes of Motion   Flexion: 4  Extension: 3+  Abduction: 2+  External rotation: 4-  Internal rotation: 4    Tests     Additional Tests Details  30\" STS = 5 x     2 minutes walk test= 250 feet    mCTSIB equals 1 out of 4    Tightness in hamstrings noted bilaterally with 90/90 test          Assessment & Plan       Assessment  Impairments: abnormal gait, abnormal or restricted ROM, activity intolerance, impaired balance, impaired physical strength, lacks appropriate home exercise program and pain with function   Functional limitations: carrying objects, lifting, walking, pulling, pushing, uncomfortable because of pain, standing and stooping   Assessment details: Very pleasant 79-year-old gentleman returns to  today with an order for bilateral greater trochanteric bursitis of his hips; he also has an additional complaint of left ankle pain, that he states he discussed with his provider but was not included in the order; patient drives cars for [employer] and enjoys doing that, however " his shift and hours have changed and now he is working 8 hours a day and back-to-back days which has been too much for his legs to handle; his greater trochanteric bursitis has returned left greater than right and additionally now has this new ankle pain which appears to be Achilles tendinitis/peroneal tendinitis-he is going to see podiatry tomorrow for this he has very poor hip strength especially with abduction and extension, he has limited balance especially on unstable surfaces and he has limited endurance with 2-minute walking test; he also has decreased strength as noted with 30 second sit to stand test of only 5 repetitions; he needs physical therapy to decrease the pain in his hip and left lower extremity as well as improving endurance and strength and balance in his legs bilaterally for functional mobility  Barriers to therapy: Comorbidities, chronic pain, right knee 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  Patient to be independent with home exercise program  2.  Patient to improve 30 second sit to stand test to 8 repetitions  3.  Patient to improve 2-minute walking test to 275 feet  4.  Patient to improve manual muscle test strength by 1 grade for activities of daily living    8 weeks   1. patient to improve 30 second sit to stand test to 10 repetitions for ADLs  2.  Patient to improve 2-minute walking test to greater than 300 feet for ADLs  3.  Patient to improve manual muscle testing of bilateral hips to greater than 4 out of 5  4.  Patient to display greater than 2 out of 4 on the mCTSIB test  5.  Patient to improve lower extremity functional scale by 1M YOSHI    Plan  Therapy options: will be seen for skilled therapy services  Planned modality interventions: TENS, thermotherapy (hydrocollator packs), ultrasound, cryotherapy, dry needling and high voltage pulsed current (pain management)  Planned therapy interventions: manual therapy,  neuromuscular re-education, postural training, spinal/joint mobilization, soft tissue mobilization, strengthening, stretching, therapeutic activities, joint mobilization, home exercise program, gait training, flexibility, functional ROM exercises, body mechanics training and balance/weight-bearing training  Frequency: 2x week  Duration in weeks: 8  Treatment plan discussed with: patient  Plan details: Will consider adding left ankle into rehab with an order from the doctor            Timed:         Manual Therapy:          mins  63667;     Therapeutic Exercise:    15     mins  96396;     Neuromuscular Ollie:         mins  02330;    Therapeutic Activity:     10     mins  81727;     Gait Training:            mins  62439;     Ultrasound:           mins  02742;    Ionto                                    mins   54786  Self Care                             mins   60894  Canalith Repos          mins 43691      Un-Timed:  Electrical Stimulation:          mins  28062 ( );  Dry Needling           mins self-pay  Traction           mins 22870    Low Eval           Mins  67884  Mod Eval     30     Mins  69754  High Eval                             Mins  18631        Timed Treatment:   25   mins   Total Treatment:     65   mins          PT: MICAH Morley PT     License Number: [id number]  Electronically signed by MICAH Morley PT, 03/07/24, 2:19 PM EST    Certification Period: 3/7/2024 thru 6/4/2024  I certify that the therapy services are furnished while this patient is under my care.  The services outlined above are required by this patient, and will be reviewed every 90 days.         Physician Signature:__________________________________________________    PHYSICIAN: Ines Hsieh PA-C  NPI: [id number]                                      DATE:      Please sign and return via fax to [email or web address] . Thank you, [facility].

## 2024-03-11 ENCOUNTER — TREATMENT (OUTPATIENT)
Dept: PHYSICAL THERAPY | Facility: CLINIC | Age: 79
End: 2024-03-11
Payer: MEDICARE

## 2024-03-11 DIAGNOSIS — M70.62 TROCHANTERIC BURSITIS OF BOTH HIPS: Primary | ICD-10-CM

## 2024-03-11 DIAGNOSIS — M70.61 TROCHANTERIC BURSITIS OF BOTH HIPS: Primary | ICD-10-CM

## 2024-03-11 PROCEDURE — 97110 THERAPEUTIC EXERCISES: CPT | Performed by: PHYSICAL THERAPIST

## 2024-03-11 PROCEDURE — 97112 NEUROMUSCULAR REEDUCATION: CPT | Performed by: PHYSICAL THERAPIST

## 2024-03-11 PROCEDURE — G0283 ELEC STIM OTHER THAN WOUND: HCPCS | Performed by: PHYSICAL THERAPIST

## 2024-03-11 PROCEDURE — 97530 THERAPEUTIC ACTIVITIES: CPT | Performed by: PHYSICAL THERAPIST

## 2024-03-11 NOTE — PROGRESS NOTES
Physical Therapy Daily Treatment Note  [address] [facility], [address]  [place] [address]    Patient: Rodrigo Baum   : 1945  Referring practitioner: Ines Hsieh PA-C  Date of Initial Visit: Type: THERAPY  Noted: 3/7/2024  Today's Date: 3/11/2024  Patient seen for 2 sessions       Visit Diagnoses:    ICD-10-CM ICD-9-CM   1. Trochanteric bursitis of both hips  M70.61 726.5    M70.62        Visit #: 2    Subjective   Patient saw podiatrist on Friday for left ankle pain, he was given a steroid Dosepak but has not started taking it ; no new complaints with left hip; patient stated it took him 20 minutes to be able to start ambulate this morning when he got out of bed due to left ankle pain and tightness, he was unable to effectively bear weight through his leg and eventually stated he felt some pops in his left ankle and was able to start walking    Objective   See Exercise, Manual, and Modality Logs for complete treatment    Tenderness to palpate left lateral hip    Ice with premodulated electrical stimulation to the left lateral hip x 20 minutes    Assessment/Plan  Patient responded well to physical therapy today for hip discomfort; he stated his left hip pain was quite a bit better at the end of the treatment session today than when he first came    Treatment considerations for next visit:  Advance as tolerated    Timed:   Manual Therapy:          mins  85161;     Therapeutic Exercise:    15     mins  88630;     Neuromuscular Ollie:    15    mins  38459;    Therapeutic Activity:     10     mins  38637;     Gait Training:            mins  81187;     Ultrasound:           mins  74314;    Ionto                                    mins   77239  Self Care                             mins   85178  Canalith Repos          mins   38406    Un-Timed:  Electrical Stimulation:     20     mins  34858 ( );  Dry Needling           mins self-pay  Traction           mins 55525      Timed Treatment:   40   mins    Total Treatment:     60   mins    MICAH Morley, PT  [place] License: [id number]

## 2024-03-14 ENCOUNTER — CLINICAL SUPPORT (OUTPATIENT)
Dept: ORTHOPEDIC SURGERY | Facility: CLINIC | Age: 79
End: 2024-03-14
Payer: MEDICARE

## 2024-03-14 ENCOUNTER — TREATMENT (OUTPATIENT)
Dept: PHYSICAL THERAPY | Facility: CLINIC | Age: 79
End: 2024-03-14
Payer: MEDICARE

## 2024-03-14 DIAGNOSIS — M70.61 TROCHANTERIC BURSITIS OF BOTH HIPS: Primary | ICD-10-CM

## 2024-03-14 DIAGNOSIS — M17.0 PRIMARY OSTEOARTHRITIS OF BOTH KNEES: Primary | ICD-10-CM

## 2024-03-14 DIAGNOSIS — M70.62 TROCHANTERIC BURSITIS OF BOTH HIPS: Primary | ICD-10-CM

## 2024-03-14 PROCEDURE — 97530 THERAPEUTIC ACTIVITIES: CPT | Performed by: PHYSICAL THERAPIST

## 2024-03-14 PROCEDURE — 97110 THERAPEUTIC EXERCISES: CPT | Performed by: PHYSICAL THERAPIST

## 2024-03-14 PROCEDURE — G0283 ELEC STIM OTHER THAN WOUND: HCPCS | Performed by: PHYSICAL THERAPIST

## 2024-03-14 PROCEDURE — 97112 NEUROMUSCULAR REEDUCATION: CPT | Performed by: PHYSICAL THERAPIST

## 2024-03-14 NOTE — PROGRESS NOTES
Procedure   - Large Joint Arthrocentesis: bilateral knee on 3/14/2024 11:03 AM  Indications: pain  Details: 21 G needle, anterolateral approach  Medications (Right): 30 mg Hyaluronan 30 MG/2ML  Medications (Left): 30 mg Hyaluronan 30 MG/2ML  Outcome: tolerated well, no immediate complications  Procedure, treatment alternatives, risks and benefits explained, specific risks discussed. Consent was given by the patient. Immediately prior to procedure a time out was called to verify the correct patient, procedure, equipment, support staff and site/side marked as required. Patient was prepped and draped in the usual sterile fashion.        Here for 3/3 bilateral knee Orthovisc injection.    Procedure Note:  I discussed with the patient the potential benefits of performing a therapeutic injection of the bilateral knee as well as potential risks including but not limited to infection, swelling, pain, bleeding, bruising, nerve/vessel damage, pseudoseptic reaction, and worsening joint pain. After informed consent and verifying correct patient, procedure site, and type of procedure, the area was prepped with alcohol, ethyl chloride was used to numb the skin. Via the anterolateral approach, the viscosupplementation syringe contents were injected into each knee. The patient tolerated the procedure well. There were no complications. A sterile dressing was placed over the injection site.    Return in 6-8 weeks for bilateral greater troch injections.

## 2024-03-14 NOTE — PROGRESS NOTES
Physical Therapy Daily Treatment Note  [address] [facility], [address]  [place] [address]    Patient: Rodrigo Baum   : 1945  Referring practitioner: Ines Hsieh PA-C  Date of Initial Visit: Type: THERAPY  Noted: 3/7/2024  Today's Date: 3/14/2024  Patient seen for 3 sessions       Visit Diagnoses:    ICD-10-CM ICD-9-CM   1. Trochanteric bursitis of both hips  M70.61 726.5    M70.62        Visit #: 3    Subjective   Decided to hold off on cataract surgery, want to get hips and left ankle better first; will start steroids on my two week break from work  Left ankle hurting a lot; going to get a resting night splint; considering wearing a walking boot    Objective   See Exercise, Manual, and Modality Logs for complete treatment    Tenderness at left greater trochanter and glute medius, TFL with moist heat x 20 minutes     Assessment/Plan  Patient needs continued skilled Physical Therapy services for decreasing pain and improving mobility, strength, balance and endurance in order to return to work and activities of daily living without pain or dysfunction    Treatment considerations for next visit:  Advance as tolerated     Timed:   Manual Therapy:         mins  04784;     Therapeutic Exercise:    15     mins  95788;     Neuromuscular Ollie:    15    mins  92888;    Therapeutic Activity:     15     mins  36564;     Gait Training:           mins  71492;     Ultrasound:          mins  79757;    Ionto                                   mins   97413  Self Care                            mins   74383  Canalith Repos         mins   00750    Un-Timed:  Electrical Stimulation:    20     mins  46902 (MC );  Dry Needling          mins self-pay  Traction          mins 82903      Timed Treatment:   45   mins   Total Treatment:     65   mins    MICAH Morley, PT  [place] License: [id number]

## 2024-03-18 ENCOUNTER — TELEPHONE (OUTPATIENT)
Dept: PHYSICAL THERAPY | Facility: OTHER | Age: 79
End: 2024-03-18
Payer: MEDICARE

## 2024-03-18 NOTE — TELEPHONE ENCOUNTER
Caller: Rodrigo Baum    Relationship: Self    What was the call regarding: PATIENT CANCELLED APPT TODAY DUE TO CONFLICT

## 2024-03-21 ENCOUNTER — TREATMENT (OUTPATIENT)
Dept: PHYSICAL THERAPY | Facility: CLINIC | Age: 79
End: 2024-03-21
Payer: MEDICARE

## 2024-03-21 DIAGNOSIS — M70.61 TROCHANTERIC BURSITIS OF BOTH HIPS: Primary | ICD-10-CM

## 2024-03-21 DIAGNOSIS — M70.62 TROCHANTERIC BURSITIS OF BOTH HIPS: Primary | ICD-10-CM

## 2024-03-21 PROCEDURE — 97530 THERAPEUTIC ACTIVITIES: CPT | Performed by: PHYSICAL THERAPIST

## 2024-03-21 PROCEDURE — 97112 NEUROMUSCULAR REEDUCATION: CPT | Performed by: PHYSICAL THERAPIST

## 2024-03-21 PROCEDURE — 97110 THERAPEUTIC EXERCISES: CPT | Performed by: PHYSICAL THERAPIST

## 2024-03-21 PROCEDURE — G0283 ELEC STIM OTHER THAN WOUND: HCPCS | Performed by: PHYSICAL THERAPIST

## 2024-03-21 NOTE — PROGRESS NOTES
Physical Therapy Daily Treatment Note  [address] [facility], [address]  [place] [address]    Patient: Rodrigo Baum   : 1945  Referring practitioner: Ines Hsieh PA-C  Date of Initial Visit: Type: THERAPY  Noted: 3/7/2024  Today's Date: 3/24/2024  Patient seen for 4 sessions       Visit Diagnoses:    ICD-10-CM ICD-9-CM   1. Trochanteric bursitis of both hips  M70.61 726.5    M70.62        Visit #: 4    Subjective   Wearing a boot on left ankle and has started steroids; ankle feeling much better; hip feels a little better as well    Objective   See Exercise, Manual, and Modality Logs for complete treatment    Tenderness left hip GT and bursa    Assessment/Plan  Patient needs continued skilled Physical Therapy services for decreasing pain and improving mobility, strength, balance and endurance in order to return to work and activities of daily living without pain or dysfunction    Treatment considerations for next visit:  Advance as tolerated     Timed:   Manual Therapy:         mins  78162;     Therapeutic Exercise:    15     mins  77889;     Neuromuscular Ollie:    15    mins  85269;    Therapeutic Activity:     10     mins  57140;     Gait Training:           mins  14815;     Ultrasound:          mins  48903;    Ionto                                   mins   95300  Self Care                            mins   69614  Canalith Repos         mins   46256    Un-Timed:  Electrical Stimulation:    20     mins  14624 ( );  Dry Needling          mins self-pay  Traction          mins 39970      Timed Treatment:   40   mins   Total Treatment:     60   mins    MICAH Morley, PT  [place] License: [id number]

## 2024-03-28 ENCOUNTER — TREATMENT (OUTPATIENT)
Dept: PHYSICAL THERAPY | Facility: CLINIC | Age: 79
End: 2024-03-28
Payer: MEDICARE

## 2024-03-28 DIAGNOSIS — M70.62 TROCHANTERIC BURSITIS OF BOTH HIPS: Primary | ICD-10-CM

## 2024-03-28 DIAGNOSIS — M70.61 TROCHANTERIC BURSITIS OF BOTH HIPS: Primary | ICD-10-CM

## 2024-03-28 PROCEDURE — 97110 THERAPEUTIC EXERCISES: CPT | Performed by: PHYSICAL THERAPIST

## 2024-03-28 PROCEDURE — 97112 NEUROMUSCULAR REEDUCATION: CPT | Performed by: PHYSICAL THERAPIST

## 2024-03-28 PROCEDURE — 97530 THERAPEUTIC ACTIVITIES: CPT | Performed by: PHYSICAL THERAPIST

## 2024-03-28 NOTE — PROGRESS NOTES
Physical Therapy Daily Treatment Note  [address] [facility], [address]  [place] [address]    Patient: Rodrigo Baum   : 1945  Referring practitioner: Ines Hsieh PA-C  Date of Initial Visit: Type: THERAPY  Noted: 3/7/2024  Today's Date: 3/28/2024  Patient seen for 5 sessions       Visit Diagnoses:    ICD-10-CM ICD-9-CM   1. Trochanteric bursitis of both hips  M70.61 726.5    M70.62        Visit #: 5    Subjective   Hip and ankle are feeling much better    Objective   See Exercise, Manual, and Modality Logs for complete treatment    Improved hip mobility and ambulation    Assessment/Plan  Patient needs continued skilled Physical Therapy services for decreasing pain and improving mobility, strength, balance and endurance in order to return to work and activities of daily living without pain or dysfunction    Treatment considerations for next visit:  Advance as tolerated    Timed:   Manual Therapy:         mins  22828;     Therapeutic Exercise:    15     mins  95640;     Neuromuscular Ollie:    15    mins  71491;    Therapeutic Activity:     10     mins  91049;     Gait Training:           mins  58174;     Ultrasound:          mins  74460;    Ionto                                   mins   01788  Self Care                            mins   86521  Canalith Repos         mins   59768    Un-Timed:  Electrical Stimulation:         mins  21825 ( );  Dry Needling          mins self-pay  Traction          mins 61058      Timed Treatment:   40   mins   Total Treatment:     50   mins    MICAH Morley, PT  [place] License: [id number]

## 2024-04-04 ENCOUNTER — TELEPHONE (OUTPATIENT)
Dept: CARDIOLOGY | Facility: CLINIC | Age: 79
End: 2024-04-04

## 2024-04-04 ENCOUNTER — TREATMENT (OUTPATIENT)
Dept: PHYSICAL THERAPY | Facility: CLINIC | Age: 79
End: 2024-04-04
Payer: MEDICARE

## 2024-04-04 DIAGNOSIS — M70.62 TROCHANTERIC BURSITIS OF BOTH HIPS: Primary | ICD-10-CM

## 2024-04-04 DIAGNOSIS — M70.61 TROCHANTERIC BURSITIS OF BOTH HIPS: Primary | ICD-10-CM

## 2024-04-04 PROCEDURE — 97112 NEUROMUSCULAR REEDUCATION: CPT | Performed by: PHYSICAL THERAPIST

## 2024-04-04 PROCEDURE — 97530 THERAPEUTIC ACTIVITIES: CPT | Performed by: PHYSICAL THERAPIST

## 2024-04-04 PROCEDURE — 97110 THERAPEUTIC EXERCISES: CPT | Performed by: PHYSICAL THERAPIST

## 2024-04-04 NOTE — TELEPHONE ENCOUNTER
Jay Jay patient today regarding the new address for the [place].  Patient has an appointment on 01/30/2025 at 11:30am.  Patient made aware of appointment, new address and directions. Patient understands.[email or web address]

## 2024-04-09 ENCOUNTER — HOSPITAL ENCOUNTER (EMERGENCY)
Facility: HOSPITAL | Age: 79
Discharge: HOME | End: 2024-04-10
Attending: EMERGENCY MEDICINE
Payer: OTHER MISCELLANEOUS

## 2024-04-09 ENCOUNTER — APPOINTMENT (OUTPATIENT)
Dept: GENERAL RADIOLOGY | Facility: HOSPITAL | Age: 79
End: 2024-04-09
Payer: OTHER MISCELLANEOUS

## 2024-04-09 ENCOUNTER — APPOINTMENT (OUTPATIENT)
Dept: CT IMAGING | Facility: HOSPITAL | Age: 79
End: 2024-04-09
Payer: OTHER MISCELLANEOUS

## 2024-04-09 DIAGNOSIS — Z86.39 HISTORY OF DIABETES MELLITUS: ICD-10-CM

## 2024-04-09 DIAGNOSIS — Z86.79 HISTORY OF CORONARY ARTERY DISEASE: ICD-10-CM
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
## 2024-04-09 DIAGNOSIS — Z86.39 HISTORY OF HYPERLIPIDEMIA: ICD-10-CM

## 2024-04-09 DIAGNOSIS — S46.911A STRAIN OF RIGHT SHOULDER, INITIAL ENCOUNTER: ICD-10-CM

## 2024-04-09 DIAGNOSIS — S00.03XA HEMATOMA OF FRONTAL SCALP, INITIAL ENCOUNTER: ICD-10-CM

## 2024-04-09 DIAGNOSIS — S09.90XA INJURY OF HEAD, INITIAL ENCOUNTER: ICD-10-CM

## 2024-04-09 DIAGNOSIS — Z86.79 HISTORY OF HYPERTENSION: ICD-10-CM

## 2024-04-09 DIAGNOSIS — Z79.82 ASPIRIN LONG-TERM USE: ICD-10-CM

## 2024-04-09 DIAGNOSIS — S62.111A CLOSED DISPLACED FRACTURE OF TRIQUETRUM OF RIGHT WRIST, INITIAL ENCOUNTER: ICD-10-CM

## 2024-04-09 PROCEDURE — 70450 CT HEAD/BRAIN W/O DYE: CPT

## 2024-04-09 PROCEDURE — 99284 EMERGENCY DEPT VISIT MOD MDM: CPT

## 2024-04-09 PROCEDURE — 73110 X-RAY EXAM OF WRIST: CPT

## 2024-04-09 PROCEDURE — 73030 X-RAY EXAM OF SHOULDER: CPT

## 2024-04-09 PROCEDURE — 72125 CT NECK SPINE W/O DYE: CPT

## 2024-04-10 VITALS
TEMPERATURE: 98 F | SYSTOLIC BLOOD PRESSURE: 134 MMHG | OXYGEN SATURATION: 96 % | BODY MASS INDEX: 23.1 KG/M2 | DIASTOLIC BLOOD PRESSURE: 80 MMHG | RESPIRATION RATE: 18 BRPM | WEIGHT: 165 LBS | HEIGHT: 71 IN | HEART RATE: 66 BPM

## 2024-04-10 PROCEDURE — 25010000002 TETANUS-DIPHTH-ACELL PERTUSSIS 5-2.5-18.5 LF-MCG/0.5 SUSPENSION PREFILLED SYRINGE: Performed by: PHYSICIAN ASSISTANT

## 2024-04-10 PROCEDURE — 90471 IMMUNIZATION ADMIN: CPT | Performed by: PHYSICIAN ASSISTANT

## 2024-04-10 PROCEDURE — 90715 TDAP VACCINE 7 YRS/> IM: CPT | Performed by: PHYSICIAN ASSISTANT

## 2024-04-10 RX ADMIN — TETANUS TOXOID, REDUCED DIPHTHERIA TOXOID AND ACELLULAR PERTUSSIS VACCINE, ADSORBED 0.5 ML: 5; 2.5; 8; 8; 2.5 SUSPENSION INTRAMUSCULAR at 01:39

## 2024-04-10 NOTE — DISCHARGE INSTRUCTIONS
ER evaluation reveals normal CT of the brain and cervical spine without contrast.  X-ray of the right shoulder showed no acute bony abnormality.  X-ray of the right wrist revealed a minimally displaced triquetral fracture.  We applied Ortho-Glass splinting and patient was neurovascularly intact.  We will give head injury instructions and recommend close orthopedic follow-up with Dr. Montes due to fracture in the right wrist/hand.  Tylenol every 4-6 hours as needed for pain.  Return to the ER if worsening symptoms.  Continue with all other current medical management.  Tetanus status was updated during the ER course.

## 2024-04-10 NOTE — ED PROVIDER NOTES
Subjective   History of Present Illness  This is a pleasant 79-year-old male that presents the ER after fall injury that occurred approximately 7 hours ago.  Patient [place] the [place] in [place], [place].  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  Patient moved a vehicle and got out and the concrete parking stop was turned perpendicular to the front of the vehicle.  Patient did not see that it was in front of him, and he tripped over the concrete marker and fell and struck his head on a metal trash can.  He also fell onto outstretched right upper extremity.  He denied any loss of consciousness.  He reports hematoma with superficial abrasion to the right frontal scalp.  He reports mild generalized headache.  He took Tylenol with some improvement in headache but now mild headache has returned.  He also reports some nausea.  He denies any vomiting or seizure activity.  He denies any neck pain.  He does report right shoulder pain and right wrist pain.  There is no swelling to the right wrist and no obvious deformity.  Patient is right-handed.  He is uncertain of his tetanus status is up-to-date.  Patient takes aspirin 81 mg by mouth daily but no other chronic anticoagulation.  Past medical history is significant for coronary artery disease, hypertension, hyperlipidemia, osteoporosis, rheumatoid arthritis, BPH, diabetes mellitus, chronic venous stasis, hypothyroidism, claustrophobia, and stage III chronic kidney disease.    History provided by:  Patient  Fall  Mechanism of injury: fall    Injury location:  Head/neck  Head/neck injury location: Right frontal scalp.  Time since incident:  7 hours  Arrived directly from scene: no    Fall:     Fall occurred:  Standing    Impact surface: Patient believes he struck his head on a metal garbage can.    Point of impact:  Head and outstretched arms (Outstretched right upper extremity)  Suspicion of alcohol use: no    Suspicion of drug use: no    Tetanus status:   Out of date  Prior to arrival data:     Patient ambulatory at scene: yes      Responsiveness at scene:  Alert    Orientation at scene:  Person, place, situation and time    Loss of consciousness: no      Amnesic to event: no    Associated symptoms: headaches (Mild generalized headache) and nausea    Associated symptoms: no abdominal pain, no back pain, no chest pain, no hearing loss, no loss of consciousness, no neck pain (No particular C-spine tenderness), no seizures and no vomiting    Risk factors: no anticoagulation therapy    Risk factors comment:  Aspirin 81 mg by mouth daily      Review of Systems   Constitutional: Negative.    HENT:  Negative for congestion, hearing loss and nosebleeds.    Eyes: Negative.  Negative for visual disturbance.   Respiratory: Negative.     Cardiovascular:  Negative for chest pain.   Gastrointestinal:  Positive for nausea. Negative for abdominal pain and vomiting.   Genitourinary: Negative.    Musculoskeletal:  Positive for arthralgias (Right wrist pain, right shoulder pain status post fall earlier today). Negative for back pain, gait problem, joint swelling, neck pain (No particular C-spine tenderness) and neck stiffness.   Skin:  Positive for color change (Hematoma to right frontal scalp) and wound (Abrasion to right frontal scalp).   Neurological:  Positive for headaches (Mild generalized headache). Negative for dizziness, seizures, loss of consciousness, syncope, facial asymmetry, speech difficulty and weakness.        No LOC after fall/head injury.  Mild generalized headache.  Hematoma to right frontal scalp.   All other systems reviewed and are negative.      Past Medical History:   Diagnosis Date    BPH (benign prostatic hyperplasia)     Bursitis of hip     CAD (coronary artery disease)     Chronic venous stasis     CKD (chronic kidney disease), stage III     Claustrophobia     Diabetes mellitus     GERD (gastroesophageal reflux disease)     HLD (hyperlipidemia)     HTN  (hypertension)     Hypothyroidism     Knee swelling     Myocardial infarction 2018    OA (osteoarthritis) of ankle/foot     OA (osteoarthritis) of hip     Osteoporosis     RA (rheumatoid arthritis)        Allergies   Allergen Reactions    Hydrocodone-Acetaminophen Anxiety    Lipitor [Atorvastatin] Anxiety and Myalgia           Lortab [Hydrocodone-Acetaminophen] Anxiety    Azithromycin Nausea Only       Past Surgical History:   Procedure Laterality Date    CARDIAC CATHETERIZATION N/A 01/24/2018    Procedure: Left Heart Cath;  Surgeon: Susannah Rasmussen MD;  Location:  [facility];  Service:     CARDIAC CATHETERIZATION N/A 07/01/2019    Procedure: Left Heart Cath;  Surgeon: Susannah Rasmussen MD;  Location:  [facility];  Service: Cardiovascular    COLONOSCOPY      CORONARY ANGIOPLASTY WITH STENT PLACEMENT      X2    CORONARY ARTERY BYPASS GRAFT N/A 01/26/2018    Procedure: CORONARY ARTERY BYPASS X 2 WITH INTERNAL MAMMARY ARTERY GRAFT, ENDOSCOPIC VEIN HARVESTING UTILIZING THE LEFT GREATER SAPPHENOUS VEIN  CYSTO BY DR WATERS;  Surgeon: Casey Morales MD;  Location:  [facility];  Service:     CYSTOSCOPY TRANSURETHRAL RESECTION OF PROSTATE N/A 11/01/2016    Procedure: CYSTOSCOPY TRANSURETHRAL RESECTION OF PROSTATE GREENLIGHT;  Surgeon: Alverto Rihcards MD;  Location:  [facility];  Service:     TONSILLECTOMY      TRIGGER POINT INJECTION      UPPER GASTROINTESTINAL ENDOSCOPY         Family History   Problem Relation Age of Onset    Stroke Mother     Hypertension Mother     Osteoarthritis Mother     Esophageal cancer Father     Diabetes Father     Heart disease Father     Hypertension Father     Heart attack Father     Osteoarthritis Father     Colon polyps Maternal Uncle     Colon cancer Maternal Uncle     No Known Problems Maternal Grandmother     No Known Problems Maternal Grandfather     Stroke Paternal Grandmother     Stroke Paternal Grandfather        Social History     Socioeconomic History     Marital status:      Spouse name: N/A    Number of children: 1    Years of education: H.S.    Highest education level: High school graduate   Tobacco Use    Smoking status: Former     Current packs/day: 0.00     Average packs/day: 1 pack/day for 20.0 years (20.0 ttl pk-yrs)     Types: Cigarettes     Start date: 1974     Quit date: 1994     Years since quittin.2    Smokeless tobacco: Never   Vaping Use    Vaping status: Never Used   Substance and Sexual Activity    Alcohol use: Not Currently     Comment: occas    Drug use: Never    Sexual activity: Not Currently     Partners: Female           Objective   Physical Exam  Vitals and nursing note reviewed.   Constitutional:       General: He is not in acute distress.     Appearance: Normal appearance. He is not ill-appearing, toxic-appearing or diaphoretic.      Comments: Pleasant talkative.  No acute sign of pain or distress.  Nontoxic   HENT:      Head: Normocephalic and atraumatic. Abrasion and contusion present. No laceration.      Jaw: There is normal jaw occlusion. No tenderness or pain on movement.        Nose: Nose normal.      Mouth/Throat:      Mouth: Mucous membranes are moist.      Pharynx: Oropharynx is clear.   Eyes:      Extraocular Movements: Extraocular movements intact.      Right eye: Normal extraocular motion.      Left eye: Normal extraocular motion.      Conjunctiva/sclera: Conjunctivae normal.      Pupils: Pupils are equal, round, and reactive to light.      Comments: Pupils equal round reactive to light.  Extraocular movements intact   Neck:      Comments: No particular C-spine tenderness.  Full range of motion  Cardiovascular:      Rate and Rhythm: Normal rate and regular rhythm. No extrasystoles are present.     Pulses: Normal pulses.           Radial pulses are 2+ on the right side and 2+ on the left side.      Heart sounds: Normal heart sounds.      Comments: Strong right radial and ulnar pulses and brisk capillary  refill  Pulmonary:      Effort: Pulmonary effort is normal.      Breath sounds: Normal breath sounds.      Comments: Lungs are clear to auscultation bilaterally  Chest:      Chest wall: No deformity, swelling, tenderness or crepitus.      Comments: No chest wall tenderness.  No bruising or sign of trauma  Abdominal:      General: Bowel sounds are normal. There is no distension. There are no signs of injury.      Palpations: Abdomen is soft.      Tenderness: There is no abdominal tenderness. There is no right CVA tenderness, left CVA tenderness, guarding or rebound.      Comments: Abdomen soft and nontender.  No flank or CVA tenderness   Musculoskeletal:         General: Normal range of motion.      Right shoulder: Tenderness and bony tenderness present. No swelling, deformity, effusion, laceration or crepitus. Normal range of motion. Normal strength. Normal pulse.      Right wrist: Tenderness and bony tenderness present. No swelling, deformity, lacerations, snuff box tenderness or crepitus. Normal range of motion. Normal pulse.        Arms:       Cervical back: Normal range of motion and neck supple. No pain with movement, spinous process tenderness or muscular tenderness.      Right lower leg: No edema.      Left lower leg: No edema.      Comments: Tenderness to palpation to the dorsal aspect of the hand towards the thumb and tenderness to the right distal radius.  No swelling or obvious deformity.  Full range of motion, both extension and flexion as well as pronation and supination.  Tenderness to the right anterior shoulder.  No clavicular tenderness.  No AC drop-off.  Full, active range of motion.  No other bony tenderness to upper or lower extremities.  No C, T, or LS spinal tenderness.   Skin:     General: Skin is warm and dry.      Findings: Abrasion and bruising present.      Comments: Right frontal scalp hematoma with abrasion   Neurological:      General: No focal deficit present.      Mental Status: He is  alert and oriented to person, place, and time.      Cranial Nerves: Cranial nerves 2-12 are intact.      Sensory: Sensation is intact.      Motor: Motor function is intact.      Coordination: Coordination is intact.      Gait: Gait is intact.      Comments: Alert and oriented x 3.  Neuro intact and nonfocal.         Splint - Cast - Strapping    Date/Time: 4/10/2024 1:37 AM    Performed by: Debby Phillips PA-C  Authorized by: Jose Espinoza MD    Consent:     Consent obtained:  Verbal    Consent given by:  Patient    Risks, benefits, and alternatives were discussed: yes      Risks discussed:  Discoloration, numbness, pain and swelling    Alternatives discussed:  No treatment  Universal protocol:     Patient identity confirmed:  Verbally with patient  Pre-procedure details:     Distal neurologic exam:  Normal    Distal perfusion: distal pulses strong and brisk capillary refill    Procedure details:     Location:  Wrist    Wrist location:  R wrist    Strapping: yes      Cast type:  Short arm    Splint type:  Thumb spica and ulnar gutter    Supplies:  Cotton padding (Ortho glass)    Attestation: Splint applied and adjusted personally by me    Post-procedure details:     Distal neurologic exam:  Normal    Distal perfusion: distal pulses strong and brisk capillary refill      Procedure completion:  Tolerated well, no immediate complications    Post-procedure imaging: not applicable               ED Course  ED Course as of 04/10/24 0142   Wed Apr 10, 2024   0136 Vital signs and neurologic exam are within normal limits.  I personally interpreted x-rays of the right shoulder which revealed degenerative changes but no acute bony abnormality and no dislocation.  I also personally interpreted x-ray of the right wrist which showed a triquetral fracture.  Radiologist read the film is minimally displaced triquetral fracture seen in the posterior aspect of the right wrist.  It appears to be a small avulsion type fracture.   CT of the brain and cervical spine without contrast reveals no acute intracranial abnormality.  Patient did have multilevel degenerative changes to the C-spine but there was no compression fracture or traumatic subluxation.  We updated patient's tetanus status.  I discussed the case with Dr. Espinoza ER attending physician.  He recommended Ortho-Glass splint, using ulnar gutter and thumb spica.  I personally applied the splint with increased Webril.  Patient was neurovascularly intact.  Recommend Tylenol every 4-6 hours as needed for pain.  We will give head injury instructions.  Recommend close orthopedic follow-up with Dr. Montes, for recheck and treatment plan options.  Return to the ER if worsening symptoms. [FC]   0138 See procedure note for details on splinting of the right wrist and hand.  Patient agreeable with close orthopedic follow-up with Dr. Montes.  Patient ready for discharge to home. [FC]      ED Course User Index  [FC] Debby Phillips, MINOR                                   No results found for this or any previous visit (from the past 24 hour(s)).  Note: In addition to lab results from this visit, the labs listed above may include labs taken at another facility or during a different encounter within the last 24 hours. Please correlate lab times with ED admission and discharge times for further clarification of the services performed during this visit.    XR Shoulder 2+ View Right   Final Result   Impression:   No acute osseous abnormality         Electronically Signed: Shaan Crandall MD     4/9/2024 11:48 PM EDT     Workstation ID: [id number]      XR Wrist 3+ View Right   Final Result   Impression:   Minimally displaced triquetral fracture seen in the posterior aspect of the right wrist.         Electronically Signed: Shaan Crandall MD     4/9/2024 11:47 PM EDT     Workstation ID: [id number]      CT Head Without Contrast   Final Result   Impression:   Atrophy. No acute intracranial process             Electronically Signed: Shaan Crandall MD     4/9/2024 11:42 PM EDT     Workstation ID: [id number]      CT Cervical Spine Without Contrast   Final Result   Impression:   Multilevel degenerative disease of the cervical spine. No acute bony abnormality.            Electronically Signed: Shaan Crandall MD     4/9/2024 11:45 PM EDT     Workstation ID: [id number]        Vitals:    04/10/24 0025 04/10/24 0026 04/10/24 0030 04/10/24 0100   BP: 134/81  132/86 162/88   BP Location:       Patient Position:       Pulse:  67 65 62   Resp:       Temp:       TempSrc:       SpO2:  97% 95% 96%   Weight:       Height:         Medications   Tetanus-Diphth-Acell Pertussis (BOOSTRIX) injection 0.5 mL (0.5 mL Intramuscular Given 4/10/24 0139)     ECG/EMG Results (last 24 hours)       ** No results found for the last 24 hours. **          No orders to display                 Medical Decision Making  Amount and/or Complexity of Data Reviewed  Radiology: ordered.    Risk  Prescription drug management.        Final diagnoses:   Fall, initial encounter   Injury of head, initial encounter   Hematoma of frontal scalp, initial encounter   Closed displaced fracture of triquetrum of right wrist, initial encounter   Strain of right shoulder, initial encounter   Aspirin long-term use   History of hypertension   History of hyperlipidemia   History of coronary artery disease   History of diabetes mellitus       ED Disposition  ED Disposition       ED Disposition   Discharge    Condition   Stable    Comment   --               Shaan Montes MD  [address] [place] [address]  [phone]    Call today  Call today for evaluation and treatment plan options    Holli Fletcher MD  [address] [facility] [address]  [place] [id number]  [phone]    Call today  Call today for close follow-up on head injury    [facility] EM[facility] DEPARTMENT  [address] [facility] [address] [phone]    If symptoms worsen          Medication List        Changed      amLODIPine 5 MG tablet  Commonly known as: NORVASC  Take 1 tablet by mouth Daily.  What changed: how much to take                 Debby Phillips PA-C  04/10/24 0142

## 2024-04-12 NOTE — PROGRESS NOTES
Physical Therapy Daily Treatment Note  [address] [facility], [address]  [place] [address]    Patient: Rodrigo Baum   : 1945  Referring practitioner: Ines Hsieh PA-C  Date of Initial Visit: Type: THERAPY  Noted: 3/7/2024  Today's Date: 2024  Patient seen for 6 sessions       Visit Diagnoses:    ICD-10-CM ICD-9-CM   1. Trochanteric bursitis of both hips  M70.61 726.5    M70.62        Visit #: 6    Subjective   Feeling a whole lot better    Objective   See Exercise, Manual, and Modality Logs for complete treatment    Ambulating without antalgia  Minimal TTP left hip    Assessment/Plan  Patient needs continued skilled Physical Therapy services for decreasing pain and improving mobility, strength, balance and endurance in order to return to work and activities of daily living without pain or dysfunction    Treatment considerations for next visit:  Advance as tolerated     Timed:   Manual Therapy:         mins  75529;     Therapeutic Exercise:    15     mins  19051;     Neuromuscular Ollie:    15    mins  66690;    Therapeutic Activity:     10     mins  39999;     Gait Training:           mins  74202;     Ultrasound:          mins  35015;    Ionto                                   mins   79410  Self Care                            mins   93167  Canalith Repos         mins   81697    Un-Timed:  Electrical Stimulation:         mins  64647 ( );  Dry Needling          mins self-pay  Traction          mins [id number]      Timed Treatment:   40   mins   Total Treatment:     50   mins    MICAH Morley, PT  [place] License: 1939

## 2024-05-02 ENCOUNTER — OFFICE VISIT (OUTPATIENT)
Dept: ORTHOPEDIC SURGERY | Facility: CLINIC | Age: 79
End: 2024-05-02
Payer: MEDICARE

## 2024-05-02 VITALS
WEIGHT: 164.9 LBS | DIASTOLIC BLOOD PRESSURE: 86 MMHG | BODY MASS INDEX: 23.09 KG/M2 | SYSTOLIC BLOOD PRESSURE: 136 MMHG | HEIGHT: 71 IN

## 2024-05-02 DIAGNOSIS — M70.62 TROCHANTERIC BURSITIS OF BOTH HIPS: Primary | ICD-10-CM

## 2024-05-02 DIAGNOSIS — M70.61 TROCHANTERIC BURSITIS OF BOTH HIPS: Primary | ICD-10-CM

## 2024-05-02 DIAGNOSIS — M17.0 PRIMARY OSTEOARTHRITIS OF BOTH KNEES: Primary | ICD-10-CM

## 2024-05-02 RX ORDER — LIDOCAINE HYDROCHLORIDE 10 MG/ML
4 INJECTION, SOLUTION EPIDURAL; INFILTRATION; INTRACAUDAL; PERINEURAL
Status: COMPLETED | OUTPATIENT
Start: 2024-05-02 | End: 2024-05-02

## 2024-05-02 RX ORDER — TRIAMCINOLONE ACETONIDE 40 MG/ML
40 INJECTION, SUSPENSION INTRA-ARTICULAR; INTRAMUSCULAR
Status: COMPLETED | OUTPATIENT
Start: 2024-05-02 | End: 2024-05-02

## 2024-05-02 RX ADMIN — TRIAMCINOLONE ACETONIDE 40 MG: 40 INJECTION, SUSPENSION INTRA-ARTICULAR; INTRAMUSCULAR at 11:20

## 2024-05-02 RX ADMIN — LIDOCAINE HYDROCHLORIDE 4 ML: 10 INJECTION, SOLUTION EPIDURAL; INFILTRATION; INTRACAUDAL; PERINEURAL at 11:20

## 2024-05-02 NOTE — PROGRESS NOTES
[facility] [place] [facility] Office Follow Up       Office Follow Up Visit       Patient Name: Rodrigo Baum    Chief Complaint:   Chief Complaint   Patient presents with    Follow-up     3 month recheck- Trochanteric bursitis of both hips       Referring Physician: No ref. provider found    History of Present Illness:   Rodrigo Baum returns to clinic today for bilateral hip pain secondary to greater trochanteric bursitis.  Corticosteroid injection into bilateral greater troch bursa 1/25/2024.  Responded well.  He says the hips have not been painful lately.  He has been dealing with other orthopedic issues including recent wrist fracture. No new changes with the hips. Overall better.      Subjective     Review of Systems   Constitutional: Negative.  Negative for chills, fatigue and fever.   HENT: Negative.  Negative for congestion and dental problem.    Eyes: Negative.  Negative for blurred vision.   Respiratory: Negative.  Negative for shortness of breath.    Cardiovascular: Negative.  Negative for leg swelling.   Gastrointestinal: Negative.  Negative for abdominal pain.   Endocrine: Negative.  Negative for polyuria.   Genitourinary: Negative.  Negative for difficulty urinating.   Musculoskeletal:  Positive for arthralgias.   Skin: Negative.    Allergic/Immunologic: Negative.    Neurological: Negative.    Hematological: Negative.  Negative for adenopathy.   Psychiatric/Behavioral: Negative.  Negative for behavioral problems.         I have reviewed and updated the following portions of the patient's history and review of systems: allergies, current medications, past family history, past medical history, past social history, past surgical history and problem list.    Medications:   Current Outpatient Medications:     amLODIPine (NORVASC) 5 MG tablet, Take 1 tablet by mouth Daily. (Patient taking differently: Take 0.5 tablets by mouth Daily.), Disp:  90 tablet, Rfl: 0    aspirin 81 MG chewable tablet, Chew 1 tablet Daily., Disp: , Rfl:     atropine 1 % ophthalmic solution, , Disp: , Rfl:     Cholecalciferol (VITAMIN D3) 50 MCG (2000 UT) tablet, Take 1 tablet by mouth Daily., Disp: , Rfl:     finasteride (PROSCAR) 5 MG tablet, Take 1 tablet by mouth Daily., Disp: , Rfl:     FOLIC ACID PO, Take 800 mcg by mouth Daily., Disp: , Rfl:     glucosamine-chondroitin 500-400 MG capsule capsule, Take 2 capsules by mouth Daily., Disp: , Rfl:     isosorbide mononitrate (IMDUR) 30 MG 24 hr tablet, Take 1 tablet by mouth Daily., Disp: 30 tablet, Rfl: 5    levothyroxine (SYNTHROID, LEVOTHROID) 112 MCG tablet, Take 1 tablet by mouth Daily., Disp: , Rfl:     Ezvjhws-Zrjhg-Xeiw-PassF-LBalm (MELATONIN + L-THEANINE PO), Take 1 tablet by mouth Daily As Needed., Disp: , Rfl:     metoprolol succinate XL (TOPROL-XL) 25 MG 24 hr tablet, Take 1 tablet by mouth Daily., Disp: 90 tablet, Rfl: 0    Multiple Vitamins-Minerals (CENTRUM SILVER 50+MEN PO), Take 1 tablet by mouth Daily., Disp: , Rfl:     nitroglycerin (NITROSTAT) 0.4 MG SL tablet, Place 1 tablet under the tongue Every 5 (Five) Minutes As Needed for Chest Pain. Take no more than 3 doses in 15 minutes., Disp: 100 tablet, Rfl: 0    omeprazole (priLOSEC) 40 MG capsule, Take 1 capsule by mouth Daily. Indications: Gastroesophageal Reflux Disease, Disp: 30 capsule, Rfl: 2    predniSONE (DELTASONE) 1 MG tablet, Take 2 tablets by mouth Daily., Disp: , Rfl:     rosuvastatin (CRESTOR) 40 MG tablet, Take 1 tablet by mouth Every Night., Disp: 90 tablet, Rfl: 2    tamsulosin (FLOMAX) 0.4 MG capsule 24 hr capsule, Take 1 capsule by mouth 2 (Two) Times a Day., Disp: , Rfl:     vitamin B-12 (CYANOCOBALAMIN) 1000 MCG tablet, Take 1 tablet by mouth Daily., Disp: , Rfl:   No current facility-administered medications for this visit.    Facility-Administered Medications Ordered in Other Visits:     Chlorhexidine Gluconate Cloth 2 % pads 1 application,  "1 application , Topical, Q12H PRN, Sd Mathew PA    Allergies:   Allergies   Allergen Reactions    Hydrocodone-Acetaminophen Anxiety    Lipitor [Atorvastatin] Anxiety and Myalgia           Lortab [Hydrocodone-Acetaminophen] Anxiety    Azithromycin Nausea Only         Objective      Vital Signs:   Vitals:    05/02/24 1119   BP: 136/86   Weight: 74.8 kg (164 lb 14.5 oz)   Height: 180.3 cm (70.98\")       Ortho Exam:  General: no acute distress, comfortable  Vitals reviewed in chart    Musculoskeletal Exam:    SIDE: Bilateral hips    Tenderness: Greater trochanteric bursa    Range of motion measurements (degrees): 0-90  Painful arc of motion: No   No skin changes  No evidence of septic joint      Results Review:  XR Shoulder 2+ View Right  Narrative: XR SHOULDER 2+ VW RIGHT    Date of Exam: 4/9/2024 11:41 PM EDT    Indication: fall injury with pain    Comparison: None available.    Findings:  There is no acute fracture or dislocation. Acromioclavicular and glenohumeral joints appear intact. No erosions. Acromiohumeral and coracoclavicular distances are well-maintained. Mild  acromioclavicular and glenohumeral osteoarthritic changes are   present. The adjacent lung and ribs appear intact.  Impression: Impression:  No acute osseous abnormality    Electronically Signed: Shaan Crandall MD    4/9/2024 11:48 PM EDT    Workstation ID: [id number]  XR Wrist 3+ View Right  Narrative: XR WRIST 3+ VW RIGHT    Date of Exam: 4/9/2024 11:40 PM EDT    Indication: wrist pain s/p fall    Comparison: None available.    Findings:  There is a small avulsion of the triquetral bone seen on the lateral film. The intercarpal relationships are normal. There is mild basal joint arthritic change.  Impression: Impression:  Minimally displaced triquetral fracture seen in the posterior aspect of the right wrist.    Electronically Signed: Shaan Crandall MD    4/9/2024 11:47 PM EDT    Workstation ID: [id number]  CT Cervical Spine Without " Contrast  Narrative: CT CERVICAL SPINE WO CONTRAST    Date of Exam: 4/9/2024 11:28 PM EDT    Indication: fall with forward flexion of neck, r/o Cspine injury.    Comparison: None available.    Technique: Axial CT images were obtained of the cervical spine without contrast administration.  Reconstructed coronal and sagittal images were also obtained. Automated exposure control and iterative construction methods were used.    Findings:  No evidence of fracture or compression deformity. No evidence of spondylolysis.  No significant spondylolisthesis. No evidence of focal lesions. The prevertebral soft tissues appear unremarkable. Surrounding soft tissues appear within normal limits.   Moderate multilevel degenerative changes are present throughout the spine. The lung apices appear clear.  Impression: Impression:  Multilevel degenerative disease of the cervical spine. No acute bony abnormality.    Electronically Signed: Shaan Crandall MD    4/9/2024 11:45 PM EDT    Workstation ID: [id number]  CT Head Without Contrast  Narrative: CT HEAD WO CONTRAST    Date of Exam: 4/9/2024 11:28 PM EDT    Indication: fall, head injury, on ASA.    Comparison: CT head dated February 25, 2023    Technique: Axial CT images were obtained of the head without contrast administration.  Automated exposure control and iterative construction methods were used.    Findings:  There is mild diffuse generalized atrophy. There are low-attenuation areas in the periventricular white matter consistent with chronic microvascular ischemic change. There is no mass, mass effect or midline shift. There are no abnormal extra-axial fluid   collections or areas of acute hemorrhage. The paranasal sinuses are clear. The mastoid air cells are clear.  Impression: Impression:  Atrophy. No acute intracranial process    Electronically Signed: Shaan Crandall MD    4/9/2024 11:42 PM EDT    Workstation ID: [id number]         Assessment / Plan      Assessment:   Diagnoses and all  orders for this visit:    1. Trochanteric bursitis of both hips (Primary)    Other orders  -     - Large Joint Arthrocentesis: bilateral hip joint        Quality Metrics:   BMI:   BMI is within normal parameters. No other follow-up for BMI required.       Tobacco:   Rodrigo Baum  reports that he quit smoking about 30 years ago. His smoking use included cigarettes. He started smoking about 50 years ago. He has a 20 pack-year smoking history. He has never used smokeless tobacco.        Plan:  Bilateral greater trochanteric bursitis responded well to corticosteroid injection 3 months ago.  Recommend repeat corticosteroid injection today.      I discussed with the patient the potential benefits of performing a therapeutic injection of the bilateral greater trochanteric bursa as well as potential risks including but not limited to infection, swelling, pain, bleeding, bruising, nerve/vessel damage, skin color changes, transient elevation in blood glucose levels, and fat atrophy. After informed consent and verifying correct patient, procedure site, and type of procedure, the area was prepped with Hibiclens, ethyl chloride was used to numb the skin. Via the lateral approach, 4cc of 1% lidocaine and  40mg/ml of Kenalog were injected to each site. The patient tolerated the procedure well. There were no complications.       Follow Up:   4 months    Ines Hsieh PA-C  [facility] [place] [facility]    Dictated using Dragon Speech Recognition.

## 2024-06-05 ENCOUNTER — TRANSCRIBE ORDERS (OUTPATIENT)
Dept: ADMINISTRATIVE | Facility: HOSPITAL | Age: 79
End: 2024-06-05
Payer: MEDICARE

## 2024-06-05 DIAGNOSIS — R51.9 RIGHT TEMPORAL HEADACHE: Primary | ICD-10-CM

## 2024-06-14 ENCOUNTER — HOSPITAL ENCOUNTER (OUTPATIENT)
Facility: HOSPITAL | Age: 79
Discharge: HOME | End: 2024-06-14
Admitting: INTERNAL MEDICINE
Payer: OTHER MISCELLANEOUS

## 2024-06-14 DIAGNOSIS — R51.9 RIGHT TEMPORAL HEADACHE: ICD-10-CM

## 2024-06-14 PROCEDURE — 70480 CT ORBIT/EAR/FOSSA W/O DYE: CPT

## 2024-09-05 ENCOUNTER — OFFICE VISIT (OUTPATIENT)
Dept: ORTHOPEDIC SURGERY | Facility: CLINIC | Age: 79
End: 2024-09-05
Payer: MEDICARE

## 2024-09-05 VITALS
WEIGHT: 174.4 LBS | SYSTOLIC BLOOD PRESSURE: 102 MMHG | BODY MASS INDEX: 24.42 KG/M2 | HEIGHT: 71 IN | DIASTOLIC BLOOD PRESSURE: 64 MMHG

## 2024-09-05 DIAGNOSIS — M70.62 TROCHANTERIC BURSITIS OF BOTH HIPS: Primary | ICD-10-CM

## 2024-09-05 DIAGNOSIS — M70.61 TROCHANTERIC BURSITIS OF BOTH HIPS: Primary | ICD-10-CM

## 2024-09-05 RX ORDER — LIDOCAINE HYDROCHLORIDE 10 MG/ML
4 INJECTION, SOLUTION EPIDURAL; INFILTRATION; INTRACAUDAL; PERINEURAL
Status: COMPLETED | OUTPATIENT
Start: 2024-09-05 | End: 2024-09-05

## 2024-09-05 RX ORDER — TRIAMCINOLONE ACETONIDE 40 MG/ML
40 INJECTION, SUSPENSION INTRA-ARTICULAR; INTRAMUSCULAR
Status: COMPLETED | OUTPATIENT
Start: 2024-09-05 | End: 2024-09-05

## 2024-09-05 RX ORDER — NAPROXEN 500 MG/1
TABLET ORAL
COMMUNITY
Start: 2024-09-04

## 2024-09-05 RX ADMIN — LIDOCAINE HYDROCHLORIDE 4 ML: 10 INJECTION, SOLUTION EPIDURAL; INFILTRATION; INTRACAUDAL; PERINEURAL at 11:37

## 2024-09-05 RX ADMIN — TRIAMCINOLONE ACETONIDE 40 MG: 40 INJECTION, SUSPENSION INTRA-ARTICULAR; INTRAMUSCULAR at 11:37

## 2024-09-05 NOTE — PROGRESS NOTES
Procedure   - Large Joint Arthrocentesis: bilateral greater trochanteric bursa on 9/5/2024 11:37 AM  Indications: pain  Details: 21 G needle, lateral approach  Medications (Right): 4 mL lidocaine PF 1% 1 %; 40 mg triamcinolone acetonide 40 MG/ML  Medications (Left): 4 mL lidocaine PF 1% 1 %; 40 mg triamcinolone acetonide 40 MG/ML  Procedure, treatment alternatives, risks and benefits explained, specific risks discussed. Consent was given by the patient. Immediately prior to procedure a time out was called to verify the correct patient, procedure, equipment, support staff and site/side marked as required. Patient was prepped and draped in the usual sterile fashion.

## 2024-09-05 NOTE — PROGRESS NOTES
[facility] [place] [facility] Office Follow Up       Office Follow Up Visit       Patient Name: Rodrigo Baum    Chief Complaint:   Chief Complaint   Patient presents with    Follow-up     4 month follow up - Trochanteric bursitis of both hips       Referring Physician: No ref. provider found    History of Present Illness:   Rodrigo Baum returns to clinic today for follow-up of bilateral hip pain.  Ongoing for the last several years.  Last visit on 5/2/2024.  Bilateral hip corticosteroid injections.  Trochanteric bursitis at that time.  Responded well.  Pain has returned.      Subjective     Review of Systems   Constitutional: Negative.  Negative for chills, fatigue and fever.   HENT: Negative.  Negative for congestion and dental problem.    Eyes: Negative.  Negative for blurred vision.   Respiratory: Negative.  Negative for shortness of breath.    Cardiovascular: Negative.  Negative for leg swelling.   Gastrointestinal: Negative.  Negative for abdominal pain.   Endocrine: Negative.  Negative for polyuria.   Genitourinary: Negative.  Negative for difficulty urinating.   Musculoskeletal:  Positive for arthralgias.   Skin: Negative.    Allergic/Immunologic: Negative.    Neurological: Negative.    Hematological: Negative.  Negative for adenopathy.   Psychiatric/Behavioral: Negative.  Negative for behavioral problems.         I have reviewed and updated the following portions of the patient's history and review of systems: allergies, current medications, past family history, past medical history, past social history, past surgical history and problem list.    Medications:   Current Outpatient Medications:     amLODIPine (NORVASC) 5 MG tablet, Take 1 tablet by mouth Daily. (Patient taking differently: Take 0.5 tablets by mouth Daily.), Disp: 90 tablet, Rfl: 0    aspirin 81 MG chewable tablet, Chew 1 tablet Daily., Disp: , Rfl:     atropine 1 % ophthalmic  solution, , Disp: , Rfl:     Cholecalciferol (VITAMIN D3) 50 MCG (2000 UT) tablet, Take 1 tablet by mouth Daily., Disp: , Rfl:     finasteride (PROSCAR) 5 MG tablet, Take 1 tablet by mouth Daily., Disp: , Rfl:     FOLIC ACID PO, Take 800 mcg by mouth Daily., Disp: , Rfl:     glucosamine-chondroitin 500-400 MG capsule capsule, Take 2 capsules by mouth Daily., Disp: , Rfl:     isosorbide mononitrate (IMDUR) 30 MG 24 hr tablet, Take 1 tablet by mouth Daily., Disp: 30 tablet, Rfl: 5    levothyroxine (SYNTHROID, LEVOTHROID) 112 MCG tablet, Take 1 tablet by mouth Daily., Disp: , Rfl:     Wsdxuiz-Qwunb-Cmer-PassF-LBalm (MELATONIN + L-THEANINE PO), Take 1 tablet by mouth Daily As Needed., Disp: , Rfl:     metoprolol succinate XL (TOPROL-XL) 25 MG 24 hr tablet, Take 1 tablet by mouth Daily., Disp: 90 tablet, Rfl: 0    Multiple Vitamins-Minerals (CENTRUM SILVER 50+MEN PO), Take 1 tablet by mouth Daily., Disp: , Rfl:     naproxen (NAPROSYN) 500 MG tablet, , Disp: , Rfl:     nitroglycerin (NITROSTAT) 0.4 MG SL tablet, Place 1 tablet under the tongue Every 5 (Five) Minutes As Needed for Chest Pain. Take no more than 3 doses in 15 minutes., Disp: 100 tablet, Rfl: 0    omeprazole (priLOSEC) 40 MG capsule, Take 1 capsule by mouth Daily. Indications: Gastroesophageal Reflux Disease, Disp: 30 capsule, Rfl: 2    predniSONE (DELTASONE) 1 MG tablet, Take 2 tablets by mouth Daily., Disp: , Rfl:     rosuvastatin (CRESTOR) 40 MG tablet, Take 1 tablet by mouth Every Night., Disp: 90 tablet, Rfl: 2    tamsulosin (FLOMAX) 0.4 MG capsule 24 hr capsule, Take 1 capsule by mouth 2 (Two) Times a Day., Disp: , Rfl:     vitamin B-12 (CYANOCOBALAMIN) 1000 MCG tablet, Take 1 tablet by mouth Daily., Disp: , Rfl:   No current facility-administered medications for this visit.    Facility-Administered Medications Ordered in Other Visits:     Chlorhexidine Gluconate Cloth 2 % pads 1 application, 1 application , Topical, Q12H PRN, Sd Mathew  "PA    Allergies:   Allergies   Allergen Reactions    Hydrocodone-Acetaminophen Anxiety    Lipitor [Atorvastatin] Anxiety and Myalgia           Lortab [Hydrocodone-Acetaminophen] Anxiety    Azithromycin Nausea Only         Objective      Vital Signs:   Vitals:    09/05/24 1123   BP: 102/64   Weight: 79.1 kg (174 lb 6.4 oz)   Height: 180.3 cm (70.98\")       Ortho Exam:  General: no acute distress, comfortable  Vitals reviewed in chart    Musculoskeletal Exam:    SIDE: Bilateral hips    Tenderness: Greater trochanter bilaterally    Range of motion measurements (degrees): 0-90  Painful arc of motion: No  No evidence of septic joint      Results Review:  None    Assessment / Plan      Assessment:   Diagnoses and all orders for this visit:    1. Trochanteric bursitis of both hips (Primary)    Other orders  -     - Large Joint Arthrocentesis: bilateral greater trochanteric bursa        Quality Metrics:   BMI:   BMI is within normal parameters. No other follow-up for BMI required.       Tobacco:   Rodrigo Baum  reports that he quit smoking about 30 years ago. His smoking use included cigarettes. He started smoking about 50 years ago. He has a 20 pack-year smoking history. He has never used smokeless tobacco.          Plan:  Bilateral hip trochanteric bursitis.  Responded well to injections in the past.  Last injections 4 months ago.  We will proceed with repeat injections today.    I discussed with the patient the potential benefits of performing a therapeutic injection of the bilateral greater trochanteric bursa as well as potential risks including but not limited to infection, swelling, pain, bleeding, bruising, nerve/vessel damage, skin color changes, transient elevation in blood glucose levels, and fat atrophy. After informed consent and verifying correct patient, procedure site, and type of procedure, the area was prepped with Hibiclens, ethyl chloride was used to numb the skin. Via the lateral approach, 4cc of 1% " lidocaine and  40mg/ml of Kenalog were injected into each bursa. The patient tolerated the procedure well. There were no complications.       Follow Up:   4 months     Ines Hsieh PA-C  [facility] [place] [facility]    Dictated using Dragon Speech Recognition.

## 2024-09-19 ENCOUNTER — CLINICAL SUPPORT (OUTPATIENT)
Dept: ORTHOPEDIC SURGERY | Facility: CLINIC | Age: 79
End: 2024-09-19
Payer: MEDICARE

## 2024-09-19 DIAGNOSIS — M17.0 PRIMARY OSTEOARTHRITIS OF BOTH KNEES: Primary | ICD-10-CM

## 2024-09-26 ENCOUNTER — CLINICAL SUPPORT (OUTPATIENT)
Dept: ORTHOPEDIC SURGERY | Facility: CLINIC | Age: 79
End: 2024-09-26
Payer: MEDICARE

## 2024-09-26 DIAGNOSIS — M17.0 PRIMARY OSTEOARTHRITIS OF BOTH KNEES: Primary | ICD-10-CM

## 2024-10-03 ENCOUNTER — CLINICAL SUPPORT (OUTPATIENT)
Dept: ORTHOPEDIC SURGERY | Facility: CLINIC | Age: 79
End: 2024-10-03
Payer: MEDICARE

## 2024-10-03 DIAGNOSIS — M17.0 PRIMARY OSTEOARTHRITIS OF BOTH KNEES: Primary | ICD-10-CM

## 2024-10-03 NOTE — PROGRESS NOTES
Procedure   - Large Joint Arthrocentesis: bilateral knee on 10/3/2024 11:21 AM  Indications: pain  Details: 21 G needle, anterolateral approach  Medications (Right): 30 mg Hyaluronan 30 MG/2ML  Medications (Left): 30 mg Hyaluronan 30 MG/2ML  Outcome: tolerated well, no immediate complications  Procedure, treatment alternatives, risks and benefits explained, specific risks discussed. Consent was given by the patient. Immediately prior to procedure a time out was called to verify the correct patient, procedure, equipment, support staff and site/side marked as required. Patient was prepped and draped in the usual sterile fashion.        Here today for 3/3 Orthovisc injection into bilateral knees.    Procedure Note:  I discussed with the patient the potential benefits of performing a therapeutic injection of the bilateral knee as well as potential risks including but not limited to infection, swelling, pain, bleeding, bruising, nerve/vessel damage, pseudoseptic reaction, and worsening joint pain. After informed consent and verifying correct patient, procedure site, and type of procedure, the area was prepped with alcohol, ethyl chloride was used to numb the skin. Via the anterolateral approach, the viscosupplementation syringe contents were injected into each knee. The patient tolerated the procedure well. There were no complications. A sterile dressing was placed over the injection site.    Return in 6 months.

## 2024-10-30 ENCOUNTER — HOSPITAL ENCOUNTER (EMERGENCY)
Facility: HOSPITAL | Age: 79
Discharge: HOME | End: 2024-10-30
Attending: EMERGENCY MEDICINE | Admitting: EMERGENCY MEDICINE
Payer: MEDICARE

## 2024-10-30 ENCOUNTER — APPOINTMENT (OUTPATIENT)
Dept: CT IMAGING | Facility: HOSPITAL | Age: 79
End: 2024-10-30
Payer: MEDICARE

## 2024-10-30 VITALS
DIASTOLIC BLOOD PRESSURE: 80 MMHG | WEIGHT: 178 LBS | BODY MASS INDEX: 24.92 KG/M2 | HEART RATE: 67 BPM | TEMPERATURE: 98.9 F | RESPIRATION RATE: 18 BRPM | HEIGHT: 71 IN | SYSTOLIC BLOOD PRESSURE: 135 MMHG | OXYGEN SATURATION: 94 %

## 2024-10-30 DIAGNOSIS — R11.2 NAUSEA AND VOMITING, UNSPECIFIED VOMITING TYPE: Primary | ICD-10-CM

## 2024-10-30 DIAGNOSIS — N20.0 KIDNEY STONE ON LEFT SIDE: ICD-10-CM

## 2024-10-30 LAB
ALBUMIN SERPL-MCNC: 4.1 G/DL (ref 3.5–5.2)
ALBUMIN/GLOB SERPL: 1.8 G/DL
ALP SERPL-CCNC: 72 U/L (ref 39–117)
ALT SERPL W P-5'-P-CCNC: 29 U/L (ref 1–41)
ANION GAP SERPL CALCULATED.3IONS-SCNC: 10 MMOL/L (ref 5–15)
AST SERPL-CCNC: 33 U/L (ref 1–40)
BACTERIA UR QL AUTO: ABNORMAL /HPF
BASOPHILS # BLD AUTO: 0.02 10*3/MM3 (ref 0–0.2)
BASOPHILS NFR BLD AUTO: 0.4 % (ref 0–1.5)
BILIRUB SERPL-MCNC: 2.3 MG/DL (ref 0–1.2)
BILIRUB UR QL STRIP: ABNORMAL
BUN SERPL-MCNC: 26 MG/DL (ref 8–23)
BUN/CREAT SERPL: 28 (ref 7–25)
CALCIUM SPEC-SCNC: 8.7 MG/DL (ref 8.6–10.5)
CHLORIDE SERPL-SCNC: 105 MMOL/L (ref 98–107)
CLARITY UR: ABNORMAL
CO2 SERPL-SCNC: 28 MMOL/L (ref 22–29)
COLOR UR: ABNORMAL
CREAT SERPL-MCNC: 0.93 MG/DL (ref 0.76–1.27)
D-LACTATE SERPL-SCNC: 1.1 MMOL/L (ref 0.5–2)
DEPRECATED RDW RBC AUTO: 46.7 FL (ref 37–54)
EGFRCR SERPLBLD CKD-EPI 2021: 84.6 ML/MIN/1.73
EOSINOPHIL # BLD AUTO: 0.2 10*3/MM3 (ref 0–0.4)
EOSINOPHIL NFR BLD AUTO: 3.9 % (ref 0.3–6.2)
ERYTHROCYTE [DISTWIDTH] IN BLOOD BY AUTOMATED COUNT: 13 % (ref 12.3–15.4)
GLOBULIN UR ELPH-MCNC: 2.3 GM/DL
GLUCOSE SERPL-MCNC: 109 MG/DL (ref 65–99)
GLUCOSE UR STRIP-MCNC: NEGATIVE MG/DL
HCT VFR BLD AUTO: 49.3 % (ref 37.5–51)
HGB BLD-MCNC: 17 G/DL (ref 13–17.7)
HGB UR QL STRIP.AUTO: ABNORMAL
HOLD SPECIMEN: NORMAL
HYALINE CASTS UR QL AUTO: ABNORMAL /LPF
IMM GRANULOCYTES # BLD AUTO: 0.02 10*3/MM3 (ref 0–0.05)
IMM GRANULOCYTES NFR BLD AUTO: 0.4 % (ref 0–0.5)
KETONES UR QL STRIP: ABNORMAL
LEUKOCYTE ESTERASE UR QL STRIP.AUTO: ABNORMAL
LIPASE SERPL-CCNC: 28 U/L (ref 13–60)
LYMPHOCYTES # BLD AUTO: 0.67 10*3/MM3 (ref 0.7–3.1)
LYMPHOCYTES NFR BLD AUTO: 13.1 % (ref 19.6–45.3)
MCH RBC QN AUTO: 33.7 PG (ref 26.6–33)
MCHC RBC AUTO-ENTMCNC: 34.5 G/DL (ref 31.5–35.7)
MCV RBC AUTO: 97.8 FL (ref 79–97)
MONOCYTES # BLD AUTO: 0.61 10*3/MM3 (ref 0.1–0.9)
MONOCYTES NFR BLD AUTO: 11.9 % (ref 5–12)
MUCOUS THREADS URNS QL MICRO: ABNORMAL /HPF
NEUTROPHILS NFR BLD AUTO: 3.59 10*3/MM3 (ref 1.7–7)
NEUTROPHILS NFR BLD AUTO: 70.3 % (ref 42.7–76)
NITRITE UR QL STRIP: NEGATIVE
NRBC BLD AUTO-RTO: 0 /100 WBC (ref 0–0.2)
PH UR STRIP.AUTO: 5.5 [PH] (ref 5–8)
PLATELET # BLD AUTO: 180 10*3/MM3 (ref 140–450)
PMV BLD AUTO: 9.7 FL (ref 6–12)
POTASSIUM SERPL-SCNC: 4.1 MMOL/L (ref 3.5–5.2)
PROT SERPL-MCNC: 6.4 G/DL (ref 6–8.5)
PROT UR QL STRIP: ABNORMAL
RBC # BLD AUTO: 5.04 10*6/MM3 (ref 4.14–5.8)
RBC # UR STRIP: ABNORMAL /HPF
REF LAB TEST METHOD: ABNORMAL
SODIUM SERPL-SCNC: 143 MMOL/L (ref 136–145)
SP GR UR STRIP: 1.03 (ref 1–1.03)
SQUAMOUS #/AREA URNS HPF: ABNORMAL /HPF
UROBILINOGEN UR QL STRIP: ABNORMAL
WBC # UR STRIP: ABNORMAL /HPF
WBC NRBC COR # BLD AUTO: 5.11 10*3/MM3 (ref 3.4–10.8)
WHOLE BLOOD HOLD COAG: NORMAL
WHOLE BLOOD HOLD SPECIMEN: NORMAL

## 2024-10-30 PROCEDURE — 25810000003 SODIUM CHLORIDE 0.9 % SOLUTION: Performed by: EMERGENCY MEDICINE

## 2024-10-30 PROCEDURE — 25510000001 IOPAMIDOL 61 % SOLUTION: Performed by: EMERGENCY MEDICINE

## 2024-10-30 PROCEDURE — 83605 ASSAY OF LACTIC ACID: CPT | Performed by: EMERGENCY MEDICINE

## 2024-10-30 PROCEDURE — 83690 ASSAY OF LIPASE: CPT | Performed by: EMERGENCY MEDICINE

## 2024-10-30 PROCEDURE — 99285 EMERGENCY DEPT VISIT HI MDM: CPT

## 2024-10-30 PROCEDURE — 96374 THER/PROPH/DIAG INJ IV PUSH: CPT

## 2024-10-30 PROCEDURE — 85025 COMPLETE CBC W/AUTO DIFF WBC: CPT | Performed by: EMERGENCY MEDICINE

## 2024-10-30 PROCEDURE — 25010000002 ONDANSETRON PER 1 MG: Performed by: EMERGENCY MEDICINE

## 2024-10-30 PROCEDURE — 81001 URINALYSIS AUTO W/SCOPE: CPT | Performed by: EMERGENCY MEDICINE

## 2024-10-30 PROCEDURE — 74177 CT ABD & PELVIS W/CONTRAST: CPT

## 2024-10-30 PROCEDURE — 80053 COMPREHEN METABOLIC PANEL: CPT | Performed by: EMERGENCY MEDICINE

## 2024-10-30 RX ORDER — ONDANSETRON 4 MG/1
4 TABLET, ORALLY DISINTEGRATING ORAL 4 TIMES DAILY PRN
Qty: 15 TABLET | Refills: 0 | Status: SHIPPED | OUTPATIENT
Start: 2024-10-30

## 2024-10-30 RX ORDER — IOPAMIDOL 612 MG/ML
100 INJECTION, SOLUTION INTRAVASCULAR
Status: COMPLETED | OUTPATIENT
Start: 2024-10-30 | End: 2024-10-30

## 2024-10-30 RX ORDER — SODIUM CHLORIDE 9 MG/ML
10 INJECTION, SOLUTION INTRAMUSCULAR; INTRAVENOUS; SUBCUTANEOUS AS NEEDED
Status: DISCONTINUED | OUTPATIENT
Start: 2024-10-30 | End: 2024-10-30 | Stop reason: HOSPADM

## 2024-10-30 RX ORDER — ONDANSETRON 2 MG/ML
4 INJECTION INTRAMUSCULAR; INTRAVENOUS ONCE
Status: COMPLETED | OUTPATIENT
Start: 2024-10-30 | End: 2024-10-30

## 2024-10-30 RX ADMIN — IOPAMIDOL 85 ML: 612 INJECTION, SOLUTION INTRAVENOUS at 17:17

## 2024-10-30 RX ADMIN — ONDANSETRON 4 MG: 2 INJECTION INTRAMUSCULAR; INTRAVENOUS at 18:44

## 2024-10-30 RX ADMIN — SODIUM CHLORIDE 1000 ML: 9 INJECTION, SOLUTION INTRAVENOUS at 18:43

## 2024-10-30 NOTE — ED PROVIDER NOTES
[place]    EMERGENCY DEPARTMENT ENCOUNTER      Pt Name: Rodrigo Baum  MRN: [id number]  YOB: 1945  Date of evaluation: 10/30/2024  Provider: Hamzah Vazquez DO    CHIEF COMPLAINT       Chief Complaint   Patient presents with    Vomiting     HPI  Stated Reason for Visit: PT PRESENTS TO ED FOR VOMITING THAT BEGAN AT 0600 THIS MORNING. PT STATES HE STOPPED VOMITING AROUND NOON AND GOT TO THE POINT WHERE HIS EMESIS WAS LIKE WATER AND DARK BROWN. History Obtained From: patient     HISTORY OF PRESENT ILLNESS  (Location/Symptom, Timing/Onset, Context/Setting, Quality, Duration, Modifying Factors, Severity.)   Rodrigo Baum is a 79 y.o. male who presents to the emergency department for evaluation of nausea vomiting sebacic like this morning than once per hour for the last next X hours, dark emesis.  Denies any significant abdominal pain, no history of underlying GI issues.  No sick contacts, no recent travel.  He is on baby aspirin, no blood thinners.  He was recently treated for UTI on October 11, 2024 with Keflex, finished out a couple weeks ago.  Retested for UTI last Thursday.  Has had a prior EGD, colonoscopy but has been a couple years.  He has had a cardiac workup, catheterizations and stents, no prior abdominal surgeries.  He notes constipation which is really not abnormal for him, no urinary complaints.  He is unable to tolerate foods or liquids at this time.  He denies any other acute systemic complaints.  No chest pain or difficulty breathing.    Nursing notes were reviewed.      PAST MEDICAL HISTORY     Past Medical History:   Diagnosis Date    BPH (benign prostatic hyperplasia)     Bursitis of hip     CAD (coronary artery disease)     Chronic venous stasis     CKD (chronic kidney disease), stage III     Claustrophobia     Diabetes mellitus     GERD (gastroesophageal reflux disease)     HLD (hyperlipidemia)     HTN (hypertension)     Hypothyroidism     Knee swelling     Myocardial  infarction 2018    OA (osteoarthritis) of ankle/foot     OA (osteoarthritis) of hip     Osteoporosis     RA (rheumatoid arthritis)          SURGICAL HISTORY       Past Surgical History:   Procedure Laterality Date    CARDIAC CATHETERIZATION N/A 01/24/2018    Procedure: Left Heart Cath;  Surgeon: Susannah Rasmussen MD;  Location:  [facility];  Service:     CARDIAC CATHETERIZATION N/A 07/01/2019    Procedure: Left Heart Cath;  Surgeon: Susannah Rasmussen MD;  Location:  [facility];  Service: Cardiovascular    COLONOSCOPY      CORONARY ANGIOPLASTY WITH STENT PLACEMENT      X2    CORONARY ARTERY BYPASS GRAFT N/A 01/26/2018    Procedure: CORONARY ARTERY BYPASS X 2 WITH INTERNAL MAMMARY ARTERY GRAFT, ENDOSCOPIC VEIN HARVESTING UTILIZING THE LEFT GREATER SAPPHENOUS VEIN  CYSTO BY DR WATERS;  Surgeon: Casey Morales MD;  Location:  [facility];  Service:     CYSTOSCOPY TRANSURETHRAL RESECTION OF PROSTATE N/A 11/01/2016    Procedure: CYSTOSCOPY TRANSURETHRAL RESECTION OF PROSTATE GREENLIGHT;  Surgeon: Alverto Richards MD;  Location:  [facility];  Service:     TONSILLECTOMY      TRIGGER POINT INJECTION      UPPER GASTROINTESTINAL ENDOSCOPY           CURRENT MEDICATIONS       Current Facility-Administered Medications:     Sodium Chloride (PF) 0.9 % 10 mL, 10 mL, Intravenous, PRN, [place], Hamzah Triplett, DO    Current Outpatient Medications:     amLODIPine (NORVASC) 5 MG tablet, Take 1 tablet by mouth Daily. (Patient taking differently: Take 0.5 tablets by mouth Daily.), Disp: 90 tablet, Rfl: 0    aspirin 81 MG chewable tablet, Chew 1 tablet Daily., Disp: , Rfl:     atropine 1 % ophthalmic solution, , Disp: , Rfl:     Cholecalciferol (VITAMIN D3) 50 MCG (2000 UT) tablet, Take 1 tablet by mouth Daily., Disp: , Rfl:     finasteride (PROSCAR) 5 MG tablet, Take 1 tablet by mouth Daily., Disp: , Rfl:     FOLIC ACID PO, Take 800 mcg by mouth Daily., Disp: , Rfl:     glucosamine-chondroitin 500-400 MG capsule  capsule, Take 2 capsules by mouth Daily., Disp: , Rfl:     isosorbide mononitrate (IMDUR) 30 MG 24 hr tablet, Take 1 tablet by mouth Daily., Disp: 30 tablet, Rfl: 5    levothyroxine (SYNTHROID, LEVOTHROID) 112 MCG tablet, Take 1 tablet by mouth Daily., Disp: , Rfl:     Xagbhhg-Wjapt-Vwhj-PassF-LBalm (MELATONIN + L-THEANINE PO), Take 1 tablet by mouth Daily As Needed., Disp: , Rfl:     metoprolol succinate XL (TOPROL-XL) 25 MG 24 hr tablet, Take 1 tablet by mouth Daily., Disp: 90 tablet, Rfl: 0    Multiple Vitamins-Minerals (CENTRUM SILVER 50+MEN PO), Take 1 tablet by mouth Daily., Disp: , Rfl:     naproxen (NAPROSYN) 500 MG tablet, , Disp: , Rfl:     nitroglycerin (NITROSTAT) 0.4 MG SL tablet, Place 1 tablet under the tongue Every 5 (Five) Minutes As Needed for Chest Pain. Take no more than 3 doses in 15 minutes., Disp: 100 tablet, Rfl: 0    omeprazole (priLOSEC) 40 MG capsule, Take 1 capsule by mouth Daily. Indications: Gastroesophageal Reflux Disease, Disp: 30 capsule, Rfl: 2    predniSONE (DELTASONE) 1 MG tablet, Take 2 tablets by mouth Daily., Disp: , Rfl:     rosuvastatin (CRESTOR) 40 MG tablet, Take 1 tablet by mouth Every Night., Disp: 90 tablet, Rfl: 2    tamsulosin (FLOMAX) 0.4 MG capsule 24 hr capsule, Take 1 capsule by mouth 2 (Two) Times a Day., Disp: , Rfl:     vitamin B-12 (CYANOCOBALAMIN) 1000 MCG tablet, Take 1 tablet by mouth Daily., Disp: , Rfl:     Facility-Administered Medications Ordered in Other Encounters:     Chlorhexidine Gluconate Cloth 2 % pads 1 application, 1 application , Topical, Q12H PRN, Sd Mathew PA    ALLERGIES     Hydrocodone-acetaminophen, Lipitor [atorvastatin], Lortab [hydrocodone-acetaminophen], and Azithromycin    FAMILY HISTORY       Family History   Problem Relation Age of Onset    Stroke Mother     Hypertension Mother     Osteoarthritis Mother     Esophageal cancer Father     Diabetes Father     Heart disease Father     Hypertension Father     Heart attack Father   "   Osteoarthritis Father     Colon polyps Maternal Uncle     Colon cancer Maternal Uncle     No Known Problems Maternal Grandmother     No Known Problems Maternal Grandfather     Stroke Paternal Grandmother     Stroke Paternal Grandfather           SOCIAL HISTORY       Social History     Socioeconomic History    Marital status:      Spouse name: N/A    Number of children: 1    Years of education: H.S.    Highest education level: High school graduate   Tobacco Use    Smoking status: Former     Current packs/day: 0.00     Average packs/day: 1 pack/day for 20.0 years (20.0 ttl pk-yrs)     Types: Cigarettes     Start date: 1974     Quit date: 1994     Years since quittin.8    Smokeless tobacco: Never   Vaping Use    Vaping status: Never Used   Substance and Sexual Activity    Alcohol use: Not Currently     Comment: occas    Drug use: Never    Sexual activity: Not Currently     Partners: Female         PHYSICAL EXAM    (up to 7 for level 4, 8 or more for level 5)     Vitals:    10/30/24 1517 10/30/24 1830 10/30/24 1845   BP: 127/78 135/80    BP Location: Left arm     Patient Position: Sitting     Pulse: 78 68 67   Resp: 18     Temp: 98.9 °F (37.2 °C)     TempSrc: Oral     SpO2: 98% 95% 94%   Weight: 80.7 kg (178 lb)     Height: 180.3 cm (71\")         Physical Exam  General : Patient is awake, alert, oriented, in no acute distress, nontoxic appearing  HEENT: Pupils are equally round, EOMI, conjunctivae clear  Neck: Neck is supple, full range of motion, trachea midline  Cardiac: Heart regular rate, rhythm, no murmurs, rubs, or gallops  Lungs: Lungs are clear to auscultation, there is no wheezing, rhonchi, or rales. There is no use of accessory muscles  Chest wall: There is no tenderness to palpation over the chest wall or over ribs  Abdomen: Abdomen is soft, nontender, nondistended.  No peritoneal signs examination, bowel sounds are present.  There are no firm or pulsatile masses, no rebound rigidity " or guarding  Musculoskeletal: 5 out of 5 strength in all 4 extremities.  No focal muscle deficits are appreciated  Dermatology: Skin is warm and dry  Psych: Mentation is grossly normal, cognition is grossly normal. Affect is appropriate      DIAGNOSTIC RESULTS     EKG:  All EKGs are interpreted by the Emergency Department Physician who either signs or Co-signs this chart in the absence of a cardiologist.    No orders to display       RADIOLOGY:     [x] Radiologist's Report Reviewed:  CT Abdomen Pelvis With Contrast   Final Result   Impression:   1.No acute abnormality identified within the abdomen or pelvis.   2.5 mm calculus within the left distal ureter (series 2, image 133). No appreciable hydronephrosis.   3.Right common iliac artery aneurysm measuring approximately 2 cm.   4.Cholelithiasis and/or gallbladder sludge.   5.Moderate hiatal hernia.   6.Colonic diverticulosis.   7.Additional findings as detailed above.         Electronically Signed: Thai Brock MD     10/30/2024 5:35 PM EDT     Workstation ID: [id number]          I ordered and independently reviewed the above noted radiographic studies.      I viewed images of CT abdomen/pelvis which showed no acute intra-abdominal pathology, 5 mm left distal ureteral calculus, hiatal hernia per my independent interpretation.    See radiologist's dictation for official interpretation.      ED BEDSIDE ULTRASOUND:   Performed by ED Physician - none    LABS:    I have reviewed and interpreted all of the currently available lab results from this visit (if applicable):  Results for orders placed or performed during the hospital encounter of 10/30/24   Comprehensive Metabolic Panel    Collection Time: 10/30/24  4:05 PM    Specimen: Blood   Result Value Ref Range    Glucose 109 (H) 65 - 99 mg/dL    BUN 26 (H) 8 - 23 mg/dL    Creatinine 0.93 0.76 - 1.27 mg/dL    Sodium 143 136 - 145 mmol/L    Potassium 4.1 3.5 - 5.2 mmol/L    Chloride 105 98 - 107 mmol/L    CO2 28.0 22.0 -  29.0 mmol/L    Calcium 8.7 8.6 - 10.5 mg/dL    Total Protein 6.4 6.0 - 8.5 g/dL    Albumin 4.1 3.5 - 5.2 g/dL    ALT (SGPT) 29 1 - 41 U/L    AST (SGOT) 33 1 - 40 U/L    Alkaline Phosphatase 72 39 - 117 U/L    Total Bilirubin 2.3 (H) 0.0 - 1.2 mg/dL    Globulin 2.3 gm/dL    A/G Ratio 1.8 g/dL    BUN/Creatinine Ratio 28.0 (H) 7.0 - 25.0    Anion Gap 10.0 5.0 - 15.0 mmol/L    eGFR 84.6 >60.0 mL/min/1.73   Lipase    Collection Time: 10/30/24  4:05 PM    Specimen: Blood   Result Value Ref Range    Lipase 28 13 - 60 U/L   Lactic Acid, Plasma    Collection Time: 10/30/24  4:05 PM    Specimen: Blood   Result Value Ref Range    Lactate 1.1 0.5 - 2.0 mmol/L   CBC Auto Differential    Collection Time: 10/30/24  4:05 PM    Specimen: Blood   Result Value Ref Range    WBC 5.11 3.40 - 10.80 10*3/mm3    RBC 5.04 4.14 - 5.80 10*6/mm3    Hemoglobin 17.0 13.0 - 17.7 g/dL    Hematocrit 49.3 37.5 - 51.0 %    MCV 97.8 (H) 79.0 - 97.0 fL    MCH 33.7 (H) 26.6 - 33.0 pg    MCHC 34.5 31.5 - 35.7 g/dL    RDW 13.0 12.3 - 15.4 %    RDW-SD 46.7 37.0 - 54.0 fl    MPV 9.7 6.0 - 12.0 fL    Platelets 180 140 - 450 10*3/mm3    Neutrophil % 70.3 42.7 - 76.0 %    Lymphocyte % 13.1 (L) 19.6 - 45.3 %    Monocyte % 11.9 5.0 - 12.0 %    Eosinophil % 3.9 0.3 - 6.2 %    Basophil % 0.4 0.0 - 1.5 %    Immature Grans % 0.4 0.0 - 0.5 %    Neutrophils, Absolute 3.59 1.70 - 7.00 10*3/mm3    Lymphocytes, Absolute 0.67 (L) 0.70 - 3.10 10*3/mm3    Monocytes, Absolute 0.61 0.10 - 0.90 10*3/mm3    Eosinophils, Absolute 0.20 0.00 - 0.40 10*3/mm3    Basophils, Absolute 0.02 0.00 - 0.20 10*3/mm3    Immature Grans, Absolute 0.02 0.00 - 0.05 10*3/mm3    nRBC 0.0 0.0 - 0.2 /100 WBC   Green Top (Gel)    Collection Time: 10/30/24  4:05 PM   Result Value Ref Range    Extra Tube Hold for add-ons.    Lavender Top    Collection Time: 10/30/24  4:05 PM   Result Value Ref Range    Extra Tube hold for add-on    Gold Top - SST    Collection Time: 10/30/24  4:05 PM   Result Value Ref  Range    Extra Tube Hold for add-ons.    Gray Top    Collection Time: 10/30/24  4:05 PM   Result Value Ref Range    Extra Tube Hold for add-ons.    Light Blue Top    Collection Time: 10/30/24  4:05 PM   Result Value Ref Range    Extra Tube Hold for add-ons.    Urinalysis With Microscopic If Indicated (No Culture) - Urine, Clean Catch    Collection Time: 10/30/24  4:10 PM    Specimen: Urine, Clean Catch   Result Value Ref Range    Color, UA Dark Yellow (A) Yellow, Straw    Appearance, UA Cloudy (A) Clear    pH, UA 5.5 5.0 - 8.0    Specific Gravity, UA 1.032 (H) 1.001 - 1.030    Glucose, UA Negative Negative    Ketones, UA Trace (A) Negative    Bilirubin, UA Small (1+) (A) Negative    Blood, UA Large (3+) (A) Negative    Protein, UA 30 mg/dL (1+) (A) Negative    Leuk Esterase, UA Trace (A) Negative    Nitrite, UA Negative Negative    Urobilinogen, UA 1.0 E.U./dL 0.2 - 1.0 E.U./dL   Urinalysis, Microscopic Only - Urine, Clean Catch    Collection Time: 10/30/24  4:10 PM    Specimen: Urine, Clean Catch   Result Value Ref Range    RBC, UA Too Numerous to Count (A) None Seen, 0-2 /HPF    WBC, UA 6-10 (A) None Seen, 0-2 /HPF    Bacteria, UA None Seen None Seen, Trace /HPF    Squamous Epithelial Cells, UA 7-12 (A) None Seen, 0-2 /HPF    Hyaline Casts, UA None Seen 0 - 6 /LPF    Mucus, UA Large/3+ (A) None Seen, Trace /HPF    Methodology Manual Light Microscopy         If labs were ordered, I independently reviewed the results and considered them in treating the patient.      EMERGENCY DEPARTMENT COURSE and DIFFERENTIAL DIAGNOSIS/MDM:   Vitals:  AS OF 19:41 EDT    BP - 135/80  HR - 67  TEMP - 98.9 °F (37.2 °C) (Oral)  O2 SATS - 94%      Orders placed during this visit:  Orders Placed This Encounter   Procedures    CT Abdomen Pelvis With Contrast    Occidental Draw    Comprehensive Metabolic Panel    Lipase    Urinalysis With Microscopic If Indicated (No Culture) - Urine, Clean Catch    Lactic Acid, Plasma    CBC Auto  Differential    Urinalysis, Microscopic Only - Urine, Clean Catch    NPO Diet NPO Type: Strict NPO    Undress & Gown    Insert Peripheral IV    CBC & Differential    Green Top (Gel)    Lavender Top    Gold Top - SST    Gray Top    Light Blue Top       All labs have been independently reviewed by me.  All radiology studies have been reviewed by me and the radiologist dictating the report.  All EKG's have been independently viewed and interpreted by me.      Discussion below represents my analysis of pertinent findings related to patient's condition, differential diagnosis, treatment plan and final disposition.    Differential diagnosis:  The differential diagnosis associated with the patient's presentation includes: Nausea vomiting, gastroenteritis, pancreatitis, dehydration, electrolyte abnormalities    Additional sources  Discussed/ obtained information from independent historians:   [] Spouse  [] Parent  [] Family member  [] Friend  [] EMS   [] Other:    External (non-ED) record review:   [] Inpatient record:   [] Office record:   [] Outpatient record:   [] Prior Outpatient labs:   [] Prior Outpatient radiology:   [] Primary Care record:   [] Outside ED record:   [] Other:     Patient's care impacted by:   [] Diabetes  [] Hypertension  [] CHF  [] Hyperlipidemia  [] Coronary Artery Disease   [] COPD   [] Cancer   [] Tobacco Abuse   [] Substance Abuse    [] Other:     Care significantly affected by Social Determinants of Health (housing and economic circumstances, unemployment)    [] Yes     [x] No   If yes, Patient's care significantly limited by Social Determinants of Health including:   [] Inadequate housing   [] Low income   [] Alcoholism and drug addiction in family   [] Problems related to primary support group   [] Unemployment   [] Problems related to employment   [] Other Social Determinants of Health:       MEDICATIONS ADMINISTERED IN ED:  Medications   Sodium Chloride (PF) 0.9 % 10 mL (has no  administration in time range)   ondansetron (ZOFRAN) injection 4 mg (4 mg Intravenous Given 10/30/24 1844)   sodium chloride 0.9 % bolus 1,000 mL (0 mL Intravenous Stopped 10/30/24 1935)   iopamidol (ISOVUE-300) 61 % injection 100 mL (85 mL Intravenous Given 10/30/24 1717)              This is a very pleasant 79-year-old male with nausea and vomiting since earlier this morning, dark in nature, nontoxic-appearing, no history of significant GI issues.  He is stable vital signs on my assessment, no peritoneal signs on physical examination.  We did proceed forward with blood work labs and image including CT abdomen/pelvis for further assessment.  Results as above.  Labs with a white count of 5.1 with a stable H&H, lactic acid, kidney function liver function are normal.  Urinalysis without infection etiology.  CT scan of the abdomen/pelvis with a 5 mm left distal ureteral stone, no signs of significant hydronephrosis, mild hiatal hernia is noted, patient is nontoxic-appearing, no signs of bowel obstruction.  On reassessment is resting comfortably.  We discussed giving him symptomatic treatments.  Maintain good fluid hydration, advancing his diet over the next few days, following closely with his PCP, return to the ED with any worsening symptoms or further concerns in the meantime.    I had a discussion with the patient/family regarding diagnosis, diagnostic results, treatment plan, and medications.  The patient/family indicated understanding of these instructions.  I spent adequate time at the bedside preceding discharge necessary to personally discuss the aftercare instructions, giving patient education, providing explanations of the results of our evaluations/findings, and my decision making to assure that the patient/family understand the plan of care.  Time was allotted to answer questions at that time and throughout the ED course.  Emphasis was placed on timely follow-up after discharge.  I also discussed the  potential for the development of an acute emergent condition requiring further evaluation, admission, or even surgical intervention. I discussed that we found nothing during the visit today indicating the need for further workup, admission, or the presence of an unstable medical condition.  I encouraged the patient to return to the emergency department immediately for ANY concerns, worsening, new complaints, or if symptoms persist and unable to seek follow-up in a timely fashion.  The patient/family expressed understanding and agreement with this plan.  The patient will follow-up with their PCP in 1-2 days for reevaluation.     PROCEDURES:  Procedures    CRITICAL CARE TIME    Total Critical Care time was 0 minutes, excluding separately reportable procedures.   There was a high probability of clinically significant/life threatening deterioration in the patient's condition which required my urgent intervention.      FINAL IMPRESSION      1. Nausea and vomiting, unspecified vomiting type    2. Kidney stone on left side          DISPOSITION/PLAN     ED Disposition       ED Disposition   Discharge    Condition   Stable    Comment   --               PATIENT REFERRED TO:  Holli Fletcher MD  [address] [address]  [phone]    In 2 days      [facility] EMERGENCY DEPARTMENT  [address] [facility] [address] [phone]    If symptoms worsen      DISCHARGE MEDICATIONS:     Medication List        CHANGE how you take these medications      amLODIPine 5 MG tablet  Commonly known as: NORVASC  Take 1 tablet by mouth Daily.  What changed: how much to take            CONTINUE taking these medications      aspirin 81 MG chewable tablet     atropine 1 % ophthalmic solution     finasteride 5 MG tablet  Commonly known as: PROSCAR     FOLIC ACID PO     glucosamine-chondroitin 500-400 MG capsule capsule     isosorbide mononitrate 30 MG 24 hr tablet  Commonly known as:  IMDUR  Take 1 tablet by mouth Daily.     levothyroxine 112 MCG tablet  Commonly known as: SYNTHROID, LEVOTHROID     MELATONIN + L-THEANINE PO     metoprolol succinate XL 25 MG 24 hr tablet  Commonly known as: TOPROL-XL  Take 1 tablet by mouth Daily.     multivitamin with minerals tablet tablet     naproxen 500 MG tablet  Commonly known as: NAPROSYN     nitroglycerin 0.4 MG SL tablet  Commonly known as: NITROSTAT  Place 1 tablet under the tongue Every 5 (Five) Minutes As Needed for Chest Pain. Take no more than 3 doses in 15 minutes.     omeprazole 40 MG capsule  Commonly known as: priLOSEC  Take 1 capsule by mouth Daily. Indications: Gastroesophageal Reflux Disease     predniSONE 1 MG tablet  Commonly known as: DELTASONE     rosuvastatin 40 MG tablet  Commonly known as: CRESTOR  Take 1 tablet by mouth Every Night.     tamsulosin 0.4 MG capsule 24 hr capsule  Commonly known as: FLOMAX     vitamin B-12 1000 MCG tablet  Commonly known as: CYANOCOBALAMIN     Vitamin D3 50 MCG (2000 UT) tablet                  Comment: Please note this report has been produced using speech recognition software.      Hamzah Vazquez DO  Attending Emergency Physician         Hamzah Vazquez DO  10/30/24 1943

## 2024-11-04 ENCOUNTER — APPOINTMENT (OUTPATIENT)
Dept: CT IMAGING | Facility: HOSPITAL | Age: 79
DRG: 690 | End: 2024-11-04
Payer: MEDICARE

## 2024-11-04 ENCOUNTER — HOSPITAL ENCOUNTER (INPATIENT)
Facility: HOSPITAL | Age: 79
LOS: 2 days | Discharge: HOME | DRG: 690 | End: 2024-11-07
Attending: EMERGENCY MEDICINE | Admitting: STUDENT IN AN ORGANIZED HEALTH CARE EDUCATION/TRAINING PROGRAM
Payer: MEDICARE

## 2024-11-04 DIAGNOSIS — K57.92 ACUTE DIVERTICULITIS: ICD-10-CM

## 2024-11-04 DIAGNOSIS — R65.21 SEPSIS WITH ACUTE ORGAN DYSFUNCTION AND SEPTIC SHOCK, DUE TO UNSPECIFIED ORGANISM, UNSPECIFIED ORGAN DYSFUNCTION TYPE: Primary | ICD-10-CM

## 2024-11-04 DIAGNOSIS — N39.0 URINARY TRACT INFECTION WITHOUT HEMATURIA, SITE UNSPECIFIED: ICD-10-CM

## 2024-11-04 DIAGNOSIS — A41.9 SEPSIS WITH ACUTE ORGAN DYSFUNCTION AND SEPTIC SHOCK, DUE TO UNSPECIFIED ORGANISM, UNSPECIFIED ORGAN DYSFUNCTION TYPE: Primary | ICD-10-CM

## 2024-11-04 LAB
ALBUMIN SERPL-MCNC: 3.7 G/DL (ref 3.5–5.2)
ALBUMIN/GLOB SERPL: 1.5 G/DL
ALP SERPL-CCNC: 78 U/L (ref 39–117)
ALT SERPL W P-5'-P-CCNC: 27 U/L (ref 1–41)
ANION GAP SERPL CALCULATED.3IONS-SCNC: 13 MMOL/L (ref 5–15)
AST SERPL-CCNC: 29 U/L (ref 1–40)
BACTERIA UR QL AUTO: ABNORMAL /HPF
BASOPHILS # BLD AUTO: 0.04 10*3/MM3 (ref 0–0.2)
BASOPHILS NFR BLD AUTO: 0.3 % (ref 0–1.5)
BILIRUB SERPL-MCNC: 2.5 MG/DL (ref 0–1.2)
BILIRUB UR QL STRIP: ABNORMAL
BUN SERPL-MCNC: 19 MG/DL (ref 8–23)
BUN/CREAT SERPL: 17.4 (ref 7–25)
CALCIUM SPEC-SCNC: 9.2 MG/DL (ref 8.6–10.5)
CHLORIDE SERPL-SCNC: 104 MMOL/L (ref 98–107)
CLARITY UR: ABNORMAL
CO2 SERPL-SCNC: 25 MMOL/L (ref 22–29)
COD CRY URNS QL: ABNORMAL /HPF
COLOR UR: ABNORMAL
CREAT SERPL-MCNC: 1.09 MG/DL (ref 0.76–1.27)
D-LACTATE SERPL-SCNC: 1.6 MMOL/L (ref 0.5–2)
D-LACTATE SERPL-SCNC: 2.4 MMOL/L (ref 0.5–2)
DEPRECATED RDW RBC AUTO: 45.4 FL (ref 37–54)
EGFRCR SERPLBLD CKD-EPI 2021: 69.9 ML/MIN/1.73
EOSINOPHIL # BLD AUTO: 0.03 10*3/MM3 (ref 0–0.4)
EOSINOPHIL NFR BLD AUTO: 0.2 % (ref 0.3–6.2)
ERYTHROCYTE [DISTWIDTH] IN BLOOD BY AUTOMATED COUNT: 12.8 % (ref 12.3–15.4)
GLOBULIN UR ELPH-MCNC: 2.5 GM/DL
GLUCOSE SERPL-MCNC: 143 MG/DL (ref 65–99)
GLUCOSE UR STRIP-MCNC: NEGATIVE MG/DL
HCT VFR BLD AUTO: 49.3 % (ref 37.5–51)
HGB BLD-MCNC: 16.9 G/DL (ref 13–17.7)
HGB UR QL STRIP.AUTO: ABNORMAL
HOLD SPECIMEN: NORMAL
HYALINE CASTS UR QL AUTO: ABNORMAL /LPF
IMM GRANULOCYTES # BLD AUTO: 0.06 10*3/MM3 (ref 0–0.05)
IMM GRANULOCYTES NFR BLD AUTO: 0.5 % (ref 0–0.5)
KETONES UR QL STRIP: ABNORMAL
LEUKOCYTE ESTERASE UR QL STRIP.AUTO: ABNORMAL
LIPASE SERPL-CCNC: 15 U/L (ref 13–60)
LYMPHOCYTES # BLD AUTO: 0.48 10*3/MM3 (ref 0.7–3.1)
LYMPHOCYTES NFR BLD AUTO: 3.8 % (ref 19.6–45.3)
MCH RBC QN AUTO: 33.2 PG (ref 26.6–33)
MCHC RBC AUTO-ENTMCNC: 34.3 G/DL (ref 31.5–35.7)
MCV RBC AUTO: 96.9 FL (ref 79–97)
MONOCYTES # BLD AUTO: 1.15 10*3/MM3 (ref 0.1–0.9)
MONOCYTES NFR BLD AUTO: 9.1 % (ref 5–12)
MUCOUS THREADS URNS QL MICRO: ABNORMAL /HPF
NEUTROPHILS NFR BLD AUTO: 10.83 10*3/MM3 (ref 1.7–7)
NEUTROPHILS NFR BLD AUTO: 86.1 % (ref 42.7–76)
NITRITE UR QL STRIP: POSITIVE
NRBC BLD AUTO-RTO: 0 /100 WBC (ref 0–0.2)
PH UR STRIP.AUTO: <=5 [PH] (ref 5–8)
PLATELET # BLD AUTO: 183 10*3/MM3 (ref 140–450)
PMV BLD AUTO: 9.7 FL (ref 6–12)
POTASSIUM SERPL-SCNC: 4 MMOL/L (ref 3.5–5.2)
PROT SERPL-MCNC: 6.2 G/DL (ref 6–8.5)
PROT UR QL STRIP: ABNORMAL
RBC # BLD AUTO: 5.09 10*6/MM3 (ref 4.14–5.8)
RBC # UR STRIP: ABNORMAL /HPF
REF LAB TEST METHOD: ABNORMAL
SODIUM SERPL-SCNC: 142 MMOL/L (ref 136–145)
SP GR UR STRIP: >=1.03 (ref 1–1.03)
SQUAMOUS #/AREA URNS HPF: ABNORMAL /HPF
UROBILINOGEN UR QL STRIP: ABNORMAL
WBC # UR STRIP: ABNORMAL /HPF
WBC NRBC COR # BLD AUTO: 12.59 10*3/MM3 (ref 3.4–10.8)
WHOLE BLOOD HOLD COAG: NORMAL
WHOLE BLOOD HOLD SPECIMEN: NORMAL

## 2024-11-04 PROCEDURE — 25010000002 KETOROLAC TROMETHAMINE PER 15 MG: Performed by: EMERGENCY MEDICINE

## 2024-11-04 PROCEDURE — 99291 CRITICAL CARE FIRST HOUR: CPT | Performed by: STUDENT IN AN ORGANIZED HEALTH CARE EDUCATION/TRAINING PROGRAM

## 2024-11-04 PROCEDURE — 99291 CRITICAL CARE FIRST HOUR: CPT

## 2024-11-04 PROCEDURE — 87186 SC STD MICRODIL/AGAR DIL: CPT | Performed by: EMERGENCY MEDICINE

## 2024-11-04 PROCEDURE — 25010000002 ONDANSETRON PER 1 MG: Performed by: EMERGENCY MEDICINE

## 2024-11-04 PROCEDURE — 80053 COMPREHEN METABOLIC PANEL: CPT | Performed by: EMERGENCY MEDICINE

## 2024-11-04 PROCEDURE — 74176 CT ABD & PELVIS W/O CONTRAST: CPT

## 2024-11-04 PROCEDURE — 36415 COLL VENOUS BLD VENIPUNCTURE: CPT

## 2024-11-04 PROCEDURE — 87077 CULTURE AEROBIC IDENTIFY: CPT | Performed by: EMERGENCY MEDICINE

## 2024-11-04 PROCEDURE — 83605 ASSAY OF LACTIC ACID: CPT | Performed by: EMERGENCY MEDICINE

## 2024-11-04 PROCEDURE — 81001 URINALYSIS AUTO W/SCOPE: CPT | Performed by: EMERGENCY MEDICINE

## 2024-11-04 PROCEDURE — 87040 BLOOD CULTURE FOR BACTERIA: CPT | Performed by: EMERGENCY MEDICINE

## 2024-11-04 PROCEDURE — 85025 COMPLETE CBC W/AUTO DIFF WBC: CPT | Performed by: EMERGENCY MEDICINE

## 2024-11-04 PROCEDURE — 25010000002 CEFTRIAXONE PER 250 MG: Performed by: EMERGENCY MEDICINE

## 2024-11-04 PROCEDURE — 83690 ASSAY OF LIPASE: CPT | Performed by: EMERGENCY MEDICINE

## 2024-11-04 PROCEDURE — 87086 URINE CULTURE/COLONY COUNT: CPT | Performed by: EMERGENCY MEDICINE

## 2024-11-04 PROCEDURE — 25810000003 SODIUM CHLORIDE 0.9 % SOLUTION: Performed by: EMERGENCY MEDICINE

## 2024-11-04 RX ORDER — ACETAMINOPHEN 500 MG
1000 TABLET ORAL ONCE
Status: COMPLETED | OUTPATIENT
Start: 2024-11-04 | End: 2024-11-04

## 2024-11-04 RX ORDER — ONDANSETRON 2 MG/ML
4 INJECTION INTRAMUSCULAR; INTRAVENOUS ONCE
Status: COMPLETED | OUTPATIENT
Start: 2024-11-04 | End: 2024-11-04

## 2024-11-04 RX ORDER — METRONIDAZOLE 500 MG/100ML
500 INJECTION, SOLUTION INTRAVENOUS EVERY 8 HOURS SCHEDULED
Status: DISCONTINUED | OUTPATIENT
Start: 2024-11-05 | End: 2024-11-07

## 2024-11-04 RX ORDER — DICYCLOMINE HYDROCHLORIDE 10 MG/1
20 CAPSULE ORAL ONCE
Status: COMPLETED | OUTPATIENT
Start: 2024-11-04 | End: 2024-11-04

## 2024-11-04 RX ORDER — POLYETHYLENE GLYCOL 3350 17 G/17G
17 POWDER, FOR SOLUTION ORAL DAILY PRN
Status: DISCONTINUED | OUTPATIENT
Start: 2024-11-04 | End: 2024-11-07 | Stop reason: HOSPADM

## 2024-11-04 RX ORDER — BISACODYL 10 MG
10 SUPPOSITORY, RECTAL RECTAL DAILY PRN
Status: DISCONTINUED | OUTPATIENT
Start: 2024-11-04 | End: 2024-11-07 | Stop reason: HOSPADM

## 2024-11-04 RX ORDER — BISACODYL 5 MG/1
5 TABLET, DELAYED RELEASE ORAL DAILY PRN
Status: DISCONTINUED | OUTPATIENT
Start: 2024-11-04 | End: 2024-11-07 | Stop reason: HOSPADM

## 2024-11-04 RX ORDER — NOREPINEPHRINE BITARTRATE 0.03 MG/ML
.02-.3 INJECTION, SOLUTION INTRAVENOUS
Status: DISCONTINUED | OUTPATIENT
Start: 2024-11-04 | End: 2024-11-05

## 2024-11-04 RX ORDER — SODIUM CHLORIDE 9 MG/ML
40 INJECTION, SOLUTION INTRAVENOUS AS NEEDED
Status: DISCONTINUED | OUTPATIENT
Start: 2024-11-04 | End: 2024-11-07 | Stop reason: HOSPADM

## 2024-11-04 RX ORDER — SODIUM CHLORIDE 0.9 % (FLUSH) 0.9 %
10 SYRINGE (ML) INJECTION AS NEEDED
Status: DISCONTINUED | OUTPATIENT
Start: 2024-11-04 | End: 2024-11-07 | Stop reason: HOSPADM

## 2024-11-04 RX ORDER — KETOROLAC TROMETHAMINE 15 MG/ML
15 INJECTION, SOLUTION INTRAMUSCULAR; INTRAVENOUS ONCE
Status: COMPLETED | OUTPATIENT
Start: 2024-11-04 | End: 2024-11-04

## 2024-11-04 RX ORDER — SODIUM CHLORIDE 0.9 % (FLUSH) 0.9 %
10 SYRINGE (ML) INJECTION EVERY 12 HOURS SCHEDULED
Status: DISCONTINUED | OUTPATIENT
Start: 2024-11-04 | End: 2024-11-07 | Stop reason: HOSPADM

## 2024-11-04 RX ADMIN — DICYCLOMINE HYDROCHLORIDE 20 MG: 10 CAPSULE ORAL at 19:01

## 2024-11-04 RX ADMIN — SODIUM CHLORIDE, POTASSIUM CHLORIDE, SODIUM LACTATE AND CALCIUM CHLORIDE 2286 ML: 600; 310; 30; 20 INJECTION, SOLUTION INTRAVENOUS at 22:57

## 2024-11-04 RX ADMIN — SODIUM CHLORIDE 2 G: 900 INJECTION INTRAVENOUS at 23:10

## 2024-11-04 RX ADMIN — SODIUM CHLORIDE 500 ML: 9 INJECTION, SOLUTION INTRAVENOUS at 20:40

## 2024-11-04 RX ADMIN — ACETAMINOPHEN 1000 MG: 500 TABLET ORAL at 19:01

## 2024-11-04 RX ADMIN — KETOROLAC TROMETHAMINE 15 MG: 15 INJECTION, SOLUTION INTRAMUSCULAR; INTRAVENOUS at 20:39

## 2024-11-04 RX ADMIN — ONDANSETRON 4 MG: 2 INJECTION INTRAMUSCULAR; INTRAVENOUS at 20:38

## 2024-11-04 NOTE — ED PROVIDER NOTES
Subjective   History of Present Illness  Patient is a 79-year-old male presenting to the emergency department with diarrhea throughout the day after taking a laxative for constipation.  Patient also has nausea and vomiting associated with kidney stone.  Patient seen here on the 30th for the symptoms as well.  See that documentation for details.  Patient has a history of hypertension, hyperlipidemia, diabetes, and CKD.    History provided by:  Patient and medical records      Review of Systems    Past Medical History:   Diagnosis Date    BPH (benign prostatic hyperplasia)     Bursitis of hip     CAD (coronary artery disease)     Chronic venous stasis     CKD (chronic kidney disease), stage III     Claustrophobia     Diabetes mellitus     GERD (gastroesophageal reflux disease)     HLD (hyperlipidemia)     HTN (hypertension)     Hypothyroidism     Knee swelling     Myocardial infarction 2018    OA (osteoarthritis) of ankle/foot     OA (osteoarthritis) of hip     Osteoporosis     RA (rheumatoid arthritis)        Allergies   Allergen Reactions    Hydrocodone-Acetaminophen Anxiety    Lipitor [Atorvastatin] Anxiety and Myalgia           Lortab [Hydrocodone-Acetaminophen] Anxiety    Azithromycin Nausea Only       Past Surgical History:   Procedure Laterality Date    CARDIAC CATHETERIZATION N/A 01/24/2018    Procedure: Left Heart Cath;  Surgeon: Susannah Rasmussen MD;  Location:  [facility];  Service:     CARDIAC CATHETERIZATION N/A 07/01/2019    Procedure: Left Heart Cath;  Surgeon: Susannah Rasmussen MD;  Location:  [facility];  Service: Cardiovascular    COLONOSCOPY      CORONARY ANGIOPLASTY WITH STENT PLACEMENT      X2    CORONARY ARTERY BYPASS GRAFT N/A 01/26/2018    Procedure: CORONARY ARTERY BYPASS X 2 WITH INTERNAL MAMMARY ARTERY GRAFT, ENDOSCOPIC VEIN HARVESTING UTILIZING THE LEFT GREATER SAPPHENOUS VEIN  CYSTO BY DR WATERS;  Surgeon: Casey Morales MD;  Location: [facility] OR;  Service:      CYSTOSCOPY TRANSURETHRAL RESECTION OF PROSTATE N/A 2016    Procedure: CYSTOSCOPY TRANSURETHRAL RESECTION OF PROSTATE GREENLIGHT;  Surgeon: Alverto Richards MD;  Location: [facility];  Service:     TONSILLECTOMY      TRIGGER POINT INJECTION      UPPER GASTROINTESTINAL ENDOSCOPY         Family History   Problem Relation Age of Onset    Stroke Mother     Hypertension Mother     Osteoarthritis Mother     Esophageal cancer Father     Diabetes Father     Heart disease Father     Hypertension Father     Heart attack Father     Osteoarthritis Father     Colon polyps Maternal Uncle     Colon cancer Maternal Uncle     No Known Problems Maternal Grandmother     No Known Problems Maternal Grandfather     Stroke Paternal Grandmother     Stroke Paternal Grandfather        Social History     Socioeconomic History    Marital status:      Spouse name: N/A    Number of children: 1    Years of education: H.S.    Highest education level: High school graduate   Tobacco Use    Smoking status: Former     Current packs/day: 0.00     Average packs/day: 1 pack/day for 20.0 years (20.0 ttl pk-yrs)     Types: Cigarettes     Start date: 1974     Quit date: 1994     Years since quittin.8    Smokeless tobacco: Never   Vaping Use    Vaping status: Never Used   Substance and Sexual Activity    Alcohol use: Not Currently     Comment: occas    Drug use: Never    Sexual activity: Not Currently     Partners: Female           Objective   Physical Exam  Vitals and nursing note reviewed.   Constitutional:       General: He is not in acute distress.     Appearance: Normal appearance. He is not toxic-appearing.   Cardiovascular:      Rate and Rhythm: Normal rate and regular rhythm.      Pulses: Normal pulses.   Pulmonary:      Effort: Pulmonary effort is normal. No respiratory distress.      Breath sounds: Normal breath sounds.   Abdominal:      Palpations: Abdomen is soft.      Tenderness: There is no guarding.   Skin:      General: Skin is warm and dry.   Neurological:      Mental Status: He is alert and oriented to person, place, and time.   Psychiatric:         Mood and Affect: Mood normal.         Behavior: Behavior normal.         Critical Care    Performed by: Prateek Camargo MD  Authorized by: Prateek Camargo MD    Critical care provider statement:     Critical care time (minutes):  60    Critical care was necessary to treat or prevent imminent or life-threatening deterioration of the following conditions:  Sepsis and shock    Critical care was time spent personally by me on the following activities:  Discussions with consultants, examination of patient, evaluation of patient's response to treatment, obtaining history from patient or surrogate, ordering and performing treatments and interventions, ordering and review of laboratory studies, ordering and review of radiographic studies, pulse oximetry and re-evaluation of patient's condition    Care discussed with: admitting provider               ED Course  ED Course as of 11/05/24 0005   Mon Nov 04, 2024 2010 WBC(!): 12.59  Mild leukocytosis noted. [RS]   2243 CT Abdomen Pelvis Without Contrast  Personally reviewed the CT scan of the abdomen and pelvis.  On my interpretation there Flaminal Tory changes of the sigmoid colon consistent with acute diverticulitis [RS]   2248 BP(!): 82/45  Patient's blood pressure has continued to trend down.  Concern for sepsis.  Antibiotics have been ordered as well as the sepsis fluid bolus.  Will plan admission to the intensivist.  I discussed the case with the intensivist who is agreeable with the plan for admission. [RS]      ED Course User Index  [RS] Prateek Camargo MD                                               Medical Decision Making  Problems Addressed:  Acute diverticulitis: complicated acute illness or injury  Sepsis with acute organ dysfunction and septic shock, due to unspecified organism, unspecified organ  dysfunction type: complicated acute illness or injury  Urinary tract infection without hematuria, site unspecified: complicated acute illness or injury    Amount and/or Complexity of Data Reviewed  External Data Reviewed: labs and notes.  Labs: ordered. Decision-making details documented in ED Course.  Radiology: ordered. Decision-making details documented in ED Course.  Discussion of management or test interpretation with external provider(s): Intensivist    Risk  OTC drugs.  Prescription drug management.  Decision regarding hospitalization.    Critical Care  Total time providing critical care: 60 minutes        Final diagnoses:   Sepsis with acute organ dysfunction and septic shock, due to unspecified organism, unspecified organ dysfunction type   Urinary tract infection without hematuria, site unspecified   Acute diverticulitis       ED Disposition  ED Disposition       ED Disposition   Decision to Admit    Condition   --    Comment   Level of Care: Critical Care [6]   Admitting Physician: LAURY GLOVER [[id number]]                 No follow-up provider specified.       Medication List      No changes were made to your prescriptions during this visit.            Prateek Camargo MD  11/05/24 0005

## 2024-11-05 PROBLEM — K57.92 DIVERTICULITIS: Status: ACTIVE | Noted: 2024-11-05

## 2024-11-05 PROBLEM — N39.0 UTI (URINARY TRACT INFECTION): Status: ACTIVE | Noted: 2024-11-05

## 2024-11-05 LAB
ADV 40+41 DNA STL QL NAA+NON-PROBE: NOT DETECTED
ANION GAP SERPL CALCULATED.3IONS-SCNC: 13 MMOL/L (ref 5–15)
ASTRO TYP 1-8 RNA STL QL NAA+NON-PROBE: NOT DETECTED
BUN SERPL-MCNC: 21 MG/DL (ref 8–23)
BUN/CREAT SERPL: 21.2 (ref 7–25)
C CAYETANENSIS DNA STL QL NAA+NON-PROBE: NOT DETECTED
C COLI+JEJ+UPSA DNA STL QL NAA+NON-PROBE: NOT DETECTED
C DIFF TOX GENS STL QL NAA+PROBE: NOT DETECTED
CALCIUM SPEC-SCNC: 8.2 MG/DL (ref 8.6–10.5)
CHLORIDE SERPL-SCNC: 106 MMOL/L (ref 98–107)
CO2 SERPL-SCNC: 22 MMOL/L (ref 22–29)
CREAT SERPL-MCNC: 0.99 MG/DL (ref 0.76–1.27)
CRYPTOSP DNA STL QL NAA+NON-PROBE: NOT DETECTED
DEPRECATED RDW RBC AUTO: 49.1 FL (ref 37–54)
E HISTOLYT DNA STL QL NAA+NON-PROBE: NOT DETECTED
EAEC PAA PLAS AGGR+AATA ST NAA+NON-PRB: NOT DETECTED
EC STX1+STX2 GENES STL QL NAA+NON-PROBE: NOT DETECTED
EGFRCR SERPLBLD CKD-EPI 2021: 78.5 ML/MIN/1.73
EPEC EAE GENE STL QL NAA+NON-PROBE: NOT DETECTED
ERYTHROCYTE [DISTWIDTH] IN BLOOD BY AUTOMATED COUNT: 13.1 % (ref 12.3–15.4)
ETEC LTA+ST1A+ST1B TOX ST NAA+NON-PROBE: NOT DETECTED
G LAMBLIA DNA STL QL NAA+NON-PROBE: NOT DETECTED
GLUCOSE SERPL-MCNC: 122 MG/DL (ref 65–99)
HCT VFR BLD AUTO: 44.5 % (ref 37.5–51)
HGB BLD-MCNC: 14.9 G/DL (ref 13–17.7)
LIPASE SERPL-CCNC: 11 U/L (ref 13–60)
MAGNESIUM SERPL-MCNC: 1.8 MG/DL (ref 1.6–2.4)
MCH RBC QN AUTO: 33.8 PG (ref 26.6–33)
MCHC RBC AUTO-ENTMCNC: 33.5 G/DL (ref 31.5–35.7)
MCV RBC AUTO: 100.9 FL (ref 79–97)
NOROVIRUS GI+II RNA STL QL NAA+NON-PROBE: NOT DETECTED
P SHIGELLOIDES DNA STL QL NAA+NON-PROBE: NOT DETECTED
PHOSPHATE SERPL-MCNC: 3.5 MG/DL (ref 2.5–4.5)
PLATELET # BLD AUTO: 156 10*3/MM3 (ref 140–450)
PMV BLD AUTO: 10.2 FL (ref 6–12)
POTASSIUM SERPL-SCNC: 4.1 MMOL/L (ref 3.5–5.2)
PROCALCITONIN SERPL-MCNC: 2.98 NG/ML (ref 0–0.25)
RBC # BLD AUTO: 4.41 10*6/MM3 (ref 4.14–5.8)
RVA RNA STL QL NAA+NON-PROBE: NOT DETECTED
S ENT+BONG DNA STL QL NAA+NON-PROBE: NOT DETECTED
SAPO I+II+IV+V RNA STL QL NAA+NON-PROBE: NOT DETECTED
SHIGELLA SP+EIEC IPAH ST NAA+NON-PROBE: NOT DETECTED
SODIUM SERPL-SCNC: 141 MMOL/L (ref 136–145)
V CHOL+PARA+VUL DNA STL QL NAA+NON-PROBE: NOT DETECTED
V CHOLERAE DNA STL QL NAA+NON-PROBE: NOT DETECTED
WBC NRBC COR # BLD AUTO: 13.09 10*3/MM3 (ref 3.4–10.8)
Y ENTEROCOL DNA STL QL NAA+NON-PROBE: NOT DETECTED

## 2024-11-05 PROCEDURE — 63710000001 PREDNISONE PER 5 MG

## 2024-11-05 PROCEDURE — 25010000002 MAGNESIUM SULFATE 4 GM/100ML SOLUTION: Performed by: STUDENT IN AN ORGANIZED HEALTH CARE EDUCATION/TRAINING PROGRAM

## 2024-11-05 PROCEDURE — 80048 BASIC METABOLIC PNL TOTAL CA: CPT

## 2024-11-05 PROCEDURE — 84145 PROCALCITONIN (PCT): CPT

## 2024-11-05 PROCEDURE — 87507 IADNA-DNA/RNA PROBE TQ 12-25: CPT

## 2024-11-05 PROCEDURE — 25010000002 METRONIDAZOLE 500 MG/100ML SOLUTION

## 2024-11-05 PROCEDURE — 85027 COMPLETE CBC AUTOMATED: CPT

## 2024-11-05 PROCEDURE — 83735 ASSAY OF MAGNESIUM: CPT

## 2024-11-05 PROCEDURE — 25010000002 CEFTRIAXONE PER 250 MG

## 2024-11-05 PROCEDURE — 83690 ASSAY OF LIPASE: CPT

## 2024-11-05 PROCEDURE — 87493 C DIFF AMPLIFIED PROBE: CPT

## 2024-11-05 PROCEDURE — 84100 ASSAY OF PHOSPHORUS: CPT

## 2024-11-05 PROCEDURE — 99232 SBSQ HOSP IP/OBS MODERATE 35: CPT | Performed by: INTERNAL MEDICINE

## 2024-11-05 PROCEDURE — 25010000002 HEPARIN (PORCINE) PER 1000 UNITS: Performed by: STUDENT IN AN ORGANIZED HEALTH CARE EDUCATION/TRAINING PROGRAM

## 2024-11-05 RX ORDER — METOPROLOL SUCCINATE 25 MG/1
25 TABLET, EXTENDED RELEASE ORAL DAILY
Status: DISCONTINUED | OUTPATIENT
Start: 2024-11-05 | End: 2024-11-07

## 2024-11-05 RX ORDER — ISOSORBIDE MONONITRATE 30 MG/1
30 TABLET, EXTENDED RELEASE ORAL DAILY
Status: DISCONTINUED | OUTPATIENT
Start: 2024-11-05 | End: 2024-11-07 | Stop reason: HOSPADM

## 2024-11-05 RX ORDER — LEVOTHYROXINE SODIUM 112 UG/1
112 TABLET ORAL DAILY
Status: DISCONTINUED | OUTPATIENT
Start: 2024-11-05 | End: 2024-11-07 | Stop reason: HOSPADM

## 2024-11-05 RX ORDER — FINASTERIDE 5 MG/1
5 TABLET, FILM COATED ORAL DAILY
Status: DISCONTINUED | OUTPATIENT
Start: 2024-11-05 | End: 2024-11-07 | Stop reason: HOSPADM

## 2024-11-05 RX ORDER — AMLODIPINE BESYLATE 2.5 MG/1
2.5 TABLET ORAL
Status: CANCELLED | OUTPATIENT
Start: 2024-11-05

## 2024-11-05 RX ORDER — MAGNESIUM SULFATE HEPTAHYDRATE 40 MG/ML
4 INJECTION, SOLUTION INTRAVENOUS ONCE
Status: COMPLETED | OUTPATIENT
Start: 2024-11-05 | End: 2024-11-05

## 2024-11-05 RX ORDER — ASPIRIN 81 MG/1
81 TABLET, CHEWABLE ORAL DAILY
Status: DISCONTINUED | OUTPATIENT
Start: 2024-11-05 | End: 2024-11-07 | Stop reason: HOSPADM

## 2024-11-05 RX ORDER — FINASTERIDE 5 MG/1
5 TABLET, FILM COATED ORAL DAILY
Status: CANCELLED | OUTPATIENT
Start: 2024-11-05

## 2024-11-05 RX ORDER — TAMSULOSIN HYDROCHLORIDE 0.4 MG/1
0.4 CAPSULE ORAL DAILY
Status: DISCONTINUED | OUTPATIENT
Start: 2024-11-05 | End: 2024-11-07 | Stop reason: HOSPADM

## 2024-11-05 RX ORDER — PREDNISONE 1 MG/1
2 TABLET ORAL DAILY
Status: DISCONTINUED | OUTPATIENT
Start: 2024-11-05 | End: 2024-11-07 | Stop reason: HOSPADM

## 2024-11-05 RX ORDER — HEPARIN SODIUM 5000 [USP'U]/ML
5000 INJECTION, SOLUTION INTRAVENOUS; SUBCUTANEOUS EVERY 8 HOURS SCHEDULED
Status: DISCONTINUED | OUTPATIENT
Start: 2024-11-05 | End: 2024-11-07 | Stop reason: HOSPADM

## 2024-11-05 RX ORDER — PANTOPRAZOLE SODIUM 40 MG/1
40 TABLET, DELAYED RELEASE ORAL
Status: DISCONTINUED | OUTPATIENT
Start: 2024-11-05 | End: 2024-11-07 | Stop reason: HOSPADM

## 2024-11-05 RX ORDER — AMLODIPINE BESYLATE 2.5 MG/1
2.5 TABLET ORAL
Status: DISCONTINUED | OUTPATIENT
Start: 2024-11-05 | End: 2024-11-07

## 2024-11-05 RX ORDER — ROSUVASTATIN CALCIUM 20 MG/1
40 TABLET, COATED ORAL NIGHTLY
Status: DISCONTINUED | OUTPATIENT
Start: 2024-11-05 | End: 2024-11-07 | Stop reason: HOSPADM

## 2024-11-05 RX ADMIN — MAGNESIUM SULFATE HEPTAHYDRATE 4 G: 40 INJECTION, SOLUTION INTRAVENOUS at 08:01

## 2024-11-05 RX ADMIN — FINASTERIDE 5 MG: 5 TABLET, FILM COATED ORAL at 08:02

## 2024-11-05 RX ADMIN — ROSUVASTATIN 40 MG: 20 TABLET, FILM COATED ORAL at 21:35

## 2024-11-05 RX ADMIN — METRONIDAZOLE 500 MG: 500 INJECTION, SOLUTION INTRAVENOUS at 08:01

## 2024-11-05 RX ADMIN — TAMSULOSIN HYDROCHLORIDE 0.4 MG: 0.4 CAPSULE ORAL at 08:02

## 2024-11-05 RX ADMIN — HEPARIN SODIUM 5000 UNITS: 5000 INJECTION INTRAVENOUS; SUBCUTANEOUS at 05:38

## 2024-11-05 RX ADMIN — SODIUM CHLORIDE 2000 MG: 900 INJECTION INTRAVENOUS at 21:27

## 2024-11-05 RX ADMIN — ASPIRIN 81 MG 81 MG: 81 TABLET ORAL at 08:02

## 2024-11-05 RX ADMIN — PANTOPRAZOLE SODIUM 40 MG: 40 TABLET, DELAYED RELEASE ORAL at 05:38

## 2024-11-05 RX ADMIN — Medication 10 ML: at 21:28

## 2024-11-05 RX ADMIN — HEPARIN SODIUM 5000 UNITS: 5000 INJECTION INTRAVENOUS; SUBCUTANEOUS at 14:08

## 2024-11-05 RX ADMIN — METRONIDAZOLE 500 MG: 500 INJECTION, SOLUTION INTRAVENOUS at 01:09

## 2024-11-05 RX ADMIN — METRONIDAZOLE 500 MG: 500 INJECTION, SOLUTION INTRAVENOUS at 16:00

## 2024-11-05 RX ADMIN — PREDNISONE 2 MG: 1 TABLET ORAL at 08:02

## 2024-11-05 RX ADMIN — LEVOTHYROXINE SODIUM 112 MCG: 0.11 TABLET ORAL at 05:38

## 2024-11-05 RX ADMIN — HEPARIN SODIUM 5000 UNITS: 5000 INJECTION INTRAVENOUS; SUBCUTANEOUS at 21:27

## 2024-11-05 RX ADMIN — MUPIROCIN 1 APPLICATION: 20 OINTMENT TOPICAL at 21:27

## 2024-11-05 RX ADMIN — Medication 10 ML: at 08:02

## 2024-11-05 NOTE — ED NOTES
Rodrigo Baum    Nursing Report ED to Floor:  Mental status: alert and oriented x4  Ambulatory status: 1 person assist, AD Megan at baseline   Oxygen Therapy:  room air   Cardiac Rhythm: SB   Admitted from: home  Safety Concerns:  fall risk   Social Issues: none   ED Room #:  14    ED Nurse Phone Extension - #[phone] or may call [phone].      HPI:   Chief Complaint   Patient presents with    Diarrhea    Nausea       Past Medical History:  Past Medical History:   Diagnosis Date    BPH (benign prostatic hyperplasia)     Bursitis of hip     CAD (coronary artery disease)     Chronic venous stasis     CKD (chronic kidney disease), stage III     Claustrophobia     Diabetes mellitus     GERD (gastroesophageal reflux disease)     HLD (hyperlipidemia)     HTN (hypertension)     Hypothyroidism     Knee swelling     Myocardial infarction 2018    OA (osteoarthritis) of ankle/foot     OA (osteoarthritis) of hip     Osteoporosis     RA (rheumatoid arthritis)         Past Surgical History:  Past Surgical History:   Procedure Laterality Date    CARDIAC CATHETERIZATION N/A 01/24/2018    Procedure: Left Heart Cath;  Surgeon: Susannah Rasmussen MD;  Location:  [facility];  Service:     CARDIAC CATHETERIZATION N/A 07/01/2019    Procedure: Left Heart Cath;  Surgeon: Susannah Rasmussen MD;  Location:  [facility];  Service: Cardiovascular    COLONOSCOPY      CORONARY ANGIOPLASTY WITH STENT PLACEMENT      X2    CORONARY ARTERY BYPASS GRAFT N/A 01/26/2018    Procedure: CORONARY ARTERY BYPASS X 2 WITH INTERNAL MAMMARY ARTERY GRAFT, ENDOSCOPIC VEIN HARVESTING UTILIZING THE LEFT GREATER SAPPHENOUS VEIN  CYSTO BY DR WATERS;  Surgeon: Casey Morales MD;  Location: [facility] OR;  Service:     CYSTOSCOPY TRANSURETHRAL RESECTION OF PROSTATE N/A 11/01/2016    Procedure: CYSTOSCOPY TRANSURETHRAL RESECTION OF PROSTATE GREENLIGHT;  Surgeon: Alverto Richards MD;  Location:  [facility];  Service:     TONSILLECTOMY      TRIGGER  POINT INJECTION      UPPER GASTROINTESTINAL ENDOSCOPY          Admitting Doctor:   Chris Almanza MD    Consulting Provider(s):  Consults       No orders found from 10/6/2024 to 11/5/2024.             Admitting Diagnosis:   The primary encounter diagnosis was Sepsis with acute organ dysfunction and septic shock, due to unspecified organism, unspecified organ dysfunction type. Diagnoses of Urinary tract infection without hematuria, site unspecified and Acute diverticulitis were also pertinent to this visit.    Most Recent Vitals:   Vitals:    11/04/24 2307 11/04/24 2315 11/04/24 2330 11/04/24 2345   BP: (!) 83/49 (!) 68/55 98/51 100/53   BP Location:       Patient Position:       Pulse: 70 67     Resp:       Temp:       TempSrc:       SpO2: 94% 95%     Weight:       Height:           Active LDAs/IV Access:   Lines, Drains & Airways       Active LDAs       Name Placement date Placement time Site Days    Peripheral IV 11/04/24 1853 Anterior;Right Forearm 11/04/24 1853  Forearm  less than 1                    Labs (abnormal labs have a star):   Labs Reviewed   COMPREHENSIVE METABOLIC PANEL - Abnormal; Notable for the following components:       Result Value    Glucose 143 (*)     Total Bilirubin 2.5 (*)     All other components within normal limits    Narrative:     GFR Normal >60  Chronic Kidney Disease <60  Kidney Failure <15    The GFR formula is only valid for adults with stable renal function between ages 18 and 70.   URINALYSIS W/ MICROSCOPIC IF INDICATED (NO CULTURE) - Abnormal; Notable for the following components:    Color, UA Dark Yellow (*)     Appearance, UA Cloudy (*)     Ketones, UA Trace (*)     Bilirubin, UA Moderate (2+) (*)     Blood, UA Moderate (2+) (*)     Protein, UA 30 mg/dL (1+) (*)     Leuk Esterase, UA Small (1+) (*)     Nitrite, UA Positive (*)     All other components within normal limits   LACTIC ACID, PLASMA - Abnormal; Notable for the following components:    Lactate 2.4 (*)     All other  components within normal limits   CBC WITH AUTO DIFFERENTIAL - Abnormal; Notable for the following components:    WBC 12.59 (*)     MCH 33.2 (*)     Neutrophil % 86.1 (*)     Lymphocyte % 3.8 (*)     Eosinophil % 0.2 (*)     Neutrophils, Absolute 10.83 (*)     Lymphocytes, Absolute 0.48 (*)     Monocytes, Absolute 1.15 (*)     Immature Grans, Absolute 0.06 (*)     All other components within normal limits   URINALYSIS, MICROSCOPIC ONLY - Abnormal; Notable for the following components:    WBC, UA 11-20 (*)     Bacteria, UA 2+ (*)     Squamous Epithelial Cells, UA 3-6 (*)     Mucus, UA Large/3+ (*)     All other components within normal limits   LIPASE - Normal   LACTIC ACID, REFLEX - Normal   BLOOD CULTURE   BLOOD CULTURE   URINE CULTURE   GASTROINTESTINAL PANEL, PCR (PREFERRED) DOES NOT INCLUDE CDIFF   CLOSTRIDIOIDES DIFFICILE TOXIN    Narrative:     The following orders were created for panel order Clostridioides difficile Toxin - Stool, Per Rectum.  Procedure                               Abnormality         Status                     ---------                               -----------         ------                     Clostridioides difficile...[[id number]]                                                   Please view results for these tests on the individual orders.   CLOSTRIDIOIDES DIFFICILE TOXIN, PCR   RAINBOW DRAW    Narrative:     The following orders were created for panel order [place] Draw.  Procedure                               Abnormality         Status                     ---------                               -----------         ------                     Green Top (Gel)[[id number]]                                  Final result               Lavender Top[[id number]]                                     Final result               Gold Top - SST[[id number]]                                   Final result               Darling Top[[id number]]                                         Final result                Light Blue Top[[id number]]                                   Final result                 Please view results for these tests on the individual orders.   PROCALCITONIN   MAGNESIUM   LIPASE   BASIC METABOLIC PANEL   CBC (NO DIFF)   MAGNESIUM   PHOSPHORUS   CBC AND DIFFERENTIAL    Narrative:     The following orders were created for panel order CBC & Differential.  Procedure                               Abnormality         Status                     ---------                               -----------         ------                     CBC Auto Differential[[id number]]        Abnormal            Final result                 Please view results for these tests on the individual orders.   GREEN TOP   LAVENDER TOP   GOLD TOP - SST   GRAY TOP   LIGHT BLUE TOP       Meds Given in ED:   Medications   sodium chloride 0.9 % flush 10 mL (has no administration in time range)   sepsis fluid LR bolus 2,286 mL (2,286 mL Intravenous New Bag 11/4/24 8349)   sodium chloride 0.9 % flush 10 mL (has no administration in time range)   sodium chloride 0.9 % flush 10 mL (has no administration in time range)   sodium chloride 0.9 % infusion 40 mL (has no administration in time range)   mupirocin (BACTROBAN) 2 % nasal ointment 1 Application (has no administration in time range)   polyethylene glycol (MIRALAX) packet 17 g (has no administration in time range)     And   bisacodyl (DULCOLAX) EC tablet 5 mg (has no administration in time range)     And   bisacodyl (DULCOLAX) suppository 10 mg (has no administration in time range)   Potassium Replacement - Follow Nurse / BPA Driven Protocol (has no administration in time range)   Magnesium Cardiology Dose Replacement - Follow Nurse / BPA Driven Protocol (has no administration in time range)   Phosphorus Replacement - Follow Nurse / BPA Driven Protocol (has no administration in time range)   Calcium Replacement - Follow Nurse / BPA Driven Protocol (has no administration in time range)    norepinephrine (LEVOPHED) 8 mg in 250 mL NS infusion (premix) (has no administration in time range)   cefTRIAXone (ROCEPHIN) 2,000 mg in sodium chloride 0.9 % 100 mL MBP (has no administration in time range)   metroNIDAZOLE (FLAGYL) IVPB 500 mg (has no administration in time range)   sodium chloride 0.9 % bolus 500 mL (0 mL Intravenous Stopped 11/4/24 2110)   acetaminophen (TYLENOL) tablet 1,000 mg (1,000 mg Oral Given 11/4/24 1901)   ketorolac (TORADOL) injection 15 mg (15 mg Intravenous Given 11/4/24 2039)   ondansetron (ZOFRAN) injection 4 mg (4 mg Intravenous Given 11/4/24 2038)   dicyclomine (BENTYL) capsule 20 mg (20 mg Oral Given 11/4/24 1901)   cefTRIAXone (ROCEPHIN) 2 g in sodium chloride 0.9 % 100 mL MBP (2 g Intravenous New Bag 11/4/24 2310)     norepinephrine, 0.02-0.3 mcg/kg/min         Last NIH score:                                                          Dysphagia screening results:  Patient Factors Component (Dysphagia:Stroke or Rule-out)  Best Eye Response: 4-->(E4) spontaneous (11/04/24 1845)  Best Motor Response: 6-->(M6) obeys commands (11/04/24 1845)  Best Verbal Response: 5-->(V5) oriented (11/04/24 1845)  [place]ona Coma Scale Score: 15 (11/04/24 1845)     [place] Coma Scale:  No data recorded     CIWA:        Restraint Type:            Isolation Status:  Contact Spore

## 2024-11-05 NOTE — CASE MANAGEMENT/SOCIAL WORK
Discharge Planning Assessment  [facility]     Patient Name: Rodrigo Baum  MRN: [id number]  Today's Date: 11/5/2024    Admit Date: 11/4/2024    Plan: Home, IDP   Discharge Needs Assessment       Row Name 11/05/24 1129       Living Environment    People in Home alone    Current Living Arrangements apartment    Potentially Unsafe Housing Conditions none    In the past 12 months has the electric, gas, oil, or water company threatened to shut off services in your home? No    Primary Care Provided by self;child(camryn)    Provides Primary Care For no one    Family Caregiver if Needed child(camryn), adult    Family Caregiver Names Alberto alarcon    Quality of Family Relationships unable to assess    Living Arrangement Comments Lives in [facility] alone in first floor apartment       Resource/Environmental Concerns    Resource/Environmental Concerns none       Transportation Needs    In the past 12 months, has lack of transportation kept you from medical appointments or from getting medications? no    In the past 12 months, has lack of transportation kept you from meetings, work, or from getting things needed for daily living? No       Food Insecurity    Within the past 12 months, you worried that your food would run out before you got the money to buy more. Never true    Within the past 12 months, the food you bought just didn't last and you didn't have money to get more. Never true       Transition Planning    Patient/Family Anticipates Transition to home with family    Patient/Family Anticipated Services at Transition none    Transportation Anticipated family or friend will provide       Discharge Needs Assessment    Equipment Currently Used at Home none    Do you want help finding or keeping work or a job? I do not need or want help    Do you want help with school or training? For example, starting or completing job training or getting a high school diploma, GED or equivalent No                   Discharge Plan        Row Name 11/05/24 1132       Plan    Plan Home, IDP    Patient/Family in Agreement with Plan yes    Plan Comments I met w/patient in room to initiate d/c planning, IDP. Insurance of [employer] Medicare REplacement confirmed. Fills scripts @ [facility] on [address], no issues verbalized w/obtaining medications. Denies HH, home O2, DME.D/C goal/plan is home. Family/friend will transport. No d/c needs verbalized @ this time. CM will continue to follow.    Final Discharge Disposition Code 01 - home or self-care                  Continued Care and Services - Admitted Since 11/4/2024    No active coordination exists for this encounter.       Expected Discharge Date and Time       Expected Discharge Date Expected Discharge Time    Nov 12, 2024            Demographic Summary       Row Name 11/05/24 1127       General Information    Arrived From home    Referral Source emergency department    Preferred Language English    General Information Comments PCP Holli Fletcher No POA/LW paperwork on file                   Functional Status       Row Name 11/05/24 1128       Functional Status    Usual Activity Tolerance good    Current Activity Tolerance good       Physical Activity    On average, how many days per week do you engage in moderate to strenuous exercise (like a brisk walk)? 0 days    On average, how many minutes do you engage in exercise at this level? 0 min    Number of minutes of exercise per week 0       Functional Status, IADL    Medications independent    Meal Preparation independent    Housekeeping independent    Laundry independent    Shopping independent    If for any reason you need help with day-to-day activities such as bathing, preparing meals, shopping, managing finances, etc., do you get the help you need? I don't need any help       Employment/    Employment Status employed part-time                   Psychosocial    No documentation.                  Abuse/Neglect    No documentation.                   Legal    No documentation.                  Substance Abuse    No documentation.                  Patient Forms    No documentation.                     Enrique Ortega RN

## 2024-11-05 NOTE — PAYOR COMM NOTE
"Bautista Pancho Matias (79 y.o. Male)       Date of Birth   1945    Social Security Number       Address   Melvin RENATA LAUR[place] [address] [place] [address]    Home Phone   [phone]    MRN   [id number]       [facility]    Marital Status                               Admission Date   11/4/24    Admission Type   Emergency    Admitting Provider   Chris Almanza MD    Attending Provider   Chris Almanza MD    Department, Room/Bed   [facility] 2A ICU, N204/1       Discharge Date       Discharge Disposition       Discharge Destination                                 Attending Provider: Chris Almanza MD    Allergies: Hydrocodone-acetaminophen, Lipitor [Atorvastatin], Lortab [Hydrocodone-acetaminophen], Azithromycin    Isolation: Spore   Infection: C.difficile (rule out) (11/04/24)   Code Status: CPR    Ht: 180.3 cm (71\")   Wt: 76.2 kg (168 lb)    Admission Cmt: None   Principal Problem: None                  Active Insurance as of 11/4/2024       Primary Coverage       Payor Plan Insurance Group Employer/Plan Group    ANTHEM MEDICARE REPLACEMENT [employer] MEDICARE ADVANTAGE [id number]       Payor Plan Address Payor Plan Phone Number Payor Plan Fax Number Effective Dates    [address] [phone]  1/1/2023 - None Entered    [facility] [id number]         Subscriber Name Subscriber Birth Date Member ID       PANCHO BAUM 1945 [id number]                     Emergency Contacts        (Rel.) Home Phone Work Phone Mobile Phone    CAESAR BAUM (Son) [phone] -- [phone]              [place]: NPI [id number] Tax ID [id number]  Insurance Information                  ANTHEM MEDICARE REPLACEMENT/ANTHEM MEDICARE ADVANTAGE Phone: [phone]    Subscriber: Pancho Baum Subscriber#: [id number]    Group#: [id number] Precert#: --    Authorization#: [id number] Effective Date: --             History & Physical        Chris Almanza MD at 11/04/24 2308           " [facility]   HISTORY AND PHYSICAL    Patient Name: Rodrigo Baum  : 1945  MRN: [id number]  Primary Care Physician:  Holli Fletcher MD  Date of admission: 2024    Subjective  Subjective     Chief Complaint: Diarrhea    HPI:  Rodrigo Baum is a 79 y.o. male past medical history of BPH, CAD (1 stent), CKD stage III, DM 2, GERD, HTN, HLD, hypothyroidism and RA presents to [facility] ED with complaints of diarrhea.  Patient was recently seen in our ED for nausea and vomiting due to a nonobstructing left ureteral stone.  He was given IV fluids and encouraged to drink plenty of water and was discharged home. He felt better for a few days. Today the patient had been taking multiple laxatives due to constipation and developed diarrhea. He denies any abdominal pain, nausea and vomiting.    When he arrived to the ED it was noted that he was hypotensive with a systolic in the 80s to 90s.  Labs include WBC 12.59, lactate 2.4 with a reflex 1.6.  UA is positive for UTI.  CT A/P reveals evidence for acute diverticulitis involving the distal descending colon extending to the mid sigmoid colon.  There are no signs of perforation or abscess.  There is also no evidence for significant nephrolithiasis or obstructive uropathy. Despite receiving 2.3 L of LR and a 500 mL normal saline bolus he remains hypotensive.  The patient will be admitted to the ICU for further evaluation and treatment.    Patient lives alone and denies smoking/alcohol use. States he completed a course of antibiotics for UTI a month ago. Son at bedside. He had normal C-scope 2 years ago.       Time spent: 9 minutes  Electronically signed by JEREMY Parekh, 24, 11:31 PM EST.        Personal History     Past Medical History:   Diagnosis Date    BPH (benign prostatic hyperplasia)     Bursitis of hip     CAD (coronary artery disease)     Chronic venous stasis     CKD (chronic kidney disease), stage III     Claustrophobia     Diabetes  mellitus     GERD (gastroesophageal reflux disease)     HLD (hyperlipidemia)     HTN (hypertension)     Hypothyroidism     Knee swelling     Myocardial infarction 2018    OA (osteoarthritis) of ankle/foot     OA (osteoarthritis) of hip     Osteoporosis     RA (rheumatoid arthritis)        Past Surgical History:   Procedure Laterality Date    CARDIAC CATHETERIZATION N/A 01/24/2018    Procedure: Left Heart Cath;  Surgeon: Susannah Rasmussen MD;  Location:  [facility];  Service:     CARDIAC CATHETERIZATION N/A 07/01/2019    Procedure: Left Heart Cath;  Surgeon: Susannah Rasmussen MD;  Location:  [facility];  Service: Cardiovascular    COLONOSCOPY      CORONARY ANGIOPLASTY WITH STENT PLACEMENT      X2    CORONARY ARTERY BYPASS GRAFT N/A 01/26/2018    Procedure: CORONARY ARTERY BYPASS X 2 WITH INTERNAL MAMMARY ARTERY GRAFT, ENDOSCOPIC VEIN HARVESTING UTILIZING THE LEFT GREATER SAPPHENOUS VEIN  CYSTO BY DR WATERS;  Surgeon: Casey Morales MD;  Location:  [facility] OR;  Service:     CYSTOSCOPY TRANSURETHRAL RESECTION OF PROSTATE N/A 11/01/2016    Procedure: CYSTOSCOPY TRANSURETHRAL RESECTION OF PROSTATE GREENLIGHT;  Surgeon: Alverto Richards MD;  Location:  [facility];  Service:     TONSILLECTOMY      TRIGGER POINT INJECTION      UPPER GASTROINTESTINAL ENDOSCOPY         Family History: family history includes Colon cancer in his maternal uncle; Colon polyps in his maternal uncle; Diabetes in his father; Esophageal cancer in his father; Heart attack in his father; Heart disease in his father; Hypertension in his father and mother; No Known Problems in his maternal grandfather and maternal grandmother; Osteoarthritis in his father and mother; Stroke in his mother, paternal grandfather, and paternal grandmother. Otherwise pertinent FHx was reviewed and not pertinent to current issue.    Social History:  reports that he quit smoking about 30 years ago. His smoking use included cigarettes. He started  smoking about 50 years ago. He has a 20 pack-year smoking history. He has never used smokeless tobacco. He reports that he does not currently use alcohol. He reports that he does not use drugs.    Home Medications:  Folic Acid, Wzmixum-Wapeb-Bdgf-PassF-LBalm, Vitamin D3, amLODIPine, aspirin, atropine, finasteride, glucosamine-chondroitin, isosorbide mononitrate, levothyroxine, metoprolol succinate XL, multivitamin with minerals, naproxen, nitroglycerin, omeprazole, ondansetron ODT, predniSONE, rosuvastatin, tamsulosin, and vitamin B-12      Allergies:  Allergies   Allergen Reactions    Hydrocodone-Acetaminophen Anxiety    Lipitor [Atorvastatin] Anxiety and Myalgia           Lortab [Hydrocodone-Acetaminophen] Anxiety    Azithromycin Nausea Only       Objective  Objective     Vitals:   Temp:  [98.5 °F (36.9 °C)] 98.5 °F (36.9 °C)  Heart Rate:  [65-89] 69  Resp:  [17] 17  BP: (82-91)/(45-63) 82/45    Physical Exam   Constitutional: Awake, alert  Eyes: PERRLA, sclerae anicteric, no conjunctival injection  HENT: NCAT, mucous membranes moist  Neck: Supple, no thyromegaly, no lymphadenopathy, trachea midline  Respiratory: Clear to auscultation bilaterally, nonlabored respirations   Cardiovascular: RRR, no murmurs, rubs, or gallops, palpable pedal pulses bilaterally  Gastrointestinal: Positive bowel sounds, mild lower quadrant tender  Musculoskeletal: No bilateral ankle edema, no clubbing or cyanosis to extremities  Psychiatric: Appropriate affect, cooperative  Neurologic: Oriented x 3, strength symmetric in all extremities, Cranial Nerves grossly intact to confrontation, speech clear  Skin: No rashes     Assessment / Plan     Brief Patient Summary:  Rodrigo Baum is a 79 y.o. male who is admitted in ICU for septic shock likely from diverticulitis.       Active Critical Care Problems:  - Septic shock  - Acute diverticulitis   - Recent outpatient use of antibiotics  - Lactic acidosis  - Dehydration    Plan:   Neuro:  Awake and alert  CVS: Trend serum lactate. Aggressive crystalloids hydration.   GI: Keep NPO. Check stool cultures and C. Diff. Empiric coverage with ceftriaxone and flagyl for 3-4 days. Educated patient to see his GI doctor after discharge for interval C-scope.   Endo: Thyroid supplements.   ID: Trend procalcitonin   DVT ppx: Hep sq  GI ppx: Not needed  Dispo: Monitor in hospital for 24 hours.       CODE STATUS:    Full code    I have spent 38 minutes of critical care time, independent of other billable procedures.       Chris Almanza MD  11/04/24   23:09 EST     Electronically signed by Chris Almanza MD at 11/05/24 0109       Current Facility-Administered Medications   Medication Dose Route Frequency Provider Last Rate Last Admin    [Held by provider] amLODIPine (NORVASC) tablet 2.5 mg  2.5 mg Oral Q24H Audrey Mendez APRN        aspirin chewable tablet 81 mg  81 mg Oral Daily Audrey Mendez APRN   81 mg at 11/05/24 0802    polyethylene glycol (MIRALAX) packet 17 g  17 g Oral Daily PRN Audrey Mendez APRN        And    bisacodyl (DULCOLAX) EC tablet 5 mg  5 mg Oral Daily PRN Audrey Mendez APRN        And    bisacodyl (DULCOLAX) suppository 10 mg  10 mg Rectal Daily PRN Audrey Mendez APRN        Calcium Replacement - Follow Nurse / BPA Driven Protocol   Does not apply PRN Audrey Mendez APRN        cefTRIAXone (ROCEPHIN) 2,000 mg in sodium chloride 0.9 % 100 mL MBP  2,000 mg Intravenous Q24H Audrey Mendez APRN        finasteride (PROSCAR) tablet 5 mg  5 mg Oral Daily Chris Almanza MD   5 mg at 11/05/24 0802    heparin (porcine) 5000 UNIT/ML injection 5,000 Units  5,000 Units Subcutaneous Q8H Chris Almanza MD   5,000 Units at 11/05/24 0538    [Held by provider] isosorbide mononitrate (IMDUR) 24 hr tablet 30 mg  30 mg Oral Daily Audrey Mendez APRN        levothyroxine (SYNTHROID, LEVOTHROID) tablet 112 mcg  112 mcg Oral Daily Chris Almanza MD   112 mcg at 11/05/24 0538    Magnesium  Cardiology Dose Replacement - Follow Nurse / BPA Driven Protocol   Does not apply PRN Audrey Mendez APRN        [Held by provider] metoprolol succinate XL (TOPROL-XL) 24 hr tablet 25 mg  25 mg Oral Daily Audrey Mendez APRN        metroNIDAZOLE (FLAGYL) IVPB 500 mg  500 mg Intravenous Q8H Audrey Mendez APRN 200 mL/hr at 11/05/24 0801 500 mg at 11/05/24 0801    mupirocin (BACTROBAN) 2 % nasal ointment 1 Application  1 Application Each Nare BID Audrey Mendez APRN        pantoprazole (PROTONIX) EC tablet 40 mg  40 mg Oral Q AM Audrey Mendez APRN   40 mg at 11/05/24 0538    Phosphorus Replacement - Follow Nurse / BPA Driven Protocol   Does not apply PRN Audrey Mendez APRN        Potassium Replacement - Follow Nurse / BPA Driven Protocol   Does not apply PRN Audrey Mendez APRN        predniSONE (DELTASONE) tablet 2 mg  2 mg Oral Daily Audrey Mendez APRN   2 mg at 11/05/24 0802    rosuvastatin (CRESTOR) tablet 40 mg  40 mg Oral Nightly Audrey Mendez APRN        sodium chloride 0.9 % flush 10 mL  10 mL Intravenous PRN Prateek Camargo MD        sodium chloride 0.9 % flush 10 mL  10 mL Intravenous Q12H Audrey Mendez APRN   10 mL at 11/05/24 0802    sodium chloride 0.9 % flush 10 mL  10 mL Intravenous PRN Audrey Mendez APRN        sodium chloride 0.9 % infusion 40 mL  40 mL Intravenous PRN Audrey Mendez APRN        tamsulosin (FLOMAX) 24 hr capsule 0.4 mg  0.4 mg Oral Daily Chris Almanza MD   0.4 mg at 11/05/24 0802     Facility-Administered Medications Ordered in Other Encounters   Medication Dose Route Frequency Provider Last Rate Last Admin    Chlorhexidine Gluconate Cloth 2 % pads 1 application  1 application  Topical Q12H PRN Sd Mathew PA         Lab Results (last 24 hours)       Procedure Component Value Units Date/Time    Urine Culture - Urine, Urine, Clean Catch [[id number]]  (Normal) Collected: 11/04/24 2016    Specimen: Urine, Clean Catch  Updated: 11/05/24 0922     Urine Culture Culture in progress    Gastrointestinal Panel, PCR - Stool, Per Rectum [[id number]]  (Normal) Collected: 11/05/24 0603    Specimen: Stool from Per Rectum Updated: 11/05/24 0838     Campylobacter Not Detected     Plesiomonas shigelloides Not Detected     Salmonella Not Detected     Vibrio Not Detected     Vibrio cholerae Not Detected     Yersinia enterocolitica Not Detected     Enteroaggregative E. coli (EAEC) Not Detected     Enteropathogenic E. coli (EPEC) Not Detected     Enterotoxigenic E. coli (ETEC) lt/st Not Detected     Shiga-like toxin-producing E. coli (STEC) stx1/stx2 Not Detected     Shigella/Enteroinvasive E. coli (EIEC) Not Detected     Cryptosporidium Not Detected     Cyclospora cayetanensis Not Detected     Entamoeba histolytica Not Detected     Giardia lamblia Not Detected     Adenovirus F40/41 Not Detected     Astrovirus Not Detected     Norovirus GI/GII Not Detected     Rotavirus A Not Detected     Sapovirus (I, II, IV or V) Not Detected    Clostridioides difficile Toxin - Stool, Per Rectum [[id number]]  (Normal) Collected: 11/05/24 0603    Specimen: Stool from Per Rectum Updated: 11/05/24 0811    Narrative:      The following orders were created for panel order Clostridioides difficile Toxin - Stool, Per Rectum.  Procedure                               Abnormality         Status                     ---------                               -----------         ------                     Clostridioides difficile...[[id number]]  Normal              Final result                 Please view results for these tests on the individual orders.    Clostridioides difficile Toxin, PCR - Stool, Per Rectum [[id number]]  (Normal) Collected: 11/05/24 0603    Specimen: Stool from Per Rectum Updated: 11/05/24 0811     Toxigenic C. difficile by PCR Not Detected    Narrative:      The result indicates the absence of toxigenic C. difficile from stool specimen.     CBC (No Diff)  "[[id number]]  (Abnormal) Collected: 11/05/24 0344    Specimen: Blood Updated: 11/05/24 0538     WBC 13.09 10*3/mm3      RBC 4.41 10*6/mm3      Hemoglobin 14.9 g/dL      Hematocrit 44.5 %      .9 fL      MCH 33.8 pg      MCHC 33.5 g/dL      RDW 13.1 %      RDW-SD 49.1 fl      MPV 10.2 fL      Platelets 156 10*3/mm3     Procalcitonin [[id number]]  (Abnormal) Collected: 11/05/24 0344    Specimen: Blood Updated: 11/05/24 0521     Procalcitonin 2.98 ng/mL     Narrative:      As a Marker for Sepsis (Non-Neonates):    1. <0.5 ng/mL represents a low risk of severe sepsis and/or septic shock.  2. >2 ng/mL represents a high risk of severe sepsis and/or septic shock.    As a Marker for Lower Respiratory Tract Infections that require antibiotic therapy:    PCT on Admission    Antibiotic Therapy       6-12 Hrs later    >0.5                Strongly Recommended  >0.25 - <0.5        Recommended   0.1 - 0.25          Discouraged              Remeasure/reassess PCT  <0.1                Strongly Discouraged     Remeasure/reassess PCT    As 28 day mortality risk marker: \"Change in Procalcitonin Result\" (>80% or <=80%) if Day 0 (or Day 1) and Day 4 values are available. Refer to http://www.[employer]-pct-calculator.com    Change in PCT <=80%  A decrease of PCT levels below or equal to 80% defines a positive change in PCT test result representing a higher risk for 28-day all-cause mortality of patients diagnosed with severe sepsis for septic shock.    Change in PCT >80%  A decrease of PCT levels of more than 80% defines a negative change in PCT result representing a lower risk for 28-day all-cause mortality of patients diagnosed with severe sepsis or septic shock.       Magnesium [[id number]]  (Normal) Collected: 11/05/24 0344    Specimen: Blood Updated: 11/05/24 0516     Magnesium 1.8 mg/dL     Lipase [[id number]]  (Abnormal) Collected: 11/05/24 0344    Specimen: Blood Updated: 11/05/24 0516     Lipase 11 U/L     Basic Metabolic Panel " [[id number]]  (Abnormal) Collected: 11/05/24 0344    Specimen: Blood Updated: 11/05/24 0516     Glucose 122 mg/dL      BUN 21 mg/dL      Creatinine 0.99 mg/dL      Sodium 141 mmol/L      Potassium 4.1 mmol/L      Comment: Slight hemolysis detected by analyzer. Result may be falsely elevated.        Chloride 106 mmol/L      CO2 22.0 mmol/L      Calcium 8.2 mg/dL      BUN/Creatinine Ratio 21.2     Anion Gap 13.0 mmol/L      eGFR 78.5 mL/min/1.73     Narrative:      GFR Normal >60  Chronic Kidney Disease <60  Kidney Failure <15    The GFR formula is only valid for adults with stable renal function between ages 18 and 70.    Phosphorus [[id number]]  (Normal) Collected: 11/05/24 0344    Specimen: Blood Updated: 11/05/24 0516     Phosphorus 3.5 mg/dL     STAT Lactic Acid, Reflex [[id number]]  (Normal) Collected: 11/04/24 2202    Specimen: Blood Updated: 11/04/24 2225     Lactate 1.6 mmol/L      Comment: Falsely depressed results may occur on samples drawn from patients receiving N-Acetylcysteine (NAC) or Metamizole.       Blood Culture - Blood, Arm, Right [[id number]] Collected: 11/04/24 2157    Specimen: Blood from Arm, Right Updated: 11/04/24 2211    Blood Culture - Blood, Arm, Right [[id number]] Collected: 11/04/24 2147    Specimen: Blood from Arm, Right Updated: 11/04/24 2211    Urinalysis With Microscopic If Indicated (No Culture) - Urine, Clean Catch [[id number]]  (Abnormal) Collected: 11/04/24 2016    Specimen: Urine, Clean Catch Updated: 11/04/24 2052     Color, UA Dark Yellow     Appearance, UA Cloudy     pH, UA <=5.0     Specific Gravity, UA >=1.030     Glucose, UA Negative     Ketones, UA Trace     Bilirubin, UA Moderate (2+)     Blood, UA Moderate (2+)     Protein, UA 30 mg/dL (1+)     Leuk Esterase, UA Small (1+)     Nitrite, UA Positive     Urobilinogen, UA 1.0 E.U./dL    Urinalysis, Microscopic Only - Urine, Clean Catch [[id number]]  (Abnormal) Collected: 11/04/24 2016    Specimen: Urine, Clean Catch Updated:  11/04/24 2052     RBC, UA 0-2 /HPF      WBC, UA 11-20 /HPF      Bacteria, UA 2+ /HPF      Squamous Epithelial Cells, UA 3-6 /HPF      Hyaline Casts, UA 0-6 /LPF      Calcium Oxalate Crystals, UA Small/1+ /HPF      Mucus, UA Large/3+ /HPF      Methodology Manual Light Microscopy    Lactic Acid, Plasma [[id number]]  (Abnormal) Collected: 11/04/24 1857    Specimen: Blood Updated: 11/04/24 1928     Lactate 2.4 mmol/L      Comment: Falsely depressed results may occur on samples drawn from patients receiving N-Acetylcysteine (NAC) or Metamizole.       Comprehensive Metabolic Panel [[id number]]  (Abnormal) Collected: 11/04/24 1857    Specimen: Blood Updated: 11/04/24 1927     Glucose 143 mg/dL      BUN 19 mg/dL      Creatinine 1.09 mg/dL      Sodium 142 mmol/L      Potassium 4.0 mmol/L      Chloride 104 mmol/L      CO2 25.0 mmol/L      Calcium 9.2 mg/dL      Total Protein 6.2 g/dL      Albumin 3.7 g/dL      ALT (SGPT) 27 U/L      AST (SGOT) 29 U/L      Alkaline Phosphatase 78 U/L      Total Bilirubin 2.5 mg/dL      Globulin 2.5 gm/dL      Comment: Calculated Result        A/G Ratio 1.5 g/dL      BUN/Creatinine Ratio 17.4     Anion Gap 13.0 mmol/L      eGFR 69.9 mL/min/1.73     Narrative:      GFR Normal >60  Chronic Kidney Disease <60  Kidney Failure <15    The GFR formula is only valid for adults with stable renal function between ages 18 and 70.    Lipase [[id number]]  (Normal) Collected: 11/04/24 1857    Specimen: Blood Updated: 11/04/24 1927     Lipase 15 U/L     [place] Draw [[id number]] Collected: 11/04/24 1857    Specimen: Blood Updated: 11/04/24 1915    Narrative:      The following orders were created for panel order [place] Draw.  Procedure                               Abnormality         Status                     ---------                               -----------         ------                     Green Top (Gel)[[id number]]                                  Final result               Lavender Top[[id number]]                                      Final result               Gold Top - SST[[id number]]                                   Final result               Darling Top[[id number]]                                         Final result               Light Blue Top[[id number]]                                   Final result                 Please view results for these tests on the individual orders.    Green Top (Gel) [[id number]] Collected: 11/04/24 1857    Specimen: Blood Updated: 11/04/24 1915     Extra Tube Hold for add-ons.     Comment: Auto resulted.       Lavender Top [[id number]] Collected: 11/04/24 1857    Specimen: Blood Updated: 11/04/24 1915     Extra Tube hold for add-on     Comment: Auto resulted       Gold Top - SST [[id number]] Collected: 11/04/24 1857    Specimen: Blood Updated: 11/04/24 1915     Extra Tube Hold for add-ons.     Comment: Auto resulted.       Gray Top [[id number]] Collected: 11/04/24 1857    Specimen: Blood Updated: 11/04/24 1915     Extra Tube Hold for add-ons.     Comment: Auto resulted.       Light Blue Top [[id number]] Collected: 11/04/24 1857    Specimen: Blood Updated: 11/04/24 1915     Extra Tube Hold for add-ons.     Comment: Auto resulted       CBC & Differential [[id number]]  (Abnormal) Collected: 11/04/24 1857    Specimen: Blood Updated: 11/04/24 1910    Narrative:      The following orders were created for panel order CBC & Differential.  Procedure                               Abnormality         Status                     ---------                               -----------         ------                     CBC Auto Differential[[id number]]        Abnormal            Final result                 Please view results for these tests on the individual orders.    CBC Auto Differential [[id number]]  (Abnormal) Collected: 11/04/24 1857    Specimen: Blood Updated: 11/04/24 1910     WBC 12.59 10*3/mm3      RBC 5.09 10*6/mm3      Hemoglobin 16.9 g/dL      Hematocrit 49.3 %      MCV 96.9 fL      MCH 33.2 pg       MCHC 34.3 g/dL      RDW 12.8 %      RDW-SD 45.4 fl      MPV 9.7 fL      Platelets 183 10*3/mm3      Neutrophil % 86.1 %      Lymphocyte % 3.8 %      Monocyte % 9.1 %      Eosinophil % 0.2 %      Basophil % 0.3 %      Immature Grans % 0.5 %      Neutrophils, Absolute 10.83 10*3/mm3      Lymphocytes, Absolute 0.48 10*3/mm3      Monocytes, Absolute 1.15 10*3/mm3      Eosinophils, Absolute 0.03 10*3/mm3      Basophils, Absolute 0.04 10*3/mm3      Immature Grans, Absolute 0.06 10*3/mm3      nRBC 0.0 /100 WBC           Imaging Results (Last 24 Hours)       Procedure Component Value Units Date/Time    CT Abdomen Pelvis Without Contrast [[id number]] Collected: 11/04/24 2233     Updated: 11/04/24 2242    Narrative:      CT ABDOMEN PELVIS WO CONTRAST    Date of Exam: 11/4/2024 10:02 PM EST    Indication: history of kidney stone with hypovolemia and hypotension.    Comparison: 10/30/2024    Technique: Axial CT images were obtained of the abdomen and pelvis without the administration of contrast. Reconstructed coronal and sagittal images were also obtained. Automated exposure control and iterative construction methods were used.      FINDINGS:  The lung bases are clear without evidence for focal consolidation or significant pleural effusion.    The liver, spleen, and pancreas are unremarkable without contrast. Gallstones/sludge are again noted in the gallbladder. There are no signs of cholecystitis. There is no biliary dilatation.  The bilateral adrenal glands are symmetric and unremarkable   without contrast.     No definitive nephrolithiasis is seen bilaterally. There is no hydronephrosis or hydroureter. There is no evidence for obstructive uropathy. No stones are seen in the bladder. Bilateral renal cysts are again noted.    There is diverticulosis throughout the colon. There has been interval development of bowel wall thickening involving the distal descending colon extending through the mid sigmoid colon. There is  stranding in the adjacent mesentery. The findings suggest   developing diverticulitis. There is no evidence of perforation. There is no evidence for abscess.    There is no bowel dilatation or obstruction. A normal-appearing appendix is observed. There is no evidence for acute appendicitis. No significant free fluid is observed. No abnormal fluid collections are identified. No significant lymphadenopathy is seen   throughout the abdomen or pelvis. The bladder is partially decompressed. There is mild to moderate atherosclerosis.    No acute osseous abnormalities are observed.      Impression:      1.Evidence for acute diverticulitis involving the distal descending colon extending to the mid sigmoid colon. There are no signs of perforation or abscess.   2.No evidence for significant nephrolithiasis. There is no evidence for obstructive uropathy.      Electronically Signed: José Miguel Miller MD    11/4/2024 10:39 PM EST    Workstation ID: [id number]          Orders (last 24 hrs)        Start     Ordered    11/06/24 2333  Auto Discontinue in 48 Hours if not Collected  ONCE CDIFF         11/04/24 2332    11/06/24 2325  Auto Discontinue GI Panel in 48 Hours if not Collected  ONCE GI PANEL         11/04/24 2327    11/06/24 0600  Magnesium  Morning Draw         11/05/24 0733    11/05/24 2100  cefTRIAXone (ROCEPHIN) 2,000 mg in sodium chloride 0.9 % 100 mL MBP  Every 24 Hours         11/04/24 2330    11/05/24 1900  cefTRIAXone (ROCEPHIN) 2,000 mg in sodium chloride 0.9 % 100 mL MBP  Every 24 Hours,   Status:  Discontinued         11/04/24 2327 11/05/24 1119  Diet: Regular/House; Fluid Consistency: Thin (IDDSI 0)  Diet Effective Now         11/05/24 1119    11/05/24 0900  finasteride (PROSCAR) tablet 5 mg  Daily         11/05/24 0108    11/05/24 0900  tamsulosin (FLOMAX) 24 hr capsule 0.4 mg  Daily         11/05/24 0108    11/05/24 0900  aspirin chewable tablet 81 mg  Daily         11/05/24 0147    11/05/24 0900  [Held by  provider]  amLODIPine (NORVASC) tablet 2.5 mg  Every 24 Hours Scheduled        (On hold since today at 0147 until manually unheld; held by Audrey Mendez APRNHold Reason: Other (Comment Required)Hold Comments: hypotension)    11/05/24 0147 11/05/24 0900  [Held by provider]  isosorbide mononitrate (IMDUR) 24 hr tablet 30 mg  Daily        (On hold since today at 0147 until manually unheld; held by Audrey Mendez APRNHold Reason: Other (Comment Required))    11/05/24 0147 11/05/24 0900  [Held by provider]  metoprolol succinate XL (TOPROL-XL) 24 hr tablet 25 mg  Daily        (On hold since today at 0147 until manually unheld; held by Audrey Mendez APRNHold Reason: Other (Comment Required))    11/05/24 0147 11/05/24 0900  predniSONE (DELTASONE) tablet 2 mg  Daily         11/05/24 0147 11/05/24 0830  magnesium sulfate 4g/100mL (PREMIX) infusion  Once         11/05/24 0733 11/05/24 0734  Diet: Liquid; Clear Liquid; Fluid Consistency: Thin (IDDSI 0)  Diet Effective Now,   Status:  Canceled         11/05/24 0733 11/05/24 0600  Basic Metabolic Panel  Morning Draw         11/04/24 2327 11/05/24 0600  CBC (No Diff)  Morning Draw         11/04/24 2327 11/05/24 0600  Magnesium  Morning Draw,   Status:  Canceled         11/04/24 2327 11/05/24 0600  Phosphorus  Morning Draw         11/04/24 2327 11/05/24 0600  heparin (porcine) 5000 UNIT/ML injection 5,000 Units  Every 8 Hours Scheduled         11/05/24 0106 11/05/24 0600  levothyroxine (SYNTHROID, LEVOTHROID) tablet 112 mcg  Daily         11/05/24 0108 11/05/24 0600  pantoprazole (PROTONIX) EC tablet 40 mg  Every Early Morning         11/05/24 0147 11/05/24 0245  rosuvastatin (CRESTOR) tablet 40 mg  Nightly         11/05/24 0147 11/05/24 0110  Inpatient Admission  Once         11/05/24 0109 11/05/24 0109  Code Status and Medical Interventions: CPR (Attempt to Resuscitate); Full Support  Continuous         11/05/24 0109     11/05/24 0030  mupirocin (BACTROBAN) 2 % nasal ointment 1 Application  2 Times Daily         11/04/24 2327 11/05/24 0006  Critical Care  Once        Comments: This order was created via procedure documentation    11/05/24 0005    11/05/24 0000  Vital Signs Every Hour and Per Hospital Policy Based on Patient Condition  Every Hour       11/04/24 2327    11/05/24 0000  Intake & Output  Every Hour       11/04/24 2327    11/05/24 0000  metroNIDAZOLE (FLAGYL) IVPB 500 mg  Every 8 Hours Scheduled         11/04/24 2335    11/04/24 2343  sodium chloride 0.9 % flush 10 mL  Every 12 Hours Scheduled         11/04/24 2327 11/04/24 2343  Pharmacy Consult for Metronidazole IV  Every 8 Hours,   Status:  Discontinued         11/04/24 2327 11/04/24 2343  norepinephrine (LEVOPHED) 8 mg in 250 mL NS infusion (premix)  Titrated,   Status:  Discontinued         11/04/24 2327    11/04/24 2333  Clostridioides difficile Toxin - Stool, Per Rectum  Once         11/04/24 2332    11/04/24 2333  Patient Isolation Contact Spore  Continuous        Comments: Isolation Precautions Per Clostridium Difficile (CDI) Infection Control Policy / Process    11/04/24 2332    11/04/24 2333  Clostridioides difficile Toxin, PCR - Stool, Per Rectum  PROCEDURE ONCE         11/04/24 2332    11/04/24 2328  Daily Weights  Daily       11/04/24 2327 11/04/24 2328  Daily CHG Bath While in Critical Care  Daily      Comments: Use for admission bath and length of critical care stay.    11/04/24 2327 11/04/24 2325  Procalcitonin  STAT         11/04/24 2327 11/04/24 2325  Magnesium  STAT         11/04/24 2327 11/04/24 2325  Lipase  STAT         11/04/24 2327 11/04/24 2325  Gastrointestinal Panel, PCR - Stool, Per Rectum  Once         11/04/24 2327 11/04/24 2324  Potassium Replacement - Follow Nurse / BPA Driven Protocol  As Needed         11/04/24 2327 11/04/24 2324  Magnesium Cardiology Dose Replacement - Follow Nurse / BPA Driven Protocol  As  "Needed         11/04/24 2327 11/04/24 2324  Phosphorus Replacement - Follow Nurse / BPA Driven Protocol  As Needed         11/04/24 2327 11/04/24 2324  Calcium Replacement - Follow Nurse / BPA Driven Protocol  As Needed         11/04/24 2327 11/04/24 2324  Place Sequential Compression Device  Once         11/04/24 2327 11/04/24 2324  Maintain Sequential Compression Device  Continuous         11/04/24 2327 11/04/24 2324  NPO Diet NPO Type: Strict NPO  Diet Effective Now,   Status:  Canceled         11/04/24 2327 11/04/24 2312  Continuous Cardiac Monitoring  Continuous        Comments: Follow Standing Orders As Outlined in Process Instructions (Open Order Report to View Full Instructions)    11/04/24 2327 11/04/24 2312  Maintain IV Access  Continuous,   Status:  Canceled         11/04/24 2327 11/04/24 2312  Telemetry - Place Orders & Notify Provider of Results When Patient Experiences Acute Chest Pain, Dysrhythmia or Respiratory Distress  Continuous        Comments: Open Order Report to View Parameters Requiring Provider Notification    11/04/24 2327 11/04/24 2312  Continuous Pulse Oximetry  Continuous         11/04/24 2327 11/04/24 2312  Height & Weight  Once         11/04/24 2327 11/04/24 2312  Oral Care - Patient Not on NPPV & Not Intubated  Every Shift       11/04/24 2327 11/04/24 2312  Target Arousal Level RASS 0 to -2  Continuous         11/04/24 2327 11/04/24 2312  Use Mobility Guidelines for Advancement of Activity  Continuous         11/04/24 2327 11/04/24 2312  Insert Peripheral IV  Once         11/04/24 2327 11/04/24 2312  Saline Lock & Maintain IV Access  Continuous         11/04/24 2327 11/04/24 2311  sodium chloride 0.9 % flush 10 mL  As Needed         11/04/24 2327 11/04/24 2311  sodium chloride 0.9 % infusion 40 mL  As Needed         11/04/24 2327 11/04/24 2311  polyethylene glycol (MIRALAX) packet 17 g  Daily PRN        Placed in \"And\" Linked Group " "   11/04/24 2327    11/04/24 2311  bisacodyl (DULCOLAX) EC tablet 5 mg  Daily PRN        Placed in \"And\" Linked Group    11/04/24 2327    11/04/24 2311  bisacodyl (DULCOLAX) suppository 10 mg  Daily PRN        Placed in \"And\" Linked Group    11/04/24 2327    11/04/24 2252  ICU / CCU Bed Request ([facility] / [facility] / [facility] ONLY)  Once         11/04/24 2251 11/04/24 2223  sepsis fluid LR bolus 2,286 mL  Once         11/04/24 2207    11/04/24 2157  STAT Lactic Acid, Reflex  PROCEDURE ONCE         11/04/24 1928    11/04/24 2146  cefTRIAXone (ROCEPHIN) 2 g in sodium chloride 0.9 % 100 mL MBP  Once         11/04/24 2130    11/04/24 2131  Blood Culture - Blood, Arm, Right  Once        Placed in \"And\" Linked Group    11/04/24 2130    11/04/24 2131  Blood Culture - Blood, Arm, Right  Once        Placed in \"And\" Linked Group    11/04/24 2130 11/04/24 2131  Urine Culture - Urine, Urine, Clean Catch  Once         11/04/24 2130 11/04/24 2129  CT Abdomen Pelvis Without Contrast  1 Time Imaging        Comments: NON-CONTRASTED STUDY      11/04/24 2129 11/04/24 2100  sodium chloride 0.9 % bolus 500 mL  Once         11/04/24 1847 11/04/24 2100  ketorolac (TORADOL) injection 15 mg  Once         11/04/24 1847 11/04/24 2027  Urinalysis, Microscopic Only - Urine, Clean Catch  Once         11/04/24 2026 11/04/24 1904  dicyclomine (BENTYL) capsule 20 mg  Once         11/04/24 1848 11/04/24 1903  acetaminophen (TYLENOL) tablet 1,000 mg  Once         11/04/24 1847 11/04/24 1903  ondansetron (ZOFRAN) injection 4 mg  Once         11/04/24 1847 11/04/24 1848  Insert Peripheral IV  Once        Placed in \"And\" Linked Group    11/04/24 1847 11/04/24 1848  Cardiac Monitoring  Continuous,   Status:  Canceled        Comments: Follow Standing Orders As Outlined in Process Instructions (Open Order Report to View Full Instructions)    11/04/24 1847 11/04/24 1848  Continuous Pulse Oximetry  Continuous,   Status:  Canceled         " "11/04/24 1847    11/04/24 1848  CBC & Differential  Once         11/04/24 1847    11/04/24 1848  Comprehensive Metabolic Panel  Once         11/04/24 1847    11/04/24 1848  Urinalysis With Microscopic If Indicated (No Culture) - Urine, Clean Catch  Once         11/04/24 1847    11/04/24 1848  Lipase  Once         11/04/24 1847    11/04/24 1848  [place] Draw  Once         11/04/24 1847    11/04/24 1848  Lactic Acid, Plasma  Once         11/04/24 1847    11/04/24 1848  CBC Auto Differential  PROCEDURE ONCE         11/04/24 1847    11/04/24 1848  Green Top (Gel)  PROCEDURE ONCE         11/04/24 1847    11/04/24 1848  Lavender Top  PROCEDURE ONCE         11/04/24 1847    11/04/24 1848  Gold Top - SST  PROCEDURE ONCE         11/04/24 1847    11/04/24 1848  Darling Top  PROCEDURE ONCE         11/04/24 1847    11/04/24 1848  Light Blue Top  PROCEDURE ONCE         11/04/24 1847    11/04/24 1847  sodium chloride 0.9 % flush 10 mL  As Needed        Placed in \"And\" Linked Group    11/04/24 1847                  Physician Progress Notes (last 24 hours)  Notes from 11/04/24 1235 through 11/05/24 1235   No notes of this type exist for this encounter.       Consult Notes (last 24 hours)  Notes from 11/04/24 1235 through 11/05/24 1235   No notes of this type exist for this encounter.       "

## 2024-11-05 NOTE — H&P
[facility]   HISTORY AND PHYSICAL    Patient Name: Rodrigo Baum  : 1945  MRN: [id number]  Primary Care Physician:  Holli Fletcher MD  Date of admission: 2024    Subjective   Subjective     Chief Complaint: Diarrhea    HPI:  Rodrigo Baum is a 79 y.o. male past medical history of BPH, CAD (1 stent), CKD stage III, DM 2, GERD, HTN, HLD, hypothyroidism and RA presents to [facility] ED with complaints of diarrhea.  Patient was recently seen in our ED for nausea and vomiting due to a nonobstructing left ureteral stone.  He was given IV fluids and encouraged to drink plenty of water and was discharged home. He felt better for a few days. Today the patient had been taking multiple laxatives due to constipation and developed diarrhea. He denies any abdominal pain, nausea and vomiting.    When he arrived to the ED it was noted that he was hypotensive with a systolic in the 80s to 90s.  Labs include WBC 12.59, lactate 2.4 with a reflex 1.6.  UA is positive for UTI.  CT A/P reveals evidence for acute diverticulitis involving the distal descending colon extending to the mid sigmoid colon.  There are no signs of perforation or abscess.  There is also no evidence for significant nephrolithiasis or obstructive uropathy. Despite receiving 2.3 L of LR and a 500 mL normal saline bolus he remains hypotensive.  The patient will be admitted to the ICU for further evaluation and treatment.    Patient lives alone and denies smoking/alcohol use. States he completed a course of antibiotics for UTI a month ago. Son at bedside. He had normal C-scope 2 years ago.       Time spent: 9 minutes  Electronically signed by JERMEY Parekh, 24, 11:31 PM EST.        Personal History     Past Medical History:   Diagnosis Date    BPH (benign prostatic hyperplasia)     Bursitis of hip     CAD (coronary artery disease)     Chronic venous stasis     CKD (chronic kidney disease), stage III     Claustrophobia     Diabetes  mellitus     GERD (gastroesophageal reflux disease)     HLD (hyperlipidemia)     HTN (hypertension)     Hypothyroidism     Knee swelling     Myocardial infarction 2018    OA (osteoarthritis) of ankle/foot     OA (osteoarthritis) of hip     Osteoporosis     RA (rheumatoid arthritis)        Past Surgical History:   Procedure Laterality Date    CARDIAC CATHETERIZATION N/A 01/24/2018    Procedure: Left Heart Cath;  Surgeon: Susannah Rasmussen MD;  Location:  [facility];  Service:     CARDIAC CATHETERIZATION N/A 07/01/2019    Procedure: Left Heart Cath;  Surgeon: Susannah Rasmussen MD;  Location:  [facility];  Service: Cardiovascular    COLONOSCOPY      CORONARY ANGIOPLASTY WITH STENT PLACEMENT      X2    CORONARY ARTERY BYPASS GRAFT N/A 01/26/2018    Procedure: CORONARY ARTERY BYPASS X 2 WITH INTERNAL MAMMARY ARTERY GRAFT, ENDOSCOPIC VEIN HARVESTING UTILIZING THE LEFT GREATER SAPPHENOUS VEIN  CYSTO BY DR WATERS;  Surgeon: Casey Morales MD;  Location:  [facility];  Service:     CYSTOSCOPY TRANSURETHRAL RESECTION OF PROSTATE N/A 11/01/2016    Procedure: CYSTOSCOPY TRANSURETHRAL RESECTION OF PROSTATE GREENLIGHT;  Surgeon: Alverto Richards MD;  Location:  [facility];  Service:     TONSILLECTOMY      TRIGGER POINT INJECTION      UPPER GASTROINTESTINAL ENDOSCOPY         Family History: family history includes Colon cancer in his maternal uncle; Colon polyps in his maternal uncle; Diabetes in his father; Esophageal cancer in his father; Heart attack in his father; Heart disease in his father; Hypertension in his father and mother; No Known Problems in his maternal grandfather and maternal grandmother; Osteoarthritis in his father and mother; Stroke in his mother, paternal grandfather, and paternal grandmother. Otherwise pertinent FHx was reviewed and not pertinent to current issue.    Social History:  reports that he quit smoking about 30 years ago. His smoking use included cigarettes. He started  smoking about 50 years ago. He has a 20 pack-year smoking history. He has never used smokeless tobacco. He reports that he does not currently use alcohol. He reports that he does not use drugs.    Home Medications:  Folic Acid, Ytvkhlv-Dxlrg-Jamy-PassF-LBalm, Vitamin D3, amLODIPine, aspirin, atropine, finasteride, glucosamine-chondroitin, isosorbide mononitrate, levothyroxine, metoprolol succinate XL, multivitamin with minerals, naproxen, nitroglycerin, omeprazole, ondansetron ODT, predniSONE, rosuvastatin, tamsulosin, and vitamin B-12      Allergies:  Allergies   Allergen Reactions    Hydrocodone-Acetaminophen Anxiety    Lipitor [Atorvastatin] Anxiety and Myalgia           Lortab [Hydrocodone-Acetaminophen] Anxiety    Azithromycin Nausea Only       Objective   Objective     Vitals:   Temp:  [98.5 °F (36.9 °C)] 98.5 °F (36.9 °C)  Heart Rate:  [65-89] 69  Resp:  [17] 17  BP: (82-91)/(45-63) 82/45    Physical Exam   Constitutional: Awake, alert  Eyes: PERRLA, sclerae anicteric, no conjunctival injection  HENT: NCAT, mucous membranes moist  Neck: Supple, no thyromegaly, no lymphadenopathy, trachea midline  Respiratory: Clear to auscultation bilaterally, nonlabored respirations   Cardiovascular: RRR, no murmurs, rubs, or gallops, palpable pedal pulses bilaterally  Gastrointestinal: Positive bowel sounds, mild lower quadrant tender  Musculoskeletal: No bilateral ankle edema, no clubbing or cyanosis to extremities  Psychiatric: Appropriate affect, cooperative  Neurologic: Oriented x 3, strength symmetric in all extremities, Cranial Nerves grossly intact to confrontation, speech clear  Skin: No rashes     Assessment / Plan     Brief Patient Summary:  Rodrigo Baum is a 79 y.o. male who is admitted in ICU for septic shock likely from diverticulitis.       Active Critical Care Problems:  - Septic shock  - Acute diverticulitis   - Recent outpatient use of antibiotics  - Lactic acidosis  - Dehydration    Plan:   Neuro:  Awake and alert  CVS: Trend serum lactate. Aggressive crystalloids hydration.   GI: Keep NPO. Check stool cultures and C. Diff. Empiric coverage with ceftriaxone and flagyl for 3-4 days. Educated patient to see his GI doctor after discharge for interval C-scope.   Endo: Thyroid supplements.   ID: Trend procalcitonin   DVT ppx: Hep sq  GI ppx: Not needed  Dispo: Monitor in hospital for 24 hours.       CODE STATUS:    Full code    I have spent 38 minutes of critical care time, independent of other billable procedures.       Chris Almanza MD  11/04/24   23:09 EST

## 2024-11-05 NOTE — PLAN OF CARE
Goal Outcome Evaluation:      Pt arrived from ER overnight, needing pressure support but BP's were WNL by the time pt arrived. 2L bolus given in ER, pt responded well. No complaints on n/v overnight. 1 large BM this morning, stool sample sent. Pt states pain is better in lower abdomen compared to when pt arrived. Pt ok to have clears per Audrey MYRICK.

## 2024-11-05 NOTE — PLAN OF CARE
Goal Outcome Evaluation:                    Patient alert and oriented x4, VSS on RA. Patient with no complaints of pain. UOP adequate, multiple Bms. C-diff negative, spore/contact precautions discontinued. Mag replaced. Patient ordered to tele awaiting bed.

## 2024-11-05 NOTE — PROGRESS NOTES
Pulmonary/Critical Care Follow-up     LOS: 0 days   Patient Care Team:  Holli Fletcher MD as PCP - General (Internal Medicine)  Tal Wagner MD as Consulting Physician (Cardiology)  Ines Hsieh PA-C as Physician Assistant (Physician Assistant)    Chief Complaint: Diarrhea      Subjective     History reviewed and updated in EMR as indicated.    Interval History:     Patient now having formed stool per nursing.  C. difficile testing was negative.  Systolic blood pressures now in the 130s.  Patient never required pressors.  Responded to 2 L fluid.  He feels better.    History taken from: Patient, chart, staff    PMH/FH/Social History were reviewed and updated appropriately in the electronic medical record.     Review of Systems:    Review of 14 systems was completed with positives and pertinent negatives noted in the subjective section.  All other systems reviewed and are negative.         Objective     Vital Signs  Temp:  [97.5 °F (36.4 °C)-98.5 °F (36.9 °C)] 98 °F (36.7 °C)  Heart Rate:  [60-89] 64  Resp:  [16-18] 16  BP: ()/(45-69) 106/64  No intake/output data recorded.  Body mass index is 23.43 kg/m².     IV drips:  norepinephrine       Physical Exam:     Constitutional:   Alert, in no acute distress   Head:   Normocephalic, atraumatic   Eyes:           Lids and lashes normal, conjunctivae and sclerae normal.  PER   ENMT:  Ears appear intact with no abnormalities noted     Lips normal.     Neck:  Trachea midline, no JVD   Lungs/Resp:    Normal effort, symmetric chest rise, no crepitus, clear to      auscultation bilaterally.               Heart/CV:   Regular rhythm and normal rate, no murmur   Abdomen/GI:    Soft, mild left lower quadrant tenderness to palpation, nondistended.  Bowel sounds present.   :    Deferred   Extremities/MSK:  No clubbing or cyanosis.  No edema.     Pulses:  Pulses palpable and equal bilaterally   Skin:  No bleeding, bruising or rash   Heme/Lymph:  No cervical or  supraclavicular adenopathy.   Neurologic:    Psychiatric:    Moves all extremities with no obvious focal motor deficit.  Cranial nerves 2 - 12 grossly intact  Non-agitated, normal affect.    The above physical exam findings were reviewed and reflect my exam findings as of today's exam.   Electronically signed by:  Jose Christensen MD  11/05/24  08:26 EST      Results Review:     I reviewed the patient's new clinical results.   Results from last 7 days   Lab Units 11/05/24  0344 11/04/24  1857 10/30/24  1605   SODIUM mmol/L 141 142 143   POTASSIUM mmol/L 4.1 4.0 4.1   CHLORIDE mmol/L 106 104 105   CO2 mmol/L 22.0 25.0 28.0   BUN mg/dL 21 19 26*   CREATININE mg/dL 0.99 1.09 0.93   CALCIUM mg/dL 8.2* 9.2 8.7   BILIRUBIN mg/dL  --  2.5* 2.3*   ALK PHOS U/L  --  78 72   ALT (SGPT) U/L  --  27 29   AST (SGOT) U/L  --  29 33   GLUCOSE mg/dL 122* 143* 109*     Results from last 7 days   Lab Units 11/05/24  0344 11/04/24  1857 10/30/24  1605   WBC 10*3/mm3 13.09* 12.59* 5.11   HEMOGLOBIN g/dL 14.9 16.9 17.0   HEMATOCRIT % 44.5 49.3 49.3   PLATELETS 10*3/mm3 156 183 180         Results from last 7 days   Lab Units 11/05/24  0344   MAGNESIUM mg/dL 1.8   PHOSPHORUS mg/dL 3.5       I reviewed the patient's new imaging including images and reports.    CT abdomen/pelvis 11/4/2024:      IMPRESSION:  1.Evidence for acute diverticulitis involving the distal descending colon extending to the mid sigmoid colon. There are no signs of perforation or abscess.   2.No evidence for significant nephrolithiasis. There is no evidence for obstructive uropathy.     CT abdomen and pelvis 10/30/2024:    Impression:  1.No acute abnormality identified within the abdomen or pelvis.  2.5 mm calculus within the left distal ureter (series 2, image 133). No appreciable hydronephrosis.  3.Right common iliac artery aneurysm measuring approximately 2 cm.  4.Cholelithiasis and/or gallbladder sludge.  5.Moderate hiatal hernia.  6.Colonic  diverticulosis.  7.Additional findings as detailed above.    Medication Review:   [Held by provider] amLODIPine, 2.5 mg, Oral, Q24H  aspirin, 81 mg, Oral, Daily  cefTRIAXone, 2,000 mg, Intravenous, Q24H  finasteride, 5 mg, Oral, Daily  heparin (porcine), 5,000 Units, Subcutaneous, Q8H  [Held by provider] isosorbide mononitrate, 30 mg, Oral, Daily  levothyroxine, 112 mcg, Oral, Daily  magnesium sulfate, 4 g, Intravenous, Once  [Held by provider] metoprolol succinate XL, 25 mg, Oral, Daily  metroNIDAZOLE, 500 mg, Intravenous, Q8H  mupirocin, 1 Application, Each Nare, BID  pantoprazole, 40 mg, Oral, Q AM  predniSONE, 2 mg, Oral, Daily  rosuvastatin, 40 mg, Oral, Nightly  sodium chloride, 10 mL, Intravenous, Q12H  tamsulosin, 0.4 mg, Oral, Daily      norepinephrine, 0.02-0.3 mcg/kg/min        Assessment & Plan         Diverticulitis    UTI (urinary tract infection)    79 y.o. male with history of HTN, HLD, T2DM, CKD 3, CAD with 1 prior stent, BPH, hypothyroidism, GERD, RA, recent hospitalization for nonobstructing left ureteral stone given IV fluids and encouraged to drink water and subsequently discharged home whitney Nguyen presented on 11/4/2024 with diarrhea after taking multiple laxatives.    CT imaging showed some evidence of distal descending colon diverticulitis.  Urinalysis was borderline for urinary tract infection.  Patient initially was hypotensive immediately after initial bolus of 2.3 L of LR and 500 mL normal saline however had adequate blood pressure on arrival to the intensive care unit and did not require initiation of pressors.  Lactic acid was initially elevated however responded to fluid resuscitation.    Patient is doing well today.  He has some mild left lower quadrant tenderness to palpation on exam.  Urine culture is pending.  Initial procalcitonin was elevated at 2.98.    Plan:  1.  For possible sepsis: In retrospect, suspect patient was dehydrated secondary to diarrhea and nausea with poor p.o.  intake.  Blood pressure responded to fluid.  Lactic acid level normalized.  Patient did not require pressors.  2.  For diverticulitis: Patient has left lower quadrant tenderness to palpation in area showing evidence of diverticulitis on CT scan.  I will continue Rocephin and Flagyl for now and likely transition to ciprofloxacin plus Flagyl versus Augmentin to complete 10 to 14 days total antibiotics at discharge.  Transition antibiotics/discharge when abdominal tenderness resolves.  3.  For possible UTI: Borderline UA.  Await urine culture result.  4.  For BPH: Continue tamsulosin and finasteride.  5.  For hypothyroidism: Continue levothyroxine.  6.  For hypertension: Resume amlodipine at this point and monitor.  7.  For DVT prophylaxis: Subcutaneous heparin.    Okay to telemetry/hospitalist.    Electronically signed by:    Jose Christensen MD  11/05/24  08:26 EST      *. Please note that portions of this note were completed with [employer] - a voice recognition program.

## 2024-11-06 LAB
ANION GAP SERPL CALCULATED.3IONS-SCNC: 10 MMOL/L (ref 5–15)
BUN SERPL-MCNC: 12 MG/DL (ref 8–23)
BUN/CREAT SERPL: 15.4 (ref 7–25)
CALCIUM SPEC-SCNC: 8.1 MG/DL (ref 8.6–10.5)
CHLORIDE SERPL-SCNC: 108 MMOL/L (ref 98–107)
CO2 SERPL-SCNC: 23 MMOL/L (ref 22–29)
CREAT SERPL-MCNC: 0.78 MG/DL (ref 0.76–1.27)
DEPRECATED RDW RBC AUTO: 46.5 FL (ref 37–54)
EGFRCR SERPLBLD CKD-EPI 2021: 91.9 ML/MIN/1.73
ERYTHROCYTE [DISTWIDTH] IN BLOOD BY AUTOMATED COUNT: 13.1 % (ref 12.3–15.4)
GLUCOSE SERPL-MCNC: 104 MG/DL (ref 65–99)
HCT VFR BLD AUTO: 44.5 % (ref 37.5–51)
HGB BLD-MCNC: 15.1 G/DL (ref 13–17.7)
MAGNESIUM SERPL-MCNC: 2.2 MG/DL (ref 1.6–2.4)
MCH RBC QN AUTO: 32.8 PG (ref 26.6–33)
MCHC RBC AUTO-ENTMCNC: 33.9 G/DL (ref 31.5–35.7)
MCV RBC AUTO: 96.7 FL (ref 79–97)
PLATELET # BLD AUTO: 165 10*3/MM3 (ref 140–450)
PMV BLD AUTO: 10.1 FL (ref 6–12)
POTASSIUM SERPL-SCNC: 3.4 MMOL/L (ref 3.5–5.2)
POTASSIUM SERPL-SCNC: 4 MMOL/L (ref 3.5–5.2)
RBC # BLD AUTO: 4.6 10*6/MM3 (ref 4.14–5.8)
SODIUM SERPL-SCNC: 141 MMOL/L (ref 136–145)
WBC NRBC COR # BLD AUTO: 8.83 10*3/MM3 (ref 3.4–10.8)

## 2024-11-06 PROCEDURE — 80048 BASIC METABOLIC PNL TOTAL CA: CPT

## 2024-11-06 PROCEDURE — 85027 COMPLETE CBC AUTOMATED: CPT

## 2024-11-06 PROCEDURE — 25010000002 VANCOMYCIN 1.5-0.9 GM/500ML-% SOLUTION

## 2024-11-06 PROCEDURE — 25010000002 HEPARIN (PORCINE) PER 1000 UNITS: Performed by: STUDENT IN AN ORGANIZED HEALTH CARE EDUCATION/TRAINING PROGRAM

## 2024-11-06 PROCEDURE — 25010000002 METRONIDAZOLE 500 MG/100ML SOLUTION

## 2024-11-06 PROCEDURE — 25010000002 CEFTRIAXONE PER 250 MG

## 2024-11-06 PROCEDURE — 83735 ASSAY OF MAGNESIUM: CPT | Performed by: STUDENT IN AN ORGANIZED HEALTH CARE EDUCATION/TRAINING PROGRAM

## 2024-11-06 PROCEDURE — 84132 ASSAY OF SERUM POTASSIUM: CPT | Performed by: INTERNAL MEDICINE

## 2024-11-06 PROCEDURE — 63710000001 PREDNISONE PER 5 MG

## 2024-11-06 PROCEDURE — 99232 SBSQ HOSP IP/OBS MODERATE 35: CPT | Performed by: INTERNAL MEDICINE

## 2024-11-06 RX ORDER — VANCOMYCIN/0.9 % SOD CHLORIDE 1.5G/250ML
20 PLASTIC BAG, INJECTION (ML) INTRAVENOUS ONCE
Status: COMPLETED | OUTPATIENT
Start: 2024-11-06 | End: 2024-11-06

## 2024-11-06 RX ORDER — POTASSIUM CHLORIDE 1500 MG/1
40 TABLET, EXTENDED RELEASE ORAL EVERY 4 HOURS
Status: COMPLETED | OUTPATIENT
Start: 2024-11-06 | End: 2024-11-06

## 2024-11-06 RX ADMIN — FINASTERIDE 5 MG: 5 TABLET, FILM COATED ORAL at 08:04

## 2024-11-06 RX ADMIN — TAMSULOSIN HYDROCHLORIDE 0.4 MG: 0.4 CAPSULE ORAL at 08:04

## 2024-11-06 RX ADMIN — MUPIROCIN 1 APPLICATION: 20 OINTMENT TOPICAL at 08:05

## 2024-11-06 RX ADMIN — POTASSIUM CHLORIDE 40 MEQ: 1500 TABLET, EXTENDED RELEASE ORAL at 12:00

## 2024-11-06 RX ADMIN — PANTOPRAZOLE SODIUM 40 MG: 40 TABLET, DELAYED RELEASE ORAL at 06:16

## 2024-11-06 RX ADMIN — MUPIROCIN 1 APPLICATION: 20 OINTMENT TOPICAL at 20:11

## 2024-11-06 RX ADMIN — METRONIDAZOLE 500 MG: 500 INJECTION, SOLUTION INTRAVENOUS at 15:39

## 2024-11-06 RX ADMIN — Medication 10 ML: at 08:05

## 2024-11-06 RX ADMIN — Medication 10 ML: at 20:14

## 2024-11-06 RX ADMIN — METRONIDAZOLE 500 MG: 500 INJECTION, SOLUTION INTRAVENOUS at 08:05

## 2024-11-06 RX ADMIN — METRONIDAZOLE 500 MG: 500 INJECTION, SOLUTION INTRAVENOUS at 00:02

## 2024-11-06 RX ADMIN — PREDNISONE 2 MG: 1 TABLET ORAL at 08:04

## 2024-11-06 RX ADMIN — SODIUM CHLORIDE 2000 MG: 900 INJECTION INTRAVENOUS at 20:10

## 2024-11-06 RX ADMIN — LEVOTHYROXINE SODIUM 112 MCG: 0.11 TABLET ORAL at 06:16

## 2024-11-06 RX ADMIN — HEPARIN SODIUM 5000 UNITS: 5000 INJECTION INTRAVENOUS; SUBCUTANEOUS at 06:16

## 2024-11-06 RX ADMIN — POTASSIUM CHLORIDE 40 MEQ: 1500 TABLET, EXTENDED RELEASE ORAL at 08:04

## 2024-11-06 RX ADMIN — ASPIRIN 81 MG 81 MG: 81 TABLET ORAL at 08:04

## 2024-11-06 RX ADMIN — ROSUVASTATIN 40 MG: 20 TABLET, FILM COATED ORAL at 20:10

## 2024-11-06 RX ADMIN — Medication 1500 MG: at 15:39

## 2024-11-06 RX ADMIN — HEPARIN SODIUM 5000 UNITS: 5000 INJECTION INTRAVENOUS; SUBCUTANEOUS at 15:39

## 2024-11-06 RX ADMIN — HEPARIN SODIUM 5000 UNITS: 5000 INJECTION INTRAVENOUS; SUBCUTANEOUS at 22:17

## 2024-11-06 NOTE — CASE MANAGEMENT/SOCIAL WORK
Continued Stay Note  [facility]     Patient Name: Rodrigo Baum  MRN: [id number]  Today's Date: 11/6/2024    Admit Date: 11/4/2024    Plan: HOme   Discharge Plan       Row Name 11/06/24 1624       Plan    Plan HOme    Patient/Family in Agreement with Plan yes    Plan Comments I met w/Mr. Baum in room to discuss d/c planning. His d/c plan remains home, family/friend will provide transportation. No d/c needs verbalized @ this time. CM will continue to follow.    Final Discharge Disposition Code 01 - home or self-care                   Discharge Codes    No documentation.                 Expected Discharge Date and Time       Expected Discharge Date Expected Discharge Time    Nov 12, 2024               Enrique Ortega RN

## 2024-11-06 NOTE — PLAN OF CARE
Goal Outcome Evaluation:  Plan of Care Reviewed With: patient        Progress: improving  Outcome Evaluation:     -VS stable on RA.   -Patient is alert and oriented. Denies pain and rested well between care.   -Good po intake.   -Good uop. 4-5 small loose/soft bowel movements during the night.   -Awaiting tele bed.

## 2024-11-06 NOTE — PLAN OF CARE
Problem: Adult Inpatient Plan of Care  Goal: Plan of Care Review  Outcome: Progressing  Flowsheets (Taken 11/6/2024 1830)  Progress: improving  Outcome Evaluation: VSS on RA. No complaints of pain. K+ replaced- 4.0 recheck. Tolerating diet. Adequate UOP. Multiple liquid/loose BMs throughout shift. Pt awaiting tele bed.  Plan of Care Reviewed With: patient

## 2024-11-06 NOTE — PROGRESS NOTES
Pharmacy Consult - Vancomycin Dosing and Monitoring    Rodrigo Baum is a 79 y.o. male receiving vancomycin therapy.     Indication: urinary tract infection with gram positive cocci on culture  Consulting Provider: Jose Christensen MD  ID Consult: no    Goal AUC: 400-600 mg/L*hr    Current Antimicrobial Therapy  Anti-Infectives (From admission, onward)      Ordered     Dose/Rate Route Frequency Start Stop    11/06/24 1435  vancomycin IVPB 1500 mg in 0.9% NaCl (Premix) 500 mL        Ordering Provider: Steven Peters, RPH    20 mg/kg × 79.9 kg  333.3 mL/hr over 90 Minutes Intravenous Once 11/06/24 1530      11/06/24 1316  Pharmacy to dose vancomycin        Ordering Provider: Jose Christensen MD     Does not apply Continuous PRN 11/06/24 1315 11/13/24 1314    11/04/24 2330  cefTRIAXone (ROCEPHIN) 2,000 mg in sodium chloride 0.9 % 100 mL MBP        Ordering Provider: Audrey Mendez APRN    2,000 mg  200 mL/hr over 30 Minutes Intravenous Every 24 Hours 11/05/24 2100 11/10/24 2059 11/04/24 2335  metroNIDAZOLE (FLAGYL) IVPB 500 mg        Ordering Provider: Audrey Mendez APRN    500 mg  200 mL/hr over 30 Minutes Intravenous Every 8 Hours Scheduled 11/05/24 0000 11/09/24 2359    11/04/24 2130  cefTRIAXone (ROCEPHIN) 2 g in sodium chloride 0.9 % 100 mL MBP        Ordering Provider: Prateek Camargo MD    2 g  200 mL/hr over 30 Minutes Intravenous Once 11/04/24 2146 11/04/24 2340          Allergies  Allergies as of 11/04/2024 - Reviewed 11/04/2024   Allergen Reaction Noted    Hydrocodone-acetaminophen Anxiety 08/12/2022    Lipitor [atorvastatin] Anxiety and Myalgia 11/01/2016    Lortab [hydrocodone-acetaminophen] Anxiety 11/01/2016    Azithromycin Nausea Only 11/09/2017     Labs  Results from last 7 days   Lab Units 11/06/24  0625 11/05/24  0344 11/04/24  1857   BUN mg/dL 12 21 19   CREATININE mg/dL 0.78 0.99 1.09     Results from last 7 days   Lab Units 11/06/24  0625 11/05/24  0344 11/04/24  1857   WBC  "10*3/mm3 8.83 13.09* 12.59*     Evaluation of Dosing     Last Dose Received in the ED/Outside Facility: n/a  Is Patient on Dialysis or Renal Replacement: no    Height - 180.3 cm (71\")  Weight - 79.9 kg (176 lb 2.4 oz)    Estimated Creatinine Clearance: 89.6 mL/min (by C-G formula based on SCr of 0.78 mg/dL).    I/O last 3 completed shifts:  In: 924 [P.O.:240; I.V.:484; IV Piggyback:200]  Out: 675 [Urine:675]    Microbiology and Radiology  Microbiology Results (last 10 days)       Procedure Component Value - Date/Time    Gastrointestinal Panel, PCR - Stool, Per Rectum [[id number]]  (Normal) Collected: 11/05/24 0603    Lab Status: Final result Specimen: Stool from Per Rectum Updated: 11/05/24 0838     Campylobacter Not Detected     Plesiomonas shigelloides Not Detected     Salmonella Not Detected     Vibrio Not Detected     Vibrio cholerae Not Detected     Yersinia enterocolitica Not Detected     Enteroaggregative E. coli (EAEC) Not Detected     Enteropathogenic E. coli (EPEC) Not Detected     Enterotoxigenic E. coli (ETEC) lt/st Not Detected     Shiga-like toxin-producing E. coli (STEC) stx1/stx2 Not Detected     Shigella/Enteroinvasive E. coli (EIEC) Not Detected     Cryptosporidium Not Detected     Cyclospora cayetanensis Not Detected     Entamoeba histolytica Not Detected     Giardia lamblia Not Detected     Adenovirus F40/41 Not Detected     Astrovirus Not Detected     Norovirus GI/GII Not Detected     Rotavirus A Not Detected     Sapovirus (I, II, IV or V) Not Detected    Clostridioides difficile Toxin - Stool, Per Rectum [[id number]]  (Normal) Collected: 11/05/24 0603    Lab Status: Final result Specimen: Stool from Per Rectum Updated: 11/05/24 0811    Narrative:      The following orders were created for panel order Clostridioides difficile Toxin - Stool, Per Rectum.  Procedure                               Abnormality         Status                     ---------                               -----------         " ------                     Clostridioides difficile...[[id number]]  Normal              Final result                 Please view results for these tests on the individual orders.    Clostridioides difficile Toxin, PCR - Stool, Per Rectum [[id number]]  (Normal) Collected: 11/05/24 0603    Lab Status: Final result Specimen: Stool from Per Rectum Updated: 11/05/24 0811     Toxigenic C. difficile by PCR Not Detected    Narrative:      The result indicates the absence of toxigenic C. difficile from stool specimen.     Blood Culture - Blood, Arm, Right [[id number]]  (Normal) Collected: 11/04/24 2157    Lab Status: Preliminary result Specimen: Blood from Arm, Right Updated: 11/05/24 2215     Blood Culture No growth at 24 hours    Narrative:      Less than seven (7) mL's of blood was collected.  Insufficient quantity may yield false negative results.    Blood Culture - Blood, Arm, Right [[id number]]  (Normal) Collected: 11/04/24 2147    Lab Status: Preliminary result Specimen: Blood from Arm, Right Updated: 11/05/24 2215     Blood Culture No growth at 24 hours    Narrative:      Less than seven (7) mL's of blood was collected.  Insufficient quantity may yield false negative results.    Urine Culture - Urine, Urine, Clean Catch [[id number]]  (Abnormal) Collected: 11/04/24 2016    Lab Status: Preliminary result Specimen: Urine, Clean Catch Updated: 11/06/24 1217     Urine Culture >100,000 CFU/mL Gram Positive Cocci    Narrative:      Colonization of the urinary tract without infection is common. Treatment is discouraged unless the patient is symptomatic, pregnant, or undergoing an invasive urologic procedure.          Reported Vancomycin Levels              InsightRX AUC Calculation:    Current AUC: -- mg/L*hr    Predicted Steady State AUC on Current Dose: -- mg/L*hr  _________________________________    Predicted Steady State AUC on New Dose: 405 mg/L*hr    Assessment/Plan:     Pharmacy consulted to dose vancomycin for urinary  tract infection, goal -600 mg/L*hr.  Patient loaded with vancomycin 1250 mg  ( ~ 18.7 mg/kg)  Urine culture positive for gram positive cocci, further information pending.   Serum creatinine is 0.78, BUN is 12, and WBC is 8.83.  Based on evaluation, initiate a maintenance regimen of vancomycin 750 mg ( ~ 9.4 mg/kg) every 12 hours.  A vancomycin random will be assessed on 11/7 with AM labs.  Will continue to follow and adjust vancomycin dose as needed based on renal function, cultures, and patient clinical status.    Thank you,    Steven Peters [facility]  11/6/2024  14:38 EST

## 2024-11-06 NOTE — PROGRESS NOTES
Pulmonary/Critical Care Follow-up     LOS: 1 day   Patient Care Team:  Holli Fletcher MD as PCP - General (Internal Medicine)  Tal Wagner MD as Consulting Physician (Cardiology)  Ines Hsieh PA-C as Physician Assistant (Physician Assistant)    Chief Complaint: Diarrhea      Subjective     History reviewed and updated in EMR as indicated.    Interval History:     Patient had some diarrhea again this morning.  Systolic blood pressures now in the 140s.  Patient never required pressors.  Responded to 2 L fluid.  Overall feeling better but still has some abdominal tenderness.    History taken from: Patient, chart, staff    PMH/FH/Social History were reviewed and updated appropriately in the electronic medical record.     Review of Systems:    Review of 14 systems was completed with positives and pertinent negatives noted in the subjective section.  All other systems reviewed and are negative.         Objective     Vital Signs  Temp:  [97.4 °F (36.3 °C)-98.2 °F (36.8 °C)] 97.8 °F (36.6 °C)  Heart Rate:  [64-98] 90  Resp:  [14-22] 16  BP: (107-164)/(63-99) 141/92  11/05 0701 - 11/06 0700  In: 924 [P.O.:240; I.V.:484]  Out: 675 [Urine:675]  Body mass index is 24.57 kg/m².     IV drips:        Physical Exam:     Constitutional:   Alert, in no acute distress   Head:   Normocephalic, atraumatic   Eyes:           Lids and lashes normal, conjunctivae and sclerae normal.  PER   ENMT:  Ears appear intact with no abnormalities noted     Lips normal.     Neck:  Trachea midline, no JVD   Lungs/Resp:    Normal effort, symmetric chest rise, no crepitus, clear to      auscultation bilaterally.               Heart/CV:   Regular rhythm and normal rate, no murmur   Abdomen/GI:    Soft, mild left lower quadrant tenderness to palpation, nondistended.  Bowel sounds present.   :    Deferred   Extremities/MSK:  No clubbing or cyanosis.  No edema.     Pulses:  Pulses palpable and equal bilaterally   Skin:  No bleeding,  bruising or rash   Heme/Lymph:  No cervical or supraclavicular adenopathy.   Neurologic:    Psychiatric:    Moves all extremities with no obvious focal motor deficit.  Cranial nerves 2 - 12 grossly intact  Non-agitated, normal affect.    The above physical exam findings were reviewed and reflect my exam findings as of today's exam.   Electronically signed by:  Jose Christensen MD  11/06/24  13:09 EST      Results Review:     I reviewed the patient's new clinical results.   Results from last 7 days   Lab Units 11/06/24  0625 11/05/24  0344 11/04/24  1857 10/30/24  1605   SODIUM mmol/L 141 141 142 143   POTASSIUM mmol/L 3.4* 4.1 4.0 4.1   CHLORIDE mmol/L 108* 106 104 105   CO2 mmol/L 23.0 22.0 25.0 28.0   BUN mg/dL 12 21 19 26*   CREATININE mg/dL 0.78 0.99 1.09 0.93   CALCIUM mg/dL 8.1* 8.2* 9.2 8.7   BILIRUBIN mg/dL  --   --  2.5* 2.3*   ALK PHOS U/L  --   --  78 72   ALT (SGPT) U/L  --   --  27 29   AST (SGOT) U/L  --   --  29 33   GLUCOSE mg/dL 104* 122* 143* 109*     Results from last 7 days   Lab Units 11/06/24 0625 11/05/24 0344 11/04/24 1857   WBC 10*3/mm3 8.83 13.09* 12.59*   HEMOGLOBIN g/dL 15.1 14.9 16.9   HEMATOCRIT % 44.5 44.5 49.3   PLATELETS 10*3/mm3 165 156 183         Results from last 7 days   Lab Units 11/06/24 0625 11/05/24 0344   MAGNESIUM mg/dL 2.2 1.8   PHOSPHORUS mg/dL  --  3.5       I reviewed the patient's new imaging including images and reports.    CT abdomen/pelvis 11/4/2024:      IMPRESSION:  1.Evidence for acute diverticulitis involving the distal descending colon extending to the mid sigmoid colon. There are no signs of perforation or abscess.   2.No evidence for significant nephrolithiasis. There is no evidence for obstructive uropathy.     CT abdomen and pelvis 10/30/2024:    Impression:  1.No acute abnormality identified within the abdomen or pelvis.  2.5 mm calculus within the left distal ureter (series 2, image 133). No appreciable hydronephrosis.  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  4.Cholelithiasis and/or gallbladder sludge.  5.Moderate hiatal hernia.  6.Colonic diverticulosis.  7.Additional findings as detailed above.    Medication Review:   [Held by provider] amLODIPine, 2.5 mg, Oral, Q24H  aspirin, 81 mg, Oral, Daily  cefTRIAXone, 2,000 mg, Intravenous, Q24H  finasteride, 5 mg, Oral, Daily  heparin (porcine), 5,000 Units, Subcutaneous, Q8H  [Held by provider] isosorbide mononitrate, 30 mg, Oral, Daily  levothyroxine, 112 mcg, Oral, Daily  [Held by provider] metoprolol succinate XL, 25 mg, Oral, Daily  metroNIDAZOLE, 500 mg, Intravenous, Q8H  mupirocin, 1 Application, Each Nare, BID  pantoprazole, 40 mg, Oral, Q AM  predniSONE, 2 mg, Oral, Daily  rosuvastatin, 40 mg, Oral, Nightly  sodium chloride, 10 mL, Intravenous, Q12H  tamsulosin, 0.4 mg, Oral, Daily             Assessment & Plan         Diverticulitis    UTI (urinary tract infection)    79 y.o. male with history of HTN, HLD, T2DM, CKD 3, CAD with 1 prior stent, BPH, hypothyroidism, GERD, RA, recent hospitalization for nonobstructing left ureteral stone given IV fluids and encouraged to drink water and subsequently discharged home whitney Nguyen presented on 11/4/2024 with diarrhea after taking multiple laxatives.    CT imaging showed some evidence of distal descending colon diverticulitis.  Urinalysis was borderline for urinary tract infection.  Patient initially was hypotensive immediately after initial bolus of 2.3 L of LR and 500 mL normal saline however had adequate blood pressure on arrival to the intensive care unit and did not require initiation of pressors.  Lactic acid was initially elevated however responded to fluid resuscitation.    Patient is doing well today.  He still has some mild left lower quadrant tenderness to palpation on exam.  Urine culture is growing gram-positive cocci awaiting speciation.  Initial procalcitonin was elevated at 2.98.    Urine culture is growing gram-positive  cocci, greater than 100,000 CFU's.  I will go ahead and initiate vancomycin and follow-up cultures.    Plan:  1.  For possible sepsis: In retrospect, suspect patient was dehydrated secondary to diarrhea and nausea with poor p.o. intake.  Blood pressure responded to fluid.  Lactic acid level normalized.  Patient did not require pressors.  2.  For diverticulitis: Patient has left lower quadrant tenderness to palpation in area showing evidence of diverticulitis on CT scan.  I will continue Rocephin and Flagyl for now and likely transition to ciprofloxacin plus Flagyl versus Augmentin to complete 10 to 14 days total antibiotics at discharge.  Transition antibiotics/discharge when abdominal tenderness resolves.  3.  For possible UTI: Borderline UA.  Greater than 100,000 CFU's gram-positive cocci on urine culture.  Start vancomycin and follow-up cultures.  4.  For BPH: Continue tamsulosin and finasteride.  5.  For hypothyroidism: Continue levothyroxine.  6.  For hypertension: Resume amlodipine at this point and monitor.  7.  For DVT prophylaxis: Subcutaneous heparin.    Okay to telemetry/hospitalist.    Electronically signed by:    Jose Christensen MD  11/06/24  13:09 EST      *. Please note that portions of this note were completed with [employer] - a voice recognition program.

## 2024-11-07 ENCOUNTER — READMISSION MANAGEMENT (OUTPATIENT)
Dept: CALL CENTER | Facility: HOSPITAL | Age: 79
End: 2024-11-07
Payer: MEDICARE

## 2024-11-07 VITALS
HEART RATE: 77 BPM | SYSTOLIC BLOOD PRESSURE: 156 MMHG | HEIGHT: 71 IN | WEIGHT: 176.15 LBS | RESPIRATION RATE: 16 BRPM | TEMPERATURE: 98 F | OXYGEN SATURATION: 96 % | BODY MASS INDEX: 24.66 KG/M2 | DIASTOLIC BLOOD PRESSURE: 84 MMHG

## 2024-11-07 LAB
BACTERIA SPEC AEROBE CULT: ABNORMAL
VANCOMYCIN SERPL-MCNC: 6.4 MCG/ML (ref 5–40)

## 2024-11-07 PROCEDURE — 99239 HOSP IP/OBS DSCHRG MGMT >30: CPT | Performed by: NURSE PRACTITIONER

## 2024-11-07 PROCEDURE — 80202 ASSAY OF VANCOMYCIN: CPT

## 2024-11-07 PROCEDURE — 25010000002 VANCOMYCIN 1 G RECONSTITUTED SOLUTION 1 EACH VIAL

## 2024-11-07 PROCEDURE — 25010000002 METRONIDAZOLE 500 MG/100ML SOLUTION

## 2024-11-07 PROCEDURE — 63710000001 PREDNISONE PER 5 MG

## 2024-11-07 PROCEDURE — 25810000003 SODIUM CHLORIDE 0.9 % SOLUTION 250 ML FLEX CONT

## 2024-11-07 PROCEDURE — 25010000002 HEPARIN (PORCINE) PER 1000 UNITS: Performed by: STUDENT IN AN ORGANIZED HEALTH CARE EDUCATION/TRAINING PROGRAM

## 2024-11-07 RX ORDER — SACCHAROMYCES BOULARDII 250 MG
250 CAPSULE ORAL 2 TIMES DAILY
Qty: 14 CAPSULE | Refills: 0 | Status: SHIPPED | OUTPATIENT
Start: 2024-11-07

## 2024-11-07 RX ORDER — METOPROLOL SUCCINATE 25 MG/1
25 TABLET, EXTENDED RELEASE ORAL DAILY
Status: DISCONTINUED | OUTPATIENT
Start: 2024-11-07 | End: 2024-11-07 | Stop reason: HOSPADM

## 2024-11-07 RX ORDER — AMLODIPINE BESYLATE 2.5 MG/1
2.5 TABLET ORAL
Status: DISCONTINUED | OUTPATIENT
Start: 2024-11-07 | End: 2024-11-07 | Stop reason: HOSPADM

## 2024-11-07 RX ADMIN — FINASTERIDE 5 MG: 5 TABLET, FILM COATED ORAL at 08:37

## 2024-11-07 RX ADMIN — PREDNISONE 2 MG: 1 TABLET ORAL at 08:37

## 2024-11-07 RX ADMIN — HEPARIN SODIUM 5000 UNITS: 5000 INJECTION INTRAVENOUS; SUBCUTANEOUS at 05:06

## 2024-11-07 RX ADMIN — METRONIDAZOLE 500 MG: 500 INJECTION, SOLUTION INTRAVENOUS at 00:00

## 2024-11-07 RX ADMIN — PANTOPRAZOLE SODIUM 40 MG: 40 TABLET, DELAYED RELEASE ORAL at 05:06

## 2024-11-07 RX ADMIN — TAMSULOSIN HYDROCHLORIDE 0.4 MG: 0.4 CAPSULE ORAL at 08:37

## 2024-11-07 RX ADMIN — LEVOTHYROXINE SODIUM 112 MCG: 0.11 TABLET ORAL at 05:06

## 2024-11-07 RX ADMIN — MUPIROCIN 1 APPLICATION: 20 OINTMENT TOPICAL at 08:40

## 2024-11-07 RX ADMIN — Medication 10 ML: at 08:40

## 2024-11-07 RX ADMIN — AMOXICILLIN AND CLAVULANATE POTASSIUM 1 TABLET: 875; 125 TABLET, FILM COATED ORAL at 12:34

## 2024-11-07 RX ADMIN — METOPROLOL SUCCINATE 25 MG: 25 TABLET, EXTENDED RELEASE ORAL at 10:43

## 2024-11-07 RX ADMIN — SODIUM CHLORIDE 1000 MG: 9 INJECTION, SOLUTION INTRAVENOUS at 08:27

## 2024-11-07 RX ADMIN — AMLODIPINE BESYLATE 2.5 MG: 2.5 TABLET ORAL at 10:43

## 2024-11-07 RX ADMIN — ASPIRIN 81 MG 81 MG: 81 TABLET ORAL at 08:37

## 2024-11-07 NOTE — CASE MANAGEMENT/SOCIAL WORK
Case Management Discharge Note      Final Note: Plan is home. Family will transport. No CM discharge needs identified         Selected Continued Care - Admitted Since 11/4/2024       Destination    No services have been selected for the patient.                Durable Medical Equipment    No services have been selected for the patient.                Dialysis/Infusion    No services have been selected for the patient.                Home Medical Care    No services have been selected for the patient.                Therapy    No services have been selected for the patient.                Community Resources    No services have been selected for the patient.                Community & DME    No services have been selected for the patient.                         Final Discharge Disposition Code: 01 - home or self-care

## 2024-11-07 NOTE — PROGRESS NOTES
"Pharmacy Consult - Vancomycin Dosing and Monitoring    Rodrigo Baum is a 79 y.o. male receiving vancomycin therapy.     Indication: urinary tract infection with gram positive cocci on culture  Consulting Provider: Jose Christensen MD  ID Consult: no    Goal AUC: 400-600 mg/L*hr    Current Antimicrobial Therapy  Anti-Infectives (From admission, onward)      Ordered     Dose/Rate Route Frequency Start Stop    11/07/24 0647  vancomycin (VANCOCIN) 1,000 mg in sodium chloride 0.9 % 250 mL IVPB-VTB        Ordering Provider: Hamzah Zuluaga, PharmD   Placed in \"And\" Linked Group    12.5 mg/kg × 79.9 kg  250 mL/hr over 60 Minutes Intravenous Every 12 Hours Scheduled 11/07/24 0730 11/13/24 2059 11/06/24 1446  !Vancomycin level scheduled with AM labs, please do not give AM vancomycin dose until level is drawn.        Ordering Provider: Steven Peters RPH     Does not apply Once 11/07/24 0600      11/06/24 1435  vancomycin IVPB 1500 mg in 0.9% NaCl (Premix) 500 mL        Ordering Provider: Steven Peters RPH    20 mg/kg × 79.9 kg  333.3 mL/hr over 90 Minutes Intravenous Once 11/06/24 1530 11/06/24 1709    11/06/24 1316  Pharmacy to dose vancomycin        Ordering Provider: Jose Christensen MD     Does not apply Continuous PRN 11/06/24 1315 11/13/24 1314    11/04/24 2330  cefTRIAXone (ROCEPHIN) 2,000 mg in sodium chloride 0.9 % 100 mL MBP        Ordering Provider: Audrey Mendez APRN    2,000 mg  200 mL/hr over 30 Minutes Intravenous Every 24 Hours 11/05/24 2100 11/10/24 2059    11/04/24 2335  metroNIDAZOLE (FLAGYL) IVPB 500 mg        Ordering Provider: Audrey Mendez APRN    500 mg  200 mL/hr over 30 Minutes Intravenous Every 8 Hours Scheduled 11/05/24 0000 11/09/24 2359    11/04/24 2130  cefTRIAXone (ROCEPHIN) 2 g in sodium chloride 0.9 % 100 mL MBP        Ordering Provider: Prateek Caamrgo MD    2 g  200 mL/hr over 30 Minutes Intravenous Once 11/04/24 2146 11/04/24 2340      " "    Allergies  Allergies as of 11/04/2024 - Reviewed 11/04/2024   Allergen Reaction Noted    Hydrocodone-acetaminophen Anxiety 08/12/2022    Lipitor [atorvastatin] Anxiety and Myalgia 11/01/2016    Lortab [hydrocodone-acetaminophen] Anxiety 11/01/2016    Azithromycin Nausea Only 11/09/2017     Labs  Results from last 7 days   Lab Units 11/06/24  0625 11/05/24  0344 11/04/24  1857   BUN mg/dL 12 21 19   CREATININE mg/dL 0.78 0.99 1.09     Results from last 7 days   Lab Units 11/06/24  0625 11/05/24  0344 11/04/24  1857   WBC 10*3/mm3 8.83 13.09* 12.59*     Evaluation of Dosing     Last Dose Received in the ED/Outside Facility: n/a  Is Patient on Dialysis or Renal Replacement: no    Height - 180.3 cm (71\")  Weight - 79.9 kg (176 lb 2.4 oz)    Estimated Creatinine Clearance: 89.6 mL/min (by C-G formula based on SCr of 0.78 mg/dL).    I/O last 3 completed shifts:  In: 3078 [P.O.:1580; I.V.:1298; IV Piggyback:200]  Out: 675 [Urine:675]    Microbiology and Radiology  Microbiology Results (last 10 days)       Procedure Component Value - Date/Time    Gastrointestinal Panel, PCR - Stool, Per Rectum [[id number]]  (Normal) Collected: 11/05/24 0603    Lab Status: Final result Specimen: Stool from Per Rectum Updated: 11/05/24 0838     Campylobacter Not Detected     Plesiomonas shigelloides Not Detected     Salmonella Not Detected     Vibrio Not Detected     Vibrio cholerae Not Detected     Yersinia enterocolitica Not Detected     Enteroaggregative E. coli (EAEC) Not Detected     Enteropathogenic E. coli (EPEC) Not Detected     Enterotoxigenic E. coli (ETEC) lt/st Not Detected     Shiga-like toxin-producing E. coli (STEC) stx1/stx2 Not Detected     Shigella/Enteroinvasive E. coli (EIEC) Not Detected     Cryptosporidium Not Detected     Cyclospora cayetanensis Not Detected     Entamoeba histolytica Not Detected     Giardia lamblia Not Detected     Adenovirus F40/41 Not Detected     Astrovirus Not Detected     Norovirus GI/GII Not " Detected     Rotavirus A Not Detected     Sapovirus (I, II, IV or V) Not Detected    Clostridioides difficile Toxin - Stool, Per Rectum [[id number]]  (Normal) Collected: 11/05/24 0603    Lab Status: Final result Specimen: Stool from Per Rectum Updated: 11/05/24 0811    Narrative:      The following orders were created for panel order Clostridioides difficile Toxin - Stool, Per Rectum.  Procedure                               Abnormality         Status                     ---------                               -----------         ------                     Clostridioides difficile...[[id number]]  Normal              Final result                 Please view results for these tests on the individual orders.    Clostridioides difficile Toxin, PCR - Stool, Per Rectum [[id number]]  (Normal) Collected: 11/05/24 0603    Lab Status: Final result Specimen: Stool from Per Rectum Updated: 11/05/24 0811     Toxigenic C. difficile by PCR Not Detected    Narrative:      The result indicates the absence of toxigenic C. difficile from stool specimen.     Blood Culture - Blood, Arm, Right [[id number]]  (Normal) Collected: 11/04/24 2157    Lab Status: Preliminary result Specimen: Blood from Arm, Right Updated: 11/06/24 2215     Blood Culture No growth at 2 days    Narrative:      Less than seven (7) mL's of blood was collected.  Insufficient quantity may yield false negative results.    Blood Culture - Blood, Arm, Right [[id number]]  (Normal) Collected: 11/04/24 2147    Lab Status: Preliminary result Specimen: Blood from Arm, Right Updated: 11/06/24 2215     Blood Culture No growth at 2 days    Narrative:      Less than seven (7) mL's of blood was collected.  Insufficient quantity may yield false negative results.    Urine Culture - Urine, Urine, Clean Catch [[id number]]  (Abnormal) Collected: 11/04/24 2016    Lab Status: Preliminary result Specimen: Urine, Clean Catch Updated: 11/06/24 1217     Urine Culture >100,000 CFU/mL Gram Positive  Cocci    Narrative:      Colonization of the urinary tract without infection is common. Treatment is discouraged unless the patient is symptomatic, pregnant, or undergoing an invasive urologic procedure.          Reported Vancomycin Levels  Results from last 7 days   Lab Units 11/07/24  0509   VANCOMYCIN RM mcg/mL 6.40             [employer] AUC Calculation:    Current AUC: -- mg/L*hr    Predicted Steady State AUC on Current Dose: -- mg/L*hr  _________________________________    Predicted Steady State AUC on New Dose: 480 mg/L*hr    Assessment/Plan:     Pharmacy consulted to dose vancomycin for urinary tract infection, goal -600 mg/L*hr.  Patient loaded with vancomycin 1250 mg  ( ~ 18.7 mg/kg).  Vancomycin random level 11/7 in the AM was 6.4  Urine culture positive for gram positive cocci, further information pending.   Initiate maintenance regimen of vancomycin 1000 mg IV q12h  Vancomycin trough scheduled 11/9 at 06:00 prior to 5th maintenance dose    Thank you,    Hamzah Zuluaga, PharmD, BCPS  11/7/2024  06:50 EST

## 2024-11-07 NOTE — PLAN OF CARE
Problem: Adult Inpatient Plan of Care  Goal: Plan of Care Review  Outcome: Met  Flowsheets (Taken 11/7/2024 1124)  Progress: improving  Outcome Evaluation: Discharging home today  Plan of Care Reviewed With: patient   Goal Outcome Evaluation:  Plan of Care Reviewed With: patient        Progress: improving  Outcome Evaluation: Discharging home today

## 2024-11-07 NOTE — PLAN OF CARE
Goal Outcome Evaluation:           Progress: improving     VSS on RA. No c/o pain, only slight discomfort with lower abd palpation. Pt had 6 UOP occ and 4 loose BM occ overnight. No acute events overnight. Vanc retimed per pharmacy for increase in dose regarding trough of 6.40 this AM. Awaiting tele bed.

## 2024-11-07 NOTE — PROGRESS NOTES
"          Clinical Nutrition Assessment     Patient Name: Rodrigo Baum  YOB: 1945  MRN: [id number]  Date of Encounter: 11/07/24 12:35 EST  Admission date: 11/4/2024  Reason for Visit: Consult, Need for education    Assessment   Nutrition Assessment   Admission Diagnosis:  Diverticulitis [K57.92]    Problem List:    Diverticulitis    UTI (urinary tract infection)      PMH:   He  has a past medical history of BPH (benign prostatic hyperplasia), Bursitis of hip, CAD (coronary artery disease), Chronic venous stasis, CKD (chronic kidney disease), stage III, Claustrophobia, Diabetes mellitus, GERD (gastroesophageal reflux disease), HLD (hyperlipidemia), HTN (hypertension), Hypothyroidism, Knee swelling, Myocardial infarction (2018), OA (osteoarthritis) of ankle/foot, OA (osteoarthritis) of hip, Osteoporosis, and RA (rheumatoid arthritis).    PSH:  He  has a past surgical history that includes Tonsillectomy; Coronary angioplasty with stent; Transurethral resection of prostate (N/A, 11/01/2016); Colonoscopy; Cardiac catheterization (N/A, 01/24/2018); Coronary artery bypass graft (N/A, 01/26/2018); Upper gastrointestinal endoscopy; Cardiac catheterization (N/A, 07/01/2019); and Trigger point injection.    Anthropometrics     Height: Height: 180.3 cm (71\")  Last Filed Weight: Weight: 79.9 kg (176 lb 2.4 oz) (11/06/24 0600)  Method: Weight Method: Bed scale  BMI: BMI (Calculated): 24.6    Weight change:  wt appears sable per EMR    Nutrition Focused Physical Exam    Date:     Unable to perform due to Defer pending indication     Subjective   Reported/Observed/Food/Nutrition Related History:     Pt reports his abdomen is , has not been able to eat much recently 2nd kidney stones and diverticulitis, feels his stomach has shrunk, has multiple questions regarding diet      Current Nutrition Prescription   PO: Diet: Regular/House; Fluid Consistency: Thin (IDDSI 0)  Oral Nutrition Supplement: "   Intake:  63% of 4 meals    Assessment & Plan   Nutrition Diagnosis   Date:  11-7-24            Updated:    Problem Food and nutrition knowledge deficit    Etiology Diverticulitis   Signs/Symptoms Need for specialized diet   Status: New    Goal / Objectives:   Nutrition to support treatment and Tolerate PO      Nutrition Intervention      Follow treatment progress, Care plan reviewed, Encourage intake, Education provided    Discuss with pt the need for low fiber diet the next 2-3 weeks to allow his gut time to heal, then once his symptoms are resolved, suggest gradually introducing high foods into diet    Encourage use of probiotic foods    Monitoring/Evaluation:   Per protocol    Sindy Madera RD  Time Spent:30min

## 2024-11-07 NOTE — DISCHARGE SUMMARY
DISCHARGE SUMMARY       Patient name: Rodrigo Baum  CSN: [id number]  MRN: [id number]  : 1945    Date of Admission: 2024  Date of Discharge:  2024    Admitting Physician: Chris Almanza MD   Primary Care Provider: Holli Fletcher MD    Admission Diagnosis: Diverticulitis      Discharge Diagnoses:     Diverticulitis    UTI (urinary tract infection)    Imaging:  CT Abdomen Pelvis Without Contrast    Result Date: 2024  1.Evidence for acute diverticulitis involving the distal descending colon extending to the mid sigmoid colon. There are no signs of perforation or abscess. 2.No evidence for significant nephrolithiasis. There is no evidence for obstructive uropathy. Electronically Signed: José Miguel Miller MD  2024 10:39 PM EST  Workstation ID: [id number]    CT Abdomen Pelvis With Contrast    Result Date: 10/30/2024  Impression: 1.No acute abnormality identified within the abdomen or pelvis. 2.5 mm calculus within the left distal ureter (series 2, image 133). No appreciable hydronephrosis. 3.Right common iliac artery aneurysm measuring approximately 2 cm. 4.Cholelithiasis and/or gallbladder sludge. 5.Moderate hiatal hernia. 6.Colonic diverticulosis. 7.Additional findings as detailed above. Electronically Signed: Thai Brock MD  10/30/2024 5:35 PM EDT  Workstation ID: [id number]     History of Present Illness (copied from H&P 24):  Rodrigo Baum is a 79 y.o. male past medical history of BPH, CAD (1 stent), CKD stage III, DM 2, GERD, HTN, HLD, hypothyroidism and RA presents to [facility] ED with complaints of diarrhea.  Patient was recently seen in our ED for nausea and vomiting due to a nonobstructing left ureteral stone.  He was given IV fluids and encouraged to drink plenty of water and was discharged home. He felt better for a few days. Today the patient had been taking multiple laxatives due to constipation and developed diarrhea. He denies any abdominal pain, nausea and vomiting.    "  When he arrived to the ED it was noted that he was hypotensive with a systolic in the 80s to 90s.  Labs include WBC 12.59, lactate 2.4 with a reflex 1.6.  UA is positive for UTI.  CT A/P reveals evidence for acute diverticulitis involving the distal descending colon extending to the mid sigmoid colon.  There are no signs of perforation or abscess.  There is also no evidence for significant nephrolithiasis or obstructive uropathy. Despite receiving 2.3 L of LR and a 500 mL normal saline bolus he remains hypotensive.  The patient will be admitted to the ICU for further evaluation and treatment.     Patient lives alone and denies smoking/alcohol use. States he completed a course of antibiotics for UTI a month ago. Son at bedside. He had normal C-scope 2 years ago (end of copied text).     Hospital Course:  Patient was admitted to [facility]* issues discussed in the above HPI and continued on IVF resuscitation with improvement in blood pressure / ultimately did not require initiation of pressors. He was additionally continued on empiric antibiotic therapy.     He was monitored in ICU an additional 72 hours on antibiotics with clinical improvement (now having more formed stools & abdominal pain resolved) and no further issues. Urine culture ultimately grew Enterococcus faecalis and antibiotics were transitioned to PO Augmentin to complete an additional 7 days of therapy.     Patient is felt to have reached maximum benefit from this hospitalization and has been deemed appropriate for discharge home. He is feeling well, is tolerating a PO diet, and is ambulating without difficulty. Discharge medications and recommendations are listed below:     Vitals:  /88   Pulse 70   Temp 98 °F (36.7 °C) (Oral)   Resp 16   Ht 180.3 cm (71\")   Wt 79.9 kg (176 lb 2.4 oz)   SpO2 96%   BMI 24.57 kg/m²     Physical Exam:  Constitutional: Elderly male sitting up in bed, well-developed and well-nourished. No distress.   HEENT:  " Normocephalic and atraumatic. PER  Neck: Normal range of motion. Neck supple. No JVD present.   Cardiovascular: Normal rate, regular rhythm, normal heart sounds and intact distal pulses.  No gallop and no friction rub.  No murmur heard.  Pulmonary/Chest: Effort normal and breath sounds normal. No respiratory distress. No wheezes, rhonchi or rales.   Abdominal: Soft. No distension and no mass. There is no tenderness.   Musculoskeletal: Normal range of motion.   Neurological: Alert and oriented to person, place, and time.  No focal deficits  Skin: Skin is warm and dry. No rash noted.   Extremities:  No clubbing, edema or cyanosis  Psychiatric: Normal mood and affect. Behavior is normal.     Labs:  Results from last 7 days   Lab Units 11/06/24  0625   WBC 10*3/mm3 8.83   HEMOGLOBIN g/dL 15.1   HEMATOCRIT % 44.5   PLATELETS 10*3/mm3 165     Results from last 7 days   Lab Units 11/06/24  1645 11/06/24  0625 11/05/24  0344 11/04/24  1857   SODIUM mmol/L  --  141   < > 142   POTASSIUM mmol/L 4.0 3.4*   < > 4.0   CHLORIDE mmol/L  --  108*   < > 104   CO2 mmol/L  --  23.0   < > 25.0   BUN mg/dL  --  12   < > 19   CREATININE mg/dL  --  0.78   < > 1.09   CALCIUM mg/dL  --  8.1*   < > 9.2   BILIRUBIN mg/dL  --   --   --  2.5*   ALK PHOS U/L  --   --   --  78   ALT (SGPT) U/L  --   --   --  27   AST (SGOT) U/L  --   --   --  29   GLUCOSE mg/dL  --  104*   < > 143*    < > = values in this interval not displayed.         Magnesium   Date Value Ref Range Status   11/06/2024 2.2 1.6 - 2.4 mg/dL Final   11/05/2024 1.8 1.6 - 2.4 mg/dL Final     Phosphorus   Date Value Ref Range Status   11/05/2024 3.5 2.5 - 4.5 mg/dL Final           Discharge Medications:     Discharge Medications        New Medications        Instructions Start Date   amoxicillin-clavulanate 875-125 MG per tablet  Commonly known as: AUGMENTIN   Take 1 tablet by mouth Every 12 (Twelve) Hours for 7 days.      saccharomyces boulardii 250 MG capsule  Commonly known as:  Florastor   250 mg, Oral, 2 Times Daily             Continue These Medications        Instructions Start Date   amLODIPine 5 MG tablet  Commonly known as: NORVASC   5 mg, Oral, Every 24 Hours Scheduled      aspirin 81 MG chewable tablet   81 mg, Oral, Daily      atropine 1 % ophthalmic solution       finasteride 5 MG tablet  Commonly known as: PROSCAR   5 mg, Oral, Daily      FOLIC ACID PO   800 mcg, Oral, Daily      glucosamine-chondroitin 500-400 MG capsule capsule   2 capsules, Oral, Daily      isosorbide mononitrate 30 MG 24 hr tablet  Commonly known as: IMDUR   30 mg, Oral, Daily      levothyroxine 112 MCG tablet  Commonly known as: SYNTHROID, LEVOTHROID   112 mcg, Oral, Daily      MELATONIN + L-THEANINE PO   1 tablet, Oral, Daily PRN      metoprolol succinate XL 25 MG 24 hr tablet  Commonly known as: TOPROL-XL   25 mg, Oral, Daily      multivitamin with minerals tablet tablet   1 tablet, Oral, Daily      naproxen 500 MG tablet  Commonly known as: NAPROSYN       nitroglycerin 0.4 MG SL tablet  Commonly known as: NITROSTAT   0.4 mg, Sublingual, Every 5 Minutes PRN, Take no more than 3 doses in 15 minutes.      omeprazole 40 MG capsule  Commonly known as: priLOSEC   40 mg, Oral, Daily      ondansetron ODT 4 MG disintegrating tablet  Commonly known as: ZOFRAN-ODT   4 mg, Oral, 4 Times Daily PRN      predniSONE 1 MG tablet  Commonly known as: DELTASONE   2 mg, Oral, Daily      rosuvastatin 40 MG tablet  Commonly known as: CRESTOR   40 mg, Oral, Nightly      tamsulosin 0.4 MG capsule 24 hr capsule  Commonly known as: FLOMAX   1 capsule, Oral, 2 Times Daily - RT      vitamin B-12 1000 MCG tablet  Commonly known as: CYANOCOBALAMIN   1,000 mcg, Oral, Daily      Vitamin D3 50 MCG (2000 UT) tablet   2,000 Units, Oral, Daily             Discharge Diet:   Diet Instructions       Diet: Regular/House Diet; Regular (IDDSI 7); Thin (IDDSI 0)      Discharge Diet: Regular/House Diet    Texture: Regular (IDDSI 7)    Fluid  Consistency: Thin (IDDSI 0)          Activity at Discharge:    Activity Instructions       Activity as Tolerated            Follow-up Appointments  Future Appointments   Date Time Provider Department Center   1/2/2025 11:20 AM Ines Hsieh PA-C [facility]   1/30/2025 11:30 AM Tal Wagner MD [facility]     Additional Instructions for the Follow-ups that You Need to Schedule       Discharge Follow-up with PCP   As directed       Currently Documented PCP:    Holli Fletcher MD    PCP Phone Number:    [phone]     Follow Up Details: 1 week              Discharge Instructions:  Okay to discharge home   Medications as above; scripts sent via Meds to Bed service   Follow up with providers as above     Current Code Status       Date Active Code Status Order ID Comments User Context       11/5/2024 0109 CPR (Attempt to Resuscitate) [id number]  Audrey Mendez APRN Inpatient        Question Answer    Code Status (Patient has no pulse and is not breathing) CPR (Attempt to Resuscitate)    Medical Interventions (Patient has pulse or is breathing) Full Support    Level Of Support Discussed With Patient             Holli Bravo DNP, APRN, [facility]  Pulmonary and Critical Care Medicine  11/07/24     Time: Discharge 40 min    CC: Holli Fletcher MD    Please note that portions of this note were completed with a voice recognition program.

## 2024-11-08 NOTE — OUTREACH NOTE
Prep Survey      Flowsheet Row Responses   [facility] patient discharged from? [place]   Is LACE score < 7 ? No   Eligibility Readm Mgmt   Discharge diagnosis Diverticulitis    UTI (urinary tract infection)   Does the patient have one of the following disease processes/diagnoses(primary or secondary)? Other   Does the patient have Home health ordered? No   Is there a DME ordered? No   Prep survey completed? Yes            SUSAN VALLE - Registered Nurse

## 2024-11-09 LAB
BACTERIA SPEC AEROBE CULT: NORMAL
BACTERIA SPEC AEROBE CULT: NORMAL

## 2024-11-12 ENCOUNTER — READMISSION MANAGEMENT (OUTPATIENT)
Dept: CALL CENTER | Facility: HOSPITAL | Age: 79
End: 2024-11-12
Payer: MEDICARE

## 2024-11-12 NOTE — OUTREACH NOTE
Medical Week 1 Survey      Flowsheet Row Responses   [facility] patient discharged from? [place]   Does the patient have one of the following disease processes/diagnoses(primary or secondary)? Other   Week 1 attempt successful? Yes   Call start time 1522   Call end time 1522   Discharge diagnosis Diverticulitis    UTI (urinary tract infection)   Meds reviewed with patient/caregiver? Yes   Is the patient having any side effects they believe may be caused by any medication additions or changes? No   Does the patient have all medications ordered at discharge? Yes   Is the patient taking all medications as directed (includes completed medication regime)? Yes   Does the patient have a primary care provider?  Yes   Does the patient have an appointment with their PCP within 7 days of discharge? Yes   Has the patient kept scheduled appointments due by today? Yes   Has home health visited the patient within 72 hours of discharge? N/A   Psychosocial issues? No   Did the patient receive a copy of their discharge instructions? Yes   Nursing interventions Reviewed instructions with patient   What is the patient's perception of their health status since discharge? Improving   Is the patient/caregiver able to teach back the hierarchy of who to call/visit for symptoms/problems? PCP, Specialist, Home health nurse, Urgent Care, ED, 911 Yes   If the patient is a current smoker, are they able to teach back resources for cessation? Not a smoker   Week 1 call completed? Yes   Graduated Yes   Wrap up additional comments Pt states doing well and has seen PCP already.   Call end time 1522            Qian HINTON - Registered Nurse

## 2024-12-11 ENCOUNTER — OFFICE VISIT (OUTPATIENT)
Dept: GASTROENTEROLOGY | Facility: CLINIC | Age: 79
End: 2024-12-11
Payer: MEDICARE

## 2024-12-11 VITALS
TEMPERATURE: 97.8 F | HEART RATE: 76 BPM | OXYGEN SATURATION: 98 % | DIASTOLIC BLOOD PRESSURE: 82 MMHG | WEIGHT: 168 LBS | BODY MASS INDEX: 23.52 KG/M2 | SYSTOLIC BLOOD PRESSURE: 118 MMHG | HEIGHT: 71 IN

## 2024-12-11 DIAGNOSIS — K21.9 GASTROESOPHAGEAL REFLUX DISEASE, UNSPECIFIED WHETHER ESOPHAGITIS PRESENT: ICD-10-CM

## 2024-12-11 DIAGNOSIS — K57.92 DIVERTICULITIS: Primary | ICD-10-CM

## 2024-12-11 DIAGNOSIS — K22.70 BARRETT'S ESOPHAGUS WITHOUT DYSPLASIA: ICD-10-CM

## 2024-12-11 RX ORDER — AMLODIPINE BESYLATE 2.5 MG/1
TABLET ORAL
COMMUNITY
Start: 2024-12-10

## 2024-12-11 RX ORDER — OMEPRAZOLE 40 MG/1
40 CAPSULE, DELAYED RELEASE ORAL DAILY
Qty: 30 CAPSULE | Refills: 11 | Status: SHIPPED | OUTPATIENT
Start: 2024-12-11

## 2024-12-11 NOTE — PROGRESS NOTES
Follow Up      Patient Name: Rodrigo Baum  : 1945   MRN: [id number]     Chief Complaint:    Chief Complaint   Patient presents with    Constipation       History of Present Illness: Rodrigo Baum is a 79 y.o. male who is here today for follow up on GERD, Saenz's and discuss constipation     Was admitted in November with diverticulitis, UTI, and sepsis.  Had been having constipation for about month prior to his episodes.    He is currently taking a fiber supplement daily and having a bm EOD.  Denies blood in the stool.  Still following low fiber diet.     GERD/Saenz's: Last EGD without evidence of Saenz's. Compliant with ppi (omeprazole).. No dysphagia. Occasional mild heartburn- 3-4 times a year. He does not use tobacco. He does not drink alcohol.     ENDOSCOPY, INT (2023) (Dr Diaz)-Short segment Saenz's, 2 cm hiatal hernia, mild gastritis-biopsies negative for Saenz's  ENDOSCOPY, INT (2021) (Dr Diaz)-Short segment Saenz's, small hiatal hernia-biopsies consistent with Saenz's  SCANNED - COLONOSCOPY (2021) (Dr Diaz)-internal hemorrhoids, one 5mm TA polyp in the cecum, one 3 mm TA polyp in the IC valve, diverticulosis, one inflammatory polyp in the sigmoid- 3 year recall     Family hx of esophageal cancer (father)    Subjective      Review of Systems:   Review of Systems   Constitutional:  Negative for activity change, appetite change, chills, diaphoresis, fever, unexpected weight gain and unexpected weight loss.   HENT:  Negative for trouble swallowing.    Gastrointestinal:  Positive for constipation. Negative for abdominal distention, abdominal pain, anal bleeding, blood in stool, diarrhea, nausea, rectal pain, vomiting, GERD and indigestion.       Medications:     Current Outpatient Medications:     amLODIPine (NORVASC) 2.5 MG tablet, , Disp: , Rfl:     aspirin 81 MG chewable tablet, Chew 1 tablet Daily., Disp: , Rfl:     finasteride (PROSCAR) 5 MG tablet,  Take 1 tablet by mouth Daily., Disp: , Rfl:     FOLIC ACID PO, Take 800 mcg by mouth Daily., Disp: , Rfl:     glucosamine-chondroitin 500-400 MG capsule capsule, Take 2 capsules by mouth Daily., Disp: , Rfl:     isosorbide mononitrate (IMDUR) 30 MG 24 hr tablet, Take 1 tablet by mouth Daily., Disp: 30 tablet, Rfl: 5    levothyroxine (SYNTHROID, LEVOTHROID) 112 MCG tablet, Take 1 tablet by mouth Daily., Disp: , Rfl:     Fkhfnyj-Alvjb-Zakm-PassF-LBalm (MELATONIN + L-THEANINE PO), Take 1 tablet by mouth Daily As Needed., Disp: , Rfl:     metoprolol succinate XL (TOPROL-XL) 25 MG 24 hr tablet, Take 1 tablet by mouth Daily., Disp: 90 tablet, Rfl: 0    Multiple Vitamins-Minerals (CENTRUM SILVER 50+MEN PO), Take 1 tablet by mouth Daily., Disp: , Rfl:     naproxen (NAPROSYN) 500 MG tablet, , Disp: , Rfl:     nitroglycerin (NITROSTAT) 0.4 MG SL tablet, Place 1 tablet under the tongue Every 5 (Five) Minutes As Needed for Chest Pain. Take no more than 3 doses in 15 minutes., Disp: 100 tablet, Rfl: 0    omeprazole (priLOSEC) 40 MG capsule, Take 1 capsule by mouth Daily. Indications: Gastroesophageal Reflux Disease, Disp: 30 capsule, Rfl: 11    ondansetron ODT (ZOFRAN-ODT) 4 MG disintegrating tablet, Take 1 tablet by mouth 4 (Four) Times a Day As Needed for Nausea or Vomiting., Disp: 15 tablet, Rfl: 0    predniSONE (DELTASONE) 1 MG tablet, Take 2 tablets by mouth Daily., Disp: , Rfl:     rosuvastatin (CRESTOR) 40 MG tablet, Take 1 tablet by mouth Every Night., Disp: 90 tablet, Rfl: 2    saccharomyces boulardii (Florastor) 250 MG capsule, Take 1 capsule by mouth 2 (Two) Times a Day., Disp: 14 capsule, Rfl: 0    tamsulosin (FLOMAX) 0.4 MG capsule 24 hr capsule, Take 1 capsule by mouth 2 (Two) Times a Day., Disp: , Rfl:     vitamin B-12 (CYANOCOBALAMIN) 1000 MCG tablet, Take 1 tablet by mouth Daily., Disp: , Rfl:   No current facility-administered medications for this visit.    Facility-Administered Medications Ordered in Other  Visits:     Chlorhexidine Gluconate Cloth 2 % pads 1 application, 1 application , Topical, Q12H YESIN, Sd Mathew PA    Allergies:   Allergies   Allergen Reactions    Hydrocodone-Acetaminophen Anxiety    Lipitor [Atorvastatin] Anxiety and Myalgia           Lortab [Hydrocodone-Acetaminophen] Anxiety    Azithromycin Nausea Only       Social History:   Social History     Socioeconomic History    Marital status:      Spouse name: N/A    Number of children: 1    Years of education: H.S.    Highest education level: High school graduate   Tobacco Use    Smoking status: Former     Current packs/day: 0.00     Average packs/day: 1 pack/day for 20.0 years (20.0 ttl pk-yrs)     Types: Cigarettes     Start date: 1974     Quit date: 1994     Years since quittin.9    Smokeless tobacco: Never   Vaping Use    Vaping status: Never Used   Substance and Sexual Activity    Alcohol use: Not Currently     Comment: occas    Drug use: Never    Sexual activity: Not Currently     Partners: Female        Surgical History:   Past Surgical History:   Procedure Laterality Date    CARDIAC CATHETERIZATION N/A 2018    Procedure: Left Heart Cath;  Surgeon: Susannah Rasmussen MD;  Location:  [facility];  Service:     CARDIAC CATHETERIZATION N/A 2019    Procedure: Left Heart Cath;  Surgeon: Susannah Rasmussen MD;  Location:  [facility];  Service: Cardiovascular    COLONOSCOPY      CORONARY ANGIOPLASTY WITH STENT PLACEMENT      X2    CORONARY ARTERY BYPASS GRAFT N/A 2018    Procedure: CORONARY ARTERY BYPASS X 2 WITH INTERNAL MAMMARY ARTERY GRAFT, ENDOSCOPIC VEIN HARVESTING UTILIZING THE LEFT GREATER SAPPHENOUS VEIN  CYSTO BY DR WATERS;  Surgeon: Casey Morales MD;  Location: [facility] OR;  Service:     CYSTOSCOPY TRANSURETHRAL RESECTION OF PROSTATE N/A 2016    Procedure: CYSTOSCOPY TRANSURETHRAL RESECTION OF PROSTATE GREENLIGHT;  Surgeon: Alverto Richards MD;  Location:   "[facility];  Service:     TONSILLECTOMY      TRIGGER POINT INJECTION      UPPER GASTROINTESTINAL ENDOSCOPY          Medical History:   Past Medical History:   Diagnosis Date    BPH (benign prostatic hyperplasia)     Bursitis of hip     CAD (coronary artery disease)     Chronic venous stasis     CKD (chronic kidney disease), stage III     Claustrophobia     Diabetes mellitus     GERD (gastroesophageal reflux disease)     HLD (hyperlipidemia)     HTN (hypertension)     Hypothyroidism     Knee swelling     Myocardial infarction 2018    OA (osteoarthritis) of ankle/foot     OA (osteoarthritis) of hip     Osteoporosis     RA (rheumatoid arthritis)         Objective     Physical Exam:  Vital Signs:   Vitals:    12/11/24 0938   BP: 118/82   Pulse: 76   Temp: 97.8 °F (36.6 °C)   SpO2: 98%   Weight: 76.2 kg (168 lb)   Height: 180.3 cm (70.98\")     Body mass index is 23.44 kg/m².     Physical Exam  Constitutional:       General: He is not in acute distress.     Appearance: He is well-developed.   Cardiovascular:      Rate and Rhythm: Normal rate and regular rhythm.   Pulmonary:      Effort: Pulmonary effort is normal. No accessory muscle usage or respiratory distress.      Breath sounds: Normal breath sounds.   Abdominal:      General: Bowel sounds are normal. There is no distension.      Palpations: Abdomen is soft.      Tenderness: There is no abdominal tenderness.   Lymphadenopathy:      Cervical: No cervical adenopathy.   Skin:     Coloration: Skin is not pale.      Findings: No erythema.   Neurological:      Mental Status: He is alert and oriented to person, place, and time.   Psychiatric:         Speech: Speech normal.         Behavior: Behavior normal.         Thought Content: Thought content normal.         Judgment: Judgment normal.         Assessment / Plan      Assessment/Plan:   Diagnoses and all orders for this visit:    1. Diverticulitis (Primary)  -     Ambulatory Referral For Screening Colonoscopy  Increase " dietary fiber, continue fiber supplement   2. Saenz's esophagus without dysplasia  -     omeprazole (priLOSEC) 40 MG capsule; Take 1 capsule by mouth Daily. Indications: Gastroesophageal Reflux Disease  Dispense: 30 capsule; Refill: 11  Discussed reflux precautions, continue PPI, EGD monitoring every 3-5 years and as needed  3. Gastroesophageal reflux disease, unspecified whether esophagitis present  -     omeprazole (priLOSEC) 40 MG capsule; Take 1 capsule by mouth Daily. Indications: Gastroesophageal Reflux Disease  Dispense: 30 capsule; Refill: 11         Follow Up:   Return in about 1 year (around 12/11/2025), or if symptoms worsen or fail to improve.    Plan of care reviewed with the patient at the conclusion of today's visit.  Education was provided regarding diagnosis, management, and any prescribed or recommended OTC medications.  Patient verbalized understanding of and agreement with management plan.       JEREMY Franz  [facility]

## 2025-01-02 ENCOUNTER — OFFICE VISIT (OUTPATIENT)
Dept: ORTHOPEDIC SURGERY | Facility: CLINIC | Age: 80
End: 2025-01-02
Payer: MEDICARE

## 2025-01-02 VITALS
SYSTOLIC BLOOD PRESSURE: 102 MMHG | HEIGHT: 71 IN | WEIGHT: 178.4 LBS | DIASTOLIC BLOOD PRESSURE: 60 MMHG | BODY MASS INDEX: 24.98 KG/M2

## 2025-01-02 DIAGNOSIS — M70.61 TROCHANTERIC BURSITIS OF BOTH HIPS: Primary | ICD-10-CM

## 2025-01-02 DIAGNOSIS — M70.62 TROCHANTERIC BURSITIS OF BOTH HIPS: Primary | ICD-10-CM

## 2025-01-02 RX ORDER — TRIAMCINOLONE ACETONIDE 40 MG/ML
40 INJECTION, SUSPENSION INTRA-ARTICULAR; INTRAMUSCULAR
Status: COMPLETED | OUTPATIENT
Start: 2025-01-02 | End: 2025-01-02

## 2025-01-02 RX ORDER — LIDOCAINE HYDROCHLORIDE 10 MG/ML
4 INJECTION, SOLUTION EPIDURAL; INFILTRATION; INTRACAUDAL; PERINEURAL
Status: COMPLETED | OUTPATIENT
Start: 2025-01-02 | End: 2025-01-02

## 2025-01-02 RX ADMIN — LIDOCAINE HYDROCHLORIDE 4 ML: 10 INJECTION, SOLUTION EPIDURAL; INFILTRATION; INTRACAUDAL; PERINEURAL at 11:37

## 2025-01-02 RX ADMIN — TRIAMCINOLONE ACETONIDE 40 MG: 40 INJECTION, SUSPENSION INTRA-ARTICULAR; INTRAMUSCULAR at 11:37

## 2025-01-02 NOTE — PROGRESS NOTES
Procedure   - Large Joint Arthrocentesis: bilateral greater trochanteric bursa on 1/2/2025 11:37 AM  Indications: pain  Details: 22 G needle, lateral approach  Medications (Right): 4 mL lidocaine PF 1% 1 %; 40 mg triamcinolone acetonide 40 MG/ML  Medications (Left): 4 mL lidocaine PF 1% 1 %; 40 mg triamcinolone acetonide 40 MG/ML  Outcome: tolerated well, no immediate complications  Procedure, treatment alternatives, risks and benefits explained, specific risks discussed. Consent was given by the patient. Immediately prior to procedure a time out was called to verify the correct patient, procedure, equipment, support staff and site/side marked as required. Patient was prepped and draped in the usual sterile fashion.

## 2025-01-02 NOTE — PROGRESS NOTES
[facility] Office Follow Up       Office Follow Up Visit       Patient Name: Rodrigo Baum    Chief Complaint:   Chief Complaint   Patient presents with    Follow-up     4 month follow up - Trochanteric bursitis of both hips       Referring Physician: No ref. provider found    History of Present Illness:   Rodrigo Baum returns to clinic today for follow-up visit for bilateral hip pain.  Last visit on 9/5/2024.  Bilateral corticosteroid injections for greater trochanteric bursitis at that time.  He says injections worked well. Pain has returned. Associated with stiffness.      Subjective     Review of Systems   Constitutional: Negative.  Negative for chills, fatigue and fever.   HENT: Negative.  Negative for congestion and dental problem.    Eyes: Negative.  Negative for blurred vision.   Respiratory: Negative.  Negative for shortness of breath.    Cardiovascular: Negative.  Negative for leg swelling.   Gastrointestinal: Negative.  Negative for abdominal pain.   Endocrine: Negative.  Negative for polyuria.   Genitourinary: Negative.  Negative for difficulty urinating.   Musculoskeletal:  Positive for arthralgias.   Skin: Negative.    Allergic/Immunologic: Negative.    Neurological: Negative.    Hematological: Negative.  Negative for adenopathy.   Psychiatric/Behavioral: Negative.  Negative for behavioral problems.         I have reviewed and updated the following portions of the patient's history and review of systems: allergies, current medications, past family history, past medical history, past social history, past surgical history and problem list.    Medications:   Current Outpatient Medications:     amLODIPine (NORVASC) 2.5 MG tablet, , Disp: , Rfl:     aspirin 81 MG chewable tablet, Chew 1 tablet Daily., Disp: , Rfl:     finasteride (PROSCAR) 5 MG tablet, Take 1 tablet by mouth Daily., Disp: , Rfl:     FOLIC ACID PO, Take  800 mcg by mouth Daily., Disp: , Rfl:     glucosamine-chondroitin 500-400 MG capsule capsule, Take 2 capsules by mouth Daily., Disp: , Rfl:     isosorbide mononitrate (IMDUR) 30 MG 24 hr tablet, Take 1 tablet by mouth Daily., Disp: 30 tablet, Rfl: 5    levothyroxine (SYNTHROID, LEVOTHROID) 112 MCG tablet, Take 1 tablet by mouth Daily., Disp: , Rfl:     Szdiyzz-Ulqnb-Luwr-PassF-LBalm (MELATONIN + L-THEANINE PO), Take 1 tablet by mouth Daily As Needed., Disp: , Rfl:     metoprolol succinate XL (TOPROL-XL) 25 MG 24 hr tablet, Take 1 tablet by mouth Daily., Disp: 90 tablet, Rfl: 0    Multiple Vitamins-Minerals (CENTRUM SILVER 50+MEN PO), Take 1 tablet by mouth Daily., Disp: , Rfl:     naproxen (NAPROSYN) 500 MG tablet, , Disp: , Rfl:     nitroglycerin (NITROSTAT) 0.4 MG SL tablet, Place 1 tablet under the tongue Every 5 (Five) Minutes As Needed for Chest Pain. Take no more than 3 doses in 15 minutes., Disp: 100 tablet, Rfl: 0    omeprazole (priLOSEC) 40 MG capsule, Take 1 capsule by mouth Daily. Indications: Gastroesophageal Reflux Disease, Disp: 30 capsule, Rfl: 11    ondansetron ODT (ZOFRAN-ODT) 4 MG disintegrating tablet, Take 1 tablet by mouth 4 (Four) Times a Day As Needed for Nausea or Vomiting., Disp: 15 tablet, Rfl: 0    predniSONE (DELTASONE) 1 MG tablet, Take 2 tablets by mouth Daily., Disp: , Rfl:     rosuvastatin (CRESTOR) 40 MG tablet, Take 1 tablet by mouth Every Night., Disp: 90 tablet, Rfl: 2    saccharomyces boulardii (Florastor) 250 MG capsule, Take 1 capsule by mouth 2 (Two) Times a Day., Disp: 14 capsule, Rfl: 0    tamsulosin (FLOMAX) 0.4 MG capsule 24 hr capsule, Take 1 capsule by mouth 2 (Two) Times a Day., Disp: , Rfl:     vitamin B-12 (CYANOCOBALAMIN) 1000 MCG tablet, Take 1 tablet by mouth Daily., Disp: , Rfl:   No current facility-administered medications for this visit.    Facility-Administered Medications Ordered in Other Visits:     Chlorhexidine Gluconate Cloth 2 % pads 1 application, 1  "application , Topical, Q12H PRN, Sd Mathew PA    Allergies:   Allergies   Allergen Reactions    Hydrocodone-Acetaminophen Anxiety    Lipitor [Atorvastatin] Anxiety and Myalgia           Lortab [Hydrocodone-Acetaminophen] Anxiety    Azithromycin Nausea Only         Objective      Vital Signs:   Vitals:    01/02/25 1129   BP: 102/60   Weight: 80.9 kg (178 lb 6.4 oz)   Height: 180.3 cm (70.98\")       Ortho Exam:  General: no acute distress, comfortable  Vitals reviewed in chart    Musculoskeletal Exam:    SIDE: Bilateral hips    Tenderness: Greater trochanter    Range of motion measurements (degrees): Full range of motion  Painful arc of motion: no  No evidence of septic joint      Results Review:  None    Assessment / Plan      Assessment:   Diagnoses and all orders for this visit:    1. Trochanteric bursitis of both hips (Primary)    Other orders  -     - Large Joint Arthrocentesis: bilateral greater trochanteric bursa        Quality Metrics:   BMI:   BMI is within normal parameters. No other follow-up for BMI required.       Tobacco:   Rodrigo Baum  reports that he quit smoking about 31 years ago. His smoking use included cigarettes. He started smoking about 51 years ago. He has a 20 pack-year smoking history. He has never used smokeless tobacco.           Plan:  Corticosteroid injections today for bilateral greater trochanteric bursitis.    I discussed with the patient the potential benefits of performing a therapeutic injection of the bilateral greater trochanteric bursa as well as potential risks including but not limited to infection, swelling, pain, bleeding, bruising, nerve/vessel damage, skin color changes, transient elevation in blood glucose levels, and fat atrophy. After informed consent and verifying correct patient, procedure site, and type of procedure, the area was prepped with Hibiclens, ethyl chloride was used to numb the skin. Via the lateral approach, 4cc of 1% lidocaine and  40mg/ml " of Kenalog were injected into each site. The patient tolerated the procedure well. There were no complications.         Follow Up:   4 months    Ines Hsieh PA-C  [facility]    Dictated using Dragon Speech Recognition.

## 2025-01-30 ENCOUNTER — OFFICE VISIT (OUTPATIENT)
Dept: CARDIOLOGY | Facility: CLINIC | Age: 80
End: 2025-01-30
Payer: MEDICARE

## 2025-01-30 VITALS
OXYGEN SATURATION: 98 % | HEART RATE: 61 BPM | WEIGHT: 176.8 LBS | DIASTOLIC BLOOD PRESSURE: 72 MMHG | BODY MASS INDEX: 24.75 KG/M2 | HEIGHT: 71 IN | SYSTOLIC BLOOD PRESSURE: 144 MMHG

## 2025-01-30 DIAGNOSIS — I25.10 CORONARY ARTERY DISEASE INVOLVING NATIVE CORONARY ARTERY OF NATIVE HEART WITHOUT ANGINA PECTORIS: Primary | ICD-10-CM

## 2025-01-30 DIAGNOSIS — E78.2 MIXED HYPERLIPIDEMIA: ICD-10-CM

## 2025-01-30 DIAGNOSIS — I10 PRIMARY HYPERTENSION: ICD-10-CM

## 2025-01-30 PROCEDURE — 3077F SYST BP >= 140 MM HG: CPT | Performed by: INTERNAL MEDICINE

## 2025-01-30 PROCEDURE — 3078F DIAST BP <80 MM HG: CPT | Performed by: INTERNAL MEDICINE

## 2025-01-30 RX ORDER — ATROPINE SULFATE 10 MG/ML
SOLUTION/ DROPS OPHTHALMIC
COMMUNITY
Start: 2025-01-13

## 2025-01-30 RX ORDER — SENNOSIDES 8.6 MG
650 CAPSULE ORAL EVERY 8 HOURS PRN
COMMUNITY

## 2025-01-30 RX ORDER — BACITRACIN ZINC AND POLYMYXIN B SULFATE 500; 10000 [USP'U]/G; [USP'U]/G
OINTMENT OPHTHALMIC
COMMUNITY
Start: 2025-01-03

## 2025-01-30 RX ORDER — TOBRAMYCIN/DEXAMETHASONE 0.3 %-0.1%
OINTMENT (GRAM) OPHTHALMIC (EYE)
COMMUNITY
Start: 2025-01-17

## 2025-01-30 RX ORDER — DOXAZOSIN 2 MG/1
2 TABLET ORAL NIGHTLY
COMMUNITY
Start: 2025-01-12

## 2025-01-30 NOTE — PROGRESS NOTES
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    PROBLEM LIST:   CAD:  11/15/1993: PTCA and repeat PTCA 1 week following of proximal  LAD, per Dr. Lovelace with normal circumflex and RCA noted at that time.   April 2008: Overlapping 3.0 x 24 mm. And 3.0 x 16.0 mm Taxus to LAD and 2.5 x 8.0 PTCA to proximal first OM, inability to pass stent.  EF greater than 60.  5/16 : Stress echocardiogram, which was within normal limits. EF greater than 60%. Normal hemodynamic response with exercise.   1/18 CABG X2 Dr Morales periop hematuria/syed issues  7/1/2019 LHC per [facility], patent LIMA to LAD, SVG to diagonal, spasm of the LIMA as well as apical LAD noted which improved with nitro, normal EF  7/19/2020 [facility] ED visit CP, troponin normal x2, admission was recommended however he left AMA  Dyslipidemia:  November 2009:  Total cholesterol 163, triglycerides 169, HDL 55, LDL 81.  02/30/2012:  Total cholesterol 242, HDL 51, triglycerides 192, , off statin therapy.  2024 116/78/56/44  Nicotine addiction cessation 15 years prior.  HTN, presumed essential.  1/17 AAA screen wnl  Claustrophobia prohibitive for most stress testing  Hypothyroidism on replacement therapy.   Arthritis data deficient, on steroid therapy greater than 5 years.   GERD.   2015 Right jaw swelling, evaluated for potential mandibular infection per Dr. Smith.   Hip and foot pain, followed per Dr Espinoza  Prostatism- 2017 3 rounds abx  11/24 72 hr admit for diverticulitis      CC:   Chief Complaint   Patient presents with    Coronary artery disease involving native coronary artery of       Allergies  Allergies   Allergen Reactions    Hydrocodone-Acetaminophen Anxiety    Lipitor  [Atorvastatin] Anxiety and Myalgia           Lortab [Hydrocodone-Acetaminophen] Anxiety    Azithromycin Nausea Only       Current Medications    Current Outpatient Medications:     acetaminophen (Tylenol 8 Hour) 650 MG 8 hr tablet, Take 1 tablet by mouth Every 8 (Eight) Hours As Needed for Mild Pain. Pt taking 2 in the morning and 2 in the evening., Disp: , Rfl:     amLODIPine (NORVASC) 2.5 MG tablet, , Disp: , Rfl:     aspirin 81 MG chewable tablet, Chew 1 tablet Daily., Disp: , Rfl:     atropine 1 % ophthalmic solution, , Disp: , Rfl:     bacitracin-polymyxin b (POLYSPORIN) 500-86764 UNIT/GM ophthalmic ointment, , Disp: , Rfl:     doxazosin (CARDURA) 2 MG tablet, Take 1 tablet by mouth Every Night., Disp: , Rfl:     finasteride (PROSCAR) 5 MG tablet, Take 1 tablet by mouth Daily., Disp: , Rfl:     FOLIC ACID PO, Take 800 mcg by mouth Daily., Disp: , Rfl:     glucosamine-chondroitin 500-400 MG capsule capsule, Take 2 capsules by mouth Daily., Disp: , Rfl:     isosorbide mononitrate (IMDUR) 30 MG 24 hr tablet, Take 1 tablet by mouth Daily., Disp: 30 tablet, Rfl: 5    levothyroxine (SYNTHROID, LEVOTHROID) 112 MCG tablet, Take 1 tablet by mouth Daily., Disp: , Rfl:     Vlkdmtb-Afryw-Dvbe-PassF-LBalm (MELATONIN + L-THEANINE PO), Take 1 tablet by mouth Daily As Needed., Disp: , Rfl:     metoprolol succinate XL (TOPROL-XL) 25 MG 24 hr tablet, Take 1 tablet by mouth Daily., Disp: 90 tablet, Rfl: 0    Multiple Vitamins-Minerals (CENTRUM SILVER 50+MEN PO), Take 1 tablet by mouth Daily., Disp: , Rfl:     naproxen (NAPROSYN) 500 MG tablet, , Disp: , Rfl:     nitroglycerin (NITROSTAT) 0.4 MG SL tablet, Place 1 tablet under the tongue Every 5 (Five) Minutes As Needed for Chest Pain. Take no more than 3 doses in 15 minutes., Disp: 100 tablet, Rfl: 0    omeprazole (priLOSEC) 40 MG capsule, Take 1 capsule by mouth Daily. Indications: Gastroesophageal Reflux Disease, Disp: 30 capsule, Rfl: 11    ondansetron ODT (ZOFRAN-ODT) 4 MG  "disintegrating tablet, Take 1 tablet by mouth 4 (Four) Times a Day As Needed for Nausea or Vomiting., Disp: 15 tablet, Rfl: 0    predniSONE (DELTASONE) 1 MG tablet, Take 2 tablets by mouth Daily., Disp: , Rfl:     rosuvastatin (CRESTOR) 40 MG tablet, Take 1 tablet by mouth Every Night., Disp: 90 tablet, Rfl: 2    saccharomyces boulardii (Florastor) 250 MG capsule, Take 1 capsule by mouth 2 (Two) Times a Day., Disp: 14 capsule, Rfl: 0    tamsulosin (FLOMAX) 0.4 MG capsule 24 hr capsule, Take 1 capsule by mouth 2 (Two) Times a Day., Disp: , Rfl:     TobraDex 0.3-0.1 % ophthalmic ointment, , Disp: , Rfl:     vitamin B-12 (CYANOCOBALAMIN) 1000 MCG tablet, Take 1 tablet by mouth Daily., Disp: , Rfl:   No current facility-administered medications for this visit.    Facility-Administered Medications Ordered in Other Visits:     Chlorhexidine Gluconate Cloth 2 % pads 1 application, 1 application , Topical, Q12H PRN, Sd Mathew PA      History of Present Illness   Rodrigo Baum is a 79 y.o. year old male here for follow up.    No new cardiac issues.  He did have a diverticular flare with sepsis in November and was in ICU for 3 days.  He had no arrhythmia nor enzyme elevation at that time    OBJECTIVE:  Vitals:    01/30/25 1057   BP: 144/72   BP Location: Left arm   Patient Position: Sitting   Cuff Size: Adult   Pulse: 61   SpO2: 98%   Weight: 80.2 kg (176 lb 12.8 oz)   Height: 180.3 cm (70.98\")         Body mass index is 24.67 kg/m².    Constitutional:       Appearance: Healthy appearance. Not in distress.   Neck:      Vascular: No JVR. JVD normal.   Pulmonary:      Effort: Pulmonary effort is normal.      Breath sounds: Normal breath sounds. No wheezing. No rhonchi. No rales.   Chest:      Chest wall: Not tender to palpatation.   Cardiovascular:      PMI at left midclavicular line. Normal rate. Regular rhythm. Normal S1. Normal S2.       Murmurs: There is no murmur.      No gallop.  No click. No rub.   Pulses:   "   Intact distal pulses.   Edema:     Peripheral edema absent.   Abdominal:      General: Bowel sounds are normal.      Palpations: Abdomen is soft.      Tenderness: There is no abdominal tenderness.   Musculoskeletal: Normal range of motion.         General: No tenderness. Skin:     General: Skin is warm and dry.   Neurological:      General: No focal deficit present.      Mental Status: Alert and oriented to person, place and time.         Diagnostic Data:    ECG 12 Lead    Date/Time: 1/30/2025 11:21 AM  Performed by: Tal Wagner MD    Authorized by: Tal Wagner MD  Comparison: compared with previous ECG from 8/4/2023  Rhythm: sinus rhythm  BPM: 61    Clinical impression: normal ECG            ASSESSMENT:   Diagnosis Plan   1. Coronary artery disease involving native coronary artery of native heart without angina pectoris        2. Primary hypertension        3. Mixed hyperlipidemia                PLAN:  CAD post remote surgical revascularization no anginal equivalent continued medical management.      Hypertension controlled current Flomax Imdur metoprolol amlodipine.     Mixed dyslipidemia controlled on statin therapy          Tal Wagner MD, FACC

## 2025-02-03 DIAGNOSIS — M17.0 PRIMARY OSTEOARTHRITIS OF BOTH KNEES: Primary | ICD-10-CM

## 2025-02-12 ENCOUNTER — TELEPHONE (OUTPATIENT)
Dept: GASTROENTEROLOGY | Facility: CLINIC | Age: 80
End: 2025-02-12
Payer: MEDICARE

## 2025-02-12 NOTE — TELEPHONE ENCOUNTER
Hub staff attempted to follow warm transfer process and was unsuccessful     Caller: Rodrigo Baum    Relationship to patient: Self    Best call back number: [phone]    Patient is needing: PATIENT CALLED IN AND STATED THAT HE WAS RETURNING A MISSED CALL TO [place]. PLEASE CALL BACK ANYTIME, OKAY TO LEAVE .

## 2025-02-13 ENCOUNTER — TELEPHONE (OUTPATIENT)
Dept: GASTROENTEROLOGY | Facility: CLINIC | Age: 80
End: 2025-02-13
Payer: MEDICARE

## 2025-02-13 NOTE — TELEPHONE ENCOUNTER
DR DOBBINS PATIENT, WANTING TO HAVE EGD AT SAME TIME HE HAS COLON NEXT FRIDAY 2/21. ADVISED PATIENT I WILL SEND NOTE FOR JANE TO SEE IN THE MORNING TO SEE ABOUT GETTTING EGD ADDED

## 2025-02-13 NOTE — TELEPHONE ENCOUNTER
Spoke with patient he want to discuss with Billing about pricing on EGD. Advised patient with [facility] phone number and he is going to contact them .

## 2025-03-07 ENCOUNTER — TELEPHONE (OUTPATIENT)
Dept: GASTROENTEROLOGY | Facility: CLINIC | Age: 80
End: 2025-03-07
Payer: MEDICARE

## 2025-03-07 ENCOUNTER — PRIOR AUTHORIZATION (OUTPATIENT)
Dept: GASTROENTEROLOGY | Facility: CLINIC | Age: 80
End: 2025-03-07
Payer: MEDICARE

## 2025-03-07 NOTE — TELEPHONE ENCOUNTER
Hub staff attempted to follow warm transfer process and was unsuccessful     Caller: PANCHO LOVE    Relationship to patient: SELF    Best call back number: [phone]    Patient is needing: PT CALLED YESTERDAY AND R/S APPT FOR COLONOSCOPY. HE PICKED UP HIS BOWEL PREP ALREADY FOR WHEN THE PROCEDURE WAS SCHEDULED FOR 3.20. YESTERDAY A DIFFERENT BOWEL PREP WAS CALLED IN FOR THE R/S COLONOSCOPY ON 4.17 AND IT'S DIFFERENT THAN THE ORIGINALLY PRESCRIBED BOWEL PREP. PT WAS CONCERNED AND WANTED TO VERIFY HE CAN USE THE PREP HE WAS ORIGINALLY PRESCRIBED FOR HIS PROCEDURE ON 4.17.

## 2025-03-07 NOTE — TELEPHONE ENCOUNTER
PA Case: [id number], Status: Approved, Coverage Starts on: 1/1/2025 12:00:00 AM, Coverage Ends on: 3/7/2026 12:00:00 AM.

## 2025-03-10 NOTE — TELEPHONE ENCOUNTER
CALLED PATIENT AND ADVISED EITHER PREP WOULD BE OK TO TAKE. PATIENT VERBALIZED UNDERSTANDING AND WAS AGREEABLE

## 2025-03-20 ENCOUNTER — OUTSIDE FACILITY SERVICE (OUTPATIENT)
Dept: GASTROENTEROLOGY | Facility: CLINIC | Age: 80
End: 2025-03-20
Payer: MEDICARE

## 2025-04-09 ENCOUNTER — TRANSCRIBE ORDERS (OUTPATIENT)
Dept: ADMINISTRATIVE | Facility: HOSPITAL | Age: 80
End: 2025-04-09
Payer: MEDICARE

## 2025-04-09 DIAGNOSIS — M79.89 SWELLING OF LIMB: Primary | ICD-10-CM

## 2025-04-10 ENCOUNTER — CLINICAL SUPPORT (OUTPATIENT)
Dept: ORTHOPEDIC SURGERY | Facility: CLINIC | Age: 80
End: 2025-04-10
Payer: MEDICARE

## 2025-04-10 ENCOUNTER — HOSPITAL ENCOUNTER (OUTPATIENT)
Dept: CARDIOLOGY | Facility: HOSPITAL | Age: 80
Discharge: HOME | End: 2025-04-10
Admitting: INTERNAL MEDICINE
Payer: MEDICARE

## 2025-04-10 VITALS — HEIGHT: 71 IN | WEIGHT: 176.8 LBS | BODY MASS INDEX: 24.75 KG/M2

## 2025-04-10 DIAGNOSIS — M17.0 PRIMARY OSTEOARTHRITIS OF BOTH KNEES: Primary | ICD-10-CM

## 2025-04-10 DIAGNOSIS — M79.89 SWELLING OF LIMB: ICD-10-CM

## 2025-04-10 LAB
BH CV LOW VAS RIGHT GASTRONEMIUS VESSEL: 1
BH CV LOW VAS RIGHT PERONEAL VESSEL: 1
BH CV LOWER VASCULAR LEFT COMMON FEMORAL AUGMENT: NORMAL
BH CV LOWER VASCULAR LEFT COMMON FEMORAL PHASIC: NORMAL
BH CV LOWER VASCULAR LEFT COMMON FEMORAL SPONT: NORMAL
BH CV LOWER VASCULAR RIGHT COMMON FEMORAL AUGMENT: NORMAL
BH CV LOWER VASCULAR RIGHT COMMON FEMORAL COMPRESS: NORMAL
BH CV LOWER VASCULAR RIGHT COMMON FEMORAL PHASIC: NORMAL
BH CV LOWER VASCULAR RIGHT COMMON FEMORAL SPONT: NORMAL
BH CV LOWER VASCULAR RIGHT DISTAL FEMORAL AUGMENT: NORMAL
BH CV LOWER VASCULAR RIGHT DISTAL FEMORAL COMPRESS: NORMAL
BH CV LOWER VASCULAR RIGHT DISTAL FEMORAL PHASIC: NORMAL
BH CV LOWER VASCULAR RIGHT DISTAL FEMORAL SPONT: NORMAL
BH CV LOWER VASCULAR RIGHT GASTRONEMIUS COMPRESS: NORMAL
BH CV LOWER VASCULAR RIGHT GREATER SAPH AK COMPRESS: NORMAL
BH CV LOWER VASCULAR RIGHT GREATER SAPH BK COMPRESS: NORMAL
BH CV LOWER VASCULAR RIGHT LESSER SAPH COMPRESS: NORMAL
BH CV LOWER VASCULAR RIGHT MID FEMORAL AUGMENT: NORMAL
BH CV LOWER VASCULAR RIGHT MID FEMORAL COMPRESS: NORMAL
BH CV LOWER VASCULAR RIGHT MID FEMORAL PHASIC: NORMAL
BH CV LOWER VASCULAR RIGHT MID FEMORAL SPONT: NORMAL
BH CV LOWER VASCULAR RIGHT PERONEAL COMPRESS: NORMAL
BH CV LOWER VASCULAR RIGHT POPLITEAL AUGMENT: NORMAL
BH CV LOWER VASCULAR RIGHT POPLITEAL COMPRESS: NORMAL
BH CV LOWER VASCULAR RIGHT POPLITEAL PHASIC: NORMAL
BH CV LOWER VASCULAR RIGHT POPLITEAL SPONT: NORMAL
BH CV LOWER VASCULAR RIGHT POSTERIOR TIBIAL COMPRESS: NORMAL
BH CV LOWER VASCULAR RIGHT PROFUNDA FEMORAL PHASIC: NORMAL
BH CV LOWER VASCULAR RIGHT PROFUNDA FEMORAL SPONT: NORMAL
BH CV LOWER VASCULAR RIGHT PROXIMAL FEMORAL AUGMENT: NORMAL
BH CV LOWER VASCULAR RIGHT PROXIMAL FEMORAL COMPRESS: NORMAL
BH CV LOWER VASCULAR RIGHT PROXIMAL FEMORAL PHASIC: NORMAL
BH CV LOWER VASCULAR RIGHT PROXIMAL FEMORAL SPONT: NORMAL
BH CV LOWER VASCULAR RIGHT SAPHENOFEMORAL JUNCTION AUGMENT: NORMAL
BH CV LOWER VASCULAR RIGHT SAPHENOFEMORAL JUNCTION COMPRESS: NORMAL
BH CV LOWER VASCULAR RIGHT SAPHENOFEMORAL JUNCTION PHASIC: NORMAL
BH CV LOWER VASCULAR RIGHT SAPHENOFEMORAL JUNCTION SPONT: NORMAL
BH CV VAS PRELIMINARY FINDINGS SCRIPTING: 1

## 2025-04-10 PROCEDURE — 93971 EXTREMITY STUDY: CPT

## 2025-04-10 RX ORDER — WARFARIN SODIUM 5 MG/1
5 TABLET ORAL
COMMUNITY

## 2025-04-10 RX ORDER — DOXYCYCLINE 100 MG/1
CAPSULE ORAL
COMMUNITY
Start: 2025-04-02

## 2025-04-10 RX ORDER — POLYETHYLENE GLYCOL-3350 AND ELECTROLYTES 236; 6.74; 5.86; 2.97; 22.74 G/274.31G; G/274.31G; G/274.31G; G/274.31G; G/274.31G
POWDER, FOR SOLUTION ORAL
COMMUNITY
Start: 2025-02-07

## 2025-04-10 NOTE — PROGRESS NOTES
Procedure   - Large Joint Arthrocentesis: bilateral knee on 4/10/2025 11:22 AM  Indications: pain  Details: 21 G needle, anterolateral approach  Medications (Right): 30 mg Hyaluronan 30 MG/2ML  Medications (Left): 30 mg Hyaluronan 30 MG/2ML  Outcome: tolerated well, no immediate complications  Procedure, treatment alternatives, risks and benefits explained, specific risks discussed. Consent was given by the patient. Immediately prior to procedure a time out was called to verify the correct patient, procedure, equipment, support staff and site/side marked as required. Patient was prepped and draped in the usual sterile fashion.        Patient here today for 1/3 bilateral knee Orthovisc injections.    Procedure Note:  I discussed with the patient the potential benefits of performing a therapeutic injection of the bilateral knee as well as potential risks including but not limited to infection, swelling, pain, bleeding, bruising, nerve/vessel damage, pseudoseptic reaction, and worsening joint pain. After informed consent and verifying correct patient, procedure site, and type of procedure, the area was prepped with alcohol, ethyl chloride was used to numb the skin. Via the anterolateral approach, the viscosupplementation syringe contents were injected into each knee. The patient tolerated the procedure well. There were no complications. A sterile dressing was placed over the injection site.    Return in 2 weeks.

## 2025-04-17 ENCOUNTER — OUTSIDE FACILITY SERVICE (OUTPATIENT)
Dept: GASTROENTEROLOGY | Facility: CLINIC | Age: 80
End: 2025-04-17
Payer: MEDICARE

## 2025-04-24 ENCOUNTER — CLINICAL SUPPORT (OUTPATIENT)
Dept: ORTHOPEDIC SURGERY | Facility: CLINIC | Age: 80
End: 2025-04-24
Payer: MEDICARE

## 2025-04-24 DIAGNOSIS — M17.0 PRIMARY OSTEOARTHRITIS OF BOTH KNEES: Primary | ICD-10-CM

## 2025-04-24 RX ORDER — APIXABAN 5 MG (74)
KIT ORAL
COMMUNITY
Start: 2025-04-11

## 2025-04-24 NOTE — PROGRESS NOTES
CC: Follow-up bilateral knee osteoarthritis, 2/3 Orthovisc injection today    History of present illness: Patient presents for his second Orthovisc injection to the bilateral knee today.  At this time the patient denies any numbness or tingling into the distal extremity.  No fever, chills, night sweats or other constitutional symptoms.    Reports knees are feeling better since starting injections.    See chart for PMH, PSH, Meds, All - reviewed.    Ortho exam:  Bilateral knee  Skin: Intact without redness, warmth or swelling/effusion.    Motor/sensory: Grossly intact L2-S1.    Assessment/plan:  Bilateral knee knee osteoarthritis    Proceed today with 2/3 of Orthovisc injection.  Patient will follow-up in 1 week for third injection.      After discussing risks of injection the patient gave consent to proceed.  Both knees were confirmed as the correct joints to be injected with a timeout.  Each knee was then prepped with Hibiclens and injected with a prefilled syringe of Orthovisc without any resistance using anterior lateral approach, patient in seated position.  The patient tolerated procedure well.  Hemostasis was achieved and a Band-Aid was applied over the injection site.  I instructed the patient on signs and symptoms of infection.  They should report to the ED if any of these develop.  Recommended modifying activity to include rest, ice, elevation and/or heat along with oral pain medication as needed.  Patient observed ambulating normally after the injection.

## 2025-04-24 NOTE — PROGRESS NOTES
Procedure   - Large Joint Arthrocentesis: bilateral knee on 4/24/2025 10:59 AM  Indications: pain  Details: 21 G needle, anterolateral approach  Medications (Right): 30 mg Hyaluronan 30 MG/2ML  Medications (Left): 30 mg Hyaluronan 30 MG/2ML  Outcome: tolerated well, no immediate complications  Procedure, treatment alternatives, risks and benefits explained, specific risks discussed. Consent was given by the patient. Immediately prior to procedure a time out was called to verify the correct patient, procedure, equipment, support staff and site/side marked as required. Patient was prepped and draped in the usual sterile fashion.

## 2025-05-01 ENCOUNTER — CLINICAL SUPPORT (OUTPATIENT)
Dept: ORTHOPEDIC SURGERY | Facility: CLINIC | Age: 80
End: 2025-05-01
Payer: MEDICARE

## 2025-05-01 DIAGNOSIS — M17.0 PRIMARY OSTEOARTHRITIS OF BOTH KNEES: Primary | ICD-10-CM

## 2025-05-01 NOTE — PROGRESS NOTES
Procedure   - Large Joint Arthrocentesis: bilateral knee on 5/1/2025 12:01 PM  Indications: pain  Details: 21 G needle, superolateral approach  Medications (Right): 30 mg Hyaluronan 30 MG/2ML  Medications (Left): 30 mg Hyaluronan 30 MG/2ML  Outcome: tolerated well, no immediate complications  Procedure, treatment alternatives, risks and benefits explained, specific risks discussed. Consent was given by the patient. Immediately prior to procedure a time out was called to verify the correct patient, procedure, equipment, support staff and site/side marked as required. Patient was prepped and draped in the usual sterile fashion.        Here today for 3/3 Orthovisc injection into bilateral knees.    Procedure Note:  I discussed with the patient the potential benefits of performing a therapeutic injection of the right and left knee as well as potential risks including but not limited to infection, swelling, pain, bleeding, bruising, nerve/vessel damage, pseudoseptic reaction, and worsening joint pain. After informed consent and verifying correct patient, procedure site, and type of procedure, the area was prepped with alcohol, ethyl chloride was used to numb the skin. Via the anterolateral approach, the viscosupplementation syringe contents were injected into the right and left knee. The patient tolerated the procedure well. There were no complications. A sterile dressing was placed over the injection site.    Return in 6 months.

## 2025-05-08 ENCOUNTER — OFFICE VISIT (OUTPATIENT)
Dept: ORTHOPEDIC SURGERY | Facility: CLINIC | Age: 80
End: 2025-05-08
Payer: MEDICARE

## 2025-05-08 VITALS
WEIGHT: 176.81 LBS | SYSTOLIC BLOOD PRESSURE: 110 MMHG | DIASTOLIC BLOOD PRESSURE: 74 MMHG | HEIGHT: 71 IN | BODY MASS INDEX: 24.75 KG/M2

## 2025-05-08 DIAGNOSIS — M70.61 TROCHANTERIC BURSITIS OF BOTH HIPS: Primary | ICD-10-CM

## 2025-05-08 DIAGNOSIS — M70.62 TROCHANTERIC BURSITIS OF BOTH HIPS: Primary | ICD-10-CM

## 2025-05-08 RX ORDER — LIDOCAINE HYDROCHLORIDE 10 MG/ML
4 INJECTION, SOLUTION EPIDURAL; INFILTRATION; INTRACAUDAL; PERINEURAL
Status: COMPLETED | OUTPATIENT
Start: 2025-05-08 | End: 2025-05-08

## 2025-05-08 RX ORDER — TRIAMCINOLONE ACETONIDE 40 MG/ML
4 INJECTION, SUSPENSION INTRA-ARTICULAR; INTRAMUSCULAR
Status: COMPLETED | OUTPATIENT
Start: 2025-05-08 | End: 2025-05-08

## 2025-05-08 RX ORDER — TRIAMCINOLONE ACETONIDE 1 MG/ML
LOTION TOPICAL
COMMUNITY
Start: 2025-04-23

## 2025-05-08 RX ADMIN — TRIAMCINOLONE ACETONIDE 4 MG: 40 INJECTION, SUSPENSION INTRA-ARTICULAR; INTRAMUSCULAR at 11:29

## 2025-05-08 RX ADMIN — LIDOCAINE HYDROCHLORIDE 4 ML: 10 INJECTION, SOLUTION EPIDURAL; INFILTRATION; INTRACAUDAL; PERINEURAL at 11:29

## 2025-05-08 NOTE — PROGRESS NOTES
Procedure   - Large Joint Arthrocentesis: bilateral greater trochanteric bursa on 5/8/2025 11:29 AM  Indications: pain  Details: 22 G (spinal) needle, lateral approach  Medications (Right): 4 mL lidocaine PF 1% 1 %; 4 mg triamcinolone acetonide 40 MG/ML  Medications (Left): 4 mL lidocaine PF 1% 1 %; 4 mg triamcinolone acetonide 40 MG/ML  Outcome: tolerated well, no immediate complications  Procedure, treatment alternatives, risks and benefits explained, specific risks discussed. Consent was given by the patient. Immediately prior to procedure a time out was called to verify the correct patient, procedure, equipment, support staff and site/side marked as required. Patient was prepped and draped in the usual sterile fashion.

## 2025-05-08 NOTE — PROGRESS NOTES
[facility] [place] [facility] Office Follow Up       Office Follow Up Visit       Patient Name: Rodrigo Baum    Chief Complaint:   Chief Complaint   Patient presents with    Follow-up     4 month follow up - Trochanteric bursitis of both hips       Referring Physician: No ref. provider found    History of Present Illness:   Rodrigo Baum returns to clinic today for follow-up of bilateral greater trochanteric bursitis.  He responded well to corticosteroid injection for bilateral greater trochanteric bursitis four months ago.  Pain has since returned.  No new issues or concerns.      Subjective     Review of Systems   Constitutional: Negative.  Negative for chills, fatigue and fever.   HENT: Negative.  Negative for congestion and dental problem.    Eyes: Negative.  Negative for blurred vision.   Respiratory: Negative.  Negative for shortness of breath.    Cardiovascular: Negative.  Negative for leg swelling.   Gastrointestinal: Negative.  Negative for abdominal pain.   Endocrine: Negative.  Negative for polyuria.   Genitourinary: Negative.  Negative for difficulty urinating.   Musculoskeletal:  Positive for arthralgias.   Skin: Negative.    Allergic/Immunologic: Negative.    Neurological: Negative.    Hematological: Negative.  Negative for adenopathy.   Psychiatric/Behavioral: Negative.  Negative for behavioral problems.         I have reviewed and updated the following portions of the patient's history and review of systems: allergies, current medications, past family history, past medical history, past social history, past surgical history and problem list.    Medications:   Current Outpatient Medications:     acetaminophen (Tylenol 8 Hour) 650 MG 8 hr tablet, Take 1 tablet by mouth Every 8 (Eight) Hours As Needed for Mild Pain. Pt taking 2 in the morning and 2 in the evening., Disp: , Rfl:     amLODIPine (NORVASC) 2.5 MG tablet, , Disp: , Rfl:      aspirin 81 MG chewable tablet, Chew 1 tablet Daily., Disp: , Rfl:     atropine 1 % ophthalmic solution, , Disp: , Rfl:     bacitracin-polymyxin b (POLYSPORIN) 500-75126 UNIT/GM ophthalmic ointment, , Disp: , Rfl:     doxazosin (CARDURA) 2 MG tablet, Take 1 tablet by mouth Every Night., Disp: , Rfl:     doxycycline (MONODOX) 100 MG capsule, , Disp: , Rfl:     Eliquis DVT/PE Starter Pack tablet therapy pack, , Disp: , Rfl:     finasteride (PROSCAR) 5 MG tablet, Take 1 tablet by mouth Daily., Disp: , Rfl:     FOLIC ACID PO, Take 800 mcg by mouth Daily., Disp: , Rfl:     GaviLyte-G 236 g solution, , Disp: , Rfl:     glucosamine-chondroitin 500-400 MG capsule capsule, Take 2 capsules by mouth Daily., Disp: , Rfl:     isosorbide mononitrate (IMDUR) 30 MG 24 hr tablet, Take 1 tablet by mouth Daily., Disp: 30 tablet, Rfl: 5    levothyroxine (SYNTHROID, LEVOTHROID) 112 MCG tablet, Take 1 tablet by mouth Daily., Disp: , Rfl:     Bcpfari-Hnxdu-Thap-PassF-LBalm (MELATONIN + L-THEANINE PO), Take 1 tablet by mouth Daily As Needed., Disp: , Rfl:     metoprolol succinate XL (TOPROL-XL) 25 MG 24 hr tablet, Take 1 tablet by mouth Daily., Disp: 90 tablet, Rfl: 0    Multiple Vitamins-Minerals (CENTRUM SILVER 50+MEN PO), Take 1 tablet by mouth Daily., Disp: , Rfl:     naproxen (NAPROSYN) 500 MG tablet, , Disp: , Rfl:     nitroglycerin (NITROSTAT) 0.4 MG SL tablet, Place 1 tablet under the tongue Every 5 (Five) Minutes As Needed for Chest Pain. Take no more than 3 doses in 15 minutes., Disp: 100 tablet, Rfl: 0    omeprazole (priLOSEC) 40 MG capsule, Take 1 capsule by mouth Daily. Indications: Gastroesophageal Reflux Disease, Disp: 30 capsule, Rfl: 11    ondansetron ODT (ZOFRAN-ODT) 4 MG disintegrating tablet, Take 1 tablet by mouth 4 (Four) Times a Day As Needed for Nausea or Vomiting., Disp: 15 tablet, Rfl: 0    predniSONE (DELTASONE) 1 MG tablet, Take 2 tablets by mouth Daily., Disp: , Rfl:     rosuvastatin (CRESTOR) 40 MG tablet, Take  "1 tablet by mouth Every Night., Disp: 90 tablet, Rfl: 2    saccharomyces boulardii (Florastor) 250 MG capsule, Take 1 capsule by mouth 2 (Two) Times a Day., Disp: 14 capsule, Rfl: 0    Sod Picosulfate-Mag Ox-Cit Acd 10-3.5-12 MG-GM -GM/160ML solution, Take 350 mL by mouth Take As Directed., Disp: 350 mL, Rfl: 0    tamsulosin (FLOMAX) 0.4 MG capsule 24 hr capsule, Take 1 capsule by mouth 2 (Two) Times a Day., Disp: , Rfl:     TobraDex 0.3-0.1 % ophthalmic ointment, , Disp: , Rfl:     triamcinolone (KENALOG) 0.1 % lotion, , Disp: , Rfl:     vitamin B-12 (CYANOCOBALAMIN) 1000 MCG tablet, Take 1 tablet by mouth Daily., Disp: , Rfl:   No current facility-administered medications for this visit.    Facility-Administered Medications Ordered in Other Visits:     Chlorhexidine Gluconate Cloth 2 % pads 1 application, 1 application , Topical, Q12H PRN, Sd Mathew PA    Allergies:   Allergies   Allergen Reactions    Hydrocodone-Acetaminophen Anxiety    Lipitor [Atorvastatin] Anxiety and Myalgia           Lortab [Hydrocodone-Acetaminophen] Anxiety    Azithromycin Nausea Only         Objective      Vital Signs:   Vitals:    05/08/25 1117   BP: 110/74   Weight: 80.2 kg (176 lb 12.9 oz)   Height: 180.3 cm (70.98\")       Ortho Exam:  General: no acute distress, comfortable  Vitals reviewed in chart    Musculoskeletal Exam:    SIDE: Bilateral hips    Tenderness: greater trochanter    Painful arc of motion: no  No evidence of septic joint      Results Review:  None      Assessment / Plan      Assessment:   Diagnoses and all orders for this visit:    1. Trochanteric bursitis of both hips (Primary)    Other orders  -     - Large Joint Arthrocentesis: bilateral greater trochanteric bursa        Quality Metrics:   BMI:   BMI is within normal parameters. No other follow-up for BMI required.       Tobacco:   Rodrigo Baum  reports that he quit smoking about 31 years ago. His smoking use included cigarettes. He started smoking " about 51 years ago. He has a 20 pack-year smoking history. He has been exposed to tobacco smoke. He has never used smokeless tobacco.           Plan:  Responded well to corticosteroid injections in the past for bilateral greater trochanteric bursitis.  We will repeat injections today.    I discussed with the patient the potential benefits of performing a therapeutic injection of the bilateral greater trochanteric bursa as well as potential risks including but not limited to infection, swelling, pain, bleeding, bruising, nerve/vessel damage, skin color changes, transient elevation in blood glucose levels, and fat atrophy. After informed consent and verifying correct patient, procedure site, and type of procedure, the area was prepped with Hibiclens, ethyl chloride was used to numb the skin. Via the lateral approach, 4cc of 1% lidocaine and  40mg/ml of Kenalog were injected into each site. The patient tolerated the procedure well. There were no complications.      Follow Up:   4 months    Ines Hsieh PA-C  [facility] [place] [facility]    Dictated using Dragon Speech Recognition.

## 2025-05-13 NOTE — PROGRESS NOTES
Physical Therapy    Physical Therapy Treatment    Patient Name: Karina Lee  MRN: [id number]  Department: [facility]  Room: [address] [place] [address]  Today's Date: 5/13/2025  Time Calculation  Start Time: 1410  Stop Time: 1435  Time Calculation (min): 25 min    Assessment/Plan   PT Assessment  PT Assessment Results: Decreased strength, Decreased endurance, Decreased range of motion, Impaired balance, Decreased mobility, Pain  Rehab Prognosis: Fair  Barriers to Discharge Home: Physical needs  Caregiver Assistance: Patient lives alone and/or does not have reliable caregiver assistance  Physical Needs: Ambulating household distances limited by function/safety, Intermittent mobility assistance needed, High falls risk due to function or environment  End of Session Communication: PCT/NA/CTA, Bedside nurse  End of Session Patient Position: Up in chair, Alarm on  PT Plan  Inpatient/Swing Bed or Outpatient: Swing Bed  PT Plan  Treatment/Interventions: Transfer training, Gait training  PT Plan: Ongoing PT  PT Frequency: BID  PT Discharge Recommendations: Low intensity level of continued care  Equipment Recommended upon Discharge: Wheeled walker  PT Recommended Transfer Status: Assist x1  PT - OK to Discharge: Yes    General Visit Information:   PT  Visit  PT Received On: 05/13/25  Response to Previous Treatment: Patient with no complaints from previous session.  General  Reason for Referral: impaired ADLs  Referred By: WILLA Fitzpatrick-CNP  Past Medical History Relevant to Rehab: PMH: Acute URI, osteoporosis, anxiety, OA, CVA, GERD, HTN  Prior to Session Communication: Bedside nurse, PCT/NA/CTA  Patient Position Received: Up in chair, Alarm on  Preferred Learning Style: visual, verbal  General Comment: Pt pleseant and cooperative throughout session. Left with all needs in reach following session.    Subjective   Precautions:  Precautions  Hearing/Visual Limitations: Wear hearing aid/glasses  LE Weight Bearing Status: Weight Bearing as  Procedure   Large Joint Arthrocentesis  Date/Time: 8/15/2017 3:05 PM  Consent given by: patient  Site marked: site marked  Timeout: Immediately prior to procedure a time out was called to verify the correct patient, procedure, equipment, support staff and site/side marked as required   Supporting Documentation  Indications: pain   Procedure Details  Location: knee - L knee  Preparation: Patient was prepped and draped in the usual sterile fashion  Needle size: 22 G  Approach: anterolateral  Medications administered: 20 mg Sodium Hyaluronate 20 MG/2ML  Patient tolerance: patient tolerated the procedure well with no immediate complications            Tolerated  Medical Precautions: Fall precautions  Precautions Comment: R hip fx non operable    Objective   Pain:  Pain Assessment  Pain Assessment: 0-10  0-10 (Numeric) Pain Score: 0 - No pain  Cognition:  Cognition  Overall Cognitive Status: Within Functional Limits  Orientation Level: Oriented X4  Coordination:  Movements are Fluid and Coordinated: Yes  Postural Control:  Static Sitting Balance  Static Sitting-Level of Assistance: Modified independent  Dynamic Sitting Balance  Dynamic Sitting-Level of Assistance: Distant supervision  Static Standing Balance  Static Standing-Level of Assistance: Distant supervision  Dynamic Standing Balance  Dynamic Standing-Level of Assistance: Contact guard  Activity Tolerance:  Activity Tolerance  Endurance: Tolerates 30 min exercise with multiple rests  Treatments:  Therapeutic Exercise  Therapeutic Exercise Performed: Yes  Therapeutic Exercise Activity 1: Seated LAQ's 3# x25  Therapeutic Exercise Activity 2: Seated hip Flexions 3# x 25  Therapeutic Exercise Activity 3: Hipa add/abd GTB X 25    Therapeutic Activity  Therapeutic Activity Performed: Yes  Therapeutic Activity 1: Side stepping at large table in community room to simulate home bathroom situation    Ambulation/Gait Training  Ambulation/Gait Training Performed: Yes  Ambulation/Gait Training 1  Surface 1: Level tile  Device 1: Rolling walker  Gait Support Devices: Gait belt  Assistance 1: Distant supervision  Quality of Gait 1: Shuffling gait  Comments/Distance (ft) 1: 75 ft x's 2  Transfers  Transfer: Yes  Transfer 1  Transfer From 1: Chair with arms to  Transfer to 1: Stand  Technique 1: Sit to stand, Stand to sit, Stand pivot  Transfer Device 1: Walker, Gait belt  Transfer Level of Assistance 1: Distant supervision  Trials/Comments 1: Completed several T/O treat    Outcome Measures:  [facility] Basic Mobility  Turning from your back to your side while in a flat bed without using bedrails: A little  Moving from lying on  your back to sitting on the side of a flat bed without using bedrails: A little  Moving to and from bed to chair (including a wheelchair): A little  Standing up from a chair using your arms (e.g. wheelchair or bedside chair): A little  To walk in hospital room: A little  Climbing 3-5 steps with railing: A lot  Basic Mobility - Total Score: 17    Encounter Problems       Encounter Problems (Active)       PT Problem       pt will be able to ambulate 20 ft with FWW and modified independent  (Progressing)       Start:  05/05/25    Expected End:  05/26/25            Pt will be able to navigate 1 4 inch threshold with no hand rail and modified independent to allow for safe DC.   (Progressing)       Start:  05/05/25    Expected End:  05/26/25            Pt will be able to perform STS transfer with modified independent  (Progressing)       Start:  05/05/25    Expected End:  05/26/25            pt will be able to propel self home distances with manual WC and modified independent  (Progressing)       Start:  05/05/25    Expected End:  05/26/25            Pt will demonstrate ability to  object from ground from sitting position, without a device and mod I (Progressing)       Start:  05/05/25    Expected End:  05/26/25            Pt will be able to navigate in narrow as needed w FWW to allow for entrance into home bathroom. (Progressing)       Start:  05/05/25    Expected End:  05/26/25               Pain - Adult

## 2025-08-07 DIAGNOSIS — M17.0 PRIMARY OSTEOARTHRITIS OF BOTH KNEES: Primary | ICD-10-CM

## (undated) DEVICE — NDL PERC 1PRT THNWALL W/BASEPLT 18G 7CM

## (undated) DEVICE — PK HEART OPN 10

## (undated) DEVICE — GW INQWIRE FC PTFE STD J/1.5 .035 260

## (undated) DEVICE — 32 FR RIGHT ANGLE – SOFT PVC CATHETER: Brand: PVC THORACIC CATHETERS

## (undated) DEVICE — CATH DIAG IMPULSE IMT 5F 100CM

## (undated) DEVICE — ST BLOOD ADMIN YTP 80IN

## (undated) DEVICE — ST INF 10DRP 4 PORT 4WY/STPCOCK 112IN

## (undated) DEVICE — A2000 MULTI-USE SYRINGE KIT, P/N 701277-003KIT CONTENTS: 100ML CONTRAST RESERVOIR AND TUBING WITH CONTRAST SPIKE AND CLAMP: Brand: A2000 MULTI-USE SYRINGE KIT

## (undated) DEVICE — Device: Brand: MEDEX

## (undated) DEVICE — Device

## (undated) DEVICE — MODEL BT2000 P/N 700287-012KIT CONTENTS: MANIFOLD WITH SALINE AND CONTRAST PORTS, SALINE TUBING WITH SPIKE AND HAND SYRINGE, TRANSDUCER: Brand: BT2000 AUTOMATED MANIFOLD KIT

## (undated) DEVICE — SOL NACL 0.9PCT 1000ML

## (undated) DEVICE — PK CATH CARD 10

## (undated) DEVICE — SUCTION CANISTER, 2500CC, RIGID: Brand: DEROYAL

## (undated) DEVICE — SUT PROLN 4/0 RB1 D/A 36IN 8557H

## (undated) DEVICE — 2963 MEDIPORE SOFT CLOTH TAPE 3 IN X 10 YD 12 RLS/CS: Brand: 3M™ MEDIPORE™

## (undated) DEVICE — CANNULA,ADULT,SOFT-TOUCH,7'TUBE,UC: Brand: PENDING

## (undated) DEVICE — SUT PROLN 4/0 SH D/A 36IN 8521H

## (undated) DEVICE — ANTIBACTERIAL UNDYED BRAIDED (POLYGLACTIN 910), SYNTHETIC ABSORBABLE SUTURE: Brand: COATED VICRYL

## (undated) DEVICE — CATH DIAG EXPO .045 FL3  5F 100CM

## (undated) DEVICE — SUT PROLN 7/0 CV BV1 24IN 8304H BX/36

## (undated) DEVICE — GLIDESHEATH SLENDER STAINLESS STEEL KIT: Brand: GLIDESHEATH SLENDER

## (undated) DEVICE — ST EXT IV SMARTSITE 2VLV SP M LL 5ML IV1

## (undated) DEVICE — MODEL AT P65, P/N 701554-001KIT CONTENTS: HAND CONTROLLER, 3-WAY HIGH-PRESSURE STOPCOCK WITH ROTATING END AND PREMIUM HIGH-PRESSURE TUBING: Brand: ANGIOTOUCH® KIT

## (undated) DEVICE — TEMP PACING WIRE: Brand: MYO/WIRE

## (undated) DEVICE — PRESSURE MONITORING SET: Brand: TRUWAVE

## (undated) DEVICE — DEV INFL MONARCH 25W

## (undated) DEVICE — AVANTI + 4F STD W/GW: Brand: AVANTI

## (undated) DEVICE — SUT SILK 2 SUTUPAK TIE 60IN SA8H 2STRAND

## (undated) DEVICE — PRESSURE MONITORING SET: Brand: TRUWAVE, VAMP

## (undated) DEVICE — 12 FOOT DISPOSABLE EXTENSION CABLE WITH SAFE CONNECT / SCREW-DOWN

## (undated) DEVICE — PRESSURE MONITORING ACCESSORY: Brand: TRUWAVE

## (undated) DEVICE — CLTH CLENS READYCLEANSE PERI CARE PK/5

## (undated) DEVICE — SENSR CERBRL O2 SOMASENSOR ADHS A/ LF

## (undated) DEVICE — PK SPECIALTYCARE M/STATE 1 OH 1/2V CUST

## (undated) DEVICE — CATH DIAG EXPO M/ PK 5F FL4/FR4 PIG

## (undated) DEVICE — GW PRESS VERRATA STR 185CM

## (undated) DEVICE — ST INF PRI SMRTSTE 20DRP 2VLV 24ML 117

## (undated) DEVICE — INTRAOPERATIVE COVER KIT, 10 PACK: Brand: SITE-RITE

## (undated) DEVICE — SUT PROLN 3/0 SH D/A 36IN 8522H

## (undated) DEVICE — ELASTIC BANDAGE: Brand: DEROYAL

## (undated) DEVICE — TOWEL,OR,DSP,ST,BLUE,STD,8/PK,10PK/CS: Brand: MEDLINE

## (undated) DEVICE — CATH FOL COUNCL 2WY 16F 5CC

## (undated) DEVICE — OASIS DRAIN, SINGLE, INLINE & ATS COMPATIBLE: Brand: OASIS

## (undated) DEVICE — DRSNG SURESITE WNDW 4X4.5

## (undated) DEVICE — DRSNG WND BORDR/ADHS NONADHR/GZ LF 4X10IN STRL

## (undated) DEVICE — DEV COMP RAD PRELUDESYNC 24CM

## (undated) DEVICE — TUBING, SUCTION, 1/4" X 10', STRAIGHT: Brand: MEDLINE

## (undated) DEVICE — AIRWY 90MM NO9

## (undated) DEVICE — SUT PROLN 6/0 C1 D/A 30IN 8706H

## (undated) DEVICE — SUT PDS 1 CTX 36IN VIO PDP371T

## (undated) DEVICE — INTRO SHEATH PRELUDE IDEAL SPRNG COIL 021 6F 23X80CM

## (undated) DEVICE — CYSTO/BLADDER IRRIGATION SET, REGULATING CLAMP

## (undated) DEVICE — SOL NS 500ML

## (undated) DEVICE — SUT SILK 0/0 CT2 18IN C027D

## (undated) DEVICE — MEDI-VAC NON-CONDUCTIVE SUCTION TUBING: Brand: CARDINAL HEALTH

## (undated) DEVICE — FLTR HME STR UNIV W/SMPL PORT

## (undated) DEVICE — KT MANIFOLD CATHLAB CUST

## (undated) DEVICE — NITINOL WIRE WITH HYDROPHILIC TIP: Brand: SENSOR

## (undated) DEVICE — VASOVIEW HEMOPRO: Brand: VASOVIEW HEMOPRO

## (undated) DEVICE — SPNG GZ WOVN 4X4IN 12PLY 10/BX STRL

## (undated) DEVICE — 32 FR STRAIGHT – SOFT PVC CATHETER: Brand: PVC THORACIC CATHETERS

## (undated) DEVICE — MEDI-VAC YANKAUER SUCTION HANDLE W/BULBOUS TIP: Brand: CARDINAL HEALTH

## (undated) DEVICE — CATH URETRL FLXITP POLLACK STD 5F 70CM

## (undated) DEVICE — GUIDE CATHETER: Brand: MACH1™

## (undated) DEVICE — SUT SILK 4/0 TIES 18IN A183H